# Patient Record
Sex: MALE | Race: WHITE | NOT HISPANIC OR LATINO | Employment: OTHER | URBAN - METROPOLITAN AREA
[De-identification: names, ages, dates, MRNs, and addresses within clinical notes are randomized per-mention and may not be internally consistent; named-entity substitution may affect disease eponyms.]

---

## 2017-01-04 ENCOUNTER — GENERIC CONVERSION - ENCOUNTER (OUTPATIENT)
Dept: OTHER | Facility: OTHER | Age: 52
End: 2017-01-04

## 2017-02-01 ENCOUNTER — HOSPITAL ENCOUNTER (OUTPATIENT)
Dept: SLEEP CENTER | Facility: CLINIC | Age: 52
Discharge: HOME | End: 2017-02-01
Payer: COMMERCIAL

## 2017-02-01 DIAGNOSIS — G47.33 OSA (OBSTRUCTIVE SLEEP APNEA): ICD-10-CM

## 2017-03-16 ENCOUNTER — APPOINTMENT (OUTPATIENT)
Dept: LAB | Facility: CLINIC | Age: 52
End: 2017-03-16
Payer: COMMERCIAL

## 2017-03-16 DIAGNOSIS — E78.5 HYPERLIPIDEMIA: ICD-10-CM

## 2017-03-16 DIAGNOSIS — E29.1 TESTICULAR HYPOFUNCTION: ICD-10-CM

## 2017-03-16 DIAGNOSIS — R73.01 IMPAIRED FASTING GLUCOSE: ICD-10-CM

## 2017-03-16 LAB
ALBUMIN SERPL BCP-MCNC: 4.1 G/DL (ref 3.5–5)
ALP SERPL-CCNC: 53 U/L (ref 46–116)
ALT SERPL W P-5'-P-CCNC: 62 U/L (ref 12–78)
ANION GAP SERPL CALCULATED.3IONS-SCNC: 9 MMOL/L (ref 4–13)
AST SERPL W P-5'-P-CCNC: 38 U/L (ref 5–45)
BASOPHILS # BLD AUTO: 0.03 THOUSANDS/ΜL (ref 0–0.1)
BASOPHILS NFR BLD AUTO: 0 % (ref 0–1)
BILIRUB SERPL-MCNC: 0.58 MG/DL (ref 0.2–1)
BUN SERPL-MCNC: 22 MG/DL (ref 5–25)
CALCIUM SERPL-MCNC: 8.6 MG/DL (ref 8.3–10.1)
CHLORIDE SERPL-SCNC: 98 MMOL/L (ref 100–108)
CHOLEST SERPL-MCNC: 127 MG/DL (ref 50–200)
CO2 SERPL-SCNC: 29 MMOL/L (ref 21–32)
CREAT SERPL-MCNC: 0.85 MG/DL (ref 0.6–1.3)
EOSINOPHIL # BLD AUTO: 0.18 THOUSAND/ΜL (ref 0–0.61)
EOSINOPHIL NFR BLD AUTO: 2 % (ref 0–6)
ERYTHROCYTE [DISTWIDTH] IN BLOOD BY AUTOMATED COUNT: 13.4 % (ref 11.6–15.1)
EST. AVERAGE GLUCOSE BLD GHB EST-MCNC: 140 MG/DL
GFR SERPL CREATININE-BSD FRML MDRD: >60 ML/MIN/1.73SQ M
GLUCOSE P FAST SERPL-MCNC: 117 MG/DL (ref 65–99)
HBA1C MFR BLD: 6.5 % (ref 4.2–6.3)
HCT VFR BLD AUTO: 45.7 % (ref 36.5–49.3)
HDLC SERPL-MCNC: 39 MG/DL (ref 40–60)
HGB BLD-MCNC: 15.8 G/DL (ref 12–17)
LDLC SERPL CALC-MCNC: 66 MG/DL (ref 0–100)
LYMPHOCYTES # BLD AUTO: 4 THOUSANDS/ΜL (ref 0.6–4.47)
LYMPHOCYTES NFR BLD AUTO: 43 % (ref 14–44)
MCH RBC QN AUTO: 32.2 PG (ref 26.8–34.3)
MCHC RBC AUTO-ENTMCNC: 34.6 G/DL (ref 31.4–37.4)
MCV RBC AUTO: 93 FL (ref 82–98)
MONOCYTES # BLD AUTO: 0.94 THOUSAND/ΜL (ref 0.17–1.22)
MONOCYTES NFR BLD AUTO: 10 % (ref 4–12)
NEUTROPHILS # BLD AUTO: 4.06 THOUSANDS/ΜL (ref 1.85–7.62)
NEUTS SEG NFR BLD AUTO: 45 % (ref 43–75)
NRBC BLD AUTO-RTO: 0 /100 WBCS
PLATELET # BLD AUTO: 197 THOUSANDS/UL (ref 149–390)
PMV BLD AUTO: 11.3 FL (ref 8.9–12.7)
POTASSIUM SERPL-SCNC: 3.9 MMOL/L (ref 3.5–5.3)
PROT SERPL-MCNC: 7.5 G/DL (ref 6.4–8.2)
PSA SERPL-MCNC: 0.6 NG/ML (ref 0–4)
RBC # BLD AUTO: 4.9 MILLION/UL (ref 3.88–5.62)
SODIUM SERPL-SCNC: 136 MMOL/L (ref 136–145)
TRIGL SERPL-MCNC: 112 MG/DL
WBC # BLD AUTO: 9.23 THOUSAND/UL (ref 4.31–10.16)

## 2017-03-16 PROCEDURE — 80061 LIPID PANEL: CPT

## 2017-03-16 PROCEDURE — 36415 COLL VENOUS BLD VENIPUNCTURE: CPT

## 2017-03-16 PROCEDURE — 84153 ASSAY OF PSA TOTAL: CPT

## 2017-03-16 PROCEDURE — 85025 COMPLETE CBC W/AUTO DIFF WBC: CPT

## 2017-03-16 PROCEDURE — 80053 COMPREHEN METABOLIC PANEL: CPT

## 2017-03-16 PROCEDURE — 84402 ASSAY OF FREE TESTOSTERONE: CPT

## 2017-03-16 PROCEDURE — 83036 HEMOGLOBIN GLYCOSYLATED A1C: CPT

## 2017-03-16 PROCEDURE — 84403 ASSAY OF TOTAL TESTOSTERONE: CPT

## 2017-03-17 LAB
TESTOST FREE SERPL-MCNC: 24.8 PG/ML (ref 7.2–24)
TESTOST SERPL-MCNC: 752 NG/DL (ref 348–1197)
TESTOSTERONE COMMENT: ABNORMAL

## 2017-03-20 ENCOUNTER — GENERIC CONVERSION - ENCOUNTER (OUTPATIENT)
Dept: OTHER | Facility: OTHER | Age: 52
End: 2017-03-20

## 2017-03-28 ENCOUNTER — ALLSCRIPTS OFFICE VISIT (OUTPATIENT)
Dept: OTHER | Facility: OTHER | Age: 52
End: 2017-03-28

## 2017-04-04 ENCOUNTER — ALLSCRIPTS OFFICE VISIT (OUTPATIENT)
Dept: OTHER | Facility: OTHER | Age: 52
End: 2017-04-04

## 2017-04-05 ENCOUNTER — GENERIC CONVERSION - ENCOUNTER (OUTPATIENT)
Dept: OTHER | Facility: OTHER | Age: 52
End: 2017-04-05

## 2017-04-10 ENCOUNTER — ALLSCRIPTS OFFICE VISIT (OUTPATIENT)
Dept: OTHER | Facility: OTHER | Age: 52
End: 2017-04-10

## 2017-04-11 ENCOUNTER — ALLSCRIPTS OFFICE VISIT (OUTPATIENT)
Dept: OTHER | Facility: OTHER | Age: 52
End: 2017-04-11

## 2017-04-18 ENCOUNTER — ALLSCRIPTS OFFICE VISIT (OUTPATIENT)
Dept: OTHER | Facility: OTHER | Age: 52
End: 2017-04-18

## 2017-04-25 ENCOUNTER — ALLSCRIPTS OFFICE VISIT (OUTPATIENT)
Dept: OTHER | Facility: OTHER | Age: 52
End: 2017-04-25

## 2017-06-27 ENCOUNTER — ALLSCRIPTS OFFICE VISIT (OUTPATIENT)
Dept: OTHER | Facility: OTHER | Age: 52
End: 2017-06-27

## 2017-07-03 DIAGNOSIS — E29.1 TESTICULAR HYPOFUNCTION: ICD-10-CM

## 2017-07-03 DIAGNOSIS — E11.9 TYPE 2 DIABETES MELLITUS WITHOUT COMPLICATIONS (HCC): ICD-10-CM

## 2017-07-03 DIAGNOSIS — E78.5 HYPERLIPIDEMIA: ICD-10-CM

## 2017-07-05 ENCOUNTER — GENERIC CONVERSION - ENCOUNTER (OUTPATIENT)
Dept: OTHER | Facility: OTHER | Age: 52
End: 2017-07-05

## 2017-07-19 ENCOUNTER — TRANSCRIBE ORDERS (OUTPATIENT)
Dept: LAB | Facility: CLINIC | Age: 52
End: 2017-07-19

## 2017-07-19 ENCOUNTER — GENERIC CONVERSION - ENCOUNTER (OUTPATIENT)
Dept: OTHER | Facility: OTHER | Age: 52
End: 2017-07-19

## 2017-07-19 ENCOUNTER — APPOINTMENT (OUTPATIENT)
Dept: LAB | Facility: CLINIC | Age: 52
End: 2017-07-19
Payer: COMMERCIAL

## 2017-07-19 DIAGNOSIS — E78.5 HYPERLIPIDEMIA: ICD-10-CM

## 2017-07-19 DIAGNOSIS — Z00.8 HEALTH EXAMINATION IN POPULATION SURVEY: ICD-10-CM

## 2017-07-19 DIAGNOSIS — E11.9 TYPE 2 DIABETES MELLITUS WITHOUT COMPLICATIONS (HCC): ICD-10-CM

## 2017-07-19 DIAGNOSIS — Z00.8 HEALTH EXAMINATION IN POPULATION SURVEY: Primary | ICD-10-CM

## 2017-07-19 DIAGNOSIS — E29.1 TESTICULAR HYPOFUNCTION: ICD-10-CM

## 2017-07-19 LAB
ALBUMIN SERPL BCP-MCNC: 3.8 G/DL (ref 3.5–5)
ALP SERPL-CCNC: 52 U/L (ref 46–116)
ALT SERPL W P-5'-P-CCNC: 43 U/L (ref 12–78)
ANION GAP SERPL CALCULATED.3IONS-SCNC: 5 MMOL/L (ref 4–13)
AST SERPL W P-5'-P-CCNC: 31 U/L (ref 5–45)
BILIRUB SERPL-MCNC: 0.65 MG/DL (ref 0.2–1)
BUN SERPL-MCNC: 16 MG/DL (ref 5–25)
CALCIUM SERPL-MCNC: 8.8 MG/DL (ref 8.3–10.1)
CHLORIDE SERPL-SCNC: 99 MMOL/L (ref 100–108)
CHOLEST SERPL-MCNC: 107 MG/DL (ref 50–200)
CO2 SERPL-SCNC: 31 MMOL/L (ref 21–32)
CREAT SERPL-MCNC: 0.81 MG/DL (ref 0.6–1.3)
CREAT UR-MCNC: 167 MG/DL
EST. AVERAGE GLUCOSE BLD GHB EST-MCNC: 143 MG/DL
GFR SERPL CREATININE-BSD FRML MDRD: >60 ML/MIN/1.73SQ M
GLUCOSE P FAST SERPL-MCNC: 118 MG/DL (ref 65–99)
HBA1C MFR BLD: 6.6 % (ref 4.2–6.3)
HDLC SERPL-MCNC: 32 MG/DL (ref 40–60)
LDLC SERPL CALC-MCNC: 57 MG/DL (ref 0–100)
MICROALBUMIN UR-MCNC: 6.3 MG/L (ref 0–20)
MICROALBUMIN/CREAT 24H UR: 4 MG/G CREATININE (ref 0–30)
POTASSIUM SERPL-SCNC: 4 MMOL/L (ref 3.5–5.3)
PROT SERPL-MCNC: 7 G/DL (ref 6.4–8.2)
SODIUM SERPL-SCNC: 135 MMOL/L (ref 136–145)
TRIGL SERPL-MCNC: 90 MG/DL

## 2017-07-19 PROCEDURE — 82043 UR ALBUMIN QUANTITATIVE: CPT

## 2017-07-19 PROCEDURE — 82570 ASSAY OF URINE CREATININE: CPT

## 2017-07-19 PROCEDURE — 84402 ASSAY OF FREE TESTOSTERONE: CPT

## 2017-07-19 PROCEDURE — 80053 COMPREHEN METABOLIC PANEL: CPT

## 2017-07-19 PROCEDURE — 84403 ASSAY OF TOTAL TESTOSTERONE: CPT

## 2017-07-19 PROCEDURE — 36415 COLL VENOUS BLD VENIPUNCTURE: CPT

## 2017-07-19 PROCEDURE — 83036 HEMOGLOBIN GLYCOSYLATED A1C: CPT

## 2017-07-19 PROCEDURE — 80061 LIPID PANEL: CPT

## 2017-07-20 ENCOUNTER — GENERIC CONVERSION - ENCOUNTER (OUTPATIENT)
Dept: OTHER | Facility: OTHER | Age: 52
End: 2017-07-20

## 2017-07-20 LAB
TESTOST FREE SERPL-MCNC: 25.2 PG/ML (ref 7.2–24)
TESTOST SERPL-MCNC: 962 NG/DL (ref 264–916)

## 2017-08-08 ENCOUNTER — GENERIC CONVERSION - ENCOUNTER (OUTPATIENT)
Dept: OTHER | Facility: OTHER | Age: 52
End: 2017-08-08

## 2017-09-05 DIAGNOSIS — E29.1 TESTICULAR HYPOFUNCTION: ICD-10-CM

## 2017-09-05 DIAGNOSIS — I10 ESSENTIAL (PRIMARY) HYPERTENSION: ICD-10-CM

## 2017-09-23 ENCOUNTER — TRANSCRIBE ORDERS (OUTPATIENT)
Dept: ADMINISTRATIVE | Age: 52
End: 2017-09-23

## 2017-09-23 ENCOUNTER — APPOINTMENT (OUTPATIENT)
Dept: LAB | Age: 52
End: 2017-09-23
Payer: COMMERCIAL

## 2017-09-23 DIAGNOSIS — E29.1 TESTICULAR HYPOFUNCTION: ICD-10-CM

## 2017-09-23 PROCEDURE — 84402 ASSAY OF FREE TESTOSTERONE: CPT

## 2017-09-23 PROCEDURE — 84403 ASSAY OF TOTAL TESTOSTERONE: CPT

## 2017-09-23 PROCEDURE — 36415 COLL VENOUS BLD VENIPUNCTURE: CPT

## 2017-09-25 ENCOUNTER — GENERIC CONVERSION - ENCOUNTER (OUTPATIENT)
Dept: OTHER | Facility: OTHER | Age: 52
End: 2017-09-25

## 2017-09-25 LAB
TESTOST FREE SERPL-MCNC: 8.4 PG/ML (ref 7.2–24)
TESTOST SERPL-MCNC: 487 NG/DL (ref 264–916)

## 2017-09-26 ENCOUNTER — GENERIC CONVERSION - ENCOUNTER (OUTPATIENT)
Dept: OTHER | Facility: OTHER | Age: 52
End: 2017-09-26

## 2017-10-04 ENCOUNTER — ALLSCRIPTS OFFICE VISIT (OUTPATIENT)
Dept: OTHER | Facility: OTHER | Age: 52
End: 2017-10-04

## 2017-10-17 ENCOUNTER — APPOINTMENT (OUTPATIENT)
Dept: LAB | Facility: CLINIC | Age: 52
End: 2017-10-17
Payer: COMMERCIAL

## 2017-10-17 DIAGNOSIS — I10 ESSENTIAL (PRIMARY) HYPERTENSION: ICD-10-CM

## 2017-10-17 LAB
ANION GAP SERPL CALCULATED.3IONS-SCNC: 8 MMOL/L (ref 4–13)
BUN SERPL-MCNC: 13 MG/DL (ref 5–25)
CALCIUM SERPL-MCNC: 8.6 MG/DL (ref 8.3–10.1)
CHLORIDE SERPL-SCNC: 99 MMOL/L (ref 100–108)
CO2 SERPL-SCNC: 30 MMOL/L (ref 21–32)
CREAT SERPL-MCNC: 0.93 MG/DL (ref 0.6–1.3)
GFR SERPL CREATININE-BSD FRML MDRD: 95 ML/MIN/1.73SQ M
GLUCOSE SERPL-MCNC: 124 MG/DL (ref 65–140)
POTASSIUM SERPL-SCNC: 3.9 MMOL/L (ref 3.5–5.3)
SODIUM SERPL-SCNC: 137 MMOL/L (ref 136–145)

## 2017-10-17 PROCEDURE — 80048 BASIC METABOLIC PNL TOTAL CA: CPT

## 2017-10-17 PROCEDURE — 36415 COLL VENOUS BLD VENIPUNCTURE: CPT

## 2017-10-18 ENCOUNTER — GENERIC CONVERSION - ENCOUNTER (OUTPATIENT)
Dept: OTHER | Facility: OTHER | Age: 52
End: 2017-10-18

## 2017-10-24 ENCOUNTER — ALLSCRIPTS OFFICE VISIT (OUTPATIENT)
Dept: OTHER | Facility: OTHER | Age: 52
End: 2017-10-24

## 2017-10-25 NOTE — PROGRESS NOTES
Assessment  1  Parotid gland enlargement (527 1) (K11 1)    Plan  Parotid gland enlargement    · Amoxicillin-Pot Clavulanate 875-125 MG Oral Tablet; TAKE 1 TABLET TWICE  DAILY AFTER MEALS UNTIL FINISHED    Discussion/Summary    Course of antibiotics for suspected parotitis  I advised CT scan parotids for persistent symptoms  Call if any changes  Chief Complaint  1  Sore Throat    History of Present Illness  HPI: Patient presents with a 2 day history of mild swelling parotid area R > L  no pain with chewing or swallowing  no nasal congestion or post nasal drainage  Current medications reviewed  History of ankylosing spondylitis      Review of Systems    Constitutional: no fever,-- no recent weight gain,-- no chills-- and-- no recent weight loss  ENT: as noted in HPI,-- no earache,-- no sore throat,-- no nasal discharge-- and-- no hoarseness  Respiratory: no cough  Gastrointestinal: no nausea,-- no vomiting-- and-- no diarrhea  Integumentary: no rashes  Neurological: no headache  Active Problems  1  Cervical radiculopathy (723 4) (M54 12)   2  Chronic pain syndrome (338 4) (G89 4)   3  Encounter for long-term (current) use of medications (V58 69) (Z79 899)   4  Essential hypertension (401 9) (I10)   5  Hyperlipidemia (272 4) (E78 5)   6  Lumbar postlaminectomy syndrome (722 83) (M96 1)   7  Testicular hypogonadism (257 2) (E29 1)   8  Tobacco use (305 1) (Z72 0)   9  Type 2 diabetes mellitus (250 00) (E11 9)    Past Medical History  1  History of Pilonidal cyst (685 1) (L05 91)    Family History  Father    1  Family history of Coronary Artery Disease (V17 49)   2  Family history of Heart Disease (V17 49)  Sister    3  Family history of Hypertension (V17 49)   4  Family history of Pure Hypercholesterolemia  Brother    5  Family history of Diabetes Mellitus (V18 0)   6  Family history of Diabetes Mellitus (V18 0)   7  Family history of Hypertension (V17 49)   8   Family history of Pure Hypercholesterolemia  Family History    9  Family history of Hypertension (V17 49)   10  Family history of No Significant Family History    Social History   · Denied: History of Alcohol Use (History)   · Current every day smoker (305 1) (F17 200)   · Denied: History of Drug Use   · Denied: History of Never A Smoker   · Tobacco use (305 1) (Z72 0)    Surgical History  1  History of Electr Analysis Of Progr Impl Pump W/ Reprogram & Refill, Requiring Physian    Current Meds   1  Atorvastatin Calcium 20 MG Oral Tablet; TAKE 1 TABLET AT BEDTIME; Therapy: 96CUF7807 to (Evaluate:44Ncx1222)  Requested for: 06DQR7371; Last   Rx:05Nlw4757 Ordered   2  BD Luer-Jordon Syringe 21G X 1 3 ML Miscellaneous; Use 1 per week; Therapy: 31LNT0707 to (Evaluate:12Ryt8903)  Requested for: 40CPX0984; Last   Rx:03Zoh6746 Ordered   3  Dilaudid SOLN Recorded   4  Gabapentin 300 MG Oral Capsule; take 3 daily Recorded   5  Lisinopril 20 MG Oral Tablet; Take 1 tablet daily; Therapy: 10PEJ9064 to (Last Rx:04Oct2017)  Requested for: 58HIO0700 Ordered   6  Methadone HCl - 10 MG Oral Tablet; Therapy: 25UOZ6074 to (Last PC:23VRI4895)  Requested for: 92PPU9157 Ordered   7  Omeprazole 40 MG Oral Capsule Delayed Release; Therapy: 21Jan2012 to (Last GB:14AVE7156)  Requested for: 21Jan2012 Ordered   8  OneTouch Lancets Miscellaneous; TEST 3X PER WEEK; Therapy: 52UYS4579 to (Evaluate:06Lcr6206)  Requested for: 28Mar2017; Last   Rx:28Mar2017 Ordered   9  OneTouch Verio In [employer]; TEST 3X PER WEEK; Therapy: 14LRX5133 to (Evaluate:84Zwd9589)  Requested for: 28Mar2017; Last   Rx:28Mar2017 Ordered   10  OneTouch Verio w/Device Kit; USE AS DIRECTED; Therapy: 60EWS8533 to (Evaluate:27Jun2017); Last Rx:29Mar2017 Ordered   11  OxyCONTIN 40 MG TB12;    Therapy: 75PAB9194 to (Last Rx:12Jan2012)  Requested for: 12Jan2012 Ordered   12  OxyCONTIN 80 MG TB12;    Therapy: 67HKV1974 to (Last Rx:12Jan2012)  Requested for: 12Jan2012 Ordered   13  Testosterone Cypionate 200 MG/ML Intramuscular Solution; Inject 80mg (0 4mL) weekly    as directed by physician; Therapy: 18TGN4691 to (Evaluate:21Jan2018) Recorded    Allergies  1  No Known Drug Allergies    Vitals   Recorded: 17GKY1885 11:32AM   Temperature 98 5 F, Tympanic   Heart Rate 78   Respiration 16   Systolic 314, LUE, Sitting   Diastolic 80, LUE, Sitting   Height 5 ft 9 in   Weight 220 lb    BMI Calculated 32 49   BSA Calculated 2 15     Physical Exam    Constitutional   General appearance: No acute distress, well appearing and well nourished  Eyes   Conjunctiva and lids: No erythema, swelling or discharge  -- sclera anicteric  Ears, Nose, Mouth, and Throat mild swelling parotid areas R > L  no tenderness  no redness or warmth  no masses  Otoscopic examination: Tympanic membranes translucent with normal light reflex  Canals patent without erythema  Oropharynx: Normal with no erythema, edema, exudate or lesions  Neck   Neck: Supple, symmetric, trachea midline, no masses  Thyroid: Normal, no thyromegaly  Pulmonary   Auscultation of lungs: Clear to auscultation  Cardiovascular   Auscultation of heart: Normal rate and rhythm, normal S1 and S2, no murmurs  Carotid pulses: 2+ bilaterally  Lymphatic   Palpation of lymph nodes in neck: No lymphadenopathy  no anterior cervical node enlargement,-- no posterior cervical node enlargement,-- no submandibular node enlargement-- and-- no supraclavicular node enlargement  Skin   Skin and subcutaneous tissue: Normal without rashes or lesions  Results/Data  (1) BASIC METABOLIC PROFILE 10WBT0474 11:44AM Donna Brizuelaiden Order Number: [id number]     Test Name Result Flag Reference   GLUCOSE,RANDM 124 mg/dL     If the patient is fasting, the ADA then defines impaired fasting glucose as > 100 mg/dL and diabetes as > or equal to 123 mg/dL    Specimen collection should occur prior to Sulfasalazine administration due to the potential for falsely depressed results  Specimen collection should occur prior to Sulfapyridine administration due to the potential for falsely elevated results  SODIUM 137 mmol/L  136-145   POTASSIUM 3 9 mmol/L  3 5-5 3   CHLORIDE 99 mmol/L L 100-108   CARBON DIOXIDE 30 mmol/L  21-32   ANION GAP (CALC) 8 mmol/L  4-13   BLOOD UREA NITROGEN 13 mg/dL  5-25   CREATININE 0 93 mg/dL  0 60-1 30   Standardized to IDMS reference method   CALCIUM 8 6 mg/dL  8 3-10 1   eGFR 95 ml/min/1 73sq [facility] Disease Education Program recommendations are as follows:  GFR calculation is accurate only with a steady state creatinine  Chronic Kidney disease less than 60 ml/min/1 73 sq  meters  Kidney failure less than 15 ml/min/1 73 sq  meters  (1) TESTOSTERONE, FREE (DIRECT) AND TOTAL 10Qnh6035 11:06AM Brown Keel Order Number: [id number]     Test Name Result Flag Reference   FREE TESTOSTERONE, DIRECT 8 4 pg/mL  7 2 - 24 0   TESTOSTERONE (TOTAL) 487 ng/dL  36 - 65   Adult male reference interval is based on a population of  healthy nonobese males (BMI <30) between 23and 44years old  [address] [place] [address], [address] [place] [address] [phone]  PMID: [id number]  Performed at:  [address]  [id number]  : Kailey Guerrero MD, Phone:  [phone]     (1) HEMOGLOBIN A1C 51MYT3023 08:12AM Brown Keel Order Number: [id number]     Test Name Result Flag Reference   HEMOGLOBIN A1C 6 6 % H 4 2-6 3   EST  AVG   GLUCOSE 143 mg/dl       (1) MICROALBUMIN CREATININE RATIO, RANDOM URINE 46Djj9983 08:12AM Brown Keel Order Number: [id number]     Test Name Result Flag Reference   MICROALBUMIN/ CREAT R 4 mg/g creatinine  0-30   MICROALBUMIN,URINE 6 3 mg/L  0 0-20 0   CREATININE URINE 167 0 mg/dL       (1) COMPREHENSIVE METABOLIC PANEL 26ZJE9150 77:17RQ Brown Keel Order Number: [id number]     Test Name Result Flag Reference   SODIUM 135 mmol/L L 136-145   POTASSIUM 4 0 mmol/L  3 5-5 3   CHLORIDE 99 mmol/L L 100-108   CARBON DIOXIDE 31 mmol/L  21-32   ANION GAP (CALC) 5 mmol/L  4-13   BLOOD UREA NITROGEN 16 mg/dL  5-25   CREATININE 0 81 mg/dL  0 60-1 30   Standardized to IDMS reference method   CALCIUM 8 8 mg/dL  8 3-10 1   BILI, TOTAL 0 65 mg/dL  0 20-1 00   ALK PHOSPHATAS 52 U/L     ALT (SGPT) 43 U/L  12-78   AST(SGOT) 31 U/L  5-45   ALBUMIN 3 8 g/dL  3 5-5 0   TOTAL PROTEIN 7 0 g/dL  6 4-8 2   eGFR Non-African American      >60 0 ml/min/1 73sq [facility] Disease Education Program recommendations are as follows:  GFR calculation is accurate only with a steady state creatinine  Chronic Kidney disease less than 60 ml/min/1 73 sq  meters  Kidney failure less than 15 ml/min/1 73 sq  meters  GLUCOSE FASTING 118 mg/dL H 65-99     (1) LIPID PANEL, FASTING 39RLJ3427 08:12AM Baljinder Orf     Test Name Result Flag Reference   CHOLESTEROL 107 mg/dL     HDL,DIRECT 32 mg/dL L 40-60   Specimen collection should occur prior to Metamizole administration due to the potential for falsely depressed results  LDL CHOLESTEROL CALCULATED 57 mg/dL  0-100   This is a fasting blood test  Water,black tea or black  coffee only after 9:00pm the night before test  Drink 2 glasses of water the morning of test         Triglyceride:         Normal              <150 mg/dl       Borderline High    150-199 mg/dl       High               200-499 mg/dl       Very High          >499 mg/dl  Cholesterol:         Desirable        <200 mg/dl      Borderline High  200-239 mg/dl      High             >239 mg/dl  HDL Cholesterol:        High    >59 mg/dL      Low     <41 mg/dL  LDL CALCULATED:    This screening LDL is a calculated result  It does not have the accuracy of the Direct Measured LDL in the monitoring of patients with hyperlipidemia and/or statin therapy  Direct Measure LDL (DNV441) must be ordered separately in these patients  TRIGLYCERIDES 90 mg/dL  <=150   Specimen collection should occur prior to N-Acetylcysteine or Metamizole administration due to the potential for falsely depressed results       Future Appointments    Date/Time Provider Specialty Site   04/05/2018 10:00 AM Jabier Go [facility] Endocrinology [address] [facility] ENDOCRINOLOGY     Signatures   Electronically signed by : ESTHER Stewart ; Oct 24 2017  1:55PM EST                       (Author)

## 2017-10-27 NOTE — PROGRESS NOTES
Assessment  1  Type 2 diabetes mellitus (250 00) (E11 9)   2  Testicular hypogonadism (257 2) (E29 1)   3  Hyperlipidemia (272 4) (E78 5)   4  Essential hypertension (401 9) (I10)    Plan  Essential hypertension    · (1) BASIC METABOLIC PROFILE; Status:Active; Requested GOV:27IXF4638;    Perform:[facility]; MGD:78FTM9923; Ordered;For:Essential hypertension; Ordered By:Xenia Barrett;  Essential hypertension, Type 2 diabetes mellitus    · Lisinopril 20 MG Oral Tablet; Take 1 tablet daily   Rx By: Lester Hilario; Dispense: 0 Days ; #:90 Tablet; Refill: 3;For: Essential hypertension, Type 2 diabetes mellitus; YARY = N; Verified Transmission to [address] [place] [address]; Last Updated By: System, [employer]; 10/4/2017 10:31:54 AM  Type 2 diabetes mellitus    · (1) HEMOGLOBIN A1C; Status:Active; Requested for:44Ifx8530;    Perform:[facility]; CCK:57LJD8614; Ordered; For:Type 2 diabetes mellitus; Ordered By:Xenia Barrett;   · Follow-up visit in 6 months Evaluation and Treatment  Follow-up  Status: Hold For -  Scheduling  Requested for: 42MSC1024   Ordered; For: Type 2 diabetes mellitus; Ordered By: Lester Hilario Performed:  Due: 60OEL2767   · Follow-Up With Advanced Practitioner Evaluation and Treatment  Follow-up  Status: Hold  For - Scheduling  Requested for: 62EWL6511   Ordered; For: Type 2 diabetes mellitus; Ordered By: Lester Hilario Performed:  Due: 47FNN1708    Discussion/Summary  Discussion Summary:   1  Hypogonadism-continue testosterone replacement  Type 2 diabetes-this is well controlled with lifestyle modification  Repeat hemoglobin A1c late November  If this is increasing, consider starting metformin  Hypertension-the blood pressure remains elevated  Increase lisinopril to 20 mg  Check BMP in 10 days  Hyperlipidemia-continue statin  Counseling Documentation With Imm: The patient was counseled regarding diagnostic results,-- instructions for management,-- impressions     Medication SE Review and Pt Understands Tx: The treatment plan was reviewed with the patient/guardian  The patient/guardian understands and agrees with the treatment plan      Chief Complaint  Chief Complaint Free Text Note Form: Follow up      History of Present Illness  Diabetes: The patient is being seen for routine follow-up of Diabetes Mellitus 2  The HbA1c was 6 6% performed on 7-19-17  The patient is not currently taking any medication for this problem  By report, there is good compliance with treatment,-- good tolerance of treatment-- and-- good symptom control  Current pertinent lifestyle factors include no obesity,-- no past or present history of a sedentary lifestyle-- and-- no inactivity  Symptoms reported by the patient include no polydipsia,-- no polyuria,-- no blurred vision,-- no polyphagia-- and-- no nocturia  Hypogonadism, Primary: The patient is being seen for follow-up of primary hypogonadism  The etiology is medication side effects  Current treatment includes intramuscular testosterone  The patient is currently asymptomatic  Hyperlipidemia (Follow-Up): The patient states his hyperlipidemia has been stable since the last visit  Comorbid Illnesses: diabetes mellitus-- and-- hypertension  He has no significant interval events  Symptoms: The patient is currently asymptomatic  Associated symptoms include no focal neurologic deficits  Hypertension (Follow-Up): The patient presents for follow-up of essential hypertension  The patient states he has been stable with his blood pressure control since the last visit  He has no comorbid illnesses  He has no significant interval events  Symptoms: The patient is currently asymptomatic  Review of Systems  Endo Adult ROS Male Established v2 - [facility]:   Constitutional/General: recent weight gain,-- no recent weight loss,-- poor energy/fatigue,-- no increased energy level,-- no insomnia/sleep problems,-- no fever-- and-- feeling weak     Heart: high blood pressure-- and-- palpitations, but-- no chest pain/tightness-- and-- no rapid/racing heart rate  Genitourinary - Urinary no frequent urination,-- no excess urination-- and-- no urinating during the night  Eyes: gritty/scratchy eyes, but-- no blurred vision,-- no double vision,-- no bulging eyes-- and-- no excessive tearing  Mouth / Throat: no hoarseness-- and-- no difficulty swallowing  Neck: no lumps,-- no swollen glands,-- no neck pain,-- neck stiffness-- and-- no enlarged thyroid  Respiratory: no wheezing,-- no asthma-- and-- no persistent cough  Musculoskeletal: muscle aches/pain,-- joint aches/pain-- and-- muscle weakness  Skin & Hair: no dry skin,-- no acne,-- the hair texture was not oily,-- no hair loss-- and-- no excessive hair growth  Gastrointestinal: no constipation,-- no diarrhea,-- no waking at night to drink-- and-- no stomach ache  Neurological: no blackouts,-- no weakness-- and-- no tremors  Genital: no testicular pain-- and-- no testicular lumps/bumps/mass  Endocrine: feeling hot frequently,-- no feeling cold frequently,-- no shifts between feeling hot and cold,-- no cold hands or feet,-- no excessive sweating,-- no thyroid problems,-- blood sugar problems,-- no excessive thirst,-- no excessive hunger,-- no change in shoe size,-- no nausea or vomiting-- and-- no shaky hands  ROS Reviewed:   ROS reviewed  Active Problems  1  Cervical radiculopathy (723 4) (M54 12)   2  Chronic pain syndrome (338 4) (G89 4)   3  Encounter for long-term (current) use of medications (V58 69) (Z79 899)   4  Encounter for pre-operative examination (V72 84) (Z01 818)   5  Essential hypertension (401 9) (I10)   6  Follow-up examination following surgery (V67 00) (Z09)   7  Hyperlipidemia (272 4) (E78 5)   8  Lumbar postlaminectomy syndrome (722 83) (M96 1)   9  Need for prophylactic vaccination and inoculation against influenza (V04 81) (Z23)   10  Surgery, elective (V50 9) (Z41 9)   11  Testicular hypogonadism (257 2) (E29 1)   12  Tobacco use (305 1) (Z72 0)   13  Type 2 diabetes mellitus (250 00) (E11 9)    Past Medical History  1  History of Pilonidal cyst (685 1) (L05 91)  Active Problems And Past Medical History Reviewed: The active problems and past medical history were reviewed and updated today  Surgical History  1  History of Electr Analysis Of Progr Impl Pump W/ Reprogram & Refill, Requiring Physian  Surgical History Reviewed: The surgical history was reviewed and updated today  Family History  Father    1  Family history of Coronary Artery Disease (V17 49)   2  Family history of Heart Disease (V17 49)  Sister    3  Family history of Hypertension (V17 49)   4  Family history of Pure Hypercholesterolemia  Brother    5  Family history of Diabetes Mellitus (V18 0)   6  Family history of Diabetes Mellitus (V18 0)   7  Family history of Hypertension (V17 49)   8  Family history of Pure Hypercholesterolemia  Family History    9  Family history of Hypertension (V17 49)   10  Family history of No Significant Family History  Family History Reviewed: The family history was reviewed and updated today  Social History   · Denied: History of Alcohol Use (History)   · Current every day smoker (305 1) (F17 200)   · Denied: History of Drug Use   · Denied: History of Never A Smoker   · Tobacco use (305 1) (Z72 0)  Social History Reviewed: The social history was reviewed and updated today  Current Meds   1  Atorvastatin Calcium 20 MG Oral Tablet; TAKE 1 TABLET AT BEDTIME; Therapy: 25ZGU5729 to (Evaluate:12Fzt8228)  Requested for: 84YCQ3216; Last   Rx:17Crs6209 Ordered   2  BD Luer-Jordon Syringe 21G X 1 3 ML Miscellaneous; Use 1 per week; Therapy: 59EUY3389 to (Evaluate:92Xql0648)  Requested for: 45MGX3833; Last   Rx:00Ntd8448 Ordered   3  Dilaudid SOLN Recorded   4  Gabapentin 300 MG Oral Capsule; take 3 daily Recorded   5  Lisinopril 10 MG Oral Tablet;  Take 1 tablet daily; Therapy: 52RAH7232 to (Evaluate:46Qlm1326)  Requested for: 54UHH6208; Last   BY:38ZJY9306 Ordered   6  Methadone HCl - 10 MG Oral Tablet; Therapy: 66GLB4240 to (Last VM:29DPZ7443)  Requested for: 30WVW2156 Ordered   7  Omeprazole 40 MG Oral Capsule Delayed Release; Therapy: 21Jan2012 to (Last EK:52EVG1707)  Requested for: 21Jan2012 Ordered   8  OneTouch Lancets Miscellaneous; TEST 3X PER WEEK; Therapy: 11DLH7046 to (Evaluate:20Wyv1355)  Requested for: 28Mar2017; Last   Rx:28Mar2017 Ordered   9  OneTouch Verio In [employer]; TEST 3X PER WEEK; Therapy: 08SDG0384 to (Evaluate:75Its6423)  Requested for: 28Mar2017; Last   Rx:28Mar2017 Ordered   10  OneTouch Verio w/Device Kit; USE AS DIRECTED; Therapy: 64MDF7680 to (Evaluate:27Jun2017); Last Rx:29Mar2017 Ordered   11  OxyCONTIN 40 MG TB12;    Therapy: 89MHK4540 to (Last Rx:12Jan2012)  Requested for: 12Jan2012 Ordered   12  OxyCONTIN 80 MG TB12;    Therapy: 94YXL2850 to (Last Rx:12Jan2012)  Requested for: 12Jan2012 Ordered   13  Testosterone Cypionate 200 MG/ML Intramuscular Solution; Inject 80mg (0 4mL) weekly    as directed by physician; Therapy: 70KUE2959 to (Evaluate:21Jan2018) Recorded  Medication List Reviewed: The medication list was reviewed and updated today  Allergies  1  No Known Drug Allergies    Vitals  Vital Signs    Recorded: 09ZEH9181 10:14AM   Heart Rate 92   Systolic 563   Diastolic 96   Height 5 ft 9 in   Weight 219 lb    BMI Calculated 32 34   BSA Calculated 2 14     Physical Exam    Constitutional   General appearance: No acute distress, well appearing and well nourished  Eyes   Conjunctiva and lids: No swelling, erythema, or discharge  Pupils: Equal, round and reactive to light  The sclera are anicteric  Extraocular movements are intact  Ears, Nose, Mouth, and Throat   External inspection of ears, nose and lips: Normal     Oropharynx: Normal with no erythema, edema, exudate or lesions      Exam of Head: The head is atraumatic and normocephalic  Neck: The neck is supple  The thyroid is normal in size with no palpable nodules  Pulmonary   Auscultation of lungs: Clear to auscultation bilaterally with normal chest expansion  Cardiovascular   Auscultation of heart: Normal rate and rhythm with no murmurs, gallops or rubs  Abdomen   Abdomen: Abdomen is soft, non-tender with normal bowel sounds  Lymphatic   Palpation of lymph nodes: No supraclavicular or suboccipital lymphadenopathy  Musculoskeletal   Inspection/palpation of joints, bones, and muscles: Muscle bulk and tone is normal     Skin   Skin and subcutaneous tissue: Normal skin temperature and color  Neurologic   Cranial nerves: Cranial nerves 2-12 intact  Reflexes: 2+ and symmetric  Motor Strength: Strength is 5/5 bilaterally  Psychiatric   Orientation to person, place and time: Normal     Mood and affect: Affect and attention span are normal        Results/Data  (1) TESTOSTERONE, FREE (DIRECT) AND TOTAL 32Vwa5072 11:06AM Grecia Ramires Order Number: [id number]     Test Name Result Flag Reference   FREE TESTOSTERONE, DIRECT 8 4 pg/mL  7 2 - 24 0   TESTOSTERONE (TOTAL) 487 ng/dL  264 - 65   Adult male reference interval is based on a population of  healthy nonobese males (BMI <30) between 23and 44years old  [address] [place] [address], [address] [phone]  PMID: [id number]  Performed at:  [address]  [id number]  : Pedro Rodrigez MD, Phone:  [phone]     (1) HEMOGLOBIN A1C 53QKO0922 08:12AM Grecia Ramires Order Number: [id number]     Test Name Result Flag Reference   HEMOGLOBIN A1C 6 6 % H 4 2-6 3   EST  AVG   GLUCOSE 143 mg/dl       (1) MICROALBUMIN CREATININE RATIO, RANDOM URINE 87Cxd9297 08:12AM Grecia Ramires Order Number: [id number]     Test Name Result Flag Reference   MICROALBUMIN/ CREAT R 4 mg/g creatinine  0-30   MICROALBUMIN,URINE 6 3 mg/L  0 0-20 0   CREATININE URINE 167 0 mg/dL       Future Appointments    Date/Time Provider Specialty Site   12/14/2017 10:10 AM ESTHER Cortez   Endocrinology [facility] ENDOCRINOLOGY     Signatures   Electronically signed by : ESTHER Pozo ; Oct  4 2017 10:33AM EST                       (Author)

## 2017-11-27 DIAGNOSIS — E11.9 TYPE 2 DIABETES MELLITUS WITHOUT COMPLICATIONS (HCC): ICD-10-CM

## 2018-01-09 NOTE — RESULT NOTES
Message   A1C is 6 5-- Which is a new Diagnosis of Type 2 Diabetes  Will place orders for Diabetes Education and Medical Nutrition therapy  He should follow up in 3 months instead of december  For now would suggest diet, exercise, and weight loss  Verified Results  (1) COMPREHENSIVE METABOLIC PANEL 89XFG4271 04:73QQ Lorelei Figueroadeidre Order Number: [id number]     Test Name Result Flag Reference   SODIUM 136 mmol/L  136-145   POTASSIUM 3 9 mmol/L  3 5-5 3   CHLORIDE 98 mmol/L L 100-108   CARBON DIOXIDE 29 mmol/L  21-32   ANION GAP (CALC) 9 mmol/L  4-13   BLOOD UREA NITROGEN 22 mg/dL  5-25   CREATININE 0 85 mg/dL  0 60-1 30   Standardized to IDMS reference method   CALCIUM 8 6 mg/dL  8 3-10 1   BILI, TOTAL 0 58 mg/dL  0 20-1 00   ALK PHOSPHATAS 53 U/L     ALT (SGPT) 62 U/L  12-78   AST(SGOT) 38 U/L  5-45   ALBUMIN 4 1 g/dL  3 5-5 0   TOTAL PROTEIN 7 5 g/dL  6 4-8 2   eGFR Non-African American      >60 0 ml/min/1 73sq m   - Patient Instructions: This is a fasting blood test  Water,black tea or black  coffee only after 9:00pm the night before test Drink 2 glasses of water the morning of test   National Kidney Disease Education Program recommendations are as follows:  GFR calculation is accurate only with a steady state creatinine  Chronic Kidney disease less than 60 ml/min/1 73 sq  meters  Kidney failure less than 15 ml/min/1 73 sq  meters  GLUCOSE FASTING 117 mg/dL H 65-99     (1) LIPID PANEL, FASTING 19SIK7647 07:28AM Lorelei Carmen Order Number: [id number]     Test Name Result Flag Reference   CHOLESTEROL 127 mg/dL     HDL,DIRECT 39 mg/dL L 40-60   Specimen collection should occur prior to Metamizole administration due to the potential for falsely depressed results  LDL CHOLESTEROL CALCULATED 66 mg/dL  0-100   - Patient Instructions:  This is a fasting blood test  Water,black tea or black  coffee only after 9:00pm the night before test   Drink 2 glasses of water the morning of test     - Patient Instructions: This is a fasting blood test  Water,black tea or black  coffee only after 9:00pm the night before test Drink 2 glasses of water the morning of test   Triglyceride:         Normal              <150 mg/dl       Borderline High    150-199 mg/dl       High               200-499 mg/dl       Very High          >499 mg/dl  Cholesterol:         Desirable        <200 mg/dl      Borderline High  200-239 mg/dl      High             >239 mg/dl  HDL Cholesterol:        High    >59 mg/dL      Low     <41 mg/dL  LDL CALCULATED:    This screening LDL is a calculated result  It does not have the accuracy of the Direct Measured LDL in the monitoring of patients with hyperlipidemia and/or statin therapy  Direct Measure LDL (QSJ061) must be ordered separately in these patients  TRIGLYCERIDES 112 mg/dL  <=150   Specimen collection should occur prior to N-Acetylcysteine or Metamizole administration due to the potential for falsely depressed results  (1) HEMOGLOBIN A1C 77TCG4370 07:28AM [facility] Order Number: [id number]     Test Name Result Flag Reference   HEMOGLOBIN A1C 6 5 % H 4 2-6 3   EST  AVG  GLUCOSE 140 mg/dl       (1) TESTOSTERONE, FREE (DIRECT) AND TOTAL 59DHR7589 07:28AM [facility] Order Number: [id number]     Test Name Result Flag Reference   FREE TESTOSTERONE, DIRECT 24 8 pg/mL H 7 2 - 24 0   COMMENT Comment     Adult male reference interval is based on a population of lean males  up to 36years old     TESTOSTERONE (TOTAL) 752 ng/dL  348 - 1197   Performed at:  [address] [place] [address]  [id number]  : Riki Vences MD, Phone:  [phone]     (1) PSA, DIAGNOSTIC (FOLLOW-UP) 33WWY9122 07:28AM [facility] Order Number: [id number]     Test Name Result Flag Reference   PSA 0 6 ng/mL  0 0-4 0   American Urological Association Guidelines define biochemical recurrence of prostate cancer as a detectable or rising PSA value post-radical prostatectomy that is greater than or equal to 0 2 ng/mL with a second confirmatory level of greater than or equal to 0 2 ng/mL  (1) CBC/PLT/DIFF 65ABH7820 07:28AM Andrés Lemus Order Number: [id number]     Test Name Result Flag Reference   WBC COUNT 9 23 Thousand/uL  4 31-10 16   RBC COUNT 4 90 Million/uL  3 88-5 62   HEMOGLOBIN 15 8 g/dL  12 0-17 0   HEMATOCRIT 45 7 %  36 5-49 3   MCV 93 fL  82-98   MCH 32 2 pg  26 8-34 3   MCHC 34 6 g/dL  31 4-37 4   RDW 13 4 %  11 6-15 1   MPV 11 3 fL  8 9-12 7   PLATELET COUNT 828 Thousands/uL  149-390   nRBC AUTOMATED 0 /100 WBCs     NEUTROPHILS RELATIVE PERCENT 45 %  43-75   LYMPHOCYTES RELATIVE PERCENT 43 %  14-44   MONOCYTES RELATIVE PERCENT 10 %  4-12   EOSINOPHILS RELATIVE PERCENT 2 %  0-6   BASOPHILS RELATIVE PERCENT 0 %  0-1   NEUTROPHILS ABSOLUTE COUNT 4 06 Thousands/? ??L  1 85-7 62   LYMPHOCYTES ABSOLUTE COUNT 4 00 Thousands/? ??L  0 60-4 47   MONOCYTES ABSOLUTE COUNT 0 94 Thousand/? ??L  0 17-1 22   EOSINOPHILS ABSOLUTE COUNT 0 18 Thousand/? ??L  0 00-0 61   BASOPHILS ABSOLUTE COUNT 0 03 Thousands/? ??L  0 00-0 10   - Patient Instructions: This bloodwork is non-fasting  Please drink two glasses of water morning of bloodwork  - Patient Instructions: This bloodwork is non-fasting  Please drink two glasses of water morning of bloodwork  Plan  Type 2 diabetes mellitus    · Follow-up visit in 3 months Evaluation and Treatment  Follow-up  Status: Hold For -  Scheduling  Requested for: 96GKM9620   · *1 - [facility] SELF MANAGEMENT TRAINING OUTPATIENT Physician Referral   New DX Type 2 Diabetes  Please provide meter sample and I will send RX for strips to pharmacy for 3x  per week testing  Thanks!   Status: Hold For - Scheduling  Requested for: 20Mar2017  Instruction : Yes  requires instruction : Yes  Needs requiring Individual DSMT? : No  Self-Management Education/Trainng : Living Well with Diabetes Education      Program  Care Summary provided  : Yes   · *1 - [facility] DIABETES Physician Referral  Consult  Only: the expectation is that the referring provider will communicate back to the patient  on treatment options  Evaluation and Treatment: the expectation is that the referred to  provider will communicate back to the patient on treatment options    Status: Need  Information - Financial Authorization  Requested for: [id number]  Care Summary provided  : Yes    Signatures   Electronically signed by : Courtney Collins [facility]; Mar 20 2017  9:48AM EST                       (Author)

## 2018-01-10 NOTE — RESULT NOTES
Discussion/Summary   A1C 6 6 Diabetes under good control rest of labs OK     Verified Results  (1) HEMOGLOBIN A1C 87VBI7071 08:12AM Eugenia Salgado Order Number: [id number]     Test Name Result Flag Reference   HEMOGLOBIN A1C 6 6 % H 4 2-6 3   EST  AVG  GLUCOSE 143 mg/dl       (1) MICROALBUMIN CREATININE RATIO, RANDOM URINE 23Hhr9997 08:12AM Eugenia Salgado Order Number: [id number]     Test Name Result Flag Reference   MICROALBUMIN/ CREAT R 4 mg/g creatinine  0-30   MICROALBUMIN,URINE 6 3 mg/L  0 0-20 0   CREATININE URINE 167 0 mg/dL       (1) COMPREHENSIVE METABOLIC PANEL 70KRD1635 79:21LD Eugenia Salgado Order Number: [id number]     Test Name Result Flag Reference   SODIUM 135 mmol/L L 136-145   POTASSIUM 4 0 mmol/L  3 5-5 3   CHLORIDE 99 mmol/L L 100-108   CARBON DIOXIDE 31 mmol/L  21-32   ANION GAP (CALC) 5 mmol/L  4-13   BLOOD UREA NITROGEN 16 mg/dL  5-25   CREATININE 0 81 mg/dL  0 60-1 30   Standardized to IDMS reference method   CALCIUM 8 8 mg/dL  8 3-10 1   BILI, TOTAL 0 65 mg/dL  0 20-1 00   ALK PHOSPHATAS 52 U/L     ALT (SGPT) 43 U/L  12-78   AST(SGOT) 31 U/L  5-45   ALBUMIN 3 8 g/dL  3 5-5 0   TOTAL PROTEIN 7 0 g/dL  6 4-8 2   eGFR Non-African American      >60 0 ml/min/1 73sq [facility] Disease Education Program recommendations are as follows:  GFR calculation is accurate only with a steady state creatinine  Chronic Kidney disease less than 60 ml/min/1 73 sq  meters  Kidney failure less than 15 ml/min/1 73 sq  meters     GLUCOSE FASTING 118 mg/dL H 65-99

## 2018-01-12 VITALS
BODY MASS INDEX: 32.58 KG/M2 | HEART RATE: 78 BPM | TEMPERATURE: 98.5 F | SYSTOLIC BLOOD PRESSURE: 112 MMHG | WEIGHT: 220 LBS | RESPIRATION RATE: 16 BRPM | HEIGHT: 69 IN | DIASTOLIC BLOOD PRESSURE: 80 MMHG

## 2018-01-12 NOTE — RESULT NOTES
Discussion/Summary   Testosterone levels are high-- reduce testosterone from 0 5ml to 0 4ml weekly and repeat testosterone levels in 6 weeks and make sure to do about 4 days after injection day     Verified Results  (1) TESTOSTERONE, FREE (DIRECT) AND TOTAL 24Vrk7590 08:12AM Adaline Fuse Order Number: [id number]     Test Name Result Flag Reference   FREE TESTOSTERONE, DIRECT 25 2 pg/mL H 7 2 - 24 0   TESTOSTERONE (TOTAL) 962 ng/dL H 264 - 916   **Please note reference interval change**  Adult male reference interval is based on a population of  healthy nonobese males (BMI <30) between 23and 44years old  [address] [place] [address], [address] [place] [address] Road [id number]  PMID: [id number]      Performed at:  [address]  [id number]  : Andreina Royal MD, Phone:  [phone]

## 2018-01-12 NOTE — PROGRESS NOTES
Plan    1  DSMT/MNT Time Record; Status:Complete;   Done: 06XJS5962 01:16PM    Discussion/Summary    PATIENT EDUCATION RECORD   Indication for Services: type 2 Diabetes Mellitus  He is ready to learn  He has no barriers to learning  Living Well with Diabetes Class 1 Education Content: What is diabetes, Types of diabetes, How is diabetes diagnosed, Management skills, Role of exercise, Glucose monitoring, Target range goals        Chief Complaint  Attendied diabetes class   Current Meds   1  Atorvastatin Calcium 20 MG Oral Tablet; TAKE 1 TABLET AT BEDTIME; Therapy: 39NLP6349 to (Evaluate:23Ymr7683)  Requested for: 22VKR5801; Last   Rx:80Edo2212 Ordered   2  BD Luer-Jordon Syringe 21G X 1" 3 ML Miscellaneous; Use 1 per week; Therapy: 86JUU3170 to (Evaluate:98Dpd0259)  Requested for: 93NIF7831; Last   Rx:40Crr9583 Ordered   3  Dilaudid SOLN Recorded   4  Methadone HCl - 10 MG Oral Tablet; Therapy: 70ZUD2539 to (Last FR:20FEN1966)  Requested for: 78VRO9652 Ordered   5  Omeprazole 40 MG Oral Capsule Delayed Release; Therapy: 21Jan2012 to (Last QV:94CNQ4822)  Requested for: 21Jan2012 Ordered   6  OneTouch Lancets Miscellaneous; TEST 3X PER WEEK; Therapy: 83FPB0385 to (Evaluate:34Ytm8853)  Requested for: 28Mar2017; Last   Rx:28Mar2017 Ordered   7  OneTouch Verio In [employer]; TEST 3X PER WEEK; Therapy: 26IYA0047 to (Evaluate:76Dgq4994)  Requested for: 28Mar2017; Last   Rx:28Mar2017 Ordered   8  OneTouch Verio w/Device Kit; USE AS DIRECTED; Therapy: 76MWA8841 to (Evaluate:27Jun2017); Last Rx:29Mar2017 Ordered   9  OxyCONTIN 40 MG TB12 (OxyCODONE HCl); Therapy: 96YSG4004 to (Last Rx:12Jan2012)  Requested for: 12Jan2012 Ordered   10  OxyCONTIN 80 MG TB12 (OxyCODONE HCl); Therapy: 19KHN2309 to (Last Rx:12Jan2012)  Requested for: 12Jan2012 Ordered   11  Testosterone Cypionate 200 MG/ML Intramuscular Solution; USE 0 5 ML IM ONCE PER    WEEK; Therapy: 40JFE1428 to (Evaluate:06Jun2017);  Last Rx:98Irc4520 Ordered    Results/Data  DSMT/MNT Time Record 37LAG8800 01:16PM Rima Big     Test Name Result Flag Reference   Date of Service 4/4/17     Start - Stop Time 9:30 am - 11:30 am     Total MInutes 120     Group Or Individual Instruction Grp - 1       End of Encounter Meds    1  Atorvastatin Calcium 20 MG Oral Tablet; TAKE 1 TABLET AT BEDTIME; Therapy: 44VCE9327 to (Evaluate:99Jop9983)  Requested for: 29EXX7118; Last   Rx:58Ajj9608 Ordered    2  OneTouch Lancets Miscellaneous; TEST 3X PER WEEK; Therapy: 91URS3993 to (Evaluate:46Cbq0770)  Requested for: 28Mar2017; Last   Rx:28Mar2017 Ordered   3  OneTouch Verio In [employer]; TEST 3X PER WEEK; Therapy: 80JOG6480 to (Evaluate:85Pzq3378)  Requested for: 28Mar2017; Last   Rx:28Mar2017 Ordered    4  BD Luer-Jordon Syringe 21G X 1" 3 ML Miscellaneous; Use 1 per week; Therapy: 66ERL9509 to (Evaluate:23Pcq9493)  Requested for: 67HGY0514; Last   Rx:11Bau3248 Ordered   5  Testosterone Cypionate 200 MG/ML Intramuscular Solution; USE 0 5 ML IM ONCE PER   WEEK; Therapy: 21TDD1352 to (Evaluate:06Jun2017); Last Rx:65Zcm4044 Ordered    6  OneTouch Verio w/Device Kit; USE AS DIRECTED; Therapy: 72AUN5674 to (Evaluate:27Jun2017); Last Rx:29Mar2017 Ordered    7  Dilaudid SOLN Recorded   8  Methadone HCl - 10 MG Oral Tablet; Therapy: 12JTR1552 to (Last IM:56MLF5175)  Requested for: 75CBQ5291 Ordered   9  Omeprazole 40 MG Oral Capsule Delayed Release; Therapy: 21Jan2012 to (Last EI:86MOU7636)  Requested for: 21Jan2012 Ordered   10  OxyCONTIN 40 MG TB12 (OxyCODONE HCl); Therapy: 56XRD6065 to (Last Rx:12Jan2012)  Requested for: 12Jan2012 Ordered   11  OxyCONTIN 80 MG TB12 (OxyCODONE HCl); Therapy: 91EKW5187 to (Last Rx:12Jan2012)  Requested for: 12Jan2012 Ordered    Future Appointments    Date/Time Provider Specialty Site   12/14/2017 10:10 AM ESTHER Canas   Endocrinology [facility] ENDOCRINOLOGY   04/11/2017 09:30 AM Annalise ePrdomo RD Diabetes Educator [address] [place] [address]   04/25/2017 09:30 AM Kar Hernandez RD Diabetes Educator [address] DIABETES EDUCATION   04/18/2017 09:30 AM TERELL Dudley Diabetes Educator [address] DIABETES EDUCATION   04/10/2017 10:15 AM Luke Vences RD, LDN Diabetes Educator [address] ENDOCRINOLOGY   06/27/2017 10:00 AM Celine Talavera, [facility] Endocrinology ST [address] [place] [address] ENDOCRINOLOGY     Signatures   Electronically signed by : TERELL Barnard;  Apr 4 2017  1:20PM EST                       (Author)    Electronically signed by : ESTHER Arnold ; Apr 4 2017  3:36PM EST

## 2018-01-12 NOTE — PROGRESS NOTES
Plan    1  DSMT/MNT Time Record; Status:Complete;   Done: 24Npb0211 12:19PM    Discussion/Summary    PATIENT EDUCATION RECORD   Indication for Services: type 2 Diabetes Mellitus  He is ready to learn  He has no barriers to learning  Living Well with Diabetes Class 2 Education Content: Macronutrients, Carbohydrate sources, What one serving of carbohydrate equals, Effects of diet on blood glucose levels, effects of carbohydrates on blood glucose levels, Basics of meal planning: balance, portions, and meal times, Measurements, Reading food labels to determine carbohydrates       Active Problems    1  Cervical radiculopathy (723 4) (M54 12)   2  Chronic pain syndrome (338 4) (G89 4)   3  Encounter for long-term (current) use of medications (V58 69) (Z79 899)   4  Encounter for pre-operative examination (V72 84) (Z01 818)   5  Essential hypertension (401 9) (I10)   6  Follow-up examination following surgery (V67 00) (Z09)   7  Hyperlipidemia (272 4) (E78 5)   8  Impaired fasting glucose (790 21) (R73 01)   9  Lumbar postlaminectomy syndrome (722 83) (M96 1)   10  Need for prophylactic vaccination and inoculation against influenza (V04 81) (Z23)   11  Surgery, elective (V50 9) (Z41 9)   12  Testicular hypogonadism (257 2) (E29 1)   13  Tobacco use (305 1) (Z72 0)   14  Type 2 diabetes mellitus (250 00) (E11 9)    Past Medical History    1  History of Pilonidal cyst (685 1) (L05 91)    Surgical History    1  History of Electr Analysis Of Progr Impl Pump W/ Reprogram & Refill, Requiring Physian    Family History  Father    1  Family history of Coronary Artery Disease (V17 49)   2  Family history of Heart Disease (V17 49)  Sister    3  Family history of Hypertension (V17 49)   4  Family history of Pure Hypercholesterolemia  Brother    5  Family history of Diabetes Mellitus (V18 0)   6  Family history of Diabetes Mellitus (V18 0)   7  Family history of Hypertension (V17 49)   8   Family history of Pure Hypercholesterolemia  Family History    9  Family history of Hypertension (V17 49)   10  Family history of No Significant Family History    Social History    · Denied: History of Alcohol Use (History)   · Current every day smoker (305 1) (F17 200)   · Denied: History of Drug Use   · Denied: History of Never A Smoker   · Tobacco use (305 1) (Z72 0)    Current Meds   1  Atorvastatin Calcium 20 MG Oral Tablet; TAKE 1 TABLET AT BEDTIME; Therapy: 18HNB5324 to (Evaluate:31Xqg9369)  Requested for: 54ICO3767; Last   Rx:18Joz4393 Ordered   2  BD Luer-Jordon Syringe 21G X 1" 3 ML Miscellaneous; Use 1 per week; Therapy: 11GCV8223 to (Evaluate:37Mkk3425)  Requested for: 54NGN9513; Last   Rx:42Ujk5495 Ordered   3  Dilaudid SOLN (HYDROmorphone HCl) Recorded   4  Methadone HCl - 10 MG Oral Tablet; Therapy: 24XFY9052 to (Last GB:32KDL6106)  Requested for: 69XPG5267 Ordered   5  Omeprazole 40 MG Oral Capsule Delayed Release; Therapy: 21Jan2012 to (Last GA:83RUQ5644)  Requested for: 21Jan2012 Ordered   6  OneTouch Lancets Miscellaneous; TEST 3X PER WEEK; Therapy: 51DPD8922 to (Evaluate:44Lgo8480)  Requested for: 28Mar2017; Last   Rx:28Mar2017 Ordered   7  OneTouch Verio In [employer]; TEST 3X PER WEEK; Therapy: 98IIN4903 to (Evaluate:79Meq7756)  Requested for: 28Mar2017; Last   Rx:28Mar2017 Ordered   8  OneTouch Verio w/Device Kit; USE AS DIRECTED; Therapy: 54XXV8592 to (Evaluate:27Jun2017); Last Rx:29Mar2017 Ordered   9  OxyCONTIN 40 MG TB12 (OxyCODONE HCl); Therapy: 57HFO1733 to (Last Rx:12Jan2012)  Requested for: 12Jan2012 Ordered   10  OxyCONTIN 80 MG TB12 (OxyCODONE HCl); Therapy: 05SPH3620 to (Last Rx:12Jan2012)  Requested for: 12Jan2012 Ordered   11  Testosterone Cypionate 200 MG/ML Intramuscular Solution; USE 0 5 ML IM ONCE PER    WEEK; Therapy: 79MUQ7341 to (Evaluate:06Jun2017); Last Rx:45Jhy2677 Ordered    Allergies    1  No Known Drug Allergies    End of Encounter Meds    1   Atorvastatin Calcium 20 MG Oral Tablet; TAKE 1 TABLET AT BEDTIME; Therapy: 67QJC2045 to (Evaluate:53Noo8365)  Requested for: 34HEJ6453; Last   Rx:08Fgr8242 Ordered    2  OneTouch Lancets Miscellaneous; TEST 3X PER WEEK; Therapy: 00MIZ1201 to (Evaluate:15Hyy5476)  Requested for: 28Mar2017; Last   Rx:28Mar2017 Ordered   3  OneTouch Verio In [employer]; TEST 3X PER WEEK; Therapy: 38OCF6154 to (Evaluate:73Zxh0023)  Requested for: 28Mar2017; Last   Rx:28Mar2017 Ordered    4  BD Luer-Jordon Syringe 21G X 1" 3 ML Miscellaneous; Use 1 per week; Therapy: 54SFM8707 to (Evaluate:26Zmq2449)  Requested for: 43UWS8238; Last   Rx:33Txv1462 Ordered   5  Testosterone Cypionate 200 MG/ML Intramuscular Solution; USE 0 5 ML IM ONCE PER   WEEK; Therapy: 42ROQ9988 to (Evaluate:06Jun2017); Last Rx:49Csg0245 Ordered    6  OneTouch Verio w/Device Kit; USE AS DIRECTED; Therapy: 82JGK8100 to (Evaluate:27Jun2017); Last Rx:29Mar2017 Ordered    7  Dilaudid SOLN (HYDROmorphone HCl) Recorded   8  Methadone HCl - 10 MG Oral Tablet; Therapy: 58OSM2000 to (Last VY:82QKM2384)  Requested for: 90EFK1647 Ordered   9  Omeprazole 40 MG Oral Capsule Delayed Release; Therapy: 21Jan2012 to (Last NT:68IYZ3931)  Requested for: 21Jan2012 Ordered   10  OxyCONTIN 40 MG TB12 (OxyCODONE HCl); Therapy: 43TRA3621 to (Last Rx:12Jan2012)  Requested for: 12Jan2012 Ordered   11  OxyCONTIN 80 MG TB12 (OxyCODONE HCl); Therapy: 51AWP9420 to (Last Rx:12Jan2012)  Requested for: 12Jan2012 Ordered    Future Appointments    Date/Time Provider Specialty Site   12/14/2017 10:10 AM ESTHER Maxwell   Endocrinology [facility] ENDOCRINOLOGY   04/25/2017 09:30 AM Ozzy Claudio RD Diabetes Educator [facility] DIABETES EDUCATION   04/18/2017 09:30 AM TERELL Garcia Diabetes Educator [facility] DIABETES EDUCATION   06/27/2017 10:00 AM Lisa Lewis [facility] Endocrinology [facility] ENDOCRINOLOGY     Signatures   Electronically signed by : Mirela Acevedo RD; Apr 11 2017 12:20PM EST (Author)    Electronically signed by : ESTHER Mackenzie ; Apr 11 2017  1:57PM EST

## 2018-01-13 VITALS
HEART RATE: 82 BPM | SYSTOLIC BLOOD PRESSURE: 170 MMHG | HEIGHT: 69 IN | DIASTOLIC BLOOD PRESSURE: 100 MMHG | BODY MASS INDEX: 32.88 KG/M2 | WEIGHT: 222.02 LBS

## 2018-01-13 NOTE — PROGRESS NOTES
Plan    1  DSMT/MNT Time Record; Status:Complete;   Done: 31MXM9367 02:57PM    Discussion/Summary    PATIENT EDUCATION RECORD   Indication for Services: type 2 Diabetes Mellitus  He is ready to learn  He has no barriers to learning  Living Well with Diabetes Class 4 Education Content: Types of cholesterol, Dietary sources of cholesterol, Types of fat, Types of fiber, Reading food labels- in depth, Healthy choices when dining out        Active Problems    1  Cervical radiculopathy (723 4) (M54 12)   2  Chronic pain syndrome (338 4) (G89 4)   3  Encounter for long-term (current) use of medications (V58 69) (Z79 899)   4  Encounter for pre-operative examination (V72 84) (Z01 818)   5  Essential hypertension (401 9) (I10)   6  Follow-up examination following surgery (V67 00) (Z09)   7  Hyperlipidemia (272 4) (E78 5)   8  Impaired fasting glucose (790 21) (R73 01)   9  Lumbar postlaminectomy syndrome (722 83) (M96 1)   10  Need for prophylactic vaccination and inoculation against influenza (V04 81) (Z23)   11  Surgery, elective (V50 9) (Z41 9)   12  Testicular hypogonadism (257 2) (E29 1)   13  Tobacco use (305 1) (Z72 0)   14  Type 2 diabetes mellitus (250 00) (E11 9)    Past Medical History    1  History of Pilonidal cyst (685 1) (L05 91)    Surgical History    1  History of Electr Analysis Of Progr Impl Pump W/ Reprogram & Refill, Requiring Physian    Family History  Father    1  Family history of Coronary Artery Disease (V17 49)   2  Family history of Heart Disease (V17 49)  Sister    3  Family history of Hypertension (V17 49)   4  Family history of Pure Hypercholesterolemia  Brother    5  Family history of Diabetes Mellitus (V18 0)   6  Family history of Diabetes Mellitus (V18 0)   7  Family history of Hypertension (V17 49)   8  Family history of Pure Hypercholesterolemia  Family History    9  Family history of Hypertension (V17 49)   10   Family history of No Significant Family History    Social History    · Denied: History of Alcohol Use (History)   · Current every day smoker (305 1) (F17 200)   · Denied: History of Drug Use   · Denied: History of Never A Smoker   · Tobacco use (305 1) (Z72 0)    Current Meds   1  Atorvastatin Calcium 20 MG Oral Tablet; TAKE 1 TABLET AT BEDTIME; Therapy: 91FZX5603 to (Evaluate:56Ohh6200)  Requested for: 45SUS9043; Last   Rx:05Lmv7805 Ordered   2  BD Luer-Jordon Syringe 21G X 1" 3 ML Miscellaneous; Use 1 per week; Therapy: 44JHC6398 to (Evaluate:66Con7500)  Requested for: 30XRZ2706; Last   Rx:35Bvb4943 Ordered   3  Dilaudid SOLN (HYDROmorphone HCl) Recorded   4  Methadone HCl - 10 MG Oral Tablet; Therapy: 49HQM7656 to (Last LA:69ZKV2621)  Requested for: 71FNN4609 Ordered   5  Omeprazole 40 MG Oral Capsule Delayed Release; Therapy: 21Jan2012 to (Last KR:93ASZ4465)  Requested for: 21Jan2012 Ordered   6  OneTouch Lancets Miscellaneous; TEST 3X PER WEEK; Therapy: 34SNP3974 to (Evaluate:55Bhu9780)  Requested for: 28Mar2017; Last   Rx:28Mar2017 Ordered   7  OneTouch Verio In [employer]; TEST 3X PER WEEK; Therapy: 63PAV2482 to (Evaluate:56Gpn4583)  Requested for: 28Mar2017; Last   Rx:28Mar2017 Ordered   8  OneTouch Verio w/Device Kit; USE AS DIRECTED; Therapy: 84LUM8322 to (Evaluate:27Jun2017); Last Rx:29Mar2017 Ordered   9  OxyCONTIN 40 MG TB12 (OxyCODONE HCl); Therapy: 05JMR6585 to (Last Rx:12Jan2012)  Requested for: 12Jan2012 Ordered   10  OxyCONTIN 80 MG TB12 (OxyCODONE HCl); Therapy: 01JDF6934 to (Last Rx:12Jan2012)  Requested for: 12Jan2012 Ordered   11  Testosterone Cypionate 200 MG/ML Intramuscular Solution; USE 0 5 ML IM ONCE PER    WEEK; Therapy: 86EEO6956 to (Evaluate:06Jun2017); Last Rx:23Ccz5904 Ordered    Allergies    1  No Known Drug Allergies    End of Encounter Meds    1  Atorvastatin Calcium 20 MG Oral Tablet; TAKE 1 TABLET AT BEDTIME; Therapy: 89VTK5087 to (Evaluate:13Gfv2014)  Requested for: 80KMK4453; Last   Rx:02Ayr5767 Ordered    2   OneTouch Lancets Miscellaneous; TEST 3X PER WEEK; Therapy: 66HST3436 to (Evaluate:21Hen8460)  Requested for: 28Mar2017; Last   Rx:28Mar2017 Ordered   3  OneTouch Verio In [employer]; TEST 3X PER WEEK; Therapy: 98WSL3460 to (Evaluate:42Yzu5513)  Requested for: 28Mar2017; Last   Rx:28Mar2017 Ordered    4  BD Luer-Jordon Syringe 21G X 1" 3 ML Miscellaneous; Use 1 per week; Therapy: 85RFT1826 to (Evaluate:17Hrt4261)  Requested for: 31SXP9509; Last   Rx:08Dec2016 Ordered   5  Testosterone Cypionate 200 MG/ML Intramuscular Solution; USE 0 5 ML IM ONCE PER   WEEK; Therapy: 16HSR6388 to (Evaluate:06Jun2017); Last Rx:08Dec2016 Ordered    6  OneTouch Verio w/Device Kit; USE AS DIRECTED; Therapy: 80LUF2570 to (Evaluate:27Jun2017); Last Rx:29Mar2017 Ordered    7  Dilaudid SOLN (HYDROmorphone HCl) Recorded   8  Methadone HCl - 10 MG Oral Tablet; Therapy: 03JOC4952 to (Last HO:90IVY9952)  Requested for: 35SQJ3279 Ordered   9  Omeprazole 40 MG Oral Capsule Delayed Release; Therapy: 21Jan2012 to (Last JX:77LDR6838)  Requested for: 21Jan2012 Ordered   10  OxyCONTIN 40 MG TB12 (OxyCODONE HCl); Therapy: 06BVM7342 to (Last Rx:12Jan2012)  Requested for: 12Jan2012 Ordered   11  OxyCONTIN 80 MG TB12 (OxyCODONE HCl); Therapy: 60LAZ4457 to (Last Rx:12Jan2012)  Requested for: 12Jan2012 Ordered    Future Appointments    Date/Time Provider Specialty Site   12/14/2017 10:10 AM ESTHER Roy   Endocrinology [facility] ENDOCRINOLOGY   06/27/2017 10:00 AM Emma Lagunas, [address] Endocrinology [facility] ENDOCRINOLOGY     Signatures   Electronically signed by : Emily Del Castillo RD; Apr 25 2017  2:58PM EST                       (Author)    Electronically signed by : ESTHER Pierson ; May  1 2017 10:10AM EST

## 2018-01-13 NOTE — PROGRESS NOTES
Discussion/Summary    PATIENT EDUCATION RECORD   Indication for Services: type 2 Diabetes Mellitus  He is ready to learn  He has no barriers to learning  Living Well with Diabetes Class 3 Education Content:Oral/Injectable medication, Prevention, Short-term complications, Long-term complications, Foot care, Sick day management (illness and stress), Travel       Chief Complaint  Attending diabetes class 3      Current Meds   1  Atorvastatin Calcium 20 MG Oral Tablet; TAKE 1 TABLET AT BEDTIME; Therapy: 25UCH5665 to (Evaluate:37Zbb5908)  Requested for: 54OUX6253; Last   Rx:50Eyk0414 Ordered   2  BD Luer-Jordon Syringe 21G X 1" 3 ML Miscellaneous; Use 1 per week; Therapy: 22RED0811 to (Evaluate:21Yut3068)  Requested for: 35ORT3335; Last   Rx:13Fxb7293 Ordered   3  Dilaudid SOLN (HYDROmorphone HCl) Recorded   4  Methadone HCl - 10 MG Oral Tablet; Therapy: 31JVI7423 to (Last GO:28GPO5815)  Requested for: 66ERA4237 Ordered   5  Omeprazole 40 MG Oral Capsule Delayed Release; Therapy: 21Jan2012 to (Last OC:23RSF7679)  Requested for: 21Jan2012 Ordered   6  OneTouch Lancets Miscellaneous; TEST 3X PER WEEK; Therapy: 01TIT0710 to (Evaluate:17Stt6706)  Requested for: 28Mar2017; Last   Rx:28Mar2017 Ordered   7  OneTouch Verio In [employer]; TEST 3X PER WEEK; Therapy: 59GLZ1827 to (Evaluate:36Yuq1100)  Requested for: 28Mar2017; Last   Rx:28Mar2017 Ordered   8  OneTouch Verio w/Device Kit; USE AS DIRECTED; Therapy: 31GTD4720 to (Evaluate:27Jun2017); Last Rx:29Mar2017 Ordered   9  OxyCONTIN 40 MG TB12 (OxyCODONE HCl); Therapy: 46HUH8369 to (Last Rx:12Jan2012)  Requested for: 12Jan2012 Ordered   10  OxyCONTIN 80 MG TB12 (OxyCODONE HCl); Therapy: 02PAC7967 to (Last Rx:12Jan2012)  Requested for: 12Jan2012 Ordered   11  Testosterone Cypionate 200 MG/ML Intramuscular Solution; USE 0 5 ML IM ONCE PER    WEEK; Therapy: 53MQT0071 to (Evaluate:06Jun2017); Last Rx:93Mce4648 Ordered    Allergies    1   No Known Drug Allergies    End of Encounter Meds    1  Atorvastatin Calcium 20 MG Oral Tablet; TAKE 1 TABLET AT BEDTIME; Therapy: 73NJP3351 to (Evaluate:06Ked9227)  Requested for: 20KXA3264; Last   Rx:26Psc4041 Ordered    2  OneTouch Lancets Miscellaneous; TEST 3X PER WEEK; Therapy: 42OSC2554 to (Evaluate:06Bhp2678)  Requested for: 28Mar2017; Last   Rx:28Mar2017 Ordered   3  OneTouch Verio In [employer]; TEST 3X PER WEEK; Therapy: 60JUU1226 to (Evaluate:84Nsf9101)  Requested for: 28Mar2017; Last   Rx:28Mar2017 Ordered    4  BD Luer-Jordon Syringe 21G X 1" 3 ML Miscellaneous; Use 1 per week; Therapy: 22WJM6540 to (Evaluate:83Iro5438)  Requested for: 27EFT2046; Last   Rx:37Qme8077 Ordered   5  Testosterone Cypionate 200 MG/ML Intramuscular Solution; USE 0 5 ML IM ONCE PER   WEEK; Therapy: 22GZQ1374 to (Evaluate:06Jun2017); Last Rx:93Rhv0246 Ordered    6  OneTouch Verio w/Device Kit; USE AS DIRECTED; Therapy: 50LZV9072 to (Evaluate:27Jun2017); Last Rx:29Mar2017 Ordered    7  Dilaudid SOLN (HYDROmorphone HCl) Recorded   8  Methadone HCl - 10 MG Oral Tablet; Therapy: 80CYW4914 to (Last CF:70KFV9529)  Requested for: 56XFK4435 Ordered   9  Omeprazole 40 MG Oral Capsule Delayed Release; Therapy: 21Jan2012 to (Last HH:66EES2639)  Requested for: 21Jan2012 Ordered   10  OxyCONTIN 40 MG TB12 (OxyCODONE HCl); Therapy: 89DTU2200 to (Last Rx:12Jan2012)  Requested for: 12Jan2012 Ordered   11  OxyCONTIN 80 MG TB12 (OxyCODONE HCl); Therapy: 98LEE4797 to (Last Rx:12Jan2012)  Requested for: 12Jan2012 Ordered    Future Appointments    Date/Time Provider Specialty Site   12/14/2017 10:10 AM ESTHER Canas  Endocrinology [facility]   04/25/2017 09:30 AM Annalise Perdomo, [address] Diabetes Educator [facility] DIABETES EDUCATION   06/27/2017 10:00 AM Beth Estrada, [facility] Endocrinology [facility] ENDOCRINOLOGY     Signatures   Electronically signed by : TERELL Pascual;  Apr 18 2017  1:45PM EST (Author)    Electronically signed by : TERELL Herron;  Apr 18 2017  1:45PM EST                       (Author)    Electronically signed by : ESTHER Fuchs ; Apr 19 2017  8:10AM EST

## 2018-01-14 VITALS
HEART RATE: 92 BPM | DIASTOLIC BLOOD PRESSURE: 96 MMHG | SYSTOLIC BLOOD PRESSURE: 148 MMHG | WEIGHT: 219 LBS | HEIGHT: 69 IN | BODY MASS INDEX: 32.44 KG/M2

## 2018-01-15 NOTE — RESULT NOTES
Discussion/Summary   Essentially normal lab  Verified Results  (1) BASIC METABOLIC PROFILE 42WIH5443 11:44AM Jose Dunn Order Number: [id number]     Test Name Result Flag Reference   GLUCOSE,RANDM 124 mg/dL     If the patient is fasting, the ADA then defines impaired fasting glucose as > 100 mg/dL and diabetes as > or equal to 123 mg/dL  Specimen collection should occur prior to Sulfasalazine administration due to the potential for falsely depressed results  Specimen collection should occur prior to Sulfapyridine administration due to the potential for falsely elevated results  SODIUM 137 mmol/L  136-145   POTASSIUM 3 9 mmol/L  3 5-5 3   CHLORIDE 99 mmol/L L 100-108   CARBON DIOXIDE 30 mmol/L  21-32   ANION GAP (CALC) 8 mmol/L  4-13   BLOOD UREA NITROGEN 13 mg/dL  5-25   CREATININE 0 93 mg/dL  0 60-1 30   Standardized to IDMS reference method   CALCIUM 8 6 mg/dL  8 3-10 1   eGFR 95 ml/min/1 73sq [facility] Disease Education Program recommendations are as follows:  GFR calculation is accurate only with a steady state creatinine  Chronic Kidney disease less than 60 ml/min/1 73 sq  meters  Kidney failure less than 15 ml/min/1 73 sq  meters

## 2018-01-15 NOTE — PROGRESS NOTES
Plan    1  DSMT/MNT Time Record; Status:Complete;   Done: 16VMU8575 12:00AM    Discussion/Summary    PATIENT EDUCATION RECORD   Indication for Services: type 2 Diabetes Mellitus  He is ready to learn  He has no barriers to learning  Diabetes Disease Process:   He understands the pathophysiology of diabetes: Method: Instruction  Response: Verbalizes Understanding   Discussed patient's type of diabetes: Method: Instruction  Response: Verbalizes Understanding   Discussed diagnosis criteria: Method: Instruction  Response: Verbalizes Understanding   Discussed treatment goals: Method: Instruction  Response: Verbalizes Understanding   Discussed benefits of control: Method: Instruction  Response: Verbalizes Understanding   Discussed treatment options: Method: Instruction  Response: Verbalizes Understanding   Healthy Eating:   Discussed general nutrition topics: Method: Instruction  Response: Verbalizes Understanding   Being Active:   Stated the benefits of exercise: Method: Instruction  Response: Verbalizes Understanding   Discussed "exercise guidelines": Method: Instruction  Response: Verbalizes Understanding  Recommend he discuss a structured exercise program with his PCP/Cardiologist    His blood glucose targets are: Pre-meal target  , Post-meal target < 140 and HS target 100-140  Monitoring:   Discussed target blood glucose ranges: Method: Instruction and Handout  Response: Verbalizes Understanding  Discussed target hemoglobin A1c: Method: Instruction  Response: Verbalizes Understanding  Discussed Finger stick testing: Method: Instruction and Demonstration  Response: Verbalizes Understanding and Returns Demonstration  Discussed proper stick techniques: Method: Instruction and Demonstration  Response: Verbalizes Understanding and Returns Demonstration  Discussed testing times: Method: Instruction  Response: Verbalizes Understanding  Discussed safe lancet disposal: Method: Instruction   Response: Verbalizes Understanding  Discussed meter : Method: Instruction  Response: Verbalizes Understanding  Discussed options for record keeping: Method: Instruction  Response: Verbalizes Understanding  Discussed reporting of readings to M D : Method: Instruction  Response: Verbalizes Understanding  He was instructed how to use the meter  He is currently using a One Touch Verio meter  He was given Fast 15 List   Problem Solving: He is able to state the symptoms, prevention, and treatment of hypoglycemia, but He is not on medications that cause hypoglycemia   Hypoglycemia: Stated definition and causes: Method: Instruction  Response: Verbalizes Understanding   Described signs and symptoms: Method: Instruction and Handout  Response: Verbalizes Understanding   Discussed prevention: Method: Instruction  Response: Verbalizes Instruction   Discussed treatment: Method: Instruction  Response: Verbalizes Instruction   Hyperglycemia: Stated definition and causes: Method: Instruction  Response: Verbalizes Understanding   Described signs and symptoms: Method: Instruction and Handout  Response: Verbalizes Instruction He was given the following educational materials: Know Your Blood Glucose Numbers, The 15/15 Rule for Treating Low Blood Sugar and Hyperglycemia/Hypoglycemia   Chief Complaint  Patient with T2DM seen for class assessment per MD request       History of Present Illness  Patient states, "I would like to avoid taking medication for this  What are the chances I won't need medication? I stopped drinking "gallons" of iced tea weekly about 2 years ago"  Patient's wife feels that he consumes large quantities of food at a time (i e  will consume 4 yogurts at a time) and that may be part of the problem  Patient was given a One Touch Verio meter and shown how to use it  BG at the visit was 111  He questioned if all of his pain is affecting his blood sugar as he knows it is causing his BP to go up   Patient is planning on taking Living Well with Diabetes classes in April at the [facility] is scheduled for MNT on 4/5/2017  Educator will remain available for further questions/concerns  Results/Data  DSMT/MNT Time Record [id number] 12:00AM Kerry Wooten     Test Name Result Flag Reference   Date of Service 3/28/2017     Start - Stop Time 10:45-11:45AM     Total MInutes 60 minutes     Group Or Individual Instruction DSMT-I       Future Appointments    Date/Time Provider Specialty Site   12/14/2017 10:10 AM ESTHER Buckner   Endocrinology [facility] ENDOCRINOLOGY   04/05/2017 10:00 AM Kerry Wooten RD, LDN Diabetes Educator [address] ENDOCRINOLOGY   06/27/2017 10:00 AM Jay Camacho [facility] Endocrinology [address] [place] [address] ENDOCRINOLOGY     Signatures   Electronically signed by : Brandon Infante; Mar 29 2017  9:46AM EST                       (Author)    Electronically signed by : ESTHER Corea ; Mar 29 2017  9:55AM EST

## 2018-01-15 NOTE — PROGRESS NOTES
Plan    1  DSMT/MNT Time Record; Status:Complete;   Done: 04LXT3668 11:42AM    Discussion/Summary    PATIENT EDUCATION RECORD   Indication for Services: type 2 Diabetes Mellitus  He is ready to learn  He has no barriers to learning  Healthy Eating:   Discussed general nutrition topics: Method: Instruction  Response: Needs Review   Discussed importance of meal timing/consistency: Method: Instruction  Response: Verbalizes Understanding   Discussed nutrient types ( Cho/Fat/Protein): Method: Instruction and Handout  Response: Needs Review   Discussed portion sizes: Method: Instruction and Handout  Response: Needs Review   Discussed food label reading: Method: Instruction  Response: Needs Review  Provided food diary and instructions on use: Method: Instruction and HandoutResponse: Verbalizes Understanding   Provided meal planning: Method: Instruction  Response: Needs Review His current weight is 221 6  His keal needs are ~1700 calories  His CHO's per meal are 45-60 grams  He/She was provided a meal plan for: fixed carbohydrates and weight loss  Discussed weight management/weight loss: Method: Instruction  Response: Verbalizes Understanding His weight goal is LOse 10% of present body weight  Discussed basic carbohydrate counting: Method: Instruction and Handout  Response: Needs Review   Being Active:   Recommend he discuss a structured exercise program with his PCP/Cardiologist  He was given the following educational materials: portion book, Personal Meal Plan 1700 calories calories and Calorie and Carbohydrate Tracking Books/Websites/Phone Apps   Chief Complaint  Patient with T2DM seen for MNT per MD request       History of Present Illness  Patient reports a weight gain of ~70 pounds since 2004  He states, "I weighed 150 pounds back in 2004  My pain started getting bad and they put me on steroids  Then my appetite increased   At the same time I was slowing down at work because of the pain from the Ankylosing Spondylitis"  Patient reports keeping his weight between 190 - 205 pounds ~2 years ago, but "the pain is dominating his life and it's getting harder to move"  Patient reports making dietary changes such as eliminating the intake of 1/2 gallon of icey tea a day and 72-96 ounces of sugar sweetened coffee daily, but denies experiencing any weight loss  He has been following a meal plan given to him by a  at a gym  The meal plan tends to be higher in protein with adequate CHO  Problems identified in food recall include inconsistent carbohydrate intake at meals, poorly timed meals (may have a snack 1 hour after consuming a meal), low intake of plant based foods, whole grains and low fat dairy and general lack of balance  Provided patient with a 1700 calorie meal plan to assist with consistency, balance and portion control  Encouraged him to consume his meals every 4-5 hours and keep snacks to 2 hours after a meal  Advised patient to keep his carbohydrate intake to ~45 grams per meal and 15 grams per between meal snack to assist with glycemic control  Suggested he limit lean protein intake to 8 ounces a day and limit added fat to 5 servings daily to assist with lipid management and calorie control  Patient would benefit from increasing his intake of plant based foods  Patient admits to having difficulty learning  Recommended he bring his wife with him to follow-up MNT  Patient's wife will be attending Living Well class 2 (carbohydrates) on 4/11/2017  Patient agreed to keep daily food logs  RD will remain available for further dietary questions/concerns  This is his initial assessment    Present at session: patient    Medical Diagnosis/Reason for Referral:T2DM  He has no special learning needs  His caloric needs are ~1700 calories  Recent weight change: 15 pound weight gain in the past 2 years; 70 pound weight gain in the past 10 years  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  Exercise routine:  Patient is unable to exercise secondary to Ankylosing Spondylitis  He eats breakfast at  8:00-9:00AM AM 5-6 hard cooked egg whites, 2 packets of flavored oatmeal or plain oatmeal, 1/2-3/4 cup berries, water OR once a week a the diner: egg white spinach omelet and a side of fruit; before changes was having 1/2 box of Honey Bunches of Oats cereal, 2 cup of whole milk, 3-4 24oz cups of coffee made with 1/4 cup sugar and 1/4 cup creamer; icey tea all day   He snacks at 10:00AM AM Protein shake made with 2 scoops of protein powder, lite coconut milk, 1/2 medium banana, 3/4 cup berries, 1 heaping tblsp of natural peanut butter; patient was not having a snack in the past   He eats lunch at  12:00PM PM 6-8oz turkey burger, 3/4 cup brown rice, green beans, water OR an apple or a rice cake; patient was SKIPPING lunch in the past   He snacks at 2:30PM PM 2 rice cakes with 1 Tblsp PB and an apple; patient was consuming 2 Activia yogurts in the past   He eats dinner at  5-5:30PM PM 6-8 ounce chicken breast, 3/4 -1 cup rice, 1/2 -3/4 cup non-starchy veggies or baby Lima beans OR 1-1 5 cups wheat pasta with meat sauce; prior to making changes patient reports consuming more red meat and larger portions   He snacks at 6:30-7:00PM PM 1 low sugar Greek yogurt; was having 2 Activia yogurts     NUTRITION DIAGNOSES   Food And Nutrition Related Knowledge Defici   Food and nutrition related knowledge deficit related to  lack of prior exposure to accurate nutrition related information  As evidenced by  no prior knowledge of need for food and nutrition related recommendations  Medical Nutrition Therapy Intervention: Plate Method, Carbohydrate counting, Meal planning, Individualized meal plan, Strategies to increase the intake of plant based foods, Strategies to monitor portion control, Label reading, Meal timing, Behavior modification strategies and Weight/BMI Goals  His comprehension was fair      His motivation was good   His compliance was fair   Goals:  1  45 grams CHO per meal and 15 grams per between meal snack  2  8 ounces lean protein and 5 added fats daily  3  Meals 4-5 hours apart; snacks 2 hours after meals  Vitals  Signs   Recorded: 10Apr2017 11:43AM   Weight: 221 lb 6 oz  BMI Calculated: 32 69  BSA Calculated: 2 16    Results/Data  DSMT/MNT Time Record 10Apr2017 11:42AM Liban Barrera     Test Name Result Flag Reference   Date of Service 4/10/2017     Start - Stop Time 10:15-11:15AM     Total MInutes 60 minutes     Group Or Individual Instruction MNT-I       Future Appointments    Date/Time Provider Specialty Site   12/14/2017 10:10 AM ESTHER Valle   Endocrinology [facility] ENDOCRINOLOGY   04/11/2017 09:30 AM Jose M Hugo, [address] [place] [address] Diabetes Educator [address] DIABETES EDUCATION   04/25/2017 09:30 AM Jose M Hugo RD Diabetes Educator [address] DIABETES EDUCATION   04/18/2017 09:30 AM TERELL Booth Diabetes Educator [address] DIABETES EDUCATION   06/27/2017 10:00 AM Britton Jimenez, [facility] Endocrinology [address] ENDOCRINOLOGY     Signatures   Electronically signed by : Dahlia Norfolk, Bethany Leventhal; Apr 10 2017  2:20PM EST                       (Author)    Electronically signed by : ESTHER Brewer ; Apr 11 2017  1:57PM EST

## 2018-01-17 NOTE — RESULT NOTES
Discussion/Summary   normal, appt 10/4     Verified Results  (1) TESTOSTERONE, FREE (DIRECT) AND TOTAL 23Isv3676 11:06AM [facility] Order Number: [id number]     Test Name Result Flag Reference   FREE TESTOSTERONE, DIRECT 8 4 pg/mL  7 2 - 24 0   TESTOSTERONE (TOTAL) 487 ng/dL  36 - 65   Adult male reference interval is based on a population of  healthy nonobese males (BMI <30) between 23and 44years old  [address] [place] [address], [address] [place], [place] [address],3;[id number]  PMID: [id number]      Performed at:  [address]  [id number]  : Brian Lyn MD, Phone:  [phone]

## 2018-01-22 VITALS — BODY MASS INDEX: 32.69 KG/M2 | WEIGHT: 221.38 LBS

## 2018-02-08 ENCOUNTER — OFFICE VISIT (OUTPATIENT)
Dept: FAMILY MEDICINE CLINIC | Facility: CLINIC | Age: 53
End: 2018-02-08
Payer: COMMERCIAL

## 2018-02-08 VITALS
DIASTOLIC BLOOD PRESSURE: 64 MMHG | WEIGHT: 228 LBS | SYSTOLIC BLOOD PRESSURE: 116 MMHG | RESPIRATION RATE: 18 BRPM | BODY MASS INDEX: 32.64 KG/M2 | TEMPERATURE: 96.9 F | HEART RATE: 78 BPM | HEIGHT: 70 IN

## 2018-02-08 DIAGNOSIS — H69.81 EUSTACHIAN TUBE DYSFUNCTION, RIGHT: Primary | ICD-10-CM

## 2018-02-08 PROCEDURE — 99213 OFFICE O/P EST LOW 20 MIN: CPT | Performed by: FAMILY MEDICINE

## 2018-02-08 RX ORDER — OMEPRAZOLE 40 MG/1
1 CAPSULE, DELAYED RELEASE ORAL DAILY
COMMUNITY
Start: 2012-01-21 | End: 2021-05-13 | Stop reason: SDUPTHER

## 2018-02-08 RX ORDER — LISINOPRIL 20 MG/1
1 TABLET ORAL DAILY
COMMUNITY
Start: 2017-06-27 | End: 2018-11-07 | Stop reason: SDUPTHER

## 2018-02-08 RX ORDER — MULTIVIT-MIN/IRON/FOLIC ACID/K 18-600-40
1 CAPSULE ORAL DAILY
COMMUNITY

## 2018-02-08 RX ORDER — OXYCODONE HCL 40 MG/1
1 TABLET, FILM COATED, EXTENDED RELEASE ORAL EVERY 12 HOURS
COMMUNITY
Start: 2012-01-12 | End: 2022-05-11 | Stop reason: ALTCHOICE

## 2018-02-08 RX ORDER — TESTOSTERONE ENANTHATE 200 MG/ML
0.4 INJECTION, SOLUTION INTRAMUSCULAR WEEKLY
COMMUNITY
Start: 2014-12-14 | End: 2018-06-06 | Stop reason: SDUPTHER

## 2018-02-08 RX ORDER — HYDROMORPHONE HYDROCHLORIDE 4 MG/ML
1 INJECTION, SOLUTION INTRAMUSCULAR; INTRAVENOUS; SUBCUTANEOUS DAILY
COMMUNITY

## 2018-02-08 RX ORDER — METHADONE HYDROCHLORIDE 10 MG/1
2 TABLET ORAL 2 TIMES DAILY
COMMUNITY
Start: 2012-01-23 | End: 2021-08-30 | Stop reason: ALTCHOICE

## 2018-02-08 RX ORDER — OXYCODONE HYDROCHLORIDE 80 MG/1
1 TABLET, FILM COATED, EXTENDED RELEASE ORAL EVERY 12 HOURS
COMMUNITY
Start: 2012-01-12

## 2018-02-08 RX ORDER — GABAPENTIN 300 MG/1
600 CAPSULE ORAL DAILY
COMMUNITY

## 2018-02-08 RX ORDER — ATORVASTATIN CALCIUM 20 MG/1
1 TABLET, FILM COATED ORAL
COMMUNITY
Start: 2015-11-10 | End: 2018-12-06 | Stop reason: SDUPTHER

## 2018-02-08 RX ORDER — FLUTICASONE PROPIONATE 50 MCG
2 SPRAY, SUSPENSION (ML) NASAL DAILY
Qty: 16 G | Refills: 1 | Status: SHIPPED | OUTPATIENT
Start: 2018-02-08 | End: 2019-11-21 | Stop reason: ALTCHOICE

## 2018-02-08 NOTE — PROGRESS NOTES
Assessment/Plan:         Diagnoses and all orders for this visit:    Eustachian tube dysfunction, right  -     fluticasone (FLONASE) 50 mcg/act nasal spray; 2 sprays into each nostril daily    Other orders  -     atorvastatin (LIPITOR) 20 mg tablet; Take 1 tablet by mouth  -     HYDROmorphone (DILAUDID) 4 mg/mL injection; Inject 1 Device as directed daily  -     gabapentin (NEURONTIN) 300 mg capsule; Take 1 capsule by mouth daily  -     lisinopril (ZESTRIL) 20 mg tablet; Take 1 tablet by mouth daily  -     methadone (DOLOPHINE) 10 mg tablet; Take 2 tablets by mouth 4 (four) times a day as needed,  -     omeprazole (PriLOSEC) 40 MG capsule; Take 1 capsule by mouth daily  -     oxyCODONE (OxyCONTIN) 40 mg 12 hr tablet; Take 1 tablet by mouth every 12 (twelve) hours  -     oxyCODONE (OXYCONTIN) 80 mg 12 hr tablet; Take 1 tablet by mouth every 12 (twelve) hours  -     Testosterone Enanthate 200 MG/ML SOLN; Inject 0 4 mL into the shoulder, thigh, or buttocks once a week  -     Cholecalciferol (VITAMIN D) 2000 units CAPS; Take 1 capsule by mouth daily      trial of steroid nasal spray with OTC Claritin or Zyrtec  Consider ENT referral for persistent symptoms advised to call if any changes  Vitals:    02/08/18 1202   BP: 116/64   Pulse: 78   Resp: 18   Temp: (!) 96 9 °F (36 1 °C)            Patient ID: Chris Moreno  is a 47 y o  male  1 week history of right ear feeling blocked  Decreased hearing  No pain  No ear drainage  + chronic nasal congestion which he attributes to "broken nose" no history of allergic rhinitis  Current medications reviewed  The following portions of the patient's history were reviewed and updated as appropriate: current medications, past family history, past medical history, past social history, past surgical history and problem list     Review of Systems   Constitutional: Negative for chills and fever     HENT: Negative for postnasal drip, rhinorrhea, sinus pain, sinus pressure and sore throat  Respiratory: Negative for cough  Gastrointestinal: Negative for nausea and vomiting  Skin: Negative for rash  Neurological: Negative for dizziness and headaches  Physical Exam   Constitutional: No distress  HENT:   Right Ear: External ear and ear canal normal  Tympanic membrane is retracted  No middle ear effusion  Left Ear: Tympanic membrane, external ear and ear canal normal    Nose: No septal deviation  Right sinus exhibits no frontal sinus tenderness  Left sinus exhibits no maxillary sinus tenderness and no frontal sinus tenderness  Mouth/Throat: Oropharynx is clear and moist    Decreased hearing on right compared to left with rubbing fingers  Eyes: Conjunctivae are normal  No scleral icterus  Neck: No tracheal deviation present  No thyromegaly present  Lymphadenopathy:     He has no cervical adenopathy  Nursing note and vitals reviewed

## 2018-04-04 RX ORDER — LANCETS 33 GAUGE
1 EACH MISCELLANEOUS 3 TIMES DAILY
COMMUNITY
Start: 2017-03-28 | End: 2021-06-28 | Stop reason: CLARIF

## 2018-04-05 ENCOUNTER — OFFICE VISIT (OUTPATIENT)
Dept: ENDOCRINOLOGY | Facility: CLINIC | Age: 53
End: 2018-04-05
Payer: COMMERCIAL

## 2018-04-05 VITALS
DIASTOLIC BLOOD PRESSURE: 82 MMHG | HEART RATE: 84 BPM | BODY MASS INDEX: 32.88 KG/M2 | HEIGHT: 69 IN | SYSTOLIC BLOOD PRESSURE: 126 MMHG | WEIGHT: 222 LBS

## 2018-04-05 DIAGNOSIS — I10 ESSENTIAL HYPERTENSION: ICD-10-CM

## 2018-04-05 DIAGNOSIS — E29.1 TESTICULAR HYPOGONADISM: ICD-10-CM

## 2018-04-05 DIAGNOSIS — M81.0 OSTEOPOROSIS, UNSPECIFIED OSTEOPOROSIS TYPE, UNSPECIFIED PATHOLOGICAL FRACTURE PRESENCE: ICD-10-CM

## 2018-04-05 DIAGNOSIS — E78.2 MIXED HYPERLIPIDEMIA: ICD-10-CM

## 2018-04-05 DIAGNOSIS — E11.9 TYPE 2 DIABETES MELLITUS WITHOUT COMPLICATION, WITHOUT LONG-TERM CURRENT USE OF INSULIN (HCC): Primary | ICD-10-CM

## 2018-04-05 PROBLEM — K11.1 PAROTID GLAND ENLARGEMENT: Status: ACTIVE | Noted: 2017-10-24

## 2018-04-05 LAB
ALBUMIN SERPL BCP-MCNC: 3.9 G/DL (ref 3.5–5)
ALP SERPL-CCNC: 49 U/L (ref 46–116)
ALT SERPL W P-5'-P-CCNC: 59 U/L (ref 12–78)
ANION GAP SERPL CALCULATED.3IONS-SCNC: 9 MMOL/L (ref 4–13)
AST SERPL W P-5'-P-CCNC: 26 U/L (ref 5–45)
BASOPHILS # BLD AUTO: 0.03 THOUSANDS/ΜL (ref 0–0.1)
BASOPHILS NFR BLD AUTO: 1 % (ref 0–1)
BILIRUB SERPL-MCNC: 0.4 MG/DL (ref 0.2–1)
BUN SERPL-MCNC: 10 MG/DL (ref 5–25)
CALCIUM SERPL-MCNC: 8.9 MG/DL (ref 8.3–10.1)
CHLORIDE SERPL-SCNC: 101 MMOL/L (ref 100–108)
CO2 SERPL-SCNC: 27 MMOL/L (ref 21–32)
CREAT SERPL-MCNC: 0.79 MG/DL (ref 0.6–1.3)
EOSINOPHIL # BLD AUTO: 0.13 THOUSAND/ΜL (ref 0–0.61)
EOSINOPHIL NFR BLD AUTO: 2 % (ref 0–6)
ERYTHROCYTE [DISTWIDTH] IN BLOOD BY AUTOMATED COUNT: 13.1 % (ref 11.6–15.1)
EST. AVERAGE GLUCOSE BLD GHB EST-MCNC: 148 MG/DL
GFR SERPL CREATININE-BSD FRML MDRD: 104 ML/MIN/1.73SQ M
GLUCOSE SERPL-MCNC: 169 MG/DL (ref 65–140)
HBA1C MFR BLD: 6.8 % (ref 4.2–6.3)
HCT VFR BLD AUTO: 41.9 % (ref 36.5–49.3)
HGB BLD-MCNC: 14.7 G/DL (ref 12–17)
LYMPHOCYTES # BLD AUTO: 2.39 THOUSANDS/ΜL (ref 0.6–4.47)
LYMPHOCYTES NFR BLD AUTO: 37 % (ref 14–44)
MCH RBC QN AUTO: 32.5 PG (ref 26.8–34.3)
MCHC RBC AUTO-ENTMCNC: 35.1 G/DL (ref 31.4–37.4)
MCV RBC AUTO: 93 FL (ref 82–98)
MONOCYTES # BLD AUTO: 0.64 THOUSAND/ΜL (ref 0.17–1.22)
MONOCYTES NFR BLD AUTO: 10 % (ref 4–12)
NEUTROPHILS # BLD AUTO: 3.28 THOUSANDS/ΜL (ref 1.85–7.62)
NEUTS SEG NFR BLD AUTO: 50 % (ref 43–75)
NRBC BLD AUTO-RTO: 0 /100 WBCS
PLATELET # BLD AUTO: 182 THOUSANDS/UL (ref 149–390)
PMV BLD AUTO: 10.9 FL (ref 8.9–12.7)
POTASSIUM SERPL-SCNC: 4.1 MMOL/L (ref 3.5–5.3)
PROT SERPL-MCNC: 7.3 G/DL (ref 6.4–8.2)
PSA SERPL-MCNC: 0.7 NG/ML (ref 0–4)
RBC # BLD AUTO: 4.52 MILLION/UL (ref 3.88–5.62)
SODIUM SERPL-SCNC: 137 MMOL/L (ref 136–145)
WBC # BLD AUTO: 6.49 THOUSAND/UL (ref 4.31–10.16)

## 2018-04-05 PROCEDURE — G0103 PSA SCREENING: HCPCS | Performed by: PHYSICIAN ASSISTANT

## 2018-04-05 PROCEDURE — 83036 HEMOGLOBIN GLYCOSYLATED A1C: CPT | Performed by: PHYSICIAN ASSISTANT

## 2018-04-05 PROCEDURE — 85025 COMPLETE CBC W/AUTO DIFF WBC: CPT | Performed by: PHYSICIAN ASSISTANT

## 2018-04-05 PROCEDURE — 99214 OFFICE O/P EST MOD 30 MIN: CPT | Performed by: PHYSICIAN ASSISTANT

## 2018-04-05 PROCEDURE — 84402 ASSAY OF FREE TESTOSTERONE: CPT | Performed by: PHYSICIAN ASSISTANT

## 2018-04-05 PROCEDURE — 80053 COMPREHEN METABOLIC PANEL: CPT | Performed by: PHYSICIAN ASSISTANT

## 2018-04-05 PROCEDURE — 36415 COLL VENOUS BLD VENIPUNCTURE: CPT | Performed by: PHYSICIAN ASSISTANT

## 2018-04-05 PROCEDURE — 84403 ASSAY OF TOTAL TESTOSTERONE: CPT | Performed by: PHYSICIAN ASSISTANT

## 2018-04-05 NOTE — LETTER
April 5, 2018     Remi Chapin MD  [address] [place] [id number]    Patient: Connie Dowling YOB: 1965   Date of Visit: 4/5/2018       Dear Dr Peter Primer:    Thank you for referring Lillian Moore to me for evaluation  Below are my notes for this consultation  If you have questions, please do not hesitate to call me  I look forward to following your patient along with you  Sincerely,        Eduardo Whitehead PA-C        CC: No Recipients  Eduardo Whitehead PA-C  4/5/2018  3:03 PM  Sign at close encounter      Established Patient Progress Note      Chief Complaint   Patient presents with    Hypogonadism    Diabetes Type 2          History of Present Illness:   Connie Dowling  is a 47 y o  male with a history of type 2 diabetes  since 2017   Reports complications of none  Denies recent illness or hospitalizations  Denies recent severe hypoglycemic or severe hyperglycemic episodes  Denies any issues with his current regimen  home glucose monitoring: are performed sporadically and are usually 120s, highest 140  Current regimen: No medications  Has attended MNT/DSMT  Last Eye Exam: Overdue for eye exam  Last Foot Exam: Following with podiatry on regular basis  Has hypertension: Taking lisinopril  Has hyperlipidemia: Taking atorvastatin    Has Hypogonadism: Treated withi Testosterone injection on Saturday   Osteoporosis-- previously treated by rheum dr Brandon Mortensen was on reclast in the past  Taking calcium and vitamin D  No fracture  Hasn't seen them in a few years  Last bone density scan over two years     Had prior steroid use from ankylosis spondylitis    Patient Active Problem List   Diagnosis    Cervical radiculopathy    Chronic pain disorder    Essential hypertension    Hyperlipidemia    Lumbar postlaminectomy syndrome    Mixed hyperlipidemia    Osteoporosis    Other chronic nonalcoholic liver disease    Parotid gland enlargement    Testicular hypogonadism    Type 2 diabetes mellitus ([facility] [address] )      History reviewed  No pertinent past medical history  History reviewed  No pertinent surgical history     Family History   Problem Relation Age of Onset    Diabetes unspecified Sister     Diabetes unspecified Brother      Social History   Substance Use Topics    Smoking status: Former Smoker    Smokeless tobacco: Never Used    Alcohol use No     No Known Allergies      Current Outpatient Prescriptions:     atorvastatin (LIPITOR) 20 mg tablet, Take 1 tablet by mouth, Disp: , Rfl:     Cholecalciferol (VITAMIN D) 2000 units CAPS, Take 1 capsule by mouth daily, Disp: , Rfl:     fluticasone (FLONASE) 50 mcg/act nasal spray, 2 sprays into each nostril daily, Disp: 16 g, Rfl: 1    gabapentin (NEURONTIN) 300 mg capsule, Take 1 capsule by mouth daily, Disp: , Rfl:     glucose blood (ONETOUCH VERIO) test strip, 1 applicator by In Vitro route 3 (three) times a day, Disp: , Rfl:     HYDROmorphone (DILAUDID) 4 mg/mL injection, Inject 1 Device as directed daily, Disp: , Rfl:     lisinopril (ZESTRIL) 20 mg tablet, Take 1 tablet by mouth daily, Disp: , Rfl:     methadone (DOLOPHINE) 10 mg tablet, Take 2 tablets by mouth 4 (four) times a day as needed,, Disp: , Rfl:     omeprazole (PriLOSEC) 40 MG capsule, Take 1 capsule by mouth daily, Disp: , Rfl:     ONETOUCH DELICA LANCETS 90A MISC, 1 applicator by Does not apply route 3 (three) times a day, Disp: , Rfl:     oxyCODONE (OxyCONTIN) 40 mg 12 hr tablet, Take 1 tablet by mouth every 12 (twelve) hours, Disp: , Rfl:     oxyCODONE (OXYCONTIN) 80 mg 12 hr tablet, Take 1 tablet by mouth every 12 (twelve) hours, Disp: , Rfl:     SYRINGE-NEEDLE, DISP, 3 ML (B-D 3CC LUER-HAFSA SYR 21GX1") 21G X 1" 3 ML MISC, by Does not apply route once a week, Disp: , Rfl:     Testosterone Enanthate 200 MG/ML SOLN, Inject 0 4 mL into the shoulder, thigh, or buttocks once a week, Disp: , Rfl:     Review of Systems   Constitutional: Negative for activity change, appetite change and fatigue  HENT: Negative for sore throat, trouble swallowing and voice change  Eyes: Negative for visual disturbance  Respiratory: Negative for choking, chest tightness and shortness of breath  Cardiovascular: Negative for chest pain, palpitations and leg swelling  Gastrointestinal: Negative for abdominal pain, constipation and diarrhea  Endocrine: Negative for cold intolerance, heat intolerance, polydipsia, polyphagia and polyuria  Genitourinary: Negative for frequency  Musculoskeletal: Positive for arthralgias, back pain, gait problem and myalgias  Skin: Negative for rash  Neurological: Negative for dizziness and syncope  Hematological: Negative for adenopathy  Psychiatric/Behavioral: Negative for sleep disturbance  All other systems reviewed and are negative  Physical Exam:  Body mass index is 32 78 kg/m²  /82   Pulse 84   Ht 5' 9" (1 753 m)   Wt 101 kg (222 lb)   BMI 32 78 kg/m²     Wt Readings from Last 3 Encounters:   04/05/18 101 kg (222 lb)   02/08/18 103 kg (228 lb)   10/24/17 99 8 kg (220 lb)       Physical Exam   Constitutional: He is oriented to person, place, and time  He appears well-developed and well-nourished  No distress (using cane, uncomfortable walking around)  HENT:   Head: Normocephalic and atraumatic  Mouth/Throat: Oropharynx is clear and moist    Eyes: Conjunctivae and EOM are normal  Pupils are equal, round, and reactive to light  Neck: Normal range of motion  Neck supple  No thyromegaly present  Cardiovascular: Normal rate, regular rhythm and normal heart sounds  No murmur heard  Pulmonary/Chest: Effort normal and breath sounds normal  No respiratory distress  He has no wheezes  He has no rales  Abdominal: Soft  Bowel sounds are normal  He exhibits no distension  There is no tenderness  Musculoskeletal: Normal range of motion  He exhibits no edema     Lymphadenopathy:     He has no cervical adenopathy  Neurological: He is alert and oriented to person, place, and time  Skin: Skin is warm and dry  Psychiatric: He has a normal mood and affect  Vitals reviewed  Diabetic Foot Exam    Labs:     Lab Results   Component Value Date    HGBA1C 6 6 (H) 07/19/2017     No results found for: GLU  Lab Results   Component Value Date    HGBA1C 6 6 (H) 07/19/2017       Lab Results   Component Value Date    CREATININE 0 93 10/17/2017    CREATININE 0 81 07/19/2017    CREATININE 0 85 03/16/2017    BUN 13 10/17/2017     10/17/2017    K 3 9 10/17/2017    CL 99 (L) 10/17/2017    CO2 30 10/17/2017     eGFR   Date Value Ref Range Status   10/17/2017 95 ml/min/1 73sq m Final       Lab Results   Component Value Date    CHOL 107 07/19/2017    HDL 32 (L) 07/19/2017    TRIG 90 07/19/2017       Lab Results   Component Value Date    ALT 43 07/19/2017    AST 31 07/19/2017    ALKPHOS 52 07/19/2017    BILITOT 0 65 07/19/2017       No results found for: [id number]  No results found for: FREET4, TSI    Impression & Plan:    Problem List Items Addressed This Visit     Essential hypertension     Continue lisinopril, BP at goal           Mixed hyperlipidemia     Continue Statin  Osteoporosis     Has not had eval/treatment in many years  No longer following with rheumatology  Will order DEXA scan  Continue calcium and Vitamin D  Relevant Orders    DXA bone density spine hip and pelvis    Testicular hypogonadism     Continue testosterone  Will check testosterone levels along with CBC and PSA  Relevant Orders    CBC and differential Clinic Collect    Testosterone, free, total Clinic Collect    PSA Clinic Collect    Comprehensive metabolic panel Clinic Collect    Type 2 diabetes mellitus ([facility] [address] ) - Primary     Stable on current regimen  Check A1C            Relevant Orders    HEMOGLOBIN A1C W/ EAG ESTIMATION Clinic Collect    Comprehensive metabolic panel Clinic Collect          Orders Placed This Encounter   Procedures    DXA bone density spine hip and pelvis     Standing Status:   Future     Standing Expiration Date:   4/5/2022     Scheduling Instructions:      Please do not wear jewelry on the day of the exam  Please wear comfortable clothing with no metal buttons, zippers, snaps or an underwire bra  Do not take any calcium supplements 24 hours prior to your test  Please bring your physician order, insurance cards, a form of photo ID and a list of your       medications with you  Arrive 5-10 minutes prior to your appointment time in order to register  Order Specific Question:   Reason for Exam:     Answer:   osteoporosis    HEMOGLOBIN A1C W/ EAG ESTIMATION Clinic Collect    CBC and differential Clinic Collect    Testosterone, free, total Clinic Collect    PSA Clinic Collect    Comprehensive metabolic panel Clinic Collect     This is a patient instruction: Patient fasting for 8 hours or longer recommended  There are no Patient Instructions on file for this visit  Discussed with the patient and all questioned fully answered  He will call me if any problems arise  Follow-up appointment in 6 months       Counseled patient on diagnostic results, prognosis, risk and benefit of treatment options, instruction for management, importance of treatment compliance, Risk  factor reduction and impressions      Courtney Collins PA-C

## 2018-04-05 NOTE — ASSESSMENT & PLAN NOTE
Has not had eval/treatment in many years  No longer following with rheumatology  Will order DEXA scan    Continue calcium and Vitamin D

## 2018-04-05 NOTE — PROGRESS NOTES
Established Patient Progress Note      Chief Complaint   Patient presents with    Hypogonadism    Diabetes Type 2          History of Present Illness:   Mame Mcallister  is a 47 y o  male with a history of type 2 diabetes  since 2017   Reports complications of none  Denies recent illness or hospitalizations  Denies recent severe hypoglycemic or severe hyperglycemic episodes  Denies any issues with his current regimen  home glucose monitoring: are performed sporadically and are usually 120s, highest 140  Current regimen: No medications  Has attended MNT/DSMT  Last Eye Exam: Overdue for eye exam  Last Foot Exam: Following with podiatry on regular basis  Has hypertension: Taking lisinopril  Has hyperlipidemia: Taking atorvastatin    Has Hypogonadism: Treated withi Testosterone injection on Saturday   Osteoporosis-- previously treated by rheum dr Celaya More was on reclast in the past  Taking calcium and vitamin D  No fracture  Hasn't seen them in a few years  Last bone density scan over two years  Had prior steroid use from ankylosis spondylitis    Patient Active Problem List   Diagnosis    Cervical radiculopathy    Chronic pain disorder    Essential hypertension    Hyperlipidemia    Lumbar postlaminectomy syndrome    Mixed hyperlipidemia    Osteoporosis    Other chronic nonalcoholic liver disease    Parotid gland enlargement    Testicular hypogonadism    Type 2 diabetes mellitus ([address] )      History reviewed  No pertinent past medical history  History reviewed  No pertinent surgical history     Family History   Problem Relation Age of Onset    Diabetes unspecified Sister     Diabetes unspecified Brother      Social History   Substance Use Topics    Smoking status: Former Smoker    Smokeless tobacco: Never Used    Alcohol use No     No Known Allergies      Current Outpatient Prescriptions:     atorvastatin (LIPITOR) 20 mg tablet, Take 1 tablet by mouth, Disp: , Rfl:    Cholecalciferol (VITAMIN D) 2000 units CAPS, Take 1 capsule by mouth daily, Disp: , Rfl:     fluticasone (FLONASE) 50 mcg/act nasal spray, 2 sprays into each nostril daily, Disp: 16 g, Rfl: 1    gabapentin (NEURONTIN) 300 mg capsule, Take 1 capsule by mouth daily, Disp: , Rfl:     glucose blood (ONETOUCH VERIO) test strip, 1 applicator by In Vitro route 3 (three) times a day, Disp: , Rfl:     HYDROmorphone (DILAUDID) 4 mg/mL injection, Inject 1 Device as directed daily, Disp: , Rfl:     lisinopril (ZESTRIL) 20 mg tablet, Take 1 tablet by mouth daily, Disp: , Rfl:     methadone (DOLOPHINE) 10 mg tablet, Take 2 tablets by mouth 4 (four) times a day as needed,, Disp: , Rfl:     omeprazole (PriLOSEC) 40 MG capsule, Take 1 capsule by mouth daily, Disp: , Rfl:     ONETOUCH DELICA LANCETS 93T MISC, 1 applicator by Does not apply route 3 (three) times a day, Disp: , Rfl:     oxyCODONE (OxyCONTIN) 40 mg 12 hr tablet, Take 1 tablet by mouth every 12 (twelve) hours, Disp: , Rfl:     oxyCODONE (OXYCONTIN) 80 mg 12 hr tablet, Take 1 tablet by mouth every 12 (twelve) hours, Disp: , Rfl:     SYRINGE-NEEDLE, DISP, 3 ML (B-D 3CC LUER-HAFSA SYR 21GX1") 21G X 1" 3 ML MISC, by Does not apply route once a week, Disp: , Rfl:     Testosterone Enanthate 200 MG/ML SOLN, Inject 0 4 mL into the shoulder, thigh, or buttocks once a week, Disp: , Rfl:     Review of Systems   Constitutional: Negative for activity change, appetite change and fatigue  HENT: Negative for sore throat, trouble swallowing and voice change  Eyes: Negative for visual disturbance  Respiratory: Negative for choking, chest tightness and shortness of breath  Cardiovascular: Negative for chest pain, palpitations and leg swelling  Gastrointestinal: Negative for abdominal pain, constipation and diarrhea  Endocrine: Negative for cold intolerance, heat intolerance, polydipsia, polyphagia and polyuria  Genitourinary: Negative for frequency  Musculoskeletal: Positive for arthralgias, back pain, gait problem and myalgias  Skin: Negative for rash  Neurological: Negative for dizziness and syncope  Hematological: Negative for adenopathy  Psychiatric/Behavioral: Negative for sleep disturbance  All other systems reviewed and are negative  Physical Exam:  Body mass index is 32 78 kg/m²  /82   Pulse 84   Ht 5' 9" (1 753 m)   Wt 101 kg (222 lb)   BMI 32 78 kg/m²    Wt Readings from Last 3 Encounters:   04/05/18 101 kg (222 lb)   02/08/18 103 kg (228 lb)   10/24/17 99 8 kg (220 lb)       Physical Exam   Constitutional: He is oriented to person, place, and time  He appears well-developed and well-nourished  No distress (using cane, uncomfortable walking around)  HENT:   Head: Normocephalic and atraumatic  Mouth/Throat: Oropharynx is clear and moist    Eyes: Conjunctivae and EOM are normal  Pupils are equal, round, and reactive to light  Neck: Normal range of motion  Neck supple  No thyromegaly present  Cardiovascular: Normal rate, regular rhythm and normal heart sounds  No murmur heard  Pulmonary/Chest: Effort normal and breath sounds normal  No respiratory distress  He has no wheezes  He has no rales  Abdominal: Soft  Bowel sounds are normal  He exhibits no distension  There is no tenderness  Musculoskeletal: Normal range of motion  He exhibits no edema  Lymphadenopathy:     He has no cervical adenopathy  Neurological: He is alert and oriented to person, place, and time  Skin: Skin is warm and dry  Psychiatric: He has a normal mood and affect  Vitals reviewed      Diabetic Foot Exam    Labs:     Lab Results   Component Value Date    HGBA1C 6 6 (H) 07/19/2017     No results found for: GLU  Lab Results   Component Value Date    HGBA1C 6 6 (H) 07/19/2017       Lab Results   Component Value Date    CREATININE 0 93 10/17/2017    CREATININE 0 81 07/19/2017    CREATININE 0 85 03/16/2017    BUN 13 10/17/2017     10/17/2017    K 3 9 10/17/2017    CL 99 (L) 10/17/2017    CO2 30 10/17/2017     eGFR   Date Value Ref Range Status   10/17/2017 95 ml/min/1 73sq m Final       Lab Results   Component Value Date    CHOL 107 07/19/2017    HDL 32 (L) 07/19/2017    TRIG 90 07/19/2017       Lab Results   Component Value Date    ALT 43 07/19/2017    AST 31 07/19/2017    ALKPHOS 52 07/19/2017    BILITOT 0 65 07/19/2017       No results found for: [id number]  No results found for: FREET4, TSI    Impression & Plan:    Problem List Items Addressed This Visit     Essential hypertension     Continue lisinopril, BP at goal           Mixed hyperlipidemia     Continue Statin  Osteoporosis     Has not had eval/treatment in many years  No longer following with rheumatology  Will order DEXA scan  Continue calcium and Vitamin D  Relevant Orders    DXA bone density spine hip and pelvis    Testicular hypogonadism     Continue testosterone  Will check testosterone levels along with CBC and PSA  Relevant Orders    CBC and differential Clinic Collect    Testosterone, free, total Clinic Collect    PSA Clinic Collect    Comprehensive metabolic panel Clinic Collect    Type 2 diabetes mellitus ([facility] [address] ) - Primary     Stable on current regimen  Check A1C  Relevant Orders    HEMOGLOBIN A1C W/ EAG ESTIMATION Clinic Collect    Comprehensive metabolic panel Clinic Collect          Orders Placed This Encounter   Procedures    DXA bone density spine hip and pelvis     Standing Status:   Future     Standing Expiration Date:   4/5/2022     Scheduling Instructions:      Please do not wear jewelry on the day of the exam  Please wear comfortable clothing with no metal buttons, zippers, snaps or an underwire bra  Do not take any calcium supplements 24 hours prior to your test  Please bring your physician order, insurance cards, a form of photo ID and a list of your       medications with you   Arrive 5-10 minutes prior to your appointment time in order to register  Order Specific Question:   Reason for Exam:     Answer:   osteoporosis    HEMOGLOBIN A1C W/ EAG ESTIMATION Clinic Collect    CBC and differential Clinic Collect    Testosterone, free, total Clinic Collect    PSA Clinic Collect    Comprehensive metabolic panel Clinic Collect     This is a patient instruction: Patient fasting for 8 hours or longer recommended  There are no Patient Instructions on file for this visit  Discussed with the patient and all questioned fully answered  He will call me if any problems arise  Follow-up appointment in 6 months       Counseled patient on diagnostic results, prognosis, risk and benefit of treatment options, instruction for management, importance of treatment compliance, Risk  factor reduction and impressions      Rashid Toney PA-C

## 2018-04-06 LAB
TESTOST FREE SERPL-MCNC: 15.2 PG/ML (ref 7.2–24)
TESTOST SERPL-MCNC: 656 NG/DL (ref 264–916)

## 2018-04-16 ENCOUNTER — TRANSCRIBE ORDERS (OUTPATIENT)
Dept: LAB | Facility: CLINIC | Age: 53
End: 2018-04-16

## 2018-04-16 ENCOUNTER — OFFICE VISIT (OUTPATIENT)
Dept: LAB | Facility: CLINIC | Age: 53
End: 2018-04-16
Payer: COMMERCIAL

## 2018-04-16 DIAGNOSIS — Z79.891 ENCOUNTER FOR LONG-TERM METHADONE USE: ICD-10-CM

## 2018-04-16 DIAGNOSIS — Z79.891 ENCOUNTER FOR LONG-TERM METHADONE USE: Primary | ICD-10-CM

## 2018-04-16 LAB
ATRIAL RATE: 83 BPM
P AXIS: 32 DEGREES
PR INTERVAL: 148 MS
QRS AXIS: 37 DEGREES
QRSD INTERVAL: 86 MS
QT INTERVAL: 386 MS
QTC INTERVAL: 453 MS
T WAVE AXIS: 29 DEGREES
VENTRICULAR RATE: 83 BPM

## 2018-04-16 PROCEDURE — 93005 ELECTROCARDIOGRAM TRACING: CPT

## 2018-04-16 PROCEDURE — 93010 ELECTROCARDIOGRAM REPORT: CPT | Performed by: INTERNAL MEDICINE

## 2018-04-17 ENCOUNTER — TELEPHONE (OUTPATIENT)
Dept: ENDOCRINOLOGY | Facility: CLINIC | Age: 53
End: 2018-04-17

## 2018-04-17 DIAGNOSIS — E11.65 UNCONTROLLED TYPE 2 DIABETES MELLITUS WITH COMPLICATION, UNSPECIFIED WHETHER LONG TERM INSULIN USE: Primary | ICD-10-CM

## 2018-04-17 DIAGNOSIS — E11.8 UNCONTROLLED TYPE 2 DIABETES MELLITUS WITH COMPLICATION, UNSPECIFIED WHETHER LONG TERM INSULIN USE: Primary | ICD-10-CM

## 2018-04-17 RX ORDER — METFORMIN HYDROCHLORIDE 500 MG/1
TABLET, EXTENDED RELEASE ORAL
Qty: 180 TABLET | Refills: 1 | Status: SHIPPED | OUTPATIENT
Start: 2018-04-17 | End: 2018-10-15 | Stop reason: SDUPTHER

## 2018-04-17 NOTE — TELEPHONE ENCOUNTER
----- Message from Yue Sanchez PA-C sent at 4/16/2018  2:45 PM EDT -----  A1C higher at 6 8 and Fasting glucose high at 169  Start Metformin ER 500mg daily with supper and after 1 week increase to 2 tabs (1000mg)  Take with food  Most common side effects may include upset stomach or diarrhea that can get better with time if severe/bothersome let us know  Rest of labs Michael Kraus

## 2018-05-04 ENCOUNTER — HOSPITAL ENCOUNTER (OUTPATIENT)
Dept: RADIOLOGY | Facility: MEDICAL CENTER | Age: 53
Discharge: HOME | End: 2018-05-04
Payer: COMMERCIAL

## 2018-05-04 DIAGNOSIS — M81.0 OSTEOPOROSIS, UNSPECIFIED OSTEOPOROSIS TYPE, UNSPECIFIED PATHOLOGICAL FRACTURE PRESENCE: ICD-10-CM

## 2018-05-04 PROCEDURE — 77080 DXA BONE DENSITY AXIAL: CPT

## 2018-05-08 ENCOUNTER — TELEPHONE (OUTPATIENT)
Dept: ENDOCRINOLOGY | Facility: CLINIC | Age: 53
End: 2018-05-08

## 2018-06-06 DIAGNOSIS — E29.1 TESTICULAR HYPOGONADISM: Primary | ICD-10-CM

## 2018-06-06 RX ORDER — TESTOSTERONE ENANTHATE 200 MG/ML
0.4 INJECTION, SOLUTION INTRAMUSCULAR WEEKLY
Qty: 5 ML | Refills: 0 | Status: SHIPPED | OUTPATIENT
Start: 2018-06-06 | End: 2018-09-04 | Stop reason: SDUPTHER

## 2018-06-22 ENCOUNTER — APPOINTMENT (OUTPATIENT)
Dept: LAB | Facility: CLINIC | Age: 53
End: 2018-06-22

## 2018-06-22 ENCOUNTER — TRANSCRIBE ORDERS (OUTPATIENT)
Dept: LAB | Facility: CLINIC | Age: 53
End: 2018-06-22

## 2018-06-22 DIAGNOSIS — Z00.8 HEALTH EXAMINATION IN POPULATION SURVEY: Primary | ICD-10-CM

## 2018-06-22 LAB
CHOLEST SERPL-MCNC: 130 MG/DL (ref 50–200)
EST. AVERAGE GLUCOSE BLD GHB EST-MCNC: 143 MG/DL
HBA1C MFR BLD: 6.6 % (ref 4.2–6.3)
HDLC SERPL-MCNC: 36 MG/DL (ref 40–60)
LDLC SERPL CALC-MCNC: 62 MG/DL (ref 0–100)
NONHDLC SERPL-MCNC: 94 MG/DL
TRIGL SERPL-MCNC: 159 MG/DL

## 2018-06-22 PROCEDURE — 36415 COLL VENOUS BLD VENIPUNCTURE: CPT

## 2018-06-22 PROCEDURE — 83036 HEMOGLOBIN GLYCOSYLATED A1C: CPT

## 2018-06-22 PROCEDURE — 80061 LIPID PANEL: CPT

## 2018-08-30 ENCOUNTER — OFFICE VISIT (OUTPATIENT)
Dept: LAB | Facility: CLINIC | Age: 53
End: 2018-08-30
Payer: COMMERCIAL

## 2018-08-30 ENCOUNTER — TRANSCRIBE ORDERS (OUTPATIENT)
Dept: LAB | Facility: CLINIC | Age: 53
End: 2018-08-30

## 2018-08-30 DIAGNOSIS — Z79.891 LONG TERM CURRENT USE OF METHADONE FOR PAIN CONTROL: Primary | ICD-10-CM

## 2018-08-30 DIAGNOSIS — Z79.891 LONG TERM CURRENT USE OF METHADONE FOR PAIN CONTROL: ICD-10-CM

## 2018-08-30 LAB
ATRIAL RATE: 83 BPM
P AXIS: 38 DEGREES
PR INTERVAL: 148 MS
QRS AXIS: 32 DEGREES
QRSD INTERVAL: 86 MS
QT INTERVAL: 378 MS
QTC INTERVAL: 444 MS
T WAVE AXIS: 26 DEGREES
VENTRICULAR RATE: 83 BPM

## 2018-08-30 PROCEDURE — 93005 ELECTROCARDIOGRAM TRACING: CPT

## 2018-08-30 PROCEDURE — 93010 ELECTROCARDIOGRAM REPORT: CPT | Performed by: INTERNAL MEDICINE

## 2018-09-04 DIAGNOSIS — E29.1 TESTICULAR HYPOGONADISM: ICD-10-CM

## 2018-09-04 RX ORDER — TESTOSTERONE ENANTHATE 200 MG/ML
0.4 INJECTION, SOLUTION INTRAMUSCULAR WEEKLY
Qty: 4.8 ML | Refills: 0 | Status: SHIPPED | OUTPATIENT
Start: 2018-09-04 | End: 2018-11-27 | Stop reason: SDUPTHER

## 2018-09-05 ENCOUNTER — TELEPHONE (OUTPATIENT)
Dept: ENDOCRINOLOGY | Facility: CLINIC | Age: 53
End: 2018-09-05

## 2018-10-03 ENCOUNTER — CLINICAL SUPPORT (OUTPATIENT)
Dept: FAMILY MEDICINE CLINIC | Facility: CLINIC | Age: 53
End: 2018-10-03
Payer: COMMERCIAL

## 2018-10-03 DIAGNOSIS — Z23 NEED FOR IMMUNIZATION AGAINST INFLUENZA: Primary | ICD-10-CM

## 2018-10-03 PROCEDURE — 90682 RIV4 VACC RECOMBINANT DNA IM: CPT

## 2018-10-03 PROCEDURE — 90471 IMMUNIZATION ADMIN: CPT

## 2018-10-15 ENCOUNTER — OFFICE VISIT (OUTPATIENT)
Dept: ENDOCRINOLOGY | Facility: CLINIC | Age: 53
End: 2018-10-15
Payer: COMMERCIAL

## 2018-10-15 VITALS
DIASTOLIC BLOOD PRESSURE: 70 MMHG | SYSTOLIC BLOOD PRESSURE: 138 MMHG | HEIGHT: 69 IN | BODY MASS INDEX: 32.44 KG/M2 | WEIGHT: 219 LBS | HEART RATE: 83 BPM

## 2018-10-15 DIAGNOSIS — E11.9 TYPE 2 DIABETES MELLITUS WITHOUT COMPLICATION, WITHOUT LONG-TERM CURRENT USE OF INSULIN (HCC): ICD-10-CM

## 2018-10-15 DIAGNOSIS — E78.2 MIXED HYPERLIPIDEMIA: ICD-10-CM

## 2018-10-15 DIAGNOSIS — I10 ESSENTIAL HYPERTENSION: ICD-10-CM

## 2018-10-15 DIAGNOSIS — M81.0 AGE-RELATED OSTEOPOROSIS WITHOUT CURRENT PATHOLOGICAL FRACTURE: ICD-10-CM

## 2018-10-15 DIAGNOSIS — E29.1 TESTICULAR HYPOGONADISM: ICD-10-CM

## 2018-10-15 DIAGNOSIS — E11.9 TYPE 2 DIABETES MELLITUS WITHOUT COMPLICATION, WITHOUT LONG-TERM CURRENT USE OF INSULIN (HCC): Primary | ICD-10-CM

## 2018-10-15 LAB
ALBUMIN SERPL BCP-MCNC: 4.2 G/DL (ref 3.5–5)
ALP SERPL-CCNC: 52 U/L (ref 46–116)
ALT SERPL W P-5'-P-CCNC: 62 U/L (ref 12–78)
ANION GAP SERPL CALCULATED.3IONS-SCNC: 7 MMOL/L (ref 4–13)
AST SERPL W P-5'-P-CCNC: 28 U/L (ref 5–45)
BILIRUB SERPL-MCNC: 0.28 MG/DL (ref 0.2–1)
BUN SERPL-MCNC: 12 MG/DL (ref 5–25)
CALCIUM SERPL-MCNC: 8.9 MG/DL (ref 8.3–10.1)
CHLORIDE SERPL-SCNC: 100 MMOL/L (ref 100–108)
CHOLEST SERPL-MCNC: 142 MG/DL (ref 50–200)
CO2 SERPL-SCNC: 29 MMOL/L (ref 21–32)
CREAT SERPL-MCNC: 0.83 MG/DL (ref 0.6–1.3)
CREAT UR-MCNC: 99.2 MG/DL
EST. AVERAGE GLUCOSE BLD GHB EST-MCNC: 148 MG/DL
GFR SERPL CREATININE-BSD FRML MDRD: 101 ML/MIN/1.73SQ M
GLUCOSE P FAST SERPL-MCNC: 92 MG/DL (ref 65–99)
HBA1C MFR BLD: 6.8 % (ref 4.2–6.3)
HCT VFR BLD AUTO: 42.7 % (ref 36.5–49.3)
HDLC SERPL-MCNC: 36 MG/DL (ref 40–60)
HGB BLD-MCNC: 14.5 G/DL (ref 12–17)
LDLC SERPL CALC-MCNC: 67 MG/DL (ref 0–100)
MICROALBUMIN UR-MCNC: 157 MG/L (ref 0–20)
MICROALBUMIN/CREAT 24H UR: 158 MG/G CREATININE (ref 0–30)
NONHDLC SERPL-MCNC: 106 MG/DL
POTASSIUM SERPL-SCNC: 4 MMOL/L (ref 3.5–5.3)
PROT SERPL-MCNC: 7.3 G/DL (ref 6.4–8.2)
SODIUM SERPL-SCNC: 136 MMOL/L (ref 136–145)
T4 FREE SERPL-MCNC: 0.81 NG/DL (ref 0.76–1.46)
TRIGL SERPL-MCNC: 197 MG/DL
TSH SERPL DL<=0.05 MIU/L-ACNC: 0.75 UIU/ML (ref 0.36–3.74)

## 2018-10-15 PROCEDURE — 82570 ASSAY OF URINE CREATININE: CPT | Performed by: INTERNAL MEDICINE

## 2018-10-15 PROCEDURE — 83036 HEMOGLOBIN GLYCOSYLATED A1C: CPT | Performed by: INTERNAL MEDICINE

## 2018-10-15 PROCEDURE — 84439 ASSAY OF FREE THYROXINE: CPT | Performed by: INTERNAL MEDICINE

## 2018-10-15 PROCEDURE — 80053 COMPREHEN METABOLIC PANEL: CPT | Performed by: INTERNAL MEDICINE

## 2018-10-15 PROCEDURE — 85014 HEMATOCRIT: CPT | Performed by: INTERNAL MEDICINE

## 2018-10-15 PROCEDURE — 80061 LIPID PANEL: CPT | Performed by: INTERNAL MEDICINE

## 2018-10-15 PROCEDURE — 99214 OFFICE O/P EST MOD 30 MIN: CPT | Performed by: INTERNAL MEDICINE

## 2018-10-15 PROCEDURE — 82043 UR ALBUMIN QUANTITATIVE: CPT | Performed by: INTERNAL MEDICINE

## 2018-10-15 PROCEDURE — 84402 ASSAY OF FREE TESTOSTERONE: CPT | Performed by: INTERNAL MEDICINE

## 2018-10-15 PROCEDURE — 85018 HEMOGLOBIN: CPT | Performed by: INTERNAL MEDICINE

## 2018-10-15 PROCEDURE — 3060F POS MICROALBUMINURIA REV: CPT | Performed by: FAMILY MEDICINE

## 2018-10-15 PROCEDURE — 84443 ASSAY THYROID STIM HORMONE: CPT | Performed by: INTERNAL MEDICINE

## 2018-10-15 PROCEDURE — 84403 ASSAY OF TOTAL TESTOSTERONE: CPT | Performed by: INTERNAL MEDICINE

## 2018-10-15 PROCEDURE — 36415 COLL VENOUS BLD VENIPUNCTURE: CPT | Performed by: INTERNAL MEDICINE

## 2018-10-15 RX ORDER — METFORMIN HYDROCHLORIDE 500 MG/1
TABLET, EXTENDED RELEASE ORAL
Qty: 180 TABLET | Refills: 3 | Status: SHIPPED | OUTPATIENT
Start: 2018-10-15 | End: 2019-10-14 | Stop reason: SDUPTHER

## 2018-10-15 RX ORDER — METFORMIN HYDROCHLORIDE 500 MG/1
TABLET, EXTENDED RELEASE ORAL
Qty: 180 TABLET | Refills: 3 | Status: SHIPPED | OUTPATIENT
Start: 2018-10-15 | End: 2018-10-15 | Stop reason: SDUPTHER

## 2018-10-15 NOTE — PROGRESS NOTES
Nancy Marsh  47 y o  male MRN: [id number]    Encounter: [id number]      Assessment/Plan     Assessment: This is a 47y o -year-old male with diabetes with hyperglycemia, hypertension, hyperlipidemia, hypogonadism and osteoporosis  Plan:  1  Type 2 diabetes with hyperglycemia-check routine laboratory testing  Continue metformin  We did discuss changes in his diet since he expressed that he has been eating more than usual     2  Hypogonadism-continue testosterone replacement  Check total and free testosterone, H&H     3  Hyperlipidemia-the LDL was at goal     4  Hypertension-blood pressure is at target  5  Osteoporosis-based on the most recent DEXA scan, his bone density is normal       CC: Diabetes    History of Present Illness     HPI:  69-year-old male with type 2 diabetes for about six months was currently on metformin  He denies any diarrhea  He states that he has not been checking his blood sugars three to the fact that he has a hard time obtaining blood from his finger  He denies any hypoglycemia  For the hypogonadism, he is on testosterone injection  He denies any difficulty urinating  For hyperlipidemia, he is on a statin  He denies any myalgia  For hypertension, he is on lisinopril  He denies any cough  For the osteoporosis, he continues on calcium and vitamin-D  Review of Systems   Constitutional: Negative for chills and fever  Respiratory: Negative for shortness of breath  Cardiovascular: Negative for chest pain  Gastrointestinal: Negative for constipation, diarrhea, nausea and vomiting  Endocrine: Negative for polydipsia and polyuria  All other systems reviewed and are negative        Historical Information   Past Medical History:   Diagnosis Date    Pilonidal cyst     last assessed 3/17/2014     Past Surgical History:   Procedure Laterality Date    OTHER SURGICAL HISTORY  03/13/2015    Electr analysis of progr impl pump w/reprogram refill, requiring physician    Replacement of right abdomen intrathecal  pain pump filled with Dilaudid with electronic analysis and programming   managed by Wen Ivey     Social History   History   Alcohol Use No     Comment: history     History   Drug Use    Types: Hydromorphone     Comment: per [employer] 'denied hx of drug use'     History   Smoking Status    Former Smoker   Smokeless Tobacco    Never Used     Comment: per [employer] 'current every day smoker/denied  hx of never a smoker/tobacco use'     Preop Family History:   Family History   Problem Relation Age of Onset    Diabetes unspecified Sister     Hypertension Sister     Hyperlipidemia Sister         pure hypercholesterolemia    Diabetes unspecified Brother         DM    Hypertension Brother     Hyperlipidemia Brother         pure hypercholesterolemia    Coronary artery disease Father     Heart disease Father     Diabetes Brother         DM    Hypertension Family        Meds/Allergies   Current Outpatient Prescriptions   Medication Sig Dispense Refill    atorvastatin (LIPITOR) 20 mg tablet Take 1 tablet by mouth      Cholecalciferol (VITAMIN D) 2000 units CAPS Take 1 capsule by mouth daily      fluticasone (FLONASE) 50 mcg/act nasal spray 2 sprays into each nostril daily 16 g 1    gabapentin (NEURONTIN) 300 mg capsule Take 1 capsule by mouth daily Taking two 300 mg at night       glucose blood (ONETOUCH VERIO) test strip 1 applicator by In Vitro route 3 (three) times a day      HYDROmorphone (DILAUDID) 4 mg/mL injection Inject 1 Device as directed daily      lisinopril (ZESTRIL) 20 mg tablet Take 1 tablet by mouth daily      metFORMIN (GLUCOPHAGE-XR) 500 mg 24 hr tablet Take 1 tablet daily with dinner then after 1 week increase to 2 tabs (1000 mg) daily with food as directed 180 tablet 1    methadone (DOLOPHINE) 10 mg tablet Take 2 tablets by mouth 4 (four) times a day as needed,      omeprazole (PriLOSEC) 40 MG capsule Take 1 capsule by mouth daily      ONETOUCH DELICA LANCETS 73P MISC 1 applicator by Does not apply route 3 (three) times a day      oxyCODONE (OxyCONTIN) 40 mg 12 hr tablet Take 1 tablet by mouth every 12 (twelve) hours      oxyCODONE (OXYCONTIN) 80 mg 12 hr tablet Take 1 tablet by mouth every 12 (twelve) hours      SYRINGE-NEEDLE, DISP, 3 ML (B-D 3CC LUER-HAFSA SYR 21GX1") 21G X 1" 3 ML MISC by Does not apply route once a week      Testosterone Enanthate 200 MG/ML SOLN Inject 0 4 mL (80 mg total) into a muscle once a week for 90 days 4 8 mL 0     No current facility-administered medications for this visit  No Known Allergies    Objective   Vitals: Blood pressure 138/70, pulse 83, height 5' 9" (1 753 m), weight 99 3 kg (219 lb)  Physical Exam   Constitutional: He is oriented to person, place, and time  He appears well-developed and well-nourished  HENT:   Head: Normocephalic and atraumatic  Mouth/Throat: Oropharynx is clear and moist and mucous membranes are normal    Eyes: Conjunctivae, EOM and lids are normal    Neck: Neck supple  No thyromegaly present  Cardiovascular: Normal rate, regular rhythm and normal heart sounds  Pulses are no weak pulses  Pulses:       Dorsalis pedis pulses are 1+ on the right side, and 1+ on the left side  Pulmonary/Chest: Effort normal and breath sounds normal    Abdominal: Soft  Bowel sounds are normal    Musculoskeletal: Normal range of motion  Feet:   Right Foot:   Skin Integrity: Negative for ulcer, skin breakdown, erythema, warmth, callus or dry skin  Left Foot:   Skin Integrity: Negative for ulcer, skin breakdown, erythema, warmth, callus or dry skin  Lymphadenopathy:        Head (right side): No occipital adenopathy present  Head (left side): No occipital adenopathy present  Right: No supraclavicular adenopathy present  Left: No supraclavicular adenopathy present  Neurological: He is alert and oriented to person, place, and time     Skin: Skin is warm and intact  Psychiatric: He has a normal mood and affect  His behavior is normal    Vitals reviewed  Patient's shoes and socks removed  Right Foot/Ankle   Right Foot Inspection  Skin Exam: skin normal and skin intact no dry skin, no warmth, no callus, no erythema, no maceration, no abnormal color, no pre-ulcer, no ulcer and no callus                            Sensory   Vibration: diminished    Monofilament testing: intact  Vascular    The right DP pulse is 1+  Left Foot/Ankle  Left Foot Inspection  Skin Exam: skin intactno dry skin, no warmth, no erythema, no maceration, normal color, no pre-ulcer, no ulcer and no callus                                         Sensory   Vibration: diminished    Monofilament: intact  Vascular    The left DP pulse is 1+  Assign Risk Category:  No deformity present; No loss of protective sensation; No weak pulses       Risk: 0    The history was obtained from the review of the chart, patient  Lab Results:   Lab Results   Component Value Date/Time    Hemoglobin A1C 6 6 (H) 06/22/2018 10:46 AM    Hemoglobin A1C 6 8 (H) 04/05/2018 10:48 AM    WBC 6 49 04/05/2018 10:48 AM    Hemoglobin 14 7 04/05/2018 10:48 AM    Hematocrit 41 9 04/05/2018 10:48 AM    MCV 93 04/05/2018 10:48 AM    Platelets 188 69/28/8705 10:48 AM    BUN 10 04/05/2018 10:48 AM    BUN 13 10/17/2017 11:44 AM    Sodium 137 04/05/2018 10:48 AM    Sodium 137 10/17/2017 11:44 AM    Potassium 4 1 04/05/2018 10:48 AM    Potassium 3 9 10/17/2017 11:44 AM    Chloride 101 04/05/2018 10:48 AM    Chloride 99 (L) 10/17/2017 11:44 AM    CO2 27 04/05/2018 10:48 AM    CO2 30 10/17/2017 11:44 AM    Creatinine 0 79 04/05/2018 10:48 AM    Creatinine 0 93 10/17/2017 11:44 AM    AST 26 04/05/2018 10:48 AM    ALT 59 04/05/2018 10:48 AM    Albumin 3 9 04/05/2018 10:48 AM    HDL, Direct 36 (L) 06/22/2018 10:46 AM    Triglycerides 159 (H) 06/22/2018 10:46 AM       Imaging Studies: I have personally reviewed pertinent reports  Portions of the record may have been created with voice recognition software  Occasional wrong word or "sound a like" substitutions may have occurred due to the inherent limitations of voice recognition software  Read the chart carefully and recognize, using context, where substitutions have occurred

## 2018-10-15 NOTE — PATIENT INSTRUCTIONS
10% - bad control"> 10% - bad control,Hemoglobin A1c (HbA1c) greater than 10% indicating poor diabetic control,Haemoglobin A1c greater than 10% indicating poor diabetic control">   Diabetes Mellitus Type 2 in Adults, Ambulatory Care   GENERAL INFORMATION:   Diabetes mellitus type 2  is a disease that affects how your body uses glucose (sugar)  Insulin helps move sugar out of the blood so it can be used for energy  Normally, when the blood sugar level increases, the pancreas makes more insulin  Type 2 diabetes develops because either the body cannot make enough insulin, or it cannot use the insulin correctly  After many years, your pancreas may stop making insulin  Common symptoms include the following:   · More hunger or thirst than usual     · Frequent urination     · Weight loss without trying     · Blurred vision  Seek immediate care for the following symptoms:   · Severe abdominal pain, or pain that spreads to your back  You may also be vomiting  · Trouble staying awake or focusing    · Shaking or sweating    · Blurred or double vision    · Breath has a fruity, sweet smell    · Breathing is deep and labored, or rapid and shallow    · Heartbeat is fast and weak  Treatment for diabetes mellitus type 2  includes keeping your blood sugar at a normal level  You must eat the right foods, and exercise regularly  You may also need medicine if you cannot control your blood sugar level with nutrition and exercise  Manage diabetes mellitus type 2:   · Check your blood sugar level  You will be taught how to check a small drop of blood in a glucose monitor  Ask your healthcare provider when and how often to check during the day  Ask your healthcare provider what your blood sugar levels should be when you check them  · Keep track of carbohydrates (sugar and starchy foods)  Your blood sugar level can get too high if you eat too many carbohydrates   Your dietitian will help you plan meals and snacks that have the right amount of carbohydrates  · Eat low-fat foods  Some examples are skinless chicken and low-fat milk  · Eat less sodium (salt)  Some examples of high-sodium foods to limit are soy sauce, potato chips, and soup  Do not add salt to food you cook  Limit your use of table salt  · Eat high-fiber foods  Foods that are a good source of fiber include vegetables, whole grain bread, and beans  · Limit alcohol  Alcohol affects your blood sugar level and can make it harder to manage your diabetes  Women should limit alcohol to 1 drink a day  Men should limit alcohol to 2 drinks a day  A drink of alcohol is 12 ounces of beer, 5 ounces of wine, or 1½ ounces of liquor  · Get regular exercise  Exercise can help keep your blood sugar level steady, decrease your risk of heart disease, and help you lose weight  Exercise for at least 30 minutes, 5 days a week  Include muscle strengthening activities 2 days each week  Work with your healthcare provider to create an exercise plan  · Check your feet each day  for injuries or open sores  Ask your healthcare provider for activities you can do if you have an open sore  · Quit smoking  If you smoke, it is never too late to quit  Smoking can worsen the problems that may occur with diabetes  Ask your healthcare provider for information about how to stop smoking if you are having trouble quitting  · Ask about your weight:  Ask healthcare providers if you need to lose weight, and how much to lose  Ask them to help you with a weight loss program  Even a 10 to 15 pound weight loss can help you manage your blood sugar level  · Carry medical alert identification  Wear medical alert jewelry or carry a card that says you have diabetes  Ask your healthcare provider where to get these items  · Ask about vaccines  Diabetes puts you at risk of serious illness if you get the flu, pneumonia, or hepatitis   Ask your healthcare provider if you should get a flu, pneumonia, or hepatitis B vaccine, and when to get the vaccine  Follow up with your healthcare provider as directed:  Write down your questions so you remember to ask them during your visits  CARE AGREEMENT:   You have the right to help plan your care  Learn about your health condition and how it may be treated  Discuss treatment options with your caregivers to decide what care you want to receive  You always have the right to refuse treatment  The above information is an  only  It is not intended as medical advice for individual conditions or treatments  Talk to your doctor, nurse or pharmacist before following any medical regimen to see if it is safe and effective for you  © 2014 [address] is for End User's use only and may not be sold, redistributed or otherwise used for commercial purposes  All illustrations and images included in CareNotes® are the copyrighted property of A D A M , Inc  or Logan Yousif

## 2018-10-17 ENCOUNTER — TELEPHONE (OUTPATIENT)
Dept: ENDOCRINOLOGY | Facility: CLINIC | Age: 53
End: 2018-10-17

## 2018-10-17 LAB
TESTOST FREE SERPL-MCNC: 15 PG/ML (ref 7.2–24)
TESTOST SERPL-MCNC: 828 NG/DL (ref 264–916)

## 2018-10-17 NOTE — TELEPHONE ENCOUNTER
Patient informed of the following lab results:    Zully Ford MD  [address] [place] [address]             Please call the patient regarding his abnormal result        The hemoglobin A1c is less than 7%   Continue metformin   Normal thyroid testing and testosterone levels   There is some protein in the urine   Continue to monitor

## 2018-10-17 NOTE — PROGRESS NOTES
Please call the patient regarding his abnormal result  The hemoglobin A1c is less than 7%  Continue metformin  Normal thyroid testing and testosterone levels  There is some protein in the urine  Continue to monitor

## 2018-10-19 DIAGNOSIS — E11.9 TYPE 2 DIABETES MELLITUS WITHOUT COMPLICATION, WITHOUT LONG-TERM CURRENT USE OF INSULIN (HCC): Primary | ICD-10-CM

## 2018-11-07 DIAGNOSIS — I10 ESSENTIAL HYPERTENSION: Primary | ICD-10-CM

## 2018-11-07 PROCEDURE — 4010F ACE/ARB THERAPY RXD/TAKEN: CPT | Performed by: FAMILY MEDICINE

## 2018-11-07 RX ORDER — LISINOPRIL 20 MG/1
TABLET ORAL
Qty: 90 TABLET | Refills: 0 | Status: SHIPPED | OUTPATIENT
Start: 2018-11-07 | End: 2019-02-06 | Stop reason: SDUPTHER

## 2018-11-27 DIAGNOSIS — E29.1 TESTICULAR HYPOGONADISM: ICD-10-CM

## 2018-11-27 RX ORDER — TESTOSTERONE ENANTHATE 200 MG/ML
0.4 INJECTION, SOLUTION INTRAMUSCULAR WEEKLY
Qty: 4.8 ML | Refills: 0 | Status: SHIPPED | OUTPATIENT
Start: 2018-11-27 | End: 2018-11-29

## 2018-11-29 ENCOUNTER — TELEPHONE (OUTPATIENT)
Dept: ENDOCRINOLOGY | Facility: CLINIC | Age: 53
End: 2018-11-29

## 2018-11-29 DIAGNOSIS — E29.1 TESTICULAR HYPOGONADISM: Primary | ICD-10-CM

## 2018-11-29 RX ORDER — TESTOSTERONE CYPIONATE 200 MG/ML
80 INJECTION INTRAMUSCULAR WEEKLY
Qty: 4.8 ML | Refills: 1 | Status: SHIPPED | OUTPATIENT
Start: 2018-11-29 | End: 2019-08-22 | Stop reason: DRUGHIGH

## 2018-11-29 RX ORDER — TESTOSTERONE CYPIONATE 200 MG/ML
80 INJECTION INTRAMUSCULAR WEEKLY
Qty: 4.8 ML | Refills: 13 | OUTPATIENT
Start: 2018-11-29 | End: 2022-05-06

## 2018-12-06 DIAGNOSIS — E78.5 HYPERLIPIDEMIA, UNSPECIFIED HYPERLIPIDEMIA TYPE: Primary | ICD-10-CM

## 2018-12-06 RX ORDER — ATORVASTATIN CALCIUM 20 MG/1
TABLET, FILM COATED ORAL
Qty: 90 TABLET | Refills: 0 | Status: SHIPPED | OUTPATIENT
Start: 2018-12-06 | End: 2019-03-11 | Stop reason: SDUPTHER

## 2018-12-13 ENCOUNTER — OFFICE VISIT (OUTPATIENT)
Dept: FAMILY MEDICINE CLINIC | Facility: CLINIC | Age: 53
End: 2018-12-13
Payer: COMMERCIAL

## 2018-12-13 VITALS
SYSTOLIC BLOOD PRESSURE: 124 MMHG | WEIGHT: 219.6 LBS | HEIGHT: 69 IN | BODY MASS INDEX: 32.53 KG/M2 | DIASTOLIC BLOOD PRESSURE: 80 MMHG | TEMPERATURE: 98.6 F

## 2018-12-13 DIAGNOSIS — J02.9 SORE THROAT: ICD-10-CM

## 2018-12-13 DIAGNOSIS — J02.0 STREP PHARYNGITIS: Primary | ICD-10-CM

## 2018-12-13 LAB — S PYO AG THROAT QL: POSITIVE

## 2018-12-13 PROCEDURE — 87880 STREP A ASSAY W/OPTIC: CPT | Performed by: FAMILY MEDICINE

## 2018-12-13 PROCEDURE — 99213 OFFICE O/P EST LOW 20 MIN: CPT | Performed by: FAMILY MEDICINE

## 2018-12-13 RX ORDER — AMOXICILLIN 500 MG/1
500 CAPSULE ORAL 3 TIMES DAILY
Qty: 21 CAPSULE | Refills: 0 | Status: SHIPPED | OUTPATIENT
Start: 2018-12-13 | End: 2018-12-20

## 2018-12-13 NOTE — PROGRESS NOTES
Assessment/Plan:         Diagnoses and all orders for this visit:    Strep pharyngitis  -     amoxicillin (AMOXIL) 500 mg capsule; Take 1 capsule (500 mg total) by mouth 3 (three) times a day for 7 days    Sore throat  -     POCT rapid strepA        Rapid strep +  Start antibiotics  Continue with symptom treatment  Push fluids  Recheck prn  Patient ID: Sosa Jarrett  is a 47 y o  male  2 day history of sore throat  Pain with swallowing  + chills  Mild non productive cough  He has been using prn DayQuil  The following portions of the patient's history were reviewed and updated as appropriate: allergies, current medications, past family history, past medical history, past social history, past surgical history and problem list     Review of Systems   Constitutional: Positive for appetite change (decreased appetite)  HENT: Positive for congestion  Negative for ear pain, postnasal drip, rhinorrhea and sinus pain  See HPI   Respiratory: Negative for shortness of breath and wheezing  Cardiovascular: Negative for chest pain and palpitations  Gastrointestinal: Negative for diarrhea, nausea and vomiting  Musculoskeletal: Negative for myalgias  Skin: Negative for rash  Neurological: Positive for headaches  Hematological: Negative for adenopathy  Objective:      /80 (BP Location: Left arm, Patient Position: Sitting)   Temp 98 6 °F (37 °C)   Ht 5' 9" (1 753 m)   Wt 99 6 kg (219 lb 9 6 oz)   BMI 32 43 kg/m²          Physical Exam   Constitutional: He appears well-developed and well-nourished  No distress  HENT:   Right Ear: Tympanic membrane normal    Left Ear: Tympanic membrane normal    Nose: Right sinus exhibits no maxillary sinus tenderness and no frontal sinus tenderness  Left sinus exhibits no maxillary sinus tenderness and no frontal sinus tenderness  Mouth/Throat: Posterior oropharyngeal erythema present   No oropharyngeal exudate or posterior oropharyngeal edema    Eyes: Conjunctivae are normal    Neck: No thyroid mass and no thyromegaly present  Cardiovascular: Normal rate, regular rhythm and normal heart sounds  Exam reveals no gallop  No murmur heard  Pulmonary/Chest: Breath sounds normal  He has no wheezes  He has no rales  Lymphadenopathy:     He has no cervical adenopathy  Skin: No rash noted  Nursing note and vitals reviewed

## 2019-02-06 DIAGNOSIS — I10 ESSENTIAL HYPERTENSION: ICD-10-CM

## 2019-02-07 RX ORDER — LISINOPRIL 20 MG/1
TABLET ORAL
Qty: 90 TABLET | Refills: 0 | Status: SHIPPED | OUTPATIENT
Start: 2019-02-07 | End: 2019-05-06 | Stop reason: SDUPTHER

## 2019-03-11 DIAGNOSIS — E78.5 HYPERLIPIDEMIA, UNSPECIFIED HYPERLIPIDEMIA TYPE: ICD-10-CM

## 2019-03-12 RX ORDER — ATORVASTATIN CALCIUM 20 MG/1
TABLET, FILM COATED ORAL
Qty: 90 TABLET | Refills: 0 | Status: SHIPPED | OUTPATIENT
Start: 2019-03-12 | End: 2019-06-04 | Stop reason: SDUPTHER

## 2019-05-06 DIAGNOSIS — I10 ESSENTIAL HYPERTENSION: ICD-10-CM

## 2019-05-06 RX ORDER — LISINOPRIL 20 MG/1
20 TABLET ORAL DAILY
Qty: 90 TABLET | Refills: 1 | Status: SHIPPED | OUTPATIENT
Start: 2019-05-06 | End: 2019-11-04 | Stop reason: SDUPTHER

## 2019-06-04 DIAGNOSIS — E78.5 HYPERLIPIDEMIA, UNSPECIFIED HYPERLIPIDEMIA TYPE: ICD-10-CM

## 2019-06-04 RX ORDER — ATORVASTATIN CALCIUM 20 MG/1
TABLET, FILM COATED ORAL
Qty: 90 TABLET | Refills: 0 | Status: SHIPPED | OUTPATIENT
Start: 2019-06-04 | End: 2019-09-05 | Stop reason: SDUPTHER

## 2019-06-26 ENCOUNTER — OFFICE VISIT (OUTPATIENT)
Dept: ENDOCRINOLOGY | Facility: CLINIC | Age: 54
End: 2019-06-26
Payer: COMMERCIAL

## 2019-06-26 VITALS
HEART RATE: 92 BPM | HEIGHT: 69 IN | BODY MASS INDEX: 32.29 KG/M2 | SYSTOLIC BLOOD PRESSURE: 128 MMHG | WEIGHT: 218 LBS | DIASTOLIC BLOOD PRESSURE: 88 MMHG

## 2019-06-26 DIAGNOSIS — E78.2 MIXED HYPERLIPIDEMIA: ICD-10-CM

## 2019-06-26 DIAGNOSIS — M81.0 AGE-RELATED OSTEOPOROSIS WITHOUT CURRENT PATHOLOGICAL FRACTURE: Primary | ICD-10-CM

## 2019-06-26 DIAGNOSIS — E11.9 TYPE 2 DIABETES MELLITUS WITHOUT COMPLICATION, WITHOUT LONG-TERM CURRENT USE OF INSULIN (HCC): ICD-10-CM

## 2019-06-26 DIAGNOSIS — E29.1 TESTICULAR HYPOGONADISM: ICD-10-CM

## 2019-06-26 DIAGNOSIS — I10 ESSENTIAL HYPERTENSION: ICD-10-CM

## 2019-06-26 PROCEDURE — 99214 OFFICE O/P EST MOD 30 MIN: CPT | Performed by: PHYSICIAN ASSISTANT

## 2019-07-10 DIAGNOSIS — Z12.11 SCREENING FOR COLON CANCER: Primary | ICD-10-CM

## 2019-08-14 ENCOUNTER — APPOINTMENT (OUTPATIENT)
Dept: LAB | Facility: CLINIC | Age: 54
End: 2019-08-14
Payer: COMMERCIAL

## 2019-08-14 DIAGNOSIS — E11.9 TYPE 2 DIABETES MELLITUS WITHOUT COMPLICATION, WITHOUT LONG-TERM CURRENT USE OF INSULIN (HCC): ICD-10-CM

## 2019-08-14 DIAGNOSIS — E29.1 TESTICULAR HYPOGONADISM: ICD-10-CM

## 2019-08-14 DIAGNOSIS — E78.2 MIXED HYPERLIPIDEMIA: ICD-10-CM

## 2019-08-14 LAB
ALBUMIN SERPL BCP-MCNC: 4.1 G/DL (ref 3.5–5)
ALP SERPL-CCNC: 51 U/L (ref 46–116)
ALT SERPL W P-5'-P-CCNC: 47 U/L (ref 12–78)
ANION GAP SERPL CALCULATED.3IONS-SCNC: 6 MMOL/L (ref 4–13)
AST SERPL W P-5'-P-CCNC: 23 U/L (ref 5–45)
BASOPHILS # BLD AUTO: 0.03 THOUSANDS/ΜL (ref 0–0.1)
BASOPHILS NFR BLD AUTO: 1 % (ref 0–1)
BILIRUB SERPL-MCNC: 0.52 MG/DL (ref 0.2–1)
BUN SERPL-MCNC: 13 MG/DL (ref 5–25)
CALCIUM SERPL-MCNC: 9.5 MG/DL (ref 8.3–10.1)
CHLORIDE SERPL-SCNC: 99 MMOL/L (ref 100–108)
CO2 SERPL-SCNC: 32 MMOL/L (ref 21–32)
CREAT SERPL-MCNC: 0.82 MG/DL (ref 0.6–1.3)
CREAT UR-MCNC: 115 MG/DL
EOSINOPHIL # BLD AUTO: 0.13 THOUSAND/ΜL (ref 0–0.61)
EOSINOPHIL NFR BLD AUTO: 2 % (ref 0–6)
ERYTHROCYTE [DISTWIDTH] IN BLOOD BY AUTOMATED COUNT: 12.7 % (ref 11.6–15.1)
EST. AVERAGE GLUCOSE BLD GHB EST-MCNC: 157 MG/DL
GFR SERPL CREATININE-BSD FRML MDRD: 102 ML/MIN/1.73SQ M
GLUCOSE P FAST SERPL-MCNC: 128 MG/DL (ref 65–99)
HBA1C MFR BLD: 7.1 % (ref 4.2–6.3)
HCT VFR BLD AUTO: 39.2 % (ref 36.5–49.3)
HGB BLD-MCNC: 13.1 G/DL (ref 12–17)
IMM GRANULOCYTES # BLD AUTO: 0.02 THOUSAND/UL (ref 0–0.2)
IMM GRANULOCYTES NFR BLD AUTO: 0 % (ref 0–2)
LYMPHOCYTES # BLD AUTO: 2.92 THOUSANDS/ΜL (ref 0.6–4.47)
LYMPHOCYTES NFR BLD AUTO: 44 % (ref 14–44)
MCH RBC QN AUTO: 31.6 PG (ref 26.8–34.3)
MCHC RBC AUTO-ENTMCNC: 33.4 G/DL (ref 31.4–37.4)
MCV RBC AUTO: 95 FL (ref 82–98)
MICROALBUMIN UR-MCNC: 84.8 MG/L (ref 0–20)
MICROALBUMIN/CREAT 24H UR: 74 MG/G CREATININE (ref 0–30)
MONOCYTES # BLD AUTO: 0.54 THOUSAND/ΜL (ref 0.17–1.22)
MONOCYTES NFR BLD AUTO: 8 % (ref 4–12)
NEUTROPHILS # BLD AUTO: 3 THOUSANDS/ΜL (ref 1.85–7.62)
NEUTS SEG NFR BLD AUTO: 45 % (ref 43–75)
NRBC BLD AUTO-RTO: 0 /100 WBCS
PLATELET # BLD AUTO: 217 THOUSANDS/UL (ref 149–390)
PMV BLD AUTO: 10.6 FL (ref 8.9–12.7)
POTASSIUM SERPL-SCNC: 4.3 MMOL/L (ref 3.5–5.3)
PROT SERPL-MCNC: 7.4 G/DL (ref 6.4–8.2)
PSA SERPL-MCNC: 0.8 NG/ML (ref 0–4)
RBC # BLD AUTO: 4.15 MILLION/UL (ref 3.88–5.62)
SODIUM SERPL-SCNC: 137 MMOL/L (ref 136–145)
WBC # BLD AUTO: 6.64 THOUSAND/UL (ref 4.31–10.16)

## 2019-08-14 PROCEDURE — 36415 COLL VENOUS BLD VENIPUNCTURE: CPT

## 2019-08-14 PROCEDURE — 82570 ASSAY OF URINE CREATININE: CPT

## 2019-08-14 PROCEDURE — 80053 COMPREHEN METABOLIC PANEL: CPT

## 2019-08-14 PROCEDURE — 83036 HEMOGLOBIN GLYCOSYLATED A1C: CPT

## 2019-08-14 PROCEDURE — 84402 ASSAY OF FREE TESTOSTERONE: CPT

## 2019-08-14 PROCEDURE — 85025 COMPLETE CBC W/AUTO DIFF WBC: CPT

## 2019-08-14 PROCEDURE — 84403 ASSAY OF TOTAL TESTOSTERONE: CPT

## 2019-08-14 PROCEDURE — 82043 UR ALBUMIN QUANTITATIVE: CPT

## 2019-08-14 PROCEDURE — 84153 ASSAY OF PSA TOTAL: CPT

## 2019-08-15 LAB
TESTOST FREE SERPL-MCNC: 19.8 PG/ML (ref 7.2–24)
TESTOST SERPL-MCNC: 976 NG/DL (ref 264–916)

## 2019-08-20 ENCOUNTER — TELEPHONE (OUTPATIENT)
Dept: ENDOCRINOLOGY | Facility: CLINIC | Age: 54
End: 2019-08-20

## 2019-08-20 DIAGNOSIS — E29.1 TESTICULAR HYPOGONADISM: Primary | ICD-10-CM

## 2019-08-20 NOTE — TELEPHONE ENCOUNTER
----- Message from Alex Roberts PA-C sent at 8/19/2019  4:59 PM EDT -----  Testosterone levels high  Currently taking 80mg weekly (0 4ml) reduce to 60mg weekly (0 3ml) and repeat lab testing in 2 months midway between injections  A1C higher at 7 1-- please focus on dietary improvements, check BG at least once per day, send log in two weeks for review  Small amount of protein in urine, continue lisinopril for BP/kidney protection

## 2019-08-20 NOTE — TELEPHONE ENCOUNTER
I did explain in a message the note that Mina has in his chart  I will place the order for free and total testosterone    Pt to call if he has any questions

## 2019-08-22 DIAGNOSIS — E29.1 TESTICULAR HYPOGONADISM: Primary | ICD-10-CM

## 2019-08-22 RX ORDER — TESTOSTERONE CYPIONATE 200 MG/ML
VIAL (ML) INTRAMUSCULAR
Start: 2019-08-22 | End: 2019-10-29 | Stop reason: SDUPTHER

## 2019-09-05 DIAGNOSIS — E78.5 HYPERLIPIDEMIA, UNSPECIFIED HYPERLIPIDEMIA TYPE: ICD-10-CM

## 2019-09-05 RX ORDER — ATORVASTATIN CALCIUM 20 MG/1
TABLET, FILM COATED ORAL
Qty: 90 TABLET | Refills: 0 | Status: SHIPPED | OUTPATIENT
Start: 2019-09-05 | End: 2019-12-03 | Stop reason: SDUPTHER

## 2019-09-23 ENCOUNTER — IMMUNIZATIONS (OUTPATIENT)
Dept: FAMILY MEDICINE CLINIC | Facility: CLINIC | Age: 54
End: 2019-09-23
Payer: COMMERCIAL

## 2019-09-23 DIAGNOSIS — Z23 NEED FOR 23-POLYVALENT PNEUMOCOCCAL POLYSACCHARIDE VACCINE: ICD-10-CM

## 2019-09-23 DIAGNOSIS — Z23 NEED FOR TDAP VACCINATION: ICD-10-CM

## 2019-09-23 DIAGNOSIS — Z23 NEED FOR IMMUNIZATION AGAINST INFLUENZA: Primary | ICD-10-CM

## 2019-09-23 PROCEDURE — 90715 TDAP VACCINE 7 YRS/> IM: CPT

## 2019-09-23 PROCEDURE — 90682 RIV4 VACC RECOMBINANT DNA IM: CPT

## 2019-09-23 PROCEDURE — 90732 PPSV23 VACC 2 YRS+ SUBQ/IM: CPT

## 2019-09-23 PROCEDURE — 90471 IMMUNIZATION ADMIN: CPT

## 2019-09-23 PROCEDURE — 90472 IMMUNIZATION ADMIN EACH ADD: CPT

## 2019-10-14 DIAGNOSIS — E11.9 TYPE 2 DIABETES MELLITUS WITHOUT COMPLICATION, WITHOUT LONG-TERM CURRENT USE OF INSULIN (HCC): ICD-10-CM

## 2019-10-14 RX ORDER — METFORMIN HYDROCHLORIDE 500 MG/1
TABLET, EXTENDED RELEASE ORAL
Qty: 180 TABLET | Refills: 0 | Status: SHIPPED | OUTPATIENT
Start: 2019-10-14 | End: 2020-01-06 | Stop reason: SDUPTHER

## 2019-10-29 DIAGNOSIS — E29.1 TESTICULAR HYPOGONADISM: ICD-10-CM

## 2019-10-29 RX ORDER — TESTOSTERONE CYPIONATE 200 MG/ML
VIAL (ML) INTRAMUSCULAR
Qty: 5 ML | Refills: 1 | Status: SHIPPED | OUTPATIENT
Start: 2019-10-29 | End: 2020-03-17 | Stop reason: SDUPTHER

## 2019-10-30 ENCOUNTER — APPOINTMENT (OUTPATIENT)
Dept: LAB | Facility: CLINIC | Age: 54
End: 2019-10-30
Payer: COMMERCIAL

## 2019-10-30 DIAGNOSIS — E29.1 TESTICULAR HYPOGONADISM: ICD-10-CM

## 2019-10-30 PROCEDURE — 36415 COLL VENOUS BLD VENIPUNCTURE: CPT

## 2019-10-30 PROCEDURE — 84403 ASSAY OF TOTAL TESTOSTERONE: CPT

## 2019-10-30 PROCEDURE — 84402 ASSAY OF FREE TESTOSTERONE: CPT

## 2019-10-31 LAB
TESTOST FREE SERPL-MCNC: 19.8 PG/ML (ref 7.2–24)
TESTOST SERPL-MCNC: 759 NG/DL (ref 264–916)

## 2019-11-04 DIAGNOSIS — I10 ESSENTIAL HYPERTENSION: ICD-10-CM

## 2019-11-04 RX ORDER — LISINOPRIL 20 MG/1
20 TABLET ORAL DAILY
Qty: 90 TABLET | Refills: 1 | Status: SHIPPED | OUTPATIENT
Start: 2019-11-04 | End: 2020-04-30 | Stop reason: SDUPTHER

## 2019-11-05 ENCOUNTER — TELEPHONE (OUTPATIENT)
Dept: ENDOCRINOLOGY | Facility: CLINIC | Age: 54
End: 2019-11-05

## 2019-11-05 NOTE — TELEPHONE ENCOUNTER
[facility] prior auth started for Testosterone Cypionate 200 mg/mL Solution - Inject 60 mg weekly, [phone]  Prior auth completed via CoverMyMeds

## 2019-11-06 NOTE — TELEPHONE ENCOUNTER
[facility] prior auth approved, PA#: [id number], Eff 11/6/19 - 11/6/20    Called and notified [address] of prior auth approval  Also called and notified patient of prior auth approval

## 2019-11-21 ENCOUNTER — OFFICE VISIT (OUTPATIENT)
Dept: FAMILY MEDICINE CLINIC | Facility: CLINIC | Age: 54
End: 2019-11-21
Payer: COMMERCIAL

## 2019-11-21 VITALS
HEIGHT: 69 IN | TEMPERATURE: 98.5 F | HEART RATE: 86 BPM | BODY MASS INDEX: 31.4 KG/M2 | SYSTOLIC BLOOD PRESSURE: 124 MMHG | RESPIRATION RATE: 16 BRPM | DIASTOLIC BLOOD PRESSURE: 86 MMHG | WEIGHT: 212 LBS

## 2019-11-21 DIAGNOSIS — IMO0002 UNCONTROLLED TYPE 2 DIABETES MELLITUS WITH COMPLICATION: ICD-10-CM

## 2019-11-21 DIAGNOSIS — M75.41 IMPINGEMENT SYNDROME OF RIGHT SHOULDER: Primary | ICD-10-CM

## 2019-11-21 DIAGNOSIS — M19.011 ARTHRITIS OF RIGHT ACROMIOCLAVICULAR JOINT: ICD-10-CM

## 2019-11-21 DIAGNOSIS — M62.08 DIASTASIS RECTI: ICD-10-CM

## 2019-11-21 PROCEDURE — 99213 OFFICE O/P EST LOW 20 MIN: CPT | Performed by: FAMILY MEDICINE

## 2019-11-21 PROCEDURE — 1036F TOBACCO NON-USER: CPT | Performed by: FAMILY MEDICINE

## 2019-11-21 PROCEDURE — 20610 DRAIN/INJ JOINT/BURSA W/O US: CPT | Performed by: FAMILY MEDICINE

## 2019-11-21 RX ORDER — METHYLPREDNISOLONE ACETATE 80 MG/ML
80 INJECTION, SUSPENSION INTRA-ARTICULAR; INTRALESIONAL; INTRAMUSCULAR; SOFT TISSUE ONCE
Status: COMPLETED | OUTPATIENT
Start: 2019-11-21 | End: 2019-11-21

## 2019-11-21 RX ADMIN — METHYLPREDNISOLONE ACETATE 80 MG: 80 INJECTION, SUSPENSION INTRA-ARTICULAR; INTRALESIONAL; INTRAMUSCULAR; SOFT TISSUE at 15:34

## 2019-11-21 NOTE — PROGRESS NOTES
Assessment/Plan:     Diagnoses and all orders for this visit:    Impingement syndrome of right shoulder  -     methylPREDNISolone acetate (DEPO-MEDROL) injection 80 mg  -     Large joint arthrocentesis: R subacromial bursa    Arthritis of right acromioclavicular joint    Diastasis recti    Uncontrolled type 2 diabetes mellitus with complication (HCC)        No treatment needed for diastasis recti  No changes in current medications  As a separate procedure today I performed a steroid injection right shoulder  See procedure note  Consider PT for persistent symptoms    BMI Counseling: Body mass index is 31 31 kg/m²  The BMI is above normal  Nutrition recommendations include reducing portion sizes, decreasing overall calorie intake, consuming healthier snacks, moderation in carbohydrate intake, reducing intake of saturated fat and trans fat and reducing intake of cholesterol  Exercise recommendations include exercising 3-5 times per week  Patient ID: Marlen Sullivan  is a 49 y o  male  Recurrent right shoulder pain  Symptoms started one week ago  No swelling  No bruising  No specific trauma or injury  Right handed  Prior left shoulder surgery  Chronic pain syndrome on high dose oral opoids along with intrathecal pain pump  Current medications reviewed  Type 2 DM on Metformin  mg BID  08/2019 A1c 7 1       The following portions of the patient's history were reviewed and updated as appropriate: allergies, current medications, past family history, past medical history, past social history, past surgical history and problem list     Review of Systems   Constitutional: Positive for unexpected weight change (7 lb weight loss from 12/2018 )  Negative for chills and fever  Gastrointestinal: Positive for abdominal pain  Negative for blood in stool, constipation, diarrhea, nausea and vomiting         " bulge" mid abdomen area when sits up  Genitourinary: Negative for difficulty urinating     Musculoskeletal: Positive for arthralgias  Negative for joint swelling  See HPI   Neurological: Negative for dizziness and headaches  Objective:      /86   Pulse 86   Temp 98 5 °F (36 9 °C)   Resp 16   Ht 5' 9" (1 753 m)   Wt 96 2 kg (212 lb)   BMI 31 31 kg/m²          Physical Exam   Constitutional: No distress  Eyes: No scleral icterus  Neck: No thyroid mass and no thyromegaly present  Abdominal: Soft  Bowel sounds are normal  He exhibits no mass  There is no hepatosplenomegaly  There is no tenderness  There is no rebound and no guarding  No hernia  Musculoskeletal: He exhibits tenderness  He exhibits no edema  Inspection right shoulder normal  No effusion  No warmth or redness  No tenderness  Full ROM with abduction, restricted ROM with internal and external rotation  + tenderness and crepitus over right AC joint  Negative cross arm test  Negative Speed test  Negative Yergason sign  +  impingement sign  + empty can sign  Negative sulcus sign  Negative [place]   Lymphadenopathy:     He has no cervical adenopathy  Right: No supraclavicular adenopathy present  Left: No supraclavicular adenopathy present  Neurological: He has normal strength  Skin: No rash noted  Large joint arthrocentesis: R subacromial bursa  Date/Time: 11/21/2019 4:00 PM  Consent given by: patient  Site marked: site marked  Supporting Documentation  Indications: pain (Right shoulder impingement syndrome)   Procedure Details  Location: shoulder - R subacromial bursa  Preparation: Betadine    Needle size: 22 G  Ultrasound guidance: no  Approach: posterolateral    Patient tolerance: patient tolerated the procedure well with no immediate complications  Dressing:  Sterile dressing applied    Depo-Medrol 80 mg/mL and Lidocaine 1% 4 mL

## 2019-12-03 DIAGNOSIS — E78.5 HYPERLIPIDEMIA, UNSPECIFIED HYPERLIPIDEMIA TYPE: ICD-10-CM

## 2019-12-03 RX ORDER — ATORVASTATIN CALCIUM 20 MG/1
20 TABLET, FILM COATED ORAL
Qty: 90 TABLET | Refills: 3 | Status: SHIPPED | OUTPATIENT
Start: 2019-12-03 | End: 2020-12-01 | Stop reason: SDUPTHER

## 2019-12-12 ENCOUNTER — OFFICE VISIT (OUTPATIENT)
Dept: FAMILY MEDICINE CLINIC | Facility: CLINIC | Age: 54
End: 2019-12-12
Payer: COMMERCIAL

## 2019-12-12 VITALS
TEMPERATURE: 98.3 F | HEART RATE: 88 BPM | RESPIRATION RATE: 16 BRPM | BODY MASS INDEX: 31.84 KG/M2 | DIASTOLIC BLOOD PRESSURE: 64 MMHG | HEIGHT: 69 IN | WEIGHT: 215 LBS | SYSTOLIC BLOOD PRESSURE: 112 MMHG

## 2019-12-12 DIAGNOSIS — G89.29 CHRONIC RIGHT SHOULDER PAIN: Primary | ICD-10-CM

## 2019-12-12 DIAGNOSIS — M45.5 ANKYLOSING SPONDYLITIS OF THORACOLUMBAR REGION (HCC): ICD-10-CM

## 2019-12-12 DIAGNOSIS — IMO0002 UNCONTROLLED TYPE 2 DIABETES MELLITUS WITH COMPLICATION: ICD-10-CM

## 2019-12-12 DIAGNOSIS — M75.41 IMPINGEMENT SYNDROME OF RIGHT SHOULDER: ICD-10-CM

## 2019-12-12 DIAGNOSIS — M25.511 CHRONIC RIGHT SHOULDER PAIN: Primary | ICD-10-CM

## 2019-12-12 PROCEDURE — 3008F BODY MASS INDEX DOCD: CPT | Performed by: FAMILY MEDICINE

## 2019-12-12 PROCEDURE — 99213 OFFICE O/P EST LOW 20 MIN: CPT | Performed by: FAMILY MEDICINE

## 2019-12-12 PROCEDURE — 1036F TOBACCO NON-USER: CPT | Performed by: FAMILY MEDICINE

## 2019-12-12 RX ORDER — PREGABALIN 75 MG/1
75 CAPSULE ORAL AS NEEDED
Refills: 0 | COMMUNITY
Start: 2019-11-26 | End: 2020-07-09

## 2019-12-12 NOTE — PROGRESS NOTES
Assessment/Plan:       Diagnoses and all orders for this visit:    Chronic right shoulder pain  -     XR shoulder 2+ vw right; Future    Impingement syndrome of right shoulder    Ankylosing spondylitis of thoracolumbar region [facility])    Uncontrolled type 2 diabetes mellitus with complication ([address] )    Other orders  -     pregabalin (LYRICA) 75 mg capsule; Take 75 mg by mouth daily          Trial of Voltaren gel to right shoulder  PRN ES Tylenol  X rays right shoulder  Consider MRI right shoulder for persistent symptoms  Patient offered referral for PT he declined  Patient ID: King Lopez  is a 49 y o  male  Patient was seen last month with recurrent right shoulder pain  No improvement with steroid injection for right shoulder impingement syndrome  No swelling  No specific trauma or injury  Right handed  Pain worse with certain movements and HS  Prior left shoulder surgery  History of ankylosing spondylitis  Chronic pain syndrome-lumbar post laminectomy syndrome on high dose oral opoids along with intrathecal pain pump  Intolerance to oral NSAIDs  Current medications reviewed  Type 2 DM on Metformin  mg BID  08/2019 A1c 7 1      The following portions of the patient's history were reviewed and updated as appropriate: allergies, current medications, past family history, past medical history, past social history, past surgical history and problem list     Review of Systems   Constitutional: Negative for appetite change, chills, fever and unexpected weight change  Musculoskeletal: Positive for arthralgias and back pain  Negative for joint swelling  See HPI    Skin: Negative for rash  Neurological: Negative for dizziness and headaches  Hematological: Negative for adenopathy  Objective:      /64   Pulse 88   Temp 98 3 °F (36 8 °C)   Resp 16   Ht 5' 9" (1 753 m)   Wt 97 5 kg (215 lb)   BMI 31 75 kg/m²          Physical Exam   Constitutional: No distress     Eyes: No scleral icterus  Musculoskeletal:   Inspection right shoulder normal  No effusion  No warmth or redness  No tenderness  Pain with external rotation  Negative cross arm test  Negative Speed test  Negative Yergason sign  +  impingement sign  Negative empty can sign  Negative sulcus sign  Negative [place]   Lymphadenopathy:     He has no cervical adenopathy  Right: No supraclavicular adenopathy present  Left: No supraclavicular adenopathy present  Neurological: He has normal strength  Skin: No rash noted           Hemoglobin A1C (%)   Date Value   08/14/2019 7 1 (H)   10/15/2018 6 8 (H)   06/22/2018 6 6 (H)   11/10/2015 6 1 (H)   06/20/2015 6 1 (H)   05/14/2015 5 9 (H)

## 2019-12-13 ENCOUNTER — APPOINTMENT (OUTPATIENT)
Dept: RADIOLOGY | Facility: MEDICAL CENTER | Age: 54
End: 2019-12-13
Payer: COMMERCIAL

## 2019-12-13 DIAGNOSIS — G89.29 CHRONIC RIGHT SHOULDER PAIN: ICD-10-CM

## 2019-12-13 DIAGNOSIS — M25.511 CHRONIC RIGHT SHOULDER PAIN: ICD-10-CM

## 2019-12-13 PROCEDURE — 73030 X-RAY EXAM OF SHOULDER: CPT

## 2019-12-20 ENCOUNTER — TELEPHONE (OUTPATIENT)
Dept: FAMILY MEDICINE CLINIC | Facility: CLINIC | Age: 54
End: 2019-12-20

## 2019-12-20 DIAGNOSIS — M25.511 CHRONIC RIGHT SHOULDER PAIN: Primary | ICD-10-CM

## 2019-12-20 DIAGNOSIS — G89.29 CHRONIC RIGHT SHOULDER PAIN: Primary | ICD-10-CM

## 2019-12-20 DIAGNOSIS — M45.5 ANKYLOSING SPONDYLITIS OF THORACOLUMBAR REGION (HCC): ICD-10-CM

## 2019-12-20 NOTE — TELEPHONE ENCOUNTER
Call re right shoulder  X rays normal  Could consider MRI shoulder for persistent symptoms  Procedure: Xr Shoulder 2+ Vw Right    Result Date: 12/19/2019  Narrative: RIGHT SHOULDER INDICATION:   M25 511: Pain in right shoulder G89 29: Other chronic pain  COMPARISON:  None VIEWS:  XR SHOULDER 2+ VW RIGHT FINDINGS: There is no acute fracture or dislocation  Mild osteoarthritis acromioclavicular joint  No lytic or blastic lesions are seen  Soft tissues are unremarkable  Impression: No acute osseous abnormality   Workstation performed: [id number]

## 2020-01-02 ENCOUNTER — TELEPHONE (OUTPATIENT)
Dept: FAMILY MEDICINE CLINIC | Facility: CLINIC | Age: 55
End: 2020-01-02

## 2020-01-02 DIAGNOSIS — F41.9 ANXIETY: Primary | ICD-10-CM

## 2020-01-02 RX ORDER — HYDROXYZINE 50 MG/1
50 TABLET, FILM COATED ORAL DAILY PRN
Qty: 10 TABLET | Refills: 0 | Status: SHIPPED | OUTPATIENT
Start: 2020-01-02 | End: 2020-02-24

## 2020-01-02 NOTE — TELEPHONE ENCOUNTER
Yes he can have additional ones to use as needed for other future procedures, so I gave him Rx for #10

## 2020-01-02 NOTE — TELEPHONE ENCOUNTER
Pharmacy called wanted to make sure it was ok for patient to take the Hydroxyzine with Methadone  (qt prolongation) please advise  Pharmacy would like a return call    [phone]

## 2020-01-02 NOTE — TELEPHONE ENCOUNTER
Patient's wife called stating patient has MRI  scheduled for Saturday  She stated get nervous and is asking if something can be called in for him to take prior to MRI to calm him down  Please advise   [phone]

## 2020-01-04 ENCOUNTER — HOSPITAL ENCOUNTER (OUTPATIENT)
Dept: MRI IMAGING | Facility: HOSPITAL | Age: 55
Discharge: HOME | End: 2020-01-04
Attending: FAMILY MEDICINE
Payer: COMMERCIAL

## 2020-01-04 DIAGNOSIS — M25.511 CHRONIC RIGHT SHOULDER PAIN: ICD-10-CM

## 2020-01-04 DIAGNOSIS — M45.5 ANKYLOSING SPONDYLITIS OF THORACOLUMBAR REGION (HCC): ICD-10-CM

## 2020-01-04 DIAGNOSIS — G89.29 CHRONIC RIGHT SHOULDER PAIN: ICD-10-CM

## 2020-01-04 PROCEDURE — 73221 MRI JOINT UPR EXTREM W/O DYE: CPT

## 2020-01-06 ENCOUNTER — OFFICE VISIT (OUTPATIENT)
Dept: ENDOCRINOLOGY | Facility: CLINIC | Age: 55
End: 2020-01-06
Payer: COMMERCIAL

## 2020-01-06 ENCOUNTER — TELEPHONE (OUTPATIENT)
Dept: FAMILY MEDICINE CLINIC | Facility: CLINIC | Age: 55
End: 2020-01-06

## 2020-01-06 VITALS
HEART RATE: 80 BPM | SYSTOLIC BLOOD PRESSURE: 110 MMHG | WEIGHT: 220 LBS | DIASTOLIC BLOOD PRESSURE: 80 MMHG | BODY MASS INDEX: 32.49 KG/M2

## 2020-01-06 DIAGNOSIS — E11.9 TYPE 2 DIABETES MELLITUS WITHOUT COMPLICATION, WITHOUT LONG-TERM CURRENT USE OF INSULIN (HCC): ICD-10-CM

## 2020-01-06 DIAGNOSIS — M81.0 AGE-RELATED OSTEOPOROSIS WITHOUT CURRENT PATHOLOGICAL FRACTURE: ICD-10-CM

## 2020-01-06 DIAGNOSIS — E29.1 TESTICULAR HYPOGONADISM: Primary | ICD-10-CM

## 2020-01-06 PROCEDURE — 99214 OFFICE O/P EST MOD 30 MIN: CPT | Performed by: INTERNAL MEDICINE

## 2020-01-06 RX ORDER — METFORMIN HYDROCHLORIDE 500 MG/1
TABLET, EXTENDED RELEASE ORAL
Qty: 180 TABLET | Refills: 3 | Status: SHIPPED | OUTPATIENT
Start: 2020-01-06 | End: 2020-07-09 | Stop reason: SDUPTHER

## 2020-01-06 NOTE — PROGRESS NOTES
King Lopez  49 y o  male MRN: [id number]    Encounter: [id number]      Assessment/Plan     Assessment: This is a 49y o -year-old male with diabetes with hyperglycemia, testicular hypogonadism and osteoporosis  Plan:  1  Type 2 diabetes with hyperglycemia-check hemoglobin A1c  Based on results, I will make further changes to his regimen if necessary  For due to his pain, he is unable to exercise  2  Testicular hypogonadism-he is to let me know much testosterone he is taking  Check total and free testosterone, H&H     3  Osteoporosis-I have ordered a repeat DEXA scan to be done in May of this year  CC: Diabetes    History of Present Illness     HPI:  71-year-old male with type 2 diabetes that was diagnosed about six months ago  He is currently on metformin  Denies any symptoms of hypo or hyperglycemia  He does not have any complications of diabetes  For hypogonadism, he is on testosterone injections  He denies any injection site difficulties  For osteoporosis, he is currently not on any medications  He will be due for a DEXA scan later this year  Review of Systems   Constitutional: Negative for chills and fever  Respiratory: Negative for shortness of breath  Cardiovascular: Negative for chest pain  Gastrointestinal: Negative for constipation, diarrhea, nausea and vomiting  Endocrine: Negative for polydipsia and polyuria  Neurological: Negative for numbness  All other systems reviewed and are negative  Historical Information   Past Medical History:   Diagnosis Date    Chronic pain     Diabetes ([address] )     GERD (gastroesophageal reflux disease)     Hyperlipidemia     Low testosterone     Neuropathy     Pilonidal cyst     last assessed 3/17/2014     Past Surgical History:   Procedure Laterality Date    OTHER SURGICAL HISTORY  03/13/2015    Electr analysis of progr impl pump w/reprogram refill, requiring physician    Replacement of right abdomen intrathecal pain pump filled with Dilaudid with electronic analysis and programming   managed by Dorys Carlos     Social History   Social History     Substance and Sexual Activity   Alcohol Use No    Comment: history     Social History     Substance and Sexual Activity   Drug Use Yes    Types: Hydromorphone     Social History     Tobacco Use   Smoking Status Former Smoker   Smokeless Tobacco Never Used     Family History:   Family History   Problem Relation Age of Onset    Diabetes unspecified Sister     Hypertension Sister     Hyperlipidemia Sister         pure hypercholesterolemia    Diabetes unspecified Brother         DM    Hypertension Brother     Hyperlipidemia Brother         pure hypercholesterolemia    Coronary artery disease Father     Heart disease Father     Diabetes Brother         DM    Hypertension Family        Meds/Allergies   Current Outpatient Medications   Medication Sig Dispense Refill    atorvastatin (LIPITOR) 20 mg tablet Take 1 tablet (20 mg total) by mouth daily at bedtime 90 tablet 3    Cholecalciferol (VITAMIN D) 2000 units CAPS Take 1 capsule by mouth daily      gabapentin (NEURONTIN) 300 mg capsule Take 1 capsule by mouth daily Taking two 300 mg at night       glucose blood (ONETOUCH VERIO) test strip 1 each by Other route 3 (three) times a day 300 each 3    HYDROmorphone (DILAUDID) 4 mg/mL injection Inject 1 Device as directed daily      metFORMIN (GLUCOPHAGE-XR) 500 mg 24 hr tablet Take 2 tablets daily with dinner as directed 180 tablet 0    methadone (DOLOPHINE) 10 mg tablet Take 2 tablets by mouth 3 (three) times a day ,       omeprazole (PriLOSEC) 40 MG capsule Take 1 capsule by mouth daily      ONETOUCH DELICA LANCETS 74Q MISC 1 applicator by Does not apply route 3 (three) times a day      oxyCODONE (OxyCONTIN) 40 mg 12 hr tablet Take 1 tablet by mouth every 12 (twelve) hours      oxyCODONE (OXYCONTIN) 80 mg 12 hr tablet Take 1 tablet by mouth every 12 (twelve) hours  pregabalin (LYRICA) 75 mg capsule Take 75 mg by mouth daily  0    SYRINGE-NEEDLE, DISP, 3 ML (B-D 3CC LUER-HAFSA SYR 21GX1") 21G X 1" 3 ML MISC by Does not apply route once a week      Testosterone Cypionate 200 MG/ML SOLN Inject 60 mg (0 3 ml) weekly 5 mL 1    hydrOXYzine HCL (ATARAX) 50 mg tablet Take 1 tablet (50 mg total) by mouth daily as needed for anxiety 10 tablet 0    lisinopril (ZESTRIL) 20 mg tablet Take 1 tablet (20 mg total) by mouth daily (Patient not taking: Reported on 1/6/2020) 90 tablet 1     No current facility-administered medications for this visit  No Known Allergies    Objective   Vitals: Blood pressure 110/80, pulse 80, weight 99 8 kg (220 lb)  Physical Exam   Constitutional: He is oriented to person, place, and time  He appears well-developed and well-nourished  No distress  HENT:   Head: Normocephalic and atraumatic  Mouth/Throat: Oropharynx is clear and moist and mucous membranes are normal  No oropharyngeal exudate  Eyes: Conjunctivae, EOM and lids are normal  Right eye exhibits no discharge  Left eye exhibits no discharge  No scleral icterus  Neck: Neck supple  No thyromegaly present  Cardiovascular: Normal rate, regular rhythm and normal heart sounds  Exam reveals no gallop and no friction rub  Pulses are no weak pulses  No murmur heard  Pulses:       Dorsalis pedis pulses are 1+ on the right side, and 1+ on the left side  Pulmonary/Chest: Effort normal and breath sounds normal  No respiratory distress  He has no wheezes  Abdominal: Soft  Bowel sounds are normal  He exhibits no distension  There is no tenderness  Musculoskeletal: Normal range of motion  He exhibits no edema, tenderness or deformity  Feet:   Right Foot:   Skin Integrity: Negative for ulcer, skin breakdown, erythema, warmth, callus or dry skin  Left Foot:   Skin Integrity: Negative for ulcer, skin breakdown, erythema, warmth, callus or dry skin     Lymphadenopathy:        Head (right side): No occipital adenopathy present  Head (left side): No occipital adenopathy present  Right: No supraclavicular adenopathy present  Left: No supraclavicular adenopathy present  Neurological: He is alert and oriented to person, place, and time  No cranial nerve deficit  Coordination normal    Skin: Skin is warm and intact  No rash noted  He is not diaphoretic  No erythema  Psychiatric: He has a normal mood and affect  His behavior is normal    Vitals reviewed  Patient's shoes and socks removed  Right Foot/Ankle   Right Foot Inspection  Skin Exam: skin normal and skin intact no dry skin, no warmth, no callus, no erythema, no maceration, no abnormal color, no pre-ulcer, no ulcer and no callus                            Sensory   Vibration: intact    Monofilament testing: intact  Vascular    The right DP pulse is 1+  Left Foot/Ankle  Left Foot Inspection  Skin Exam: skin normal and skin intactno dry skin, no warmth, no erythema, no maceration, normal color, no pre-ulcer, no ulcer and no callus                                         Sensory   Vibration: intact    Monofilament: intact  Vascular    The left DP pulse is 1+  Assign Risk Category:  No deformity present; No loss of protective sensation; No weak pulses       Risk: 0      The history was obtained from the review of the chart, patient      Lab Results:   Lab Results   Component Value Date/Time    Hemoglobin A1C 7 1 (H) 08/14/2019 07:37 AM    WBC 6 64 08/14/2019 07:37 AM    Hemoglobin 13 1 08/14/2019 07:37 AM    Hematocrit 39 2 08/14/2019 07:37 AM    MCV 95 08/14/2019 07:37 AM    Platelets 192 09/77/0805 07:37 AM    BUN 13 08/14/2019 07:37 AM    Potassium 4 3 08/14/2019 07:37 AM    Chloride 99 (L) 08/14/2019 07:37 AM    CO2 32 08/14/2019 07:37 AM    Creatinine 0 82 08/14/2019 07:37 AM    AST 23 08/14/2019 07:37 AM    ALT 47 08/14/2019 07:37 AM    Albumin 4 1 08/14/2019 07:37 AM           Imaging Studies: I have personally reviewed pertinent reports  Portions of the record may have been created with voice recognition software  Occasional wrong word or "sound a like" substitutions may have occurred due to the inherent limitations of voice recognition software  Read the chart carefully and recognize, using context, where substitutions have occurred

## 2020-01-06 NOTE — TELEPHONE ENCOUNTER
Call re MRI right shoulder  MRI showed rotator cuff tear  Biceps tendonitis and mild OA  Recommendation ortho evaluation  Procedure: Xr Shoulder 2+ Vw Right    Result Date: 12/19/2019  Narrative: RIGHT SHOULDER INDICATION:   M25 511: Pain in right shoulder G89 29: Other chronic pain  COMPARISON:  None VIEWS:  XR SHOULDER 2+ VW RIGHT FINDINGS: There is no acute fracture or dislocation  Mild osteoarthritis acromioclavicular joint  No lytic or blastic lesions are seen  Soft tissues are unremarkable  Impression: No acute osseous abnormality  Workstation performed: [id number]     Procedure: Mri Shoulder Right Wo Contrast    Result Date: 1/6/2020  Narrative: MRI RIGHT SHOULDER INDICATION:   M25 511: Pain in right shoulder G89 29: Other chronic pain M45 5: Ankylosing spondylitis of thoracolumbar region  COMPARISON:  Right shoulder x-ray 12/13/19 TECHNIQUE:   The following MR sequences were obtained of the right shoulder: Localizer, axial GRE/PD fat sat, oblique coronal T2 fat sat, oblique sagittal T1/T2 fat sat  Imaging performed on 1 5T MRI Gadolinium was not used  FINDINGS: SUBCUTANEOUS TISSUES: Normal JOINT EFFUSION: None  ACROMION PROCESS: There is an os acromiale  No edema noted at the synchondrosis ROTATOR CUFF: There is a well-corticated ossific protuberance at the greater tuberosity anteriorly as seen on series 7/16, which contains the anterior leading edge supraspinatus insertion fibers; this ossific protuberance is seated slightly more distal than the expected anatomic location  There is moderate supraspinatus tendinopathy with high-grade bursal surface tearing, measuring about 5 mm in width, located just posterior to the protuberance described above, with retraction of tendon fibers measuring about 1 3 cm on series 5/13  Intrasubstance subscapularis tear noted on series 4/16 SUBACROMIAL/SUBDELTOID BURSA: Normal  LONG HEAD OF BICEPS TENDON: There is mild tendinosis without tear    Fluid is noted in the bicipital groove sheath GLENOID LABRUM: Intact  GLENOHUMERAL JOINT: Intact  ACROMIOCLAVICULAR JOINT:  There is mild osteoarthritis  Bone marrow edema noted in the distal clavicle BONES: Normal      Impression: 1  Moderate supraspinatus tendinopathy with high-grade bursal surface tear 2  Intrasubstance subscapularis tear 3  Mild long head biceps tendinopathy with tenosynovitis 4  Mild acromioclavicular osteoarthritis with bone marrow edema in the distal clavicle 5    Os acromiale noted without bone marrow edema Workstation performed: [id number]

## 2020-01-09 ENCOUNTER — OFFICE VISIT (OUTPATIENT)
Dept: OBGYN CLINIC | Facility: HOSPITAL | Age: 55
End: 2020-01-09
Payer: COMMERCIAL

## 2020-01-09 VITALS
SYSTOLIC BLOOD PRESSURE: 119 MMHG | HEIGHT: 69 IN | BODY MASS INDEX: 32.58 KG/M2 | WEIGHT: 220 LBS | DIASTOLIC BLOOD PRESSURE: 79 MMHG | HEART RATE: 84 BPM

## 2020-01-09 DIAGNOSIS — M25.511 CHRONIC RIGHT SHOULDER PAIN: ICD-10-CM

## 2020-01-09 DIAGNOSIS — G89.29 CHRONIC RIGHT SHOULDER PAIN: ICD-10-CM

## 2020-01-09 DIAGNOSIS — S46.011A TRAUMATIC INCOMPLETE TEAR OF RIGHT ROTATOR CUFF, INITIAL ENCOUNTER: Primary | ICD-10-CM

## 2020-01-09 PROCEDURE — 20610 DRAIN/INJ JOINT/BURSA W/O US: CPT | Performed by: ORTHOPAEDIC SURGERY

## 2020-01-09 PROCEDURE — 99243 OFF/OP CNSLTJ NEW/EST LOW 30: CPT | Performed by: ORTHOPAEDIC SURGERY

## 2020-01-09 RX ORDER — BUPIVACAINE HYDROCHLORIDE 2.5 MG/ML
2 INJECTION, SOLUTION INFILTRATION; PERINEURAL
Status: COMPLETED | OUTPATIENT
Start: 2020-01-09 | End: 2020-01-09

## 2020-01-09 RX ORDER — BETAMETHASONE SODIUM PHOSPHATE AND BETAMETHASONE ACETATE 3; 3 MG/ML; MG/ML
12 INJECTION, SUSPENSION INTRA-ARTICULAR; INTRALESIONAL; INTRAMUSCULAR; SOFT TISSUE
Status: COMPLETED | OUTPATIENT
Start: 2020-01-09 | End: 2020-01-09

## 2020-01-09 RX ORDER — NALOXONE HYDROCHLORIDE 4 MG/.1ML
SPRAY NASAL AS NEEDED
COMMUNITY
Start: 2020-01-08 | End: 2020-06-08

## 2020-01-09 RX ORDER — LIDOCAINE HYDROCHLORIDE 10 MG/ML
2 INJECTION, SOLUTION INFILTRATION; PERINEURAL
Status: COMPLETED | OUTPATIENT
Start: 2020-01-09 | End: 2020-01-09

## 2020-01-09 RX ADMIN — BUPIVACAINE HYDROCHLORIDE 2 ML: 2.5 INJECTION, SOLUTION INFILTRATION; PERINEURAL at 16:23

## 2020-01-09 RX ADMIN — LIDOCAINE HYDROCHLORIDE 2 ML: 10 INJECTION, SOLUTION INFILTRATION; PERINEURAL at 16:23

## 2020-01-09 RX ADMIN — BETAMETHASONE SODIUM PHOSPHATE AND BETAMETHASONE ACETATE 12 MG: 3; 3 INJECTION, SUSPENSION INTRA-ARTICULAR; INTRALESIONAL; INTRAMUSCULAR; SOFT TISSUE at 16:23

## 2020-01-09 NOTE — LETTER
January 10, 2020     Mauricio Wise MD  [address] [place] [address]    Patient: Sera Parks YOB: 1965   Date of Visit: 1/9/2020       Dear Dr Nicolasa Kumar:    Thank you for referring Roddy Trevino to me for evaluation  Below are my notes for this consultation  If you have questions, please do not hesitate to call me  I look forward to following your patient along with you  Sincerely,        Vena Blizzard, MD        CC: No Recipients  Bernardino Mora MD  1/9/2020  5:23 PM  Attested  49 y o male presenting for evaluation of right shoulder pain  He does have a history of chronic shoulder problems and injuries to his right shoulder in the past and he was recommended to undergo a rotator for cuff repair by surgeon years ago  Approximately 2 months ago he was lifting a backpack at of his passenger seat when he felt a tearing sensation in his right shoulder and immediate onset of pain  This is remained persistent  Today pain is located over the anterior lateral aspect of the shoulder is made worse with attempted overhead activities and when lying on the affected side  Pain is worse when he is trying to sleep at night  Pain is currently relieved by rest and oral medications  He has not tried a formal course of physical therapy  Review of Systems  Review of systems negative unless otherwise specified in HPI    Past Medical History  Past Medical History:   Diagnosis Date    Chronic pain     Diabetes ([address] )     GERD (gastroesophageal reflux disease)     Hyperlipidemia     Low testosterone     Neuropathy     Pilonidal cyst     last assessed 3/17/2014       Past Surgical History  Past Surgical History:   Procedure Laterality Date    OTHER SURGICAL HISTORY  03/13/2015    Electr analysis of progr impl pump w/reprogram refill, requiring physician    Replacement of right abdomen intrathecal  pain pump filled with Dilaudid with electronic analysis and programming   managed by Dago Vaughn       Current Medications  Current Outpatient Medications on File Prior to Visit   Medication Sig Dispense Refill    atorvastatin (LIPITOR) 20 mg tablet Take 1 tablet (20 mg total) by mouth daily at bedtime 90 tablet 3    Cholecalciferol (VITAMIN D) 2000 units CAPS Take 1 capsule by mouth daily      gabapentin (NEURONTIN) 300 mg capsule Take 1 capsule by mouth daily Taking two 300 mg at night       glucose blood (ONETOUCH VERIO) test strip 1 each by Other route 3 (three) times a day 300 each 3    HYDROmorphone (DILAUDID) 4 mg/mL injection Inject 1 Device as directed daily      hydrOXYzine HCL (ATARAX) 50 mg tablet Take 1 tablet (50 mg total) by mouth daily as needed for anxiety 10 tablet 0    lisinopril (ZESTRIL) 20 mg tablet Take 1 tablet (20 mg total) by mouth daily (Patient not taking: Reported on 1/6/2020) 90 tablet 1    metFORMIN (GLUCOPHAGE-XR) 500 mg 24 hr tablet Take 2 tablets daily with dinner as directed 180 tablet 3    methadone (DOLOPHINE) 10 mg tablet Take 2 tablets by mouth 3 (three) times a day ,       NARCAN 4 MG/0 1ML LIQD       omeprazole (PriLOSEC) 40 MG capsule Take 1 capsule by mouth daily      ONETOUCH DELICA LANCETS 54D MISC 1 applicator by Does not apply route 3 (three) times a day      oxyCODONE (OxyCONTIN) 40 mg 12 hr tablet Take 1 tablet by mouth every 12 (twelve) hours      oxyCODONE (OXYCONTIN) 80 mg 12 hr tablet Take 1 tablet by mouth every 12 (twelve) hours      pregabalin (LYRICA) 75 mg capsule Take 75 mg by mouth daily  0    SYRINGE-NEEDLE, DISP, 3 ML (B-D 3CC LUER-HAFSA SYR 21GX1") 21G X 1" 3 ML MISC by Does not apply route once a week      Testosterone Cypionate 200 MG/ML SOLN Inject 60 mg (0 3 ml) weekly 5 mL 1     No current facility-administered medications on file prior to visit          Recent Labs [facility])  0   Lab Value Date/Time    HCT 39 2 08/14/2019 0737    HCT 43 2 08/20/2015 0901    HGB 13 1 08/14/2019 0737    HGB 15 3 08/20/2015 0901    WBC 6 64 08/14/2019 0737    WBC 9 55 08/20/2015 0901    INR 1 00 03/04/2015 1150    GLUCOSE 94 11/30/2015 0937    HGBA1C 7 1 (H) 08/14/2019 0737    HGBA1C 6 1 (H) 11/10/2015 1735         Physical exam  · General: Awake, Alert, Oriented  · Eyes: Pupils equal, round and reactive to light  · Heart: regular rate and rhythm  · Lungs: No audible wheezing  · Abdomen: soft  Right upper extremity  · Skin intact  No atrophy of the periscapular muscles  Tenderness palpation over the anterior lateral aspect of the acromion  Forward flexion to 160° external rotation to 45° internal rotation to the right sacroiliac joint  Painful Sarah's negative belly press negative Hornblower's  Motor sensation intact distally        Imaging  Images were personally reviewed with the patient    X-rays right shoulder shows no fracture or degenerative changes    MRI of the right shoulder shows intrasubstance degeneration of the supraspinatus tendon and anterior leading edge bursal sided tear tear    Procedure  Large joint arthrocentesis: R subacromial bursa  Date/Time: 1/9/2020 4:23 PM  Site marked: site marked  Timeout: Immediately prior to procedure a time out was called to verify the correct patient, procedure, equipment, support staff and site/side marked as required   Supporting Documentation  Indications: pain   Procedure Details  Location: shoulder - R subacromial bursa  Preparation: Patient was prepped and draped in the usual sterile fashion  Needle size: 22 G  Approach: posterolateral  Medications administered: 2 mL bupivacaine 0 25 %; 2 mL lidocaine 1 %; 12 mg betamethasone acetate-betamethasone sodium phosphate 6 (3-3) mg/mL    Patient tolerance: patient tolerated the procedure well with no immediate complications  Dressing:  Sterile dressing applied            Assessment/Plan:   54 y o male with right subacromial bursitis and bursal sided rotator cuff tear    · Injection of corticosteroid medication into the right subacromial bursa was administered as outlined above  · Will begin a formal physical therapy regimen since this has not been initiated  · Patient follow-up with Dr Grecia Herbert in 6 weeks to see if he is responding well to physical therapy        Attestation signed by Franc Silva MD at 1/9/2020  5:23 PM:  Patient was seen and examined today  Case was reviewed with the resident physician  I agree the history, exam, assessment and plan as documented by the resident physician  [de-identified] year right-hand-dominant male fell today strain to his right shoulder when reaching and lifting a backpack office car seat  This occurred several weeks ago  He continues to have pain and dysfunction since  Examination confirms a right shoulder with almost full active forward flexion abduction internal external rotation  There is weakness of external rotation 10 testing in this recreates pain  Cursory exam of both upper extremity finds no muscle strength weakness C5-T1 in both upper extremities  I personally reviewed x-rays of the right shoulder my interpretation is as follows: No fracture, dislocation, joint space narrowing seen  I personally reviewed an MRI scan of the right shoulder my interpretation is as follows:  Strain pattern is seen within the distal supraspinatus  Assessment/plan:  49 year right-hand-dominant male who was pain and dysfunction stemming from right rotator cuff strain  Treatment options including risks, benefits, alternatives were discussed in detail  An injection of corticosteroid followed by supervised program of physical therapy is indicated  It is advised, except, administers outlined above    I recommended this patient be seen by Dr Anna Mota in follow-up after physical therapy has been tried to determine whether not a rotator cuff repair would be necessary

## 2020-01-09 NOTE — PROGRESS NOTES
49 y o male presenting for evaluation of right shoulder pain  He does have a history of chronic shoulder problems and injuries to his right shoulder in the past and he was recommended to undergo a rotator for cuff repair by surgeon years ago  Approximately 2 months ago he was lifting a backpack at of his passenger seat when he felt a tearing sensation in his right shoulder and immediate onset of pain  This is remained persistent  Today pain is located over the anterior lateral aspect of the shoulder is made worse with attempted overhead activities and when lying on the affected side  Pain is worse when he is trying to sleep at night  Pain is currently relieved by rest and oral medications  He has not tried a formal course of physical therapy  Review of Systems  Review of systems negative unless otherwise specified in HPI    Past Medical History  Past Medical History:   Diagnosis Date    Chronic pain     Diabetes ([address] )     GERD (gastroesophageal reflux disease)     Hyperlipidemia     Low testosterone     Neuropathy     Pilonidal cyst     last assessed 3/17/2014       Past Surgical History  Past Surgical History:   Procedure Laterality Date    OTHER SURGICAL HISTORY  03/13/2015    Electr analysis of progr impl pump w/reprogram refill, requiring physician    Replacement of right abdomen intrathecal  pain pump filled with Dilaudid with electronic analysis and programming   managed by Maira Rai       Current Medications  Current Outpatient Medications on File Prior to Visit   Medication Sig Dispense Refill    atorvastatin (LIPITOR) 20 mg tablet Take 1 tablet (20 mg total) by mouth daily at bedtime 90 tablet 3    Cholecalciferol (VITAMIN D) 2000 units CAPS Take 1 capsule by mouth daily      gabapentin (NEURONTIN) 300 mg capsule Take 1 capsule by mouth daily Taking two 300 mg at night       glucose blood (ONETOUCH VERIO) test strip 1 each by Other route 3 (three) times a day 300 each 3    HYDROmorphone (DILAUDID) 4 mg/mL injection Inject 1 Device as directed daily      hydrOXYzine HCL (ATARAX) 50 mg tablet Take 1 tablet (50 mg total) by mouth daily as needed for anxiety 10 tablet 0    lisinopril (ZESTRIL) 20 mg tablet Take 1 tablet (20 mg total) by mouth daily (Patient not taking: Reported on 1/6/2020) 90 tablet 1    metFORMIN (GLUCOPHAGE-XR) 500 mg 24 hr tablet Take 2 tablets daily with dinner as directed 180 tablet 3    methadone (DOLOPHINE) 10 mg tablet Take 2 tablets by mouth 3 (three) times a day ,       NARCAN 4 MG/0 1ML LIQD       omeprazole (PriLOSEC) 40 MG capsule Take 1 capsule by mouth daily      ONETOUCH DELICA LANCETS 42Q MISC 1 applicator by Does not apply route 3 (three) times a day      oxyCODONE (OxyCONTIN) 40 mg 12 hr tablet Take 1 tablet by mouth every 12 (twelve) hours      oxyCODONE (OXYCONTIN) 80 mg 12 hr tablet Take 1 tablet by mouth every 12 (twelve) hours      pregabalin (LYRICA) 75 mg capsule Take 75 mg by mouth daily  0    SYRINGE-NEEDLE, DISP, 3 ML (B-D 3CC LUER-HAFSA SYR 21GX1") 21G X 1" 3 ML MISC by Does not apply route once a week      Testosterone Cypionate 200 MG/ML SOLN Inject 60 mg (0 3 ml) weekly 5 mL 1     No current facility-administered medications on file prior to visit  Recent Labs [facility])  0   Lab Value Date/Time    HCT 39 2 08/14/2019 0737    HCT 43 2 08/20/2015 0901    HGB 13 1 08/14/2019 0737    HGB 15 3 08/20/2015 0901    WBC 6 64 08/14/2019 0737    WBC 9 55 08/20/2015 0901    INR 1 00 03/04/2015 1150    GLUCOSE 94 11/30/2015 0937    HGBA1C 7 1 (H) 08/14/2019 0737    HGBA1C 6 1 (H) 11/10/2015 1735         Physical exam  · General: Awake, Alert, Oriented  · Eyes: Pupils equal, round and reactive to light  · Heart: regular rate and rhythm  · Lungs: No audible wheezing  · Abdomen: soft  Right upper extremity  · Skin intact  No atrophy of the periscapular muscles    Tenderness palpation over the anterior lateral aspect of the acromion  Forward flexion to 160° external rotation to 45° internal rotation to the right sacroiliac joint  Painful Sarah's negative belly press negative Hornblower's  Motor sensation intact distally        Imaging  Images were personally reviewed with the patient    X-rays right shoulder shows no fracture or degenerative changes    MRI of the right shoulder shows intrasubstance degeneration of the supraspinatus tendon and anterior leading edge bursal sided tear tear    Procedure  Large joint arthrocentesis: R subacromial bursa  Date/Time: 1/9/2020 4:23 PM  Site marked: site marked  Timeout: Immediately prior to procedure a time out was called to verify the correct patient, procedure, equipment, support staff and site/side marked as required   Supporting Documentation  Indications: pain   Procedure Details  Location: shoulder - R subacromial bursa  Preparation: Patient was prepped and draped in the usual sterile fashion  Needle size: 22 G  Approach: posterolateral  Medications administered: 2 mL bupivacaine 0 25 %; 2 mL lidocaine 1 %; 12 mg betamethasone acetate-betamethasone sodium phosphate 6 (3-3) mg/mL    Patient tolerance: patient tolerated the procedure well with no immediate complications  Dressing:  Sterile dressing applied            Assessment/Plan:   49 y o male with right subacromial bursitis and bursal sided rotator cuff tear    · Injection of corticosteroid medication into the right subacromial bursa was administered as outlined above  · Will begin a formal physical therapy regimen since this has not been initiated  · Patient follow-up with Dr Delroy Arenas in 6 weeks to see if he is responding well to physical therapy

## 2020-01-15 ENCOUNTER — EVALUATION (OUTPATIENT)
Dept: PHYSICAL THERAPY | Facility: CLINIC | Age: 55
End: 2020-01-15
Payer: COMMERCIAL

## 2020-01-15 DIAGNOSIS — S46.011A TRAUMATIC INCOMPLETE TEAR OF RIGHT ROTATOR CUFF, INITIAL ENCOUNTER: ICD-10-CM

## 2020-01-15 PROCEDURE — 97110 THERAPEUTIC EXERCISES: CPT | Performed by: PHYSICAL THERAPIST

## 2020-01-15 PROCEDURE — 97162 PT EVAL MOD COMPLEX 30 MIN: CPT | Performed by: PHYSICAL THERAPIST

## 2020-01-15 NOTE — PROGRESS NOTES
PT Evaluation     Today's date: 1/15/2020  Patient name: Marlena Perez  : 1965  MRN: [id number]  Referring provider: Jess Kim MD  Dx:   Encounter Diagnosis     ICD-10-CM    1  Traumatic incomplete tear of right rotator cuff, initial encounter S46 011A Ambulatory referral to Physical Therapy       Start Time: 1015  Stop Time: 1115  Total time in clinic (min): 60 minutes    Assessment  Assessment details: Marlena Perez  is a 49 y o  male who presents with signs and symptoms consistent of chronic shoulder tendopathy  Patient notes that he has had pain the shoulders for years but had a recent episode of more intense pain  Patient reports that this episode of pain started about 2 5 months ago when he went to  a heavy backpack and immediately had a sharp pain in the R shoulder  Patient noted it got slight better but has been persisting since then  Patient went to his doctor last week where he received an MRI showing moderate supraspinatus tendinopathy with high-grade bursal surface tear, intrasubstance subscapularis tear, mild long head biceps tendinopathy with tenosynovitis, mild acromioclavicular osteoarthritis with bone marrow  Patient notes that the pain is achiness and occasionally a sharp pain with certain movements  Patient notes that the pain mostly on the top near the [facility] joint but then globally throughout the whole shoulder in the area of the deltoid  Aggravating factors include overhead lifting, lifting heavy objects, putting deodorant on, putting on jacket, and sleeping  Easing factors include, injections, medications, rest in sitting with it propped up  Patient presents with intermittent R shoulder pain specifically with abduction and internal rotation, slight decreases in strength secondary to pain, decreased ROM into internal rotation, and decreased joint mobility due to posterior and anterior capsule tightness   Positive mcconnell-hellen, painful arc in the range of the [facility] joint, and positive infraspinatus testing conforming impingement and tendonopathy issues  Cervical ROM and testing were negative ruling put cervical involvement  Due to these impairments, patient has difficulty performing a/iadls, recreational activities and work-related activities  Patient would benefit from skilled physical therapy to address the impairments, improve their level of function, and to improve their overall quality of life  Impairments: abnormal or restricted ROM, abnormal movement, impaired physical strength, lacks appropriate home exercise program, pain with function and poor posture     Symptom irritability: moderateUnderstanding of Dx/Px/POC: good   Prognosis: good    Goals  Short Term Goals: to be achieved by 4 weeks  1) Patient to be independent with basic HEP  2) Decrease pain to 4/10 at its worst   3) Increase shoulder ROM by 5-10 degrees in all deficit planes in order to participate in daily activities  4) Increase shoulder strength by 1/2 MMT grade in all deficient planes  Long Term Goals: to be achieved by discharge  1) FOTO equal to or greater than 65 indicating improvements with overall function  2) Patient to be independent with comprehensive HEP  3) Patient will demonstrate over head reaching with little to no pain in order to participate in daily activities  4) Patient will be able to tolerate holding or lifting heavy objects at chest height for prolong periods of time in order to spend time with grandson and perform daily tasks  5) Patient to report no sleep interruption secondary to pain  6) Patient will have little to no pain with UE dressing and pants management in order to improve quality of life    7)  Decrease pain to 2-3/10 at its worst       Plan  Plan details: 2x week for 4-6 weeks   Patient would benefit from: PT eval and skilled physical therapy  Planned therapy interventions: manual therapy, neuromuscular re-education, patient education, therapeutic activities, therapeutic exercise and home exercise program  Treatment plan discussed with: patient        Subjective Evaluation    History of Present Illness  Mechanism of injury: History of Current Injury: Patient notes that he has been having R shoulder pain that started 2 5 months ago  Patient notes that he has had pain the shoulders for years but had an increase in pain when he went to  a heavy backpack and immediately have a sharp pain  Patient didn't go to see a doctor until last week since it wasn't getting better  Patient notes having radiating pain into the elbow and down to the hand, and decrease  strength  Intermittent pain more so than constant  Pain location/Descriptors: Patient notes that the pain is achiness and occasionally a sharp pain  Patient notes that the pain mostly on the top near the [facility], [place] joint but then globally throughout the whole shoulder  Aggravating factors: Overhead lifting,   Easing factors: Injections, medications, rest in sitting   24 HR pattern: Patient notes having sleep disturbances with achiness  Imaging: see below  Special Questions: Denies numbness or tingling  Denies Ds and Ns  Patient goals:  Patient reports that he would like to be able to decrease pain to a tolerable level to do the things that he wants to do     Hobbies/Interest: Taking care and spending time with his grandson   Occupation: [employer]; construction work    Pain  Current pain ratin  At best pain ratin  At worst pain ratin  Location: R   Quality: dull ache and sharp  Relieving factors: rest and change in position  Aggravating factors: overhead activity and lifting      Diagnostic Tests  MRI studies: abnormal (atient received MRI results that showed Moderate supraspinatus tendinopathy with high-grade bursal surface tear, Intrasubstance subscapularis tear, Mild long head biceps tendinopathy with tenosynovitis, Mild acromioclavicular osteoarthritis with bone marro)  Patient Goals  Patient goals for therapy: decreased pain  Patient goal: Patient reports that he would like to be able to decrease pain to a tolerable level to do the things that he wants to do  Objective     Palpation   Left   No palpable tenderness to the anterior deltoid and biceps  Right   No palpable tenderness to the anterior deltoid and biceps  Additional Palpation Details  No TTP at the [facility], [place] joint or any other area of the shoulder  Active Range of Motion   Cervical/Thoracic Spine       Cervical    Flexion:  WFL  Extension:  WFL  Left lateral flexion:  WFL  Right lateral flexion:  WFL  Left rotation:  WFL  Right rotation:  WFL  Left Shoulder   Flexion: WFL and with pain  Abduction: WFL  Adduction: WFL  External rotation 0°: 42 degrees   Internal rotation 90°: WFL    Right Shoulder   Flexion: WFL  Extension: 60 (tightness) degrees   Abduction: 140 degrees with pain  External rotation 0°: 32 degrees   Internal rotation 90°: 33 degrees with pain    Additional Active Range of Motion Details  Completed in sitting secondary to not tolerating supine long  Passive Range of Motion   Left Shoulder   Flexion: WFL  Extension: WFL  Abduction: WFL  External rotation 0°: WFL    Right Shoulder   Flexion: WFL  Extension: WFL  Abduction: WFL  External rotation 0°: WFL    Scapular Mobility   Left Shoulder   Scapular mobility: good    Right Shoulder   Scapular mobility: good    Additional Scapular Mobility Details  Tightness in all planes with scapular mobility         Strength/Myotome Testing     Left Shoulder     Planes of Motion   Flexion: 4+   Abduction: 4+   External rotation at 0°: 4+   Internal rotation at 0°: 4+     Isolated Muscles   Serratus anterior: 4     Right Shoulder     Planes of Motion   Flexion: 4   Abduction: 4   External rotation at 0°: 4   Internal rotation at 0°: 4     Isolated Muscles   Serratus anterior: 4     Left Elbow   Flexion: 5  Forearm supination: 5  Forearm pronation: 5    Right Elbow Flexion: 5  Forearm supination: 5  Forearm pronation: 5    Additional Strength Details  Can't tolerate prone: unable to assess scapular stabilizers   Strength:   Elbow Flexed R: 43 lbs  Elbow Extended R: 43 lbs  Elbow Flexed L: 48 lbs  Elbow Extended R: 42 lbs    No pain throughout elbow resisted testing  Tests   Cervical   Negative vertical compression  Left   Negative Spurling's Test A  Right   Negative Spurling's Test A  Left Shoulder   Negative Hawkin's and painful arc  Right Shoulder   Positive Hawkin's and painful arc  Lumbar   Negative vertical compression  Precautions:  Ankylosing spondilitis, Chronic Pain, diabetes, GERD       Manual              Inf and posterior Mobs             Scapular mobs                                                         Exercise Diary              Sidelying IR and ER with weights HEP            Shoulder adduction stretch HEP            Internal rotation strap stretch HEP            Pec stretch              Ys, Ts, Is             Resisted IR and ER             Wall ball rolls CW and CCW             Forward chair stretch             Wall slides with lift              No moneys              Posterior behind back resisted abduction                                                                                                                     Arm bike                  Modalities

## 2020-01-16 NOTE — PROGRESS NOTES
Daily Note     Today's date: 2020  Patient name: Ortega Chu  : 1965  MRN: [id number]  Referring provider: Uma English MD  Dx:   Encounter Diagnosis     ICD-10-CM    1  Traumatic incomplete tear of right rotator cuff, initial encounter S46 011A        Start Time: 745  Stop Time: 0830  Total time in clinic (min): 45 minutes    Subjective: Patient notes that he has some shoulder pain this date as well as some shoulder  Objective: See treatment diary below  Assessment: Patient tolerated treatment well  Patient responded well to the introduction of exercise program this date  Patient demonstrated good technique with cueing about 25% of the time for proper recruitment of muscular  Patient had some complaints of pain and soreness in the R shoulder and elbow throughout exercise  Patient fatigued post session  Patient would benefit from continued PT  Plan: Continue per plan of care  Precautions:  Ankylosing spondilitis, Chronic Pain, diabetes, GERD       Manual              Inf and posterior Mobs             Scapular mobs  10'                                                       Exercise Diary              Sidelying IR and ER with weights HEP            Shoulder adduction stretch HEP            Internal rotation strap stretch HEP            Pec stretch              Ys, Ts, Is             Resisted IR and ER  [place]             Wall ball rolls CW and CCW 10x ea direction             Forward chair stretch 3x20"            Wall slides with lift  2x10             No moneys  3x10  GTB            Posterior behind back resisted abduction             [place] Sweeps              [place] Rotation pull downs  2x10  20#            Resisted Rows at [place] 2x10   20#                                                                             Arm bike  3' FWD and 3' BWD                 Modalities

## 2020-01-17 ENCOUNTER — OFFICE VISIT (OUTPATIENT)
Dept: PHYSICAL THERAPY | Facility: CLINIC | Age: 55
End: 2020-01-17
Payer: COMMERCIAL

## 2020-01-17 DIAGNOSIS — S46.011A TRAUMATIC INCOMPLETE TEAR OF RIGHT ROTATOR CUFF, INITIAL ENCOUNTER: Primary | ICD-10-CM

## 2020-01-17 PROCEDURE — 97112 NEUROMUSCULAR REEDUCATION: CPT | Performed by: PHYSICAL THERAPIST

## 2020-01-17 PROCEDURE — 97110 THERAPEUTIC EXERCISES: CPT | Performed by: PHYSICAL THERAPIST

## 2020-01-17 PROCEDURE — 97140 MANUAL THERAPY 1/> REGIONS: CPT | Performed by: PHYSICAL THERAPIST

## 2020-01-21 NOTE — PROGRESS NOTES
Daily Note     Today's date: 2020  Patient name: Rachele Min  : 1965  MRN: [id number]  Referring provider: Bacilio Cho MD  Dx:   Encounter Diagnosis     ICD-10-CM    1  Traumatic incomplete tear of right rotator cuff, initial encounter S46 011A        Start Time: 1000  Stop Time: 1045  Total time in clinic (min): 45 minutes    Subjective: Patient notes that he has some shoulder pain this date and not feeling too well with having a cold earlier in the week  Objective: See treatment diary below  Assessment: Patient tolerated treatment well  Patient demonstrated good technique with cueing about 25% of the time for proper recruitment of muscular  Patient had some complaints of pain and soreness in the R shoulder throughout exercise specifically with overhead movement  Will continue to focus on scapular strength  Patient fatigued post session  Patient would benefit from continued PT  Plan: Continue per plan of care  Precautions:  Ankylosing spondilitis, Chronic Pain, diabetes, GERD,  pump      Manual             Inf and posterior Mobs             Scapular mobs  10' 10'                                                      Exercise Diary             Sidelying IR and ER with weights HEP            Shoulder adduction stretch HEP            Internal rotation strap stretch HEP            Pec stretch   3x20"            Ys, Ts, Is             Resisted IR and ER  [place]             Wall ball rolls CW and CCW 10x ea direction             Forward chair stretch 3x20" 3x20"           Wall slides with lift  2x10  2x10 YTB           No moneys  3x10  GTB 3x10  GTB           Posterior behind back resisted abduction             [place] Sweeps   3x10 20#           [place] Rotation pull downs  2x10  20# 2x10  20#           Resisted Rows at [place] 2x10   20# 2x10   20#           [place] wall slides with rotation  5x  RTB Arm bike  3' FWD and 3' BWD  Pullies 5 min                Modalities

## 2020-01-22 ENCOUNTER — APPOINTMENT (OUTPATIENT)
Dept: PHYSICAL THERAPY | Facility: CLINIC | Age: 55
End: 2020-01-22
Payer: COMMERCIAL

## 2020-01-24 ENCOUNTER — TRANSCRIBE ORDERS (OUTPATIENT)
Dept: LAB | Facility: CLINIC | Age: 55
End: 2020-01-24

## 2020-01-24 ENCOUNTER — OFFICE VISIT (OUTPATIENT)
Dept: PHYSICAL THERAPY | Facility: CLINIC | Age: 55
End: 2020-01-24
Payer: COMMERCIAL

## 2020-01-24 ENCOUNTER — APPOINTMENT (OUTPATIENT)
Dept: LAB | Facility: CLINIC | Age: 55
End: 2020-01-24
Payer: COMMERCIAL

## 2020-01-24 DIAGNOSIS — S46.011A TRAUMATIC INCOMPLETE TEAR OF RIGHT ROTATOR CUFF, INITIAL ENCOUNTER: Primary | ICD-10-CM

## 2020-01-24 DIAGNOSIS — Z51.81 ENCOUNTER FOR THERAPEUTIC DRUG MONITORING: Primary | ICD-10-CM

## 2020-01-24 DIAGNOSIS — Z00.00 ROUTINE GENERAL MEDICAL EXAMINATION AT A HEALTH CARE FACILITY: ICD-10-CM

## 2020-01-24 PROCEDURE — 97110 THERAPEUTIC EXERCISES: CPT | Performed by: PHYSICAL THERAPIST

## 2020-01-24 PROCEDURE — 97140 MANUAL THERAPY 1/> REGIONS: CPT | Performed by: PHYSICAL THERAPIST

## 2020-01-24 PROCEDURE — 97112 NEUROMUSCULAR REEDUCATION: CPT | Performed by: PHYSICAL THERAPIST

## 2020-01-24 PROCEDURE — 80307 DRUG TEST PRSMV CHEM ANLYZR: CPT

## 2020-01-28 NOTE — PROGRESS NOTES
Daily Note     Today's date: 2020  Patient name: Blanca Soliz  : 1965  MRN: [id number]  Referring provider: Gonzales Milton MD  Dx:   Encounter Diagnosis     ICD-10-CM    1  Traumatic incomplete tear of right rotator cuff, initial encounter S46 011A        Start Time: 1000  Stop Time: 1045  Total time in clinic (min): 45 minutes    Subjective: Patient notes that he has some shoulder pain this date  Objective: See treatment diary below  Assessment: Patient tolerated treatment well  Patient responded well to introduction of new exercises  Patient demonstrated good technique with cueing about 25% of the time for correction of set-up  Patient had continued complaints of pain and soreness in the R shoulder throughout exercise specifically with overhead movement and activation of the RTC  Patient fatigued post session  Patient would benefit from continued PT  Plan: Continue per plan of care  Precautions:  Ankylosing spondilitis, Chronic Pain, diabetes, GERD,  Diluted pump (abdomen)      Manual            Inf and posterior Mobs   5'          Scapular mobs  10' 10' 5'                                                     Exercise Diary            Sidelying IR and ER with weights HEP            Shoulder adduction stretch HEP            Internal rotation strap stretch HEP            Pec stretch   3x20"            Ys, Ts, Is             Resisted IR and ER  [place]   2x10  GTB          Wall ball rolls CW and CCW 10x ea direction             Forward chair stretch 3x20" 3x20" 3x20"          Wall slides with lift  2x10  2x10 YTB 2x10 YTB          No moneys  3x10  GTB 3x10  GTB 3x10  GTB          Posterior behind back resisted abduction             [place] Sweeps   3x10 20# 3x10 20#          [place] Rotation pull downs  2x10  20# 2x10  20#           Resisted Rows at [place] 2x10   20# 2x10   20# 2x10   20#          [place] wall slides with rotation  5x  RTB 5x  RTB Resisted Extensions   2x10  BTB          Serratus punches with TB   2x10  GTB          Horizontal ABD scap focus   10x GTB                       Arm bike  3' FWD and 3' BWD  Pullies 5 min  3' FWD and 3' BWD

## 2020-01-29 ENCOUNTER — TELEPHONE (OUTPATIENT)
Dept: ENDOCRINOLOGY | Facility: CLINIC | Age: 55
End: 2020-01-29

## 2020-01-29 ENCOUNTER — OFFICE VISIT (OUTPATIENT)
Dept: PHYSICAL THERAPY | Facility: CLINIC | Age: 55
End: 2020-01-29
Payer: COMMERCIAL

## 2020-01-29 ENCOUNTER — LAB (OUTPATIENT)
Dept: LAB | Facility: CLINIC | Age: 55
End: 2020-01-29
Payer: COMMERCIAL

## 2020-01-29 DIAGNOSIS — E11.9 TYPE 2 DIABETES MELLITUS WITHOUT COMPLICATION, WITHOUT LONG-TERM CURRENT USE OF INSULIN (HCC): ICD-10-CM

## 2020-01-29 DIAGNOSIS — E29.1 TESTICULAR HYPOGONADISM: ICD-10-CM

## 2020-01-29 DIAGNOSIS — S46.011A TRAUMATIC INCOMPLETE TEAR OF RIGHT ROTATOR CUFF, INITIAL ENCOUNTER: Primary | ICD-10-CM

## 2020-01-29 LAB
ALBUMIN SERPL BCP-MCNC: 3.8 G/DL (ref 3.5–5)
ALP SERPL-CCNC: 49 U/L (ref 46–116)
ALT SERPL W P-5'-P-CCNC: 56 U/L (ref 12–78)
ANION GAP SERPL CALCULATED.3IONS-SCNC: 4 MMOL/L (ref 4–13)
AST SERPL W P-5'-P-CCNC: 21 U/L (ref 5–45)
BILIRUB SERPL-MCNC: 0.41 MG/DL (ref 0.2–1)
BUN SERPL-MCNC: 12 MG/DL (ref 5–25)
CALCIUM SERPL-MCNC: 9 MG/DL (ref 8.3–10.1)
CHLORIDE SERPL-SCNC: 100 MMOL/L (ref 100–108)
CO2 SERPL-SCNC: 33 MMOL/L (ref 21–32)
CREAT SERPL-MCNC: 0.74 MG/DL (ref 0.6–1.3)
EST. AVERAGE GLUCOSE BLD GHB EST-MCNC: 174 MG/DL
GFR SERPL CREATININE-BSD FRML MDRD: 105 ML/MIN/1.73SQ M
GLUCOSE P FAST SERPL-MCNC: 152 MG/DL (ref 65–99)
HBA1C MFR BLD: 7.7 % (ref 4.2–6.3)
HCT VFR BLD AUTO: 40.8 % (ref 36.5–49.3)
HGB BLD-MCNC: 13.7 G/DL (ref 12–17)
POTASSIUM SERPL-SCNC: 3.8 MMOL/L (ref 3.5–5.3)
PROT SERPL-MCNC: 7.4 G/DL (ref 6.4–8.2)
SODIUM SERPL-SCNC: 137 MMOL/L (ref 136–145)

## 2020-01-29 PROCEDURE — 36415 COLL VENOUS BLD VENIPUNCTURE: CPT

## 2020-01-29 PROCEDURE — 85014 HEMATOCRIT: CPT

## 2020-01-29 PROCEDURE — 97112 NEUROMUSCULAR REEDUCATION: CPT | Performed by: PHYSICAL THERAPIST

## 2020-01-29 PROCEDURE — 80053 COMPREHEN METABOLIC PANEL: CPT

## 2020-01-29 PROCEDURE — 83036 HEMOGLOBIN GLYCOSYLATED A1C: CPT

## 2020-01-29 PROCEDURE — 84403 ASSAY OF TOTAL TESTOSTERONE: CPT

## 2020-01-29 PROCEDURE — 85018 HEMOGLOBIN: CPT

## 2020-01-29 PROCEDURE — 84402 ASSAY OF FREE TESTOSTERONE: CPT

## 2020-01-29 PROCEDURE — 97140 MANUAL THERAPY 1/> REGIONS: CPT | Performed by: PHYSICAL THERAPIST

## 2020-01-29 PROCEDURE — 97110 THERAPEUTIC EXERCISES: CPT | Performed by: PHYSICAL THERAPIST

## 2020-01-29 NOTE — TELEPHONE ENCOUNTER
----- Message from Nirmal Saul MD sent at 1/29/2020  2:23 PM EST -----  A1c has increased  Please send blood sugar log      Sent to my chart

## 2020-01-30 LAB
TESTOST FREE SERPL-MCNC: 21.7 PG/ML (ref 7.2–24)
TESTOST SERPL-MCNC: 796 NG/DL (ref 264–916)

## 2020-01-31 ENCOUNTER — TELEPHONE (OUTPATIENT)
Dept: ENDOCRINOLOGY | Facility: CLINIC | Age: 55
End: 2020-01-31

## 2020-01-31 ENCOUNTER — APPOINTMENT (OUTPATIENT)
Dept: PHYSICAL THERAPY | Facility: CLINIC | Age: 55
End: 2020-01-31
Payer: COMMERCIAL

## 2020-01-31 NOTE — PROGRESS NOTES
Daily Note     Today's date: 2020  Patient name: Eladio Bales  : 1965  MRN: [id number]  Referring provider: Afsaneh Horton MD  Dx:   No diagnosis found  Subjective: Patient notes that he has some shoulder pain this date  Objective: See treatment diary below  Assessment: Patient tolerated treatment well  Patient responded well to introduction of new exercises  Patient demonstrated good technique with cueing about 25% of the time for correction of set-up  Patient had continued complaints of pain and soreness in the R shoulder throughout exercise specifically with overhead movement and activation of the RTC  Patient fatigued post session  Patient would benefit from continued PT  Plan: Continue per plan of care  Precautions:  Ankylosing spondilitis, Chronic Pain, diabetes, GERD,  Diluted pump (abdomen)      Manual            Inf and posterior Mobs   5'          Scapular mobs  10' 10' 5'                                                     Exercise Diary            Sidelying IR and ER with weights HEP            Shoulder adduction stretch HEP            Internal rotation strap stretch HEP            Pec stretch   3x20"            Ys, Ts, Is             Resisted IR and ER  [place]   2x10  GTB          Wall ball rolls CW and CCW 10x ea direction             Forward chair stretch 3x20" 3x20" 3x20"          Wall slides with lift  2x10  2x10 YTB 2x10 YTB          No moneys  3x10  GTB 3x10  GTB 3x10  GTB          Posterior behind back resisted abduction             [place] Sweeps   3x10 20# 3x10 20#          [place] Rotation pull downs  2x10  20# 2x10  20#           Resisted Rows at [place] 2x10   20# 2x10   20# 2x10   20#          [place] wall slides with rotation  5x  RTB 5x  RTB          Resisted Extensions   2x10  BTB          Serratus punches with TB   2x10  GTB          Horizontal ABD scap focus   10x GTB                       Arm bike  3' FWD and 3' BWD  Pullies 5 min  3' FWD and 3' BWD

## 2020-02-03 LAB
AMPHETAMINES UR QL SCN: NEGATIVE NG/ML
BARBITURATES UR QL SCN: NEGATIVE NG/ML
BENZODIAZ UR QL: NEGATIVE NG/ML
BZE UR QL: NEGATIVE NG/ML
CANNABINOIDS UR QL SCN: NEGATIVE NG/ML
METHADONE UR QL SCN: POSITIVE
OPIATES UR QL: NEGATIVE
PCP UR QL: NEGATIVE NG/ML
PROPOXYPH UR QL SCN: NEGATIVE NG/ML

## 2020-02-05 ENCOUNTER — APPOINTMENT (OUTPATIENT)
Dept: PHYSICAL THERAPY | Facility: CLINIC | Age: 55
End: 2020-02-05
Payer: COMMERCIAL

## 2020-02-06 NOTE — PROGRESS NOTES
Daily Note     Today's date: 2020  Patient name: Costella Ganser  : 1965  MRN: [id number]  Referring provider: Marge Ortiz MD  Dx:   Encounter Diagnosis     ICD-10-CM    1  Traumatic incomplete tear of right rotator cuff, initial encounter S46 011A        Start Time: 1000  Stop Time: 1045  Total time in clinic (min): 45 minutes    Subjective: Patient notes that his shoulder is a little sore today secondary to dog sit the past couple days and having to push them away at times  Objective: See treatment diary below  Assessment: Patient tolerated treatment well  Patient had continued complaints of pain and soreness in the R shoulder throughout exercise specifically with overhead movement and activation of the RTC  Patient doing well with scapular stabilizing exercises will minimal cueing  Patient fatigued post session  Patient would benefit from continued PT  Plan: Continue per plan of care  Precautions:  Ankylosing spondilitis, Chronic Pain, diabetes, GERD,  Diluted pump (abdomen)      Manual   2/7         Inf and posterior Mobs   5' 8'         Scapular mobs  10' 10' 5'                                                     Exercise Diary   2/7         Sidelying IR and ER with weights HEP            Shoulder adduction stretch HEP            Internal rotation strap stretch HEP            Pec stretch   3x20"            Ys, Ts, Is             Resisted IR and ER     2x10  GTB 2x10  GTB         Wall ball rolls CW and CCW 10x ea direction             Forward chair stretch 3x20" 3x20" 3x20" 3x20"         Wall slides with lift  2x10  2x10 YTB 2x10 YTB          No moneys  3x10  GTB 3x10  GTB 3x10  GTB 3x10  GTB         Posterior behind back resisted abduction             [place] Sweeps   3x10 20# 3x10 20# 3x10 20#         [place] Rotation pull downs  2x10  20# 2x10  20#  2x10  15#         Resisted Rows at [place] 2x10   20# 2x10   20# 2x10   20# 2x10   20# [place] wall slides with rotation  5x  RTB 5x  RTB          Resisted Extensions   2x10  BTB 2x10  BTB         Serratus punches with TB   2x10  GTB          Horizontal ABD scap focus   10x GTB 10x GTB                      Arm bike  3' FWD and 3' BWD  Pullies 5 min  3' FWD and 3' BWD  3' FWD and 3' BWD

## 2020-02-07 ENCOUNTER — OFFICE VISIT (OUTPATIENT)
Dept: PHYSICAL THERAPY | Facility: CLINIC | Age: 55
End: 2020-02-07
Payer: COMMERCIAL

## 2020-02-07 DIAGNOSIS — S46.011A TRAUMATIC INCOMPLETE TEAR OF RIGHT ROTATOR CUFF, INITIAL ENCOUNTER: Primary | ICD-10-CM

## 2020-02-07 PROCEDURE — 97140 MANUAL THERAPY 1/> REGIONS: CPT | Performed by: PHYSICAL THERAPIST

## 2020-02-07 PROCEDURE — 97112 NEUROMUSCULAR REEDUCATION: CPT | Performed by: PHYSICAL THERAPIST

## 2020-02-07 PROCEDURE — 97110 THERAPEUTIC EXERCISES: CPT | Performed by: PHYSICAL THERAPIST

## 2020-02-11 NOTE — PROGRESS NOTES
Daily Note     Today's date: 2020  Patient name: Sera Parks  : 1965  MRN: [id number]  Referring provider: Joselin Mark MD  Dx:   Encounter Diagnosis     ICD-10-CM    1  Traumatic incomplete tear of right rotator cuff, initial encounter S46 011A        Start Time: 945  Stop Time: 1030  Total time in clinic (min): 45 minutes    Subjective: Patient notes that his shoulder is a little sore today  Objective: See treatment diary below  Assessment: Patient tolerated treatment well  Patient had some complaints of pain throughout treatment this date  Focused on scapular strengthening this date  Patient required minimal verbal cueing for proper technique  Patient doing well with scapular stabilizing exercises will minimal cueing  Patient fatigued post session  Patient would benefit from continued PT  Plan: Continue per plan of care  Precautions:  Ankylosing spondilitis, Chronic Pain, diabetes, GERD,  Diluted pump (abdomen)      Manual          Inf and posterior Mobs   5' 8' 8'        Scapular mobs  10' 10' 5'  5'                                                   Exercise Diary          Sidelying IR and ER with weights HEP            Shoulder adduction stretch HEP            Internal rotation strap stretch HEP            Pec stretch   3x20"            Ys, Ts, Is             Resisted IR and ER     2x10  GTB 2x10  GTB 2x10  GTB        Wall ball rolls CW and CCW 10x ea direction             Forward chair stretch 3x20" 3x20" 3x20" 3x20" 3x20"        Wall slides with lift  2x10  2x10 YTB 2x10 YTB          No moneys  3x10  GTB 3x10  GTB 3x10  GTB 2x10  GTB 3x10  GTB        Posterior behind back resisted abduction             [place]ill Sweeps   3x10 20# 3x10 20# 3x10 20# 3x10 20#        [place] Rotation pull downs  2x10  20# 2x10  20#  2x10  15# 2x10  15#        Resisted Rows at [place] 2x10   20# 2x10   20# 2x10   20# 3x10   20# 3x10   20# [place] wall slides with rotation  5x  RTB 5x  RTB          Resisted Extensions   2x10  BTB 2x10  BTB 2x10  BTB        Serratus punches with TB   2x10  GTB          Horizontal ABD scap focus   10x GTB 10x GTB 10x GTB                     Arm bike  3' FWD and 3' BWD  Pullies 5 min  3' FWD and 3' BWD  3' FWD and 3' BWD  3' FWD and 3' BWD

## 2020-02-12 ENCOUNTER — OFFICE VISIT (OUTPATIENT)
Dept: PHYSICAL THERAPY | Facility: CLINIC | Age: 55
End: 2020-02-12
Payer: COMMERCIAL

## 2020-02-12 DIAGNOSIS — S46.011A TRAUMATIC INCOMPLETE TEAR OF RIGHT ROTATOR CUFF, INITIAL ENCOUNTER: Primary | ICD-10-CM

## 2020-02-12 PROCEDURE — 97112 NEUROMUSCULAR REEDUCATION: CPT | Performed by: PHYSICAL THERAPIST

## 2020-02-12 PROCEDURE — 97140 MANUAL THERAPY 1/> REGIONS: CPT | Performed by: PHYSICAL THERAPIST

## 2020-02-12 PROCEDURE — 97110 THERAPEUTIC EXERCISES: CPT | Performed by: PHYSICAL THERAPIST

## 2020-02-13 NOTE — PROGRESS NOTES
Daily Note     Today's date: 2020  Patient name: Esther Carlos  : 1965  MRN: [id number]  Referring provider: Carmina Parker MD  Dx:   Encounter Diagnosis     ICD-10-CM    1  Traumatic incomplete tear of right rotator cuff, initial encounter S46 011A        Start Time: 1000  Stop Time: 1045  Total time in clinic (min): 45 minutes    Subjective: Patient notes that his shoulder is a little sore today  Objective: See treatment diary below  Assessment: Patient tolerated treatment well  Patient responded well to progressions with exercises this date with minimal pain  Patient doing well with scapular stabilizing exercises will minimal cueing  Patient fatigued post session  Patient would benefit from continued PT  Plan: Continue per plan of care  Precautions:  Ankylosing spondilitis, Chronic Pain, diabetes, GERD,  Diluted pump (abdomen)      Manual         Inf and posterior Mobs   5' 8' 8'        Scapular mobs  10' 10' 5'  5'                                                   Exercise Diary         Sidelying IR and ER with weights HEP            Shoulder adduction stretch HEP            Internal rotation strap stretch HEP            Pec stretch   3x20"            Ys, Ts, Is             Resisted IR and ER     2x10  GTB 2x10  GTB 2x10  GTB 2x10  GTB       Wall ball rolls CW and CCW 10x ea direction             Forward chair stretch 3x20" 3x20" 3x20" 3x20" 3x20"        Wall slides with lift  2x10  2x10 YTB 2x10 YTB          No moneys  3x10  GTB 3x10  GTB 3x10  GTB 2x10  GTB 3x10  GTB 3x10  BTB       Posterior behind back resisted abduction             [place] Sweeps   3x10 20# 3x10 20# 3x10 20# 3x10 20# 3x10 20#       [place] Rotation pull downs  2x10  20# 2x10  20#  2x10  15# 2x10  15# 2x10  15#       Resisted Rows at [place] 2x10   20# 2x10   20# 2x10   20# 3x10   20# 3x10   20# 3x10   20#       [place] wall slides with rotation 5x  RTB 5x  RTB          Resisted Extensions   2x10  BTB 2x10  BTB 2x10  BTB 3x10  BTB       Serratus punches with TB   2x10  GTB          Horizontal ABD scap focus   10x GTB 10x GTB 10x GTB 10x GTB       Bent over Ys, Ts, Is      10x ea       Arm bike  3' FWD and 3' BWD  Pullies 5 min  3' FWD and 3' BWD  3' FWD and 3' BWD  3' FWD and 3' BWD  Pullies  5 min

## 2020-02-14 ENCOUNTER — OFFICE VISIT (OUTPATIENT)
Dept: PHYSICAL THERAPY | Facility: CLINIC | Age: 55
End: 2020-02-14
Payer: COMMERCIAL

## 2020-02-14 DIAGNOSIS — S46.011A TRAUMATIC INCOMPLETE TEAR OF RIGHT ROTATOR CUFF, INITIAL ENCOUNTER: Primary | ICD-10-CM

## 2020-02-14 PROCEDURE — 97110 THERAPEUTIC EXERCISES: CPT | Performed by: PHYSICAL THERAPIST

## 2020-02-14 PROCEDURE — 97140 MANUAL THERAPY 1/> REGIONS: CPT | Performed by: PHYSICAL THERAPIST

## 2020-02-14 PROCEDURE — 97112 NEUROMUSCULAR REEDUCATION: CPT | Performed by: PHYSICAL THERAPIST

## 2020-02-18 NOTE — PROGRESS NOTES
Daily Note     Today's date: 2020  Patient name: Alissa Bolanos  : 1965  MRN: [id number]  Referring provider: Alexei Mark MD  Dx:   Encounter Diagnosis     ICD-10-CM    1  Traumatic incomplete tear of right rotator cuff, initial encounter S46 011A        Start Time: 1030  Stop Time: 1115  Total time in clinic (min): 45 minutes    Subjective: Patient notes that his shoulder is a little sore today  Patient did not get a good night sleep last night and is tired  Objective: See treatment diary below  Assessment: Patient tolerated treatment well  Patient responded well to progressions this date  Patient continues to have mild-moderate with active movements such as external rotation and abduction  Patient doing well with scapular stabilizing exercises will minimal cueing  Patient fatigued post session  Patient would benefit from continued PT  Plan: Continue per plan of care  Re-Evaluation next session  Precautions:  Ankylosing spondilitis, Chronic Pain, diabetes, GERD,  Diluted pump (abdomen)      Manual         Inf and posterior Mobs   5' 8' 8' 8' 5'      Scapular mobs  10' 10' 5'  5' 8' 5'                                                 Exercise Diary        Sidelying IR and ER with weights HEP            Shoulder adduction stretch HEP            Internal rotation strap stretch HEP            Pec stretch   3x20"            Ys, Ts, Is             Resisted IR and ER     2x10  GTB 2x10  GTB 2x10  GTB 2x10  GTB 3x10  BTB      Wall ball rolls CW and CCW 10x ea direction             Forward chair stretch 3x20" 3x20" 3x20" 3x20" 3x20"        Wall slides with lift  2x10  2x10 YTB 2x10 YTB          No moneys  3x10  GTB 3x10  GTB 3x10  GTB 2x10  GTB 3x10  GTB 3x10  BTB 3x10  BTB      Posterior behind back resisted abduction             [place]ill Sweeps   3x10 20# 3x10 20# 3x10 20# 3x10 20# 3x10 20# 3x10 25#      [place] Rotation pull downs  2x10  20# 2x10  20#  2x10  15# 2x10  15# 2x10  15#       Resisted Rows at [place] 2x10   20# 2x10   20# 2x10   20# 3x10   20# 3x10   20# 3x10   20# 3x10   25#      [place] wall slides with rotation  5x  RTB 5x  RTB    5x  RTB      Resisted Extensions   2x10  BTB 2x10  BTB 2x10  BTB 3x10  BTB 3x10  BTB      Serratus punches with TB   2x10  GTB          Horizontal ABD scap focus   10x GTB 10x GTB 10x GTB 10x GTB 10x GTB      Bent over Ys, Ts, Is      10x ea       Arm bike  3' FWD and 3' BWD  Pullies 5 min  3' FWD and 3' BWD  3' FWD and 3' BWD  3' FWD and 3' BWD  Pullies  5 min  3' FWD and 3' BWD

## 2020-02-19 ENCOUNTER — OFFICE VISIT (OUTPATIENT)
Dept: PHYSICAL THERAPY | Facility: CLINIC | Age: 55
End: 2020-02-19
Payer: COMMERCIAL

## 2020-02-19 DIAGNOSIS — S46.011A TRAUMATIC INCOMPLETE TEAR OF RIGHT ROTATOR CUFF, INITIAL ENCOUNTER: Primary | ICD-10-CM

## 2020-02-19 PROCEDURE — 97140 MANUAL THERAPY 1/> REGIONS: CPT | Performed by: PHYSICAL THERAPIST

## 2020-02-19 PROCEDURE — 97112 NEUROMUSCULAR REEDUCATION: CPT | Performed by: PHYSICAL THERAPIST

## 2020-02-19 PROCEDURE — 97110 THERAPEUTIC EXERCISES: CPT | Performed by: PHYSICAL THERAPIST

## 2020-02-20 NOTE — PROGRESS NOTES
PT RE-EVALUATION    Today's date: 2020  Patient name: Wilber Christianson  : 1965  MRN: [id number]  Referring provider: Shubham Tariq MD  Dx:   Encounter Diagnosis     ICD-10-CM    1  Traumatic incomplete tear of right rotator cuff, initial encounter S46 011A        Start Time: 945  Stop Time: 1030  Total time in clinic (min): 45 minutes    Assessment  Assessment details: Re-Eval 2020: Patient notes that he feels he is 40% improved since the start of therapy  Patient has completed 9 visits thus far in this episode of care for chronic shoulder tendinoplasty  Patient notes that when he is trying to rest he has the worst pain  Patient notes that he is able to get t-shirts on a little better since we started therapy  Patient still unable to put on sweatshirts  Patient reports that the pain fluctuates so much throughout the day as well as between days  Patient notes that he might not have to much pain and can do a little more one day and then have a lot of pain the next  Upon evaluation, patient had some good improvements with strength and ROM throughout the R shoulder  Patient continues to have pain with movements into rotation and overhead which is limiting him the most as well as tightness throughout the joint  Patient has met some of his short term goals and is progressing slow but appropriately toward long term goals  Patient scored a 61 on FOTO indicating improvements with overall function  Patient would benefit from continued skilled physical therapy to address the impairments, improve their level of function, and to improve their overall quality of life  Wilber Christianson is a 49 y o  male who presents with signs and symptoms consistent of chronic shoulder tendopathy  Patient notes that he has had pain the shoulders for years but had a recent episode of more intense pain   Patient reports that this episode of pain started about 2 5 months ago when he went to  a heavy backpack and immediately had a sharp pain in the R shoulder  Patient noted it got slight better but has been persisting since then  Patient went to his doctor last week where he received an MRI showing moderate supraspinatus tendinopathy with high-grade bursal surface tear, intrasubstance subscapularis tear, mild long head biceps tendinopathy with tenosynovitis, mild acromioclavicular osteoarthritis with bone marrow  Patient notes that the pain is achiness and occasionally a sharp pain with certain movements  Patient notes that the pain mostly on the top near the [facility] joint but then globally throughout the whole shoulder in the area of the deltoid  Aggravating factors include overhead lifting, lifting heavy objects, putting deodorant on, putting on jacket, and sleeping  Easing factors include, injections, medications, rest in sitting with it propped up  Patient presents with intermittent R shoulder pain specifically with abduction and internal rotation, slight decreases in strength secondary to pain, decreased ROM into internal rotation, and decreased joint mobility due to posterior and anterior capsule tightness  Positive mcconnell-hellen, painful arc in the range of the [facility] joint, and positive infraspinatus testing conforming impingement and tendonopathy issues  Cervical ROM and testing were negative ruling put cervical involvement  Due to these impairments, patient has difficulty performing a/iadls, recreational activities and work-related activities  Patient would benefit from skilled physical therapy to address the impairments, improve their level of function, and to improve their overall quality of life      Impairments: abnormal or restricted ROM, abnormal movement, impaired physical strength, lacks appropriate home exercise program, pain with function and poor posture     Symptom irritability: moderateUnderstanding of Dx/Px/POC: good   Prognosis: good    Goals  Short Term Goals: to be achieved by 4 weeks  1) Patient to be independent with basic HEP  MET  2) Decrease pain to 4/10 at its worst  PARTIALLY MET   3) Increase shoulder ROM by 5-10 degrees in all deficit planes in order to participate in daily activities  PARTIALLY MET   4) Increase shoulder strength by 1/2 MMT grade in all deficient planes  PARTIALLY MET     Long Term Goals: to be achieved by discharge  1) FOTO equal to or greater than 65 indicating improvements with overall function  PARTIALLY MET   2) Patient to be independent with comprehensive HEP  PARTIALLY MET   3) Patient will demonstrate over head reaching with little to no pain in order to participate in daily activities  PARTIALLY MET   4) Patient will be able to tolerate holding or lifting heavy objects at chest height for prolong periods of time in order to spend time with grandson and perform daily tasks  PARTIALLY MET   5) Patient to report no sleep interruption secondary to pain  PARTIALLY MET   6) Patient will have little to no pain with UE dressing and pants management in order to improve quality of life  PARTIALLY MET   7)  Decrease pain to 2-3/10 at its worst  PARTIALLY MET       Plan  Plan details: UPDATED POC: 2x week for 4-6 weeks   Patient would benefit from: PT eval and skilled physical therapy  Planned therapy interventions: manual therapy, neuromuscular re-education, patient education, therapeutic activities, therapeutic exercise and home exercise program  Treatment plan discussed with: patient        Subjective Evaluation    History of Present Illness  Mechanism of injury: Patient notes that he feels he is 40% improved since the start of therapy  Patient notes that when he is trying to rest he has the worst pain  Patient notes that he is able to get t-shirts on a little better since we started therapy  Patient still unable to put on sweatshirts  Patient reports that the pain fluctuates so much throughout the day as well as between days   Patient notes that he might not have to much pain and can do a little more one day and then have a lot of pain the next  History of Current Injury: Patient notes that he has been having R shoulder pain that started 2 5 months ago  Patient notes that he has had pain the shoulders for years but had an increase in pain when he went to  a heavy backpack and immediately have a sharp pain  Patient didn't go to see a doctor until last week since it wasn't getting better  Patient notes having radiating pain into the elbow and down to the hand, and decrease  strength  Intermittent pain more so than constant  Pain location/Descriptors: Patient notes that the pain is achiness and occasionally a sharp pain  Patient notes that the pain mostly on the top near the [facility] joint but then globally throughout the whole shoulder  Aggravating factors: Overhead lifting,   Easing factors: Injections, medications, rest in sitting   24 HR pattern: Patient notes having sleep disturbances with achiness  Imaging: see below  Special Questions: Denies numbness or tingling  Denies Ds and Ns  Patient goals:  Patient reports that he would like to be able to decrease pain to a tolerable level to do the things that he wants to do     Hobbies/Interest: Taking care and spending time with his grandson   Occupation:  [employer]; construction work    Pain  Current pain ratin  At best pain ratin  At worst pain ratin  Location: R shoulder   Quality: dull ache and sharp  Relieving factors: rest and change in position  Aggravating factors: overhead activity and lifting      Diagnostic Tests  MRI studies: abnormal (atient received MRI results that showed Moderate supraspinatus tendinopathy with high-grade bursal surface tear, Intrasubstance subscapularis tear, Mild long head biceps tendinopathy with tenosynovitis, Mild acromioclavicular osteoarthritis with bone marro)  Patient Goals  Patient goals for therapy: decreased pain  Patient goal: Patient reports that he would like to be able to decrease pain to a tolerable level to do the things that he wants to do  Objective     Palpation   Left   No palpable tenderness to the anterior deltoid and biceps  Right   No palpable tenderness to the anterior deltoid and biceps  Additional Palpation Details  No TTP at the [facility] joint or any other area of the shoulder  Active Range of Motion   Cervical/Thoracic Spine       Cervical    Flexion:  WFL  Extension:  WFL  Left lateral flexion:  WFL  Right lateral flexion:  WFL  Left rotation:  WFL  Right rotation:  WFL  Left Shoulder   Flexion: WFL and with pain  Abduction: WFL  Adduction: WFL  External rotation 0°: 42 degrees   Internal rotation 90°: WFL    Right Shoulder   Flexion: WFL  Extension: 60 (tightness) degrees   Abduction: 170 degrees with pain  External rotation 0°: 39 degrees   External rotation 90°: 80 (supine) degrees  Internal rotation 90°: 70 (supine) degrees     Passive Range of Motion   Left Shoulder   Flexion: WFL  Extension: WFL  Abduction: WFL  External rotation 0°: WFL    Right Shoulder   Flexion: WFL  Extension: WFL  Abduction: WFL  External rotation 0°: WFL    Scapular Mobility   Left Shoulder   Scapular mobility: good    Right Shoulder   Scapular mobility: good    Additional Scapular Mobility Details  Tightness in all planes with scapular mobility         Strength/Myotome Testing     Left Shoulder     Planes of Motion   Flexion: 4+   Abduction: 4+   External rotation at 0°: 4+   Internal rotation at 0°: 4+     Isolated Muscles   Serratus anterior: 4     Right Shoulder     Planes of Motion   Flexion: 4+   Abduction: 4+   External rotation at 0°: 4   External rotation at 90°: 4   Internal rotation at 0°: 4   Internal rotation at 90°: 4     Isolated Muscles   Serratus anterior: 4     Left Elbow   Flexion: 5  Forearm supination: 5  Forearm pronation: 5    Right Elbow   Flexion: 5  Forearm supination: 5  Forearm pronation: 5    Additional Strength Details  Can't tolerate prone: unable to assess scapular stabilizers   Strength:   Elbow Flexed R: 43 lbs  Elbow Extended R: 32 lbs  Elbow Flexed L: 43 lbs  Elbow Extended R: 43 lbs    No pain throughout elbow resisted testing  Tests   Cervical   Negative vertical compression  Left   Negative Spurling's Test A  Right   Negative Spurling's Test A  Left Shoulder   Negative Hawkin's and painful arc  Right Shoulder   Positive Hawkin's and painful arc  Lumbar   Negative vertical compression  Flowsheet Rows      Most Recent Value   PT/OT G-Codes   Current Score  60   Projected Score  65             Precautions:  Ankylosing spondilitis, Chronic Pain, diabetes, GERD      Manual  1/17 1/24 1/29 2/7 2/12 2/14 2/19          Inf and posterior Mobs     5' 8' 8' 8' 5'         Scapular mobs  10' 10' 5'   5' 8' 5'                                                                                       Exercise Diary  1/17 1/24 1/29 2/7 2/12 2/14 2/19 2/21       Sidelying IR and ER with weights HEP                     Shoulder adduction stretch HEP                     Internal rotation strap stretch HEP                     Pec stretch    3x20"                    Ys, Ts, Is                       Resisted IR and ER        2x10  GTB 2x10  GTB 2x10  GTB 2x10  GTB 3x10  BTB  3x10  BTB       Wall ball rolls CW and CCW 10x ea direction                      Forward chair stretch 3x20" 3x20" 3x20" 3x20" 3x20"             Wall slides with lift  2x10  2x10 YTB 2x10 YTB                 No moneys  3x10  GTB 3x10  GTB 3x10  GTB 2x10  GTB 3x10  GTB 3x10  BTB 3x10  BTB         Posterior behind back resisted abduction                       [place] Sweeps    3x10 20# 3x10 20# 3x10 20# 3x10 20# 3x10 20# 3x10 25#  3x10 25#       [place] Rotation pull downs  2x10  20# 2x10  20#   2x10  15# 2x10  15# 2x10  15#           Resisted Rows at [place] 2x10   20# 2x10   20# 2x10   20# 3x10   20# 3x10   20# 3x10   20# 3x10   25#  3x10 25#       [place] wall slides with rotation   5x  RTB 5x  RTB       5x  RTB         Resisted Extensions     2x10  BTB 2x10  BTB 2x10  BTB 3x10  BTB 3x10  BTB  3x10  Blk TB       Serratus punches with TB     2x10  GTB                 Horizontal ABD scap focus     10x GTB 10x GTB 10x GTB 10x GTB 10x GTB         Bent over Ys, Ts, Is           10x ea           Arm bike  3' FWD and 3' BWD  Pullies 5 min  3' FWD and 3' BWD  3' FWD and 3' BWD  3' FWD and 3' BWD  Pullies  5 min  3' FWD and 3' BWD

## 2020-02-21 ENCOUNTER — EVALUATION (OUTPATIENT)
Dept: PHYSICAL THERAPY | Facility: CLINIC | Age: 55
End: 2020-02-21
Payer: COMMERCIAL

## 2020-02-21 DIAGNOSIS — S46.011A TRAUMATIC INCOMPLETE TEAR OF RIGHT ROTATOR CUFF, INITIAL ENCOUNTER: Primary | ICD-10-CM

## 2020-02-21 PROCEDURE — 97112 NEUROMUSCULAR REEDUCATION: CPT | Performed by: PHYSICAL THERAPIST

## 2020-02-21 PROCEDURE — 97110 THERAPEUTIC EXERCISES: CPT | Performed by: PHYSICAL THERAPIST

## 2020-02-24 ENCOUNTER — OFFICE VISIT (OUTPATIENT)
Dept: OBGYN CLINIC | Facility: HOSPITAL | Age: 55
End: 2020-02-24
Payer: COMMERCIAL

## 2020-02-24 VITALS
HEIGHT: 69 IN | HEART RATE: 82 BPM | SYSTOLIC BLOOD PRESSURE: 128 MMHG | WEIGHT: 217 LBS | DIASTOLIC BLOOD PRESSURE: 82 MMHG | BODY MASS INDEX: 32.14 KG/M2

## 2020-02-24 DIAGNOSIS — M75.101 TEAR OF RIGHT SUPRASPINATUS TENDON: Primary | ICD-10-CM

## 2020-02-24 DIAGNOSIS — S46.011A TRAUMATIC INCOMPLETE TEAR OF RIGHT ROTATOR CUFF, INITIAL ENCOUNTER: ICD-10-CM

## 2020-02-24 PROCEDURE — 3079F DIAST BP 80-89 MM HG: CPT | Performed by: ORTHOPAEDIC SURGERY

## 2020-02-24 PROCEDURE — 3008F BODY MASS INDEX DOCD: CPT | Performed by: ORTHOPAEDIC SURGERY

## 2020-02-24 PROCEDURE — 99214 OFFICE O/P EST MOD 30 MIN: CPT | Performed by: ORTHOPAEDIC SURGERY

## 2020-02-24 PROCEDURE — 3074F SYST BP LT 130 MM HG: CPT | Performed by: ORTHOPAEDIC SURGERY

## 2020-02-24 PROCEDURE — 1036F TOBACCO NON-USER: CPT | Performed by: ORTHOPAEDIC SURGERY

## 2020-02-24 PROCEDURE — 3051F HG A1C>EQUAL 7.0%<8.0%: CPT | Performed by: ORTHOPAEDIC SURGERY

## 2020-02-24 RX ORDER — HYDROXYZINE 50 MG/1
50 TABLET, FILM COATED ORAL AS NEEDED
COMMUNITY
End: 2020-07-09

## 2020-02-24 NOTE — PROGRESS NOTES
I personally examined the patient and reviewed the history provided  I agree with the note and the assessment and plan by Dr Lizett Oliveira MD      Briefly the patient is a 49 y o  male with a chief complaint of right shoulder pain who presents to the office for evaluation and treatment  Please refer to the documented HPI in the body of the note for details  The patient had an acute injury which precipitated these symptoms and has failed to improve with appropriate nonoperative care    Physical Exam: Blood pressure 128/82, pulse 82, height 5' 9" (1 753 m), weight 98 4 kg (217 lb)  Right shoulder full range of motion with pain and weakness with abduction testing    Radiology: I have personally reviewed the following images and my read follows  MRI scan right shoulder shows a high-grade bursal sided supraspinatus tendon tear    Assessment:    Right high-grade bursal sided supraspinatus tendon tear    Plan:    Given the patient's lack of improvement with nonoperative care he is indicated for arthroscopic rotator cuff repair of his right shoulder  He does wish to proceed forward with scheduling  A thorough discussion was performed with the patient regarding the risks and benefit of operative and nonoperative treatment of their rotator cuff tear  Risks discussed include but were not limited to infection, neurovascular injury, recurrent tear, nonhealing of the repair, need for further surgery, need for biceps tenodesis or tenotomy, stiffness, need for prolonged rehabilitation, as well as the risk of anesthesia  After this discussion all questions were answered and informed consent was obtained in the office for arthroscopic rotator cuff repair of the right shoulder                Assessment  Diagnoses and all orders for this visit:    Tear of right supraspinatus tendon        Discussion and Plan:  WBAT RUE  Continue pain regimen as directed   Given patients persistent symptoms despite conservative treatment discussion of arthroscopic rotator cuff repair including the risks and benefits oc the procedure were discussed in detail, at this time patient wishes to proceed with surgical scheduling  Will schedule patient for surgery today and will see patient back post op       Subjective:   Patient ID: Pasquale Tapia  is a 49 y o  male      complaining of right shoulder pain   The pain began 3 month(s) ago and is associated with an acute injury  Patient has had chronic pain to the right shoulder but remembers trying to lift a backpack out of the back seat of his car and experiencing a tearing sensation to the right shoulder  The patient describes the pain as aching in quality,  occurring with increasing frequency in timing, and localizes the pain to the  right deltoid  The pain is worse with overhead work and relieved by rest   The pain is not associated with numbness and tingling  The pain is not associated with constitutional symptoms  The patient is awoken at night by the pain  The patient has had physical therapy as well as narcotic medication for treatment  The following portions of the patient's history were reviewed and updated as appropriate: allergies, current medications, past family history, past medical history, past social history, past surgical history and problem list     Review of Systems   Constitutional: Negative for chills  HENT: Negative for hearing loss  Eyes: Negative for discharge  Respiratory: Negative for cough  Cardiovascular: Negative for chest pain  Skin: Negative for pallor  Neurological: Negative for numbness  Psychiatric/Behavioral: Negative for agitation         Objective:  /82   Pulse 82   Ht 5' 9" (1 753 m)   Wt 98 4 kg (217 lb)   BMI 32 05 kg/m²       Right Shoulder Exam     Range of Motion   Active abduction: normal   External rotation: normal   Forward flexion: normal   Internal rotation 0 degrees: Lumbar     Muscle Strength   Abduction: 4/5 External rotation: 4/5   Supraspinatus: 4/5   Subscapularis: 5/5   Biceps: 5/5     Tests   Fay test: positive  Impingement: positive  Drop arm: negative    Other   Erythema: absent  Sensation: normal  Pulse: present            Physical Exam   Constitutional: He is oriented to person, place, and time  He appears well-developed and well-nourished  HENT:   Head: Normocephalic and atraumatic  Eyes: Pupils are equal, round, and reactive to light  EOM are normal    Neck: Normal range of motion  Neck supple  Cardiovascular: Normal rate, regular rhythm, normal heart sounds and intact distal pulses  Pulmonary/Chest: Effort normal and breath sounds normal  No respiratory distress  Abdominal: Soft  He exhibits no distension  Neurological: He is alert and oriented to person, place, and time  Skin: Skin is warm and dry  Psychiatric: He has a normal mood and affect  His behavior is normal    Nursing note and vitals reviewed  I have personally reviewed pertinent films in PACS and my interpretation is as follows      XR of the right shoulder shows no significant degenerative changes to the glenohumeral joint    MRI of the right shoulder shows bursal sided tear of the right supraspinatus tendon

## 2020-02-24 NOTE — PATIENT INSTRUCTIONS
You are being scheduled for a shoulder arthroscopy to treat your symptoms  Below are some instructions and information on what to expect before and after your surgery  Pre-Surgical Preparation for Arthroscopic Shoulder Surgery: You will be contacted the evening prior to your surgery to confirm the scheduled time of the procedure and when to arrive at the hospital    Do not eat or drink anything after midnight the night before your surgery  Since you are having out-patient surgery, make sure that you have someone who can drive you home later in the day  Also, prepare that person for a long day, as the process of safely preparing for and recovering from the procedure is more time consuming than the actual procedure! As you will be in a sling after surgery, please wear or bring a loose fitting button-down shirt so that you can easily place this over the sling when you leave the surgical suite  This avoids having to place the operative arm in a sleeve  Most patients find that this is the easiest outfit to wear for the first week or so after surgery so you may want to plan accordingly  Most patients find that lying down in bed after shoulder surgery accentuates their discomfort  This is likely related to the effect of gravity on the swelling in the shoulder  As a result, most patients sleep better in a recliner or in bed with pillows propped up behind their back for the first few days or weeks after surgery  It is a good idea to plan for this ahead of time so there will be less hassle getting things set up the night after surgery  What to Expect After Arthroscopic Shoulder Surgery: It is normal to have swelling and discomfort in the shoulder for several days or a week after surgery  It is also normal to have a small amount of drainage from the surgical wounds (especially the first few days after surgery), as we put fluid into the shoulder to visualize the structures during surgery  It is NOT normal to have foul smelling, purulent drainage and if this is noted, please contact the office immediately or proceed to the emergency room for evaluation as this may indicate an infection  Applying ice bags to the shoulder may help with pain that is not controlled by the regional block  Ice should be applied 20-30 minutes at a time, every hour or two  Make sure to put a thin towel or T-shirt next to your skin to avoid direct contact of the ice with the skin  Icing is most helpful in the first 48 hours, although many people find that continuing past this time frame lessens their postoperative pain  Please note that your post-operative dressing is not conductive to ice, so if you need to, it is okay to remove that dressing even the night after surgery and place band-aids over the wounds in order for the ice to take effect  Pain Control    Most patients will receive a nerve block, the local anesthetic may keep your whole arm numb for up to 4 days  You will be given a prescription for narcotic pain medication when you are discharged from the hospital   With the newer nerve block that is being utilized, patients are rarely requiring the use of this narcotic pain medication  If you find you do not tolerate that type of pain medicine well, call our office and we will try another one  In addition to the narcotic pain medication, it is safe to use an anti-inflammatory (unless the patient has a medical condition that would not allow safe use of this mediation)  This includes the Advil, Motrin, Ibuprofen and Alleve category of medications  Simply follow the over the counter dosing on the package and use as indicated as another adjunct  Importantly since these medications are all very similar, use only one of them  Tylenol is a separate medication that can be utilized as well and can be taken at the same time as the other medication or given in a "staggered" manner    Just make sure that you follow the dosing on the over the counter bottle instructions  Also make sure that the pain medication prescribed by Dr Nancy Paez team does not contain acetaminophen (this is found in Percocet and Vicodin)  Typically we do not prescribe those types of pain medications but if for some reason that has been prescribed DO NOT add more Tylenol (acetaminophen) as you could end up taking too much of that medication  As mentioned above, most patients find that lying down accentuates their discomfort  You might sleep better in a recliner, or propped up in bed  Dr Shira Owens encourages patients to safely ambulate around the house as much as possible in the first few days after the procedure as this can help with blood circulation in the legs  While the incidence of blood clots is very rare following shoulder surgery, early ambulation is a great way to help decrease the already low rate  24 hours after the surgery you may remove the bandage and cover incisions with Band-Aids if needed  At that time you may shower, the wounds will have a surgical glue that will protect them from shower water but do not submerge your incisions directly (bathing or swimming) until at least 2 weeks post-operatively  It is safe to let the arm hang at the side and take a shower and put on a shirt without the sling on  Just make sure that you do not use the operative are to reach out and grab anything as that may damage the repair  When you are done showering and getting dressed please return the operative arm to the sling  Unless noted otherwise in your discharge paperwork, Dr Shira Owens uses absorbable sutures so they do not need to be removed  Dr Laura Hopper physician assistant (PA) will see you in the office a few days after the procedure to review the intra-operative findings and to initiate physical therapy if appropriate    A post-operative appointment should have been scheduled for you already, but if for some reason this did not happen, please call the office to make one  Physical therapy is important after nearly all shoulder surgeries and a detailed rehabilitation plan based on the specific intra-operative findings and procedures will be provided to your therapist at the first post-operative office visit  Most patients have post-operative therapy appointments scheduled pre-operatively, but if you do not, that will be handled at the first post-operative office visit  Unless expressly directed otherwise it is safe to remove the sling even the first day after the surgery and let the arm hang by the side  This allows patients to shower and even put a shirt on (bad arm in the sleeve first)  It is also safe to flex and extend their wrist, hand and fingers as much as possible when the block wears off  These simple motions can serve to pump fluid out of the forearm and decrease swelling in the arm

## 2020-02-24 NOTE — PRE-PROCEDURE INSTRUCTIONS
Pre-Surgery Instructions:   Medication Instructions    atorvastatin (LIPITOR) 20 mg tablet Instructed patient per Anesthesia Guidelines   Cholecalciferol (VITAMIN D) 2000 units CAPS Patient was instructed by Physician and understands   gabapentin (NEURONTIN) 300 mg capsule Instructed patient per Anesthesia Guidelines   HYDROmorphone (DILAUDID) 4 mg/mL injection Instructed patient per Anesthesia Guidelines   hydrOXYzine HCL (ATARAX) 50 mg tablet Instructed patient per Anesthesia Guidelines   lisinopril (ZESTRIL) 20 mg tablet Instructed patient per Anesthesia Guidelines   metFORMIN (GLUCOPHAGE-XR) 500 mg 24 hr tablet Patient was instructed by Physician and understands   methadone (DOLOPHINE) 10 mg tablet Patient was instructed by Physician and understands   NARCAN 4 MG/0 1ML LIQD Patient was instructed by Physician and understands   omeprazole (PriLOSEC) 40 MG capsule Instructed patient per Anesthesia Guidelines   oxyCODONE (OxyCONTIN) 40 mg 12 hr tablet Instructed patient per Anesthesia Guidelines   oxyCODONE (OXYCONTIN) 80 mg 12 hr tablet Instructed patient per Anesthesia Guidelines   pregabalin (LYRICA) 75 mg capsule Instructed patient per Anesthesia Guidelines   Testosterone Cypionate 200 MG/ML SOLN Instructed patient per Anesthesia Guidelines  Pre op and bathing instructions reviewed  Pt will get hibiclens  Pt will bring sling DOS

## 2020-02-25 ENCOUNTER — ANESTHESIA EVENT (OUTPATIENT)
Dept: PERIOP | Facility: AMBULARY SURGERY CENTER | Age: 55
End: 2020-02-25
Payer: COMMERCIAL

## 2020-02-26 ENCOUNTER — APPOINTMENT (OUTPATIENT)
Dept: PHYSICAL THERAPY | Facility: CLINIC | Age: 55
End: 2020-02-26
Payer: COMMERCIAL

## 2020-02-27 NOTE — ANESTHESIA PREPROCEDURE EVALUATION
Review of Systems/Medical History  Patient summary reviewed  Chart reviewed  No history of anesthetic complications     Cardiovascular  Hyperlipidemia, Hypertension ,    Pulmonary  Sleep apnea ,        GI/Hepatic    GERD ,        Negative  ROS        Endo/Other  Diabetes ,      GYN       Hematology  Negative hematology ROS      Musculoskeletal  Back pain , cervical pain,   Arthritis     Neurology  Negative neurology ROS   Fibromyalgia   Psychology   Depression ,   Chronic opioid dependence Chronic pain,            Physical Exam    Airway    Mallampati score: II  TM Distance: >3 FB  Neck ROM: full     Dental       Cardiovascular      Pulmonary      Other Findings        Anesthesia Plan  ASA Score- 3     Anesthesia Type- general with ASA Monitors  Additional Monitors:   Airway Plan: LMA  Comment: IS Block for post op pain per surgeon request        Plan Factors-    Induction- intravenous  Postoperative Plan-     Informed Consent- Anesthetic plan and risks discussed with patient  I personally reviewed this patient with the CRNA  Discussed and agreed on the Anesthesia Plan with the CRNA  Brian Peterson

## 2020-02-28 ENCOUNTER — ANESTHESIA (OUTPATIENT)
Dept: PERIOP | Facility: AMBULARY SURGERY CENTER | Age: 55
End: 2020-02-28
Payer: COMMERCIAL

## 2020-02-28 ENCOUNTER — APPOINTMENT (OUTPATIENT)
Dept: PHYSICAL THERAPY | Facility: CLINIC | Age: 55
End: 2020-02-28
Payer: COMMERCIAL

## 2020-02-28 ENCOUNTER — HOSPITAL ENCOUNTER (OUTPATIENT)
Facility: AMBULARY SURGERY CENTER | Age: 55
Setting detail: OUTPATIENT SURGERY
Discharge: HOME | End: 2020-02-28
Attending: ORTHOPAEDIC SURGERY | Admitting: ORTHOPAEDIC SURGERY
Payer: COMMERCIAL

## 2020-02-28 VITALS
WEIGHT: 217 LBS | OXYGEN SATURATION: 95 % | BODY MASS INDEX: 32.14 KG/M2 | HEART RATE: 80 BPM | RESPIRATION RATE: 18 BRPM | DIASTOLIC BLOOD PRESSURE: 70 MMHG | HEIGHT: 69 IN | TEMPERATURE: 97.9 F | SYSTOLIC BLOOD PRESSURE: 118 MMHG

## 2020-02-28 PROCEDURE — C9290 INJ, BUPIVACAINE LIPOSOME: HCPCS | Performed by: ANESTHESIOLOGY

## 2020-02-28 PROCEDURE — 99024 POSTOP FOLLOW-UP VISIT: CPT | Performed by: ORTHOPAEDIC SURGERY

## 2020-02-28 PROCEDURE — C1713 ANCHOR/SCREW BN/BN,TIS/BN: HCPCS | Performed by: ORTHOPAEDIC SURGERY

## 2020-02-28 PROCEDURE — 29827 SHO ARTHRS SRG RT8TR CUF RPR: CPT | Performed by: ORTHOPAEDIC SURGERY

## 2020-02-28 DEVICE — ANCHOR HEALIX ADV BR 3 SUTURE W/ORTHOCORD 4.5MM: Type: IMPLANTABLE DEVICE | Site: SHOULDER | Status: FUNCTIONAL

## 2020-02-28 RX ORDER — SODIUM CHLORIDE, SODIUM LACTATE, POTASSIUM CHLORIDE, CALCIUM CHLORIDE 600; 310; 30; 20 MG/100ML; MG/100ML; MG/100ML; MG/100ML
50 INJECTION, SOLUTION INTRAVENOUS CONTINUOUS
Status: DISCONTINUED | OUTPATIENT
Start: 2020-02-28 | End: 2020-02-28 | Stop reason: HOSPADM

## 2020-02-28 RX ORDER — FENTANYL CITRATE/PF 50 MCG/ML
50 SYRINGE (ML) INJECTION
Status: DISCONTINUED | OUTPATIENT
Start: 2020-02-28 | End: 2020-02-28 | Stop reason: HOSPADM

## 2020-02-28 RX ORDER — ONDANSETRON 2 MG/ML
4 INJECTION INTRAMUSCULAR; INTRAVENOUS ONCE AS NEEDED
Status: DISCONTINUED | OUTPATIENT
Start: 2020-02-28 | End: 2020-02-28 | Stop reason: HOSPADM

## 2020-02-28 RX ORDER — FENTANYL CITRATE 50 UG/ML
INJECTION, SOLUTION INTRAMUSCULAR; INTRAVENOUS AS NEEDED
Status: DISCONTINUED | OUTPATIENT
Start: 2020-02-28 | End: 2020-02-28 | Stop reason: SURG

## 2020-02-28 RX ORDER — BUPIVACAINE HYDROCHLORIDE 5 MG/ML
INJECTION, SOLUTION EPIDURAL; INTRACAUDAL AS NEEDED
Status: DISCONTINUED | OUTPATIENT
Start: 2020-02-28 | End: 2020-02-28 | Stop reason: SURG

## 2020-02-28 RX ORDER — METOCLOPRAMIDE HYDROCHLORIDE 5 MG/ML
10 INJECTION INTRAMUSCULAR; INTRAVENOUS ONCE AS NEEDED
Status: DISCONTINUED | OUTPATIENT
Start: 2020-02-28 | End: 2020-02-28 | Stop reason: HOSPADM

## 2020-02-28 RX ORDER — ONDANSETRON 2 MG/ML
4 INJECTION INTRAMUSCULAR; INTRAVENOUS EVERY 6 HOURS PRN
Status: DISCONTINUED | OUTPATIENT
Start: 2020-02-28 | End: 2020-02-28 | Stop reason: HOSPADM

## 2020-02-28 RX ORDER — CEFAZOLIN SODIUM 2 G/50ML
2000 SOLUTION INTRAVENOUS ONCE
Status: COMPLETED | OUTPATIENT
Start: 2020-02-28 | End: 2020-02-28

## 2020-02-28 RX ORDER — LIDOCAINE HYDROCHLORIDE 10 MG/ML
INJECTION, SOLUTION EPIDURAL; INFILTRATION; INTRACAUDAL; PERINEURAL AS NEEDED
Status: DISCONTINUED | OUTPATIENT
Start: 2020-02-28 | End: 2020-02-28 | Stop reason: SURG

## 2020-02-28 RX ORDER — HYDROMORPHONE HCL/PF 1 MG/ML
0.5 SYRINGE (ML) INJECTION
Status: DISCONTINUED | OUTPATIENT
Start: 2020-02-28 | End: 2020-02-28 | Stop reason: HOSPADM

## 2020-02-28 RX ORDER — ONDANSETRON 2 MG/ML
INJECTION INTRAMUSCULAR; INTRAVENOUS AS NEEDED
Status: DISCONTINUED | OUTPATIENT
Start: 2020-02-28 | End: 2020-02-28 | Stop reason: SURG

## 2020-02-28 RX ORDER — PROPOFOL 10 MG/ML
INJECTION, EMULSION INTRAVENOUS AS NEEDED
Status: DISCONTINUED | OUTPATIENT
Start: 2020-02-28 | End: 2020-02-28 | Stop reason: SURG

## 2020-02-28 RX ORDER — ACETAMINOPHEN 325 MG/1
650 TABLET ORAL EVERY 6 HOURS PRN
Status: DISCONTINUED | OUTPATIENT
Start: 2020-02-28 | End: 2020-02-28 | Stop reason: HOSPADM

## 2020-02-28 RX ORDER — MIDAZOLAM HYDROCHLORIDE 2 MG/2ML
INJECTION, SOLUTION INTRAMUSCULAR; INTRAVENOUS AS NEEDED
Status: DISCONTINUED | OUTPATIENT
Start: 2020-02-28 | End: 2020-02-28 | Stop reason: SURG

## 2020-02-28 RX ADMIN — FENTANYL CITRATE 50 MCG: 50 INJECTION, SOLUTION INTRAMUSCULAR; INTRAVENOUS at 09:56

## 2020-02-28 RX ADMIN — CEFAZOLIN SODIUM 2000 MG: 2 SOLUTION INTRAVENOUS at 10:01

## 2020-02-28 RX ADMIN — MIDAZOLAM HYDROCHLORIDE 2 MG: 1 INJECTION, SOLUTION INTRAMUSCULAR; INTRAVENOUS at 08:58

## 2020-02-28 RX ADMIN — BUPIVACAINE 20 ML: 13.3 INJECTION, SUSPENSION, LIPOSOMAL INFILTRATION at 09:00

## 2020-02-28 RX ADMIN — PROPOFOL 200 MG: 10 INJECTION, EMULSION INTRAVENOUS at 09:56

## 2020-02-28 RX ADMIN — ONDANSETRON 4 MG: 2 INJECTION INTRAMUSCULAR; INTRAVENOUS at 10:45

## 2020-02-28 RX ADMIN — LIDOCAINE HYDROCHLORIDE 50 MG: 10 INJECTION, SOLUTION EPIDURAL; INFILTRATION; INTRACAUDAL; PERINEURAL at 09:56

## 2020-02-28 RX ADMIN — BUPIVACAINE HYDROCHLORIDE 7.5 ML: 5 INJECTION, SOLUTION EPIDURAL; INTRACAUDAL at 09:00

## 2020-02-28 RX ADMIN — SODIUM CHLORIDE, SODIUM LACTATE, POTASSIUM CHLORIDE, AND CALCIUM CHLORIDE: .6; .31; .03; .02 INJECTION, SOLUTION INTRAVENOUS at 09:51

## 2020-02-28 NOTE — H&P
I identified and marked the patient in the pre-op holding area after confirming the surgical consent  No changes to medical health since the H&P was preformed  The patient's prescription history was queried in the [facility] to ensure compliance with applicable state laws

## 2020-02-28 NOTE — OP NOTE
OPERATIVE REPORT  PATIENT NAME: Gene Mukherjee Sr     :  1965  MRN: [id number]  Pt Location: [facility] OR ROOM 05    SURGERY DATE: 2020     SURGEON: Yanick Aranda MD     ASSISTANT: Pinky Rodriguez PA-C     NOTE: Pinky Rodriguez PA-C was present throughout the entire procedure and performed essential assistance with patient prepping, draping, positioning, suture management, wound closure, sterile dressing application and sling application, all under my direct supervision  RESIDENT ASSITANT: Leonardo Andrade MD PGY-2 Assisted in the procedure  Chick MD Harman, was present for the entire procedure and was scrubbed for and performed all the key and essential components of the procedure  PREOPERATIVE DIAGNOSIS:  Right Shoulder High Grade Bursal Sided Supraspinatus Tear    POSTOPERATIVE DIAGNOSIS: Same with Small Full Thickness Component    PROCEDURES: Surgical Arthroscopy Right Shoulder with Rotator Cuff Repair and Debridement (Subacromial Bursitis and CA Ligmament)    ANESTHESIA STAFF: Ismael Hendrix MD     ANESTHESIA TYPE: General LMA with ultrasound guided interscalene block (Exparel)  The interscalene block was provided by the anesthesia staff per my request for postoperative pain control and to decrease the use of postoperative narcotic medication for pain control  COMPLICATIONS: None    FINDINGS: High Grade Bursal Sided Supraspinatus Tear with Small Full Thickness Component    SPECIMEN(S):  None    ESTIMATED BLOOD LOSS: Minimal    INDICATION:  Briefly, the patient is a 49 y o   male with right shoulder pain  MRI scan confirmed a bursal sided partial thickness supraspinatus tear  The patient elected for arthroscopic treatment  Informed consent was obtained after a thorough discussion of the risks and benefits of the procedure, as well as alternatives to the procedure       OPERATIVE TECHNIQUE:  On the day of surgery, I identified the patients right shoulder and marked it with my initials  The patient was taken to the operating room where anesthesia was induced and 2 grams of IV Cefazolin were given  The patient was examined in the supine position and was found to have full range of motion of the right shoulder equivalent to the contralateral side with no instability (patient was noted to have lack of full elbow extension consistent with his history of ankylosing spondylitis)  The patient was then positioned in the [address] [place] [address] position  All bony prominences were padded  The shoulder was prepped and draped in normal sterile fashion  After a time-out for safety, a standard posterior arthroscopic portal was made  Glenohumeral evaluation revealed intact glenohumeral articular cartilage with no loose bodies  There was no tear of the subscapularis and on the articular side of the supraspinatus and infraspinatus there was no large tear but did appear to have a small pinhole type tear of the posterior supraspinatus  The long-head biceps and circumferential glenoid labrum without tear  After the intra-articular evaluation was completed, the scope was then placed in the subacromial space through a posterolateral portal where a thorough debridement of the subacromial bursitis (bursectomy) was performed  The high-grade bursal sided partial thickness supraspinatus tear was found and this did have a small pinhole component as suspected on the articular side, the tear measured a little less than 1 cm from anterior to posterior  The tendon quality itself was significantly frayed but robust   There was significant reciprocal fraying of the CA ligament associated with this partial-thickness tear  The tuberosity was prepared in routine fashion and a single row repair with one 4 5 mm triple loaded Mitek Healix Advanced anchor using 3 simple stitches through the tendon was performed achieving anatomic reduction of the rotator cuff tendon to the tuberosity    The central stitch did go through the small full-thickness component to close this off  The CA ligament did have fraying reciprocal to the bursal sided tear and the patient was noted to have an os acromiale so a formal subacromial decompression was felt to be inappropriate and may destabilize his os so instead a debridement of the CA ligament was performed to remove any frayed tissue but the CA ligament self was not released off the anterolateral edge of the acromion thereby maintaining stability of the os acromiale  The area was then irrigated  Scope was withdrawn  Wounds were closed with 4-0 Monocryl and Histoacryl  Sterile dressings and a sling with an abduction pillow was placed  The patient was awoken without complication and returned to the recovery room in good condition  We will see the patient back in the office next week to initiate therapy following accelerated rotator cuff repair rehabilitation protocol  At the end of procedure, the counts were correct       PATIENT DISPOSITION:  Stable to PACU      SIGNATURE: Anna Mota MD  DATE: February 28, 2020  TIME: 10:49 AM

## 2020-02-28 NOTE — DISCHARGE INSTRUCTIONS
You are being scheduled for a shoulder arthroscopy to treat your symptoms  Below are some instructions and information on what to expect before and after your surgery  Pre-Surgical Preparation for Arthroscopic Shoulder Surgery: You will be contacted the evening prior to your surgery to confirm the scheduled time of the procedure and when to arrive at the hospital    Do not eat or drink anything after midnight the night before your surgery  Since you are having out-patient surgery, make sure that you have someone who can drive you home later in the day  Also, prepare that person for a long day, as the process of safely preparing for and recovering from the procedure is more time consuming than the actual procedure! As you will be in a sling after surgery, please wear or bring a loose fitting button-down shirt so that you can easily place this over the sling when you leave the surgical suite  This avoids having to place the operative arm in a sleeve  Most patients find that this is the easiest outfit to wear for the first week or so after surgery so you may want to plan accordingly  Most patients find that lying down in bed after shoulder surgery accentuates their discomfort  This is likely related to the effect of gravity on the swelling in the shoulder  As a result, most patients sleep better in a recliner or in bed with pillows propped up behind their back for the first few days or weeks after surgery  It is a good idea to plan for this ahead of time so there will be less hassle getting things set up the night after surgery  What to Expect After Arthroscopic Shoulder Surgery: It is normal to have swelling and discomfort in the shoulder for several days or a week after surgery  It is also normal to have a small amount of drainage from the surgical wounds (especially the first few days after surgery), as we put fluid into the shoulder to visualize the structures during surgery  It is NOT normal to have foul smelling, purulent drainage and if this is noted, please contact the office immediately or proceed to the emergency room for evaluation as this may indicate an infection  Applying ice bags to the shoulder may help with pain that is not controlled by the regional block  Ice should be applied 20-30 minutes at a time, every hour or two  Make sure to put a thin towel or T-shirt next to your skin to avoid direct contact of the ice with the skin  Icing is most helpful in the first 48 hours, although many people find that continuing past this time frame lessens their postoperative pain  Please note that your post-operative dressing is not conductive to ice, so if you need to, it is okay to remove that dressing even the night after surgery and place band-aids over the wounds in order for the ice to take effect  Pain Control    Most patients will receive a nerve block, the local anesthetic may keep your whole arm numb for up to 4 days  You will be given a prescription for narcotic pain medication when you are discharged from the hospital   With the newer nerve block that is being utilized, patients are rarely requiring the use of this narcotic pain medication  If you find you do not tolerate that type of pain medicine well, call our office and we will try another one  In addition to the narcotic pain medication, it is safe to use an anti-inflammatory (unless the patient has a medical condition that would not allow safe use of this mediation)  This includes the Advil, Motrin, Ibuprofen and Alleve category of medications  Simply follow the over the counter dosing on the package and use as indicated as another adjunct  Importantly since these medications are all very similar, use only one of them  Tylenol is a separate medication that can be utilized as well and can be taken at the same time as the other medication or given in a "staggered" manner    Just make sure that you follow the dosing on the over the counter bottle instructions  Also make sure that the pain medication prescribed by Dr Kati Puente team does not contain acetaminophen (this is found in Percocet and Vicodin)  Typically we do not prescribe those types of pain medications but if for some reason that has been prescribed DO NOT add more Tylenol (acetaminophen) as you could end up taking too much of that medication  As mentioned above, most patients find that lying down accentuates their discomfort  You might sleep better in a recliner, or propped up in bed  Dr Yevgeniy Samuels encourages patients to safely ambulate around the house as much as possible in the first few days after the procedure as this can help with blood circulation in the legs  While the incidence of blood clots is very rare following shoulder surgery, early ambulation is a great way to help decrease the already low rate  24 hours after the surgery you may remove the bandage and cover incisions with Band-Aids if needed  At that time you may shower, the wounds will have a surgical glue that will protect them from shower water but do not submerge your incisions directly (bathing or swimming) until at least 2 weeks post-operatively  It is safe to let the arm hang at the side and take a shower and put on a shirt without the sling on  Just make sure that you do not use the operative are to reach out and grab anything as that may damage the repair  When you are done showering and getting dressed please return the operative arm to the sling  Unless noted otherwise in your discharge paperwork, Dr Yevgeniy Samuels uses absorbable sutures so they do not need to be removed  Dr Martínez Shore physician assistant (PA) will see you in the office a few days after the procedure to review the intra-operative findings and to initiate physical therapy if appropriate    A post-operative appointment should have been scheduled for you already, but if for some reason this did not happen, please call the office to make one  Physical therapy is important after nearly all shoulder surgeries and a detailed rehabilitation plan based on the specific intra-operative findings and procedures will be provided to your therapist at the first post-operative office visit  Most patients have post-operative therapy appointments scheduled pre-operatively, but if you do not, that will be handled at the first post-operative office visit  Unless expressly directed otherwise it is safe to remove the sling even the first day after the surgery and let the arm hang by the side  This allows patients to shower and even put a shirt on (bad arm in the sleeve first)  It is also safe to flex and extend their wrist, hand and fingers as much as possible when the block wears off  These simple motions can serve to pump fluid out of the forearm and decrease swelling in the arm

## 2020-02-28 NOTE — ANESTHESIA PROCEDURE NOTES
Peripheral Block    Patient location during procedure: holding area  Start time: 2/28/2020 9:00 AM  Reason for block: at surgeon's request and post-op pain management  Staffing  Anesthesiologist: Chelsi Peacock MD  Performed: anesthesiologist   Preanesthetic Checklist  Completed: patient identified, site marked, surgical consent, pre-op evaluation, timeout performed, IV checked, risks and benefits discussed and monitors and equipment checked  Peripheral Block  Patient position: supine  Prep: ChloraPrep  Patient monitoring: continuous pulse ox and frequent blood pressure checks  Block type: interscalene  Laterality: right  Injection technique: single-shot  Procedures: ultrasound guided, Ultrasound guidance required for the procedure to increase accuracy and safety of medication placement and decrease risk of complications    Ultrasound permanent image saved  Needle  Needle type: Stimuplex   Needle gauge: 22 G  Needle length: 5 cm  Needle localization: ultrasound guidance  Test dose: negative  Assessment  Injection assessment: incremental injection and local visualized surrounding nerve on ultrasound  Paresthesia pain: none  Heart rate change: no  Slow fractionated injection: yes  Post-procedure:  site cleaned  patient tolerated the procedure well with no immediate complications

## 2020-02-28 NOTE — ANESTHESIA POSTPROCEDURE EVALUATION
Post-Op Assessment Note    CV Status:  Stable    Pain management: adequate     Mental Status:  Alert and awake   Hydration Status:  Euvolemic   PONV Controlled:  Controlled   Airway Patency:  Patent   Post Op Vitals Reviewed: Yes      Staff: CRNA           /71 (02/28/20 1100)    Temp 98 2 °F (36 8 °C) (02/28/20 1100)    Pulse 77 (02/28/20 1100)   Resp 16 (02/28/20 1100)    SpO2 94 % (02/28/20 1100)

## 2020-03-02 ENCOUNTER — OFFICE VISIT (OUTPATIENT)
Dept: OBGYN CLINIC | Facility: HOSPITAL | Age: 55
End: 2020-03-02

## 2020-03-02 ENCOUNTER — EVALUATION (OUTPATIENT)
Dept: PHYSICAL THERAPY | Facility: CLINIC | Age: 55
End: 2020-03-02
Payer: COMMERCIAL

## 2020-03-02 VITALS
HEART RATE: 87 BPM | SYSTOLIC BLOOD PRESSURE: 106 MMHG | WEIGHT: 220 LBS | DIASTOLIC BLOOD PRESSURE: 77 MMHG | HEIGHT: 69 IN | BODY MASS INDEX: 32.58 KG/M2

## 2020-03-02 DIAGNOSIS — Z47.89 AFTERCARE FOLLOWING SURGERY OF THE MUSCULOSKELETAL SYSTEM: Primary | ICD-10-CM

## 2020-03-02 DIAGNOSIS — M75.101 TEAR OF RIGHT SUPRASPINATUS TENDON: Primary | ICD-10-CM

## 2020-03-02 PROCEDURE — 3074F SYST BP LT 130 MM HG: CPT | Performed by: PHYSICIAN ASSISTANT

## 2020-03-02 PROCEDURE — 3078F DIAST BP <80 MM HG: CPT | Performed by: PHYSICIAN ASSISTANT

## 2020-03-02 PROCEDURE — 97162 PT EVAL MOD COMPLEX 30 MIN: CPT | Performed by: PHYSICAL THERAPIST

## 2020-03-02 PROCEDURE — 99024 POSTOP FOLLOW-UP VISIT: CPT | Performed by: PHYSICIAN ASSISTANT

## 2020-03-02 PROCEDURE — 97110 THERAPEUTIC EXERCISES: CPT | Performed by: PHYSICAL THERAPIST

## 2020-03-02 PROCEDURE — 3008F BODY MASS INDEX DOCD: CPT | Performed by: PHYSICIAN ASSISTANT

## 2020-03-02 NOTE — PROGRESS NOTES
PT Evaluation     Today's date: 3/2/2020  Patient name: Edgard Tavarez  : 1965  MRN: [id number]  Referring provider: Annette Herbert MD  Dx:   Encounter Diagnosis     ICD-10-CM    1  Tear of right supraspinatus tendon M75 101        Start Time: 1215  Stop Time: 1300  Total time in clinic (min): 45 minutes    Assessment  Assessment details: Edgard Tavarez  is a 49 y o  male who presents status post right shoulder arthroscopy with rotator cuff repair and subacromial debridement  Patient was being seen for formal PT at this location leading up to this surgery for conservative treatment  Patient was not getting relief from 6 weeks of therapy and decided to continue with surgical repair  Patient is on accelerated protocol for RTC repair  Patient notes that he is having shoulder and elbow pain  Patient notes that the pain in the elbow is from the posterior aspect down into the forearm  Patient notes slight relief in the pain after the pillow was removed by doctor during this mornings follow-up appointment  Wife and patient wife's mother is helping with him  Patient presents with pain, decreased strength, decreased ROM and decreased joint mobility  Due to these impairments, Patient has difficulty performing a/iadls and recreational activities  Patient would benefit from skilled physical therapy to address the impairments, improve their level of function, and to improve their overall quality of life  Impairments: abnormal movement, impaired physical strength, lacks appropriate home exercise program, pain with function, scapular dyskinesis and weight-bearing intolerance    Symptom irritability: moderateUnderstanding of Dx/Px/POC: good   Prognosis: good    Goals  Short Term Goals: to be achieved by 4 weeks  1) Patient to be independent with basic HEP  2) Decrease pain to 6/10 at its worst   3) Increase shoulder ROM by 5-10 degrees in all planes      Long Term Goals: to be achieved by discharge  1) FOTO equal to or greater than 63 indicating improvements with overall function  2) Patient to be independent with comprehensive HEP  3) Patient will demonstrate maximal over head reaching  4) Increase UE strength to 4/5 MMT grade in all planes to improve a/iadls  5) Patient to report no sleep interruption secondary to pain  Plan  Plan details: 2x week for 4-6 weeks   Patient would benefit from: PT eval and skilled physical therapy  Planned therapy interventions: manual therapy, neuromuscular re-education, therapeutic activities, therapeutic exercise, patient education and home exercise program  Treatment plan discussed with: patient        Subjective Evaluation    History of Present Illness  Mechanism of injury: Patient is status post right shoulder arthroscopy with rotator cuff repair and subacromial debridement  Patient was being seen for formal PT at this location leading up to this surgery  Patient notes that he is having shoulder and elbow pain  Patient notes that the pain in the elbow is from the posterior aspect down into the forearm  Patient notes slight relief in the pain after the pillow was removed by doctor during this mornings follow-up appointment  Wife and patient wife's mother is helping with him  Patient had follow-up appointment with surgeon this date and was made aware of the protocol moving forward     Pain  Current pain ratin  At best pain ratin  At worst pain rating: 10  Location: R anterior shoulder   Quality: throbbing, sharp and pulling          Objective     Active Range of Motion   Cervical/Thoracic Spine       Cervical    Flexion:  WFL  Extension:  WFL  Left lateral flexion:  WFL  Right lateral flexion:  WFL  Left rotation:  WFL  Right rotation:  [facility]    Left Wrist   Wrist flexion: WFL  Wrist extension: WFL  Radial deviation: WFL    Right Wrist   Wrist flexion: 65 degrees   Wrist extension: 60 degrees   Radial deviation: 15 degrees   Ulnar deviation: 35 degrees     Passive Range of Motion   Left Shoulder   Internal rotation 0°: with pain    Right Shoulder   Flexion: 40 degrees with pain  Abduction: 45 degrees with pain  External rotation 0°: 10 degrees with pain  Internal rotation 0°: 40 degrees     Left Elbow   Flexion: WFL  Extension: WFL    Right Elbow   Flexion: 20 degrees with pain  Extension: 60 degrees with pain    Additional Passive Range of Motion Details  Patient symptoms very irritable  Unable to get much PROM based on patient's tolerance  Strength/Myotome Testing     Additional Strength Details  Not tested on IE secondary to protocol               Precautions: Dorothy accelerated RTC protocol, Ankylosing spondilitis, Chronic Pain, diabetes, GERD     Manual              PROM shoulder motion              Low gr mobs according to protocol schedule                                                        Exercise Diary              Elbow flex/ext HEP            Wrist flex HEP            Towel squeezes HEP            Scap retractions              Neck flex/ext             Chin tucks              Neck side bending             Shoulder shrugs              Scapular depression             Scapular protraction             Pendulums                                                                                                                                       Modalities

## 2020-03-02 NOTE — PROGRESS NOTES
Assessment:       1  Aftercare following surgery of the musculoskeletal system          Plan:        Patient is doing well postoperatively  He will continue with pain management services as he was under the care prior to surgery  Operative note, images, and rehab protocol were discussed  All questions were addressed to the patient's satisfaction  Patient has an appointment with PT  Follow-up will be in 6-8 weeks to assess patient's progress  Subjective:     Patient ID: Elana Alejandro  is a 49 y o  male  Chief Complaint:    HPI    Patient presents to the office for follow-up status post right shoulder arthroscopy with rotator cuff repair and subacromial debridement  His surgery was performed on 02/28/2020  He states he has regained sensation in his right upper extremity following anesthetic block  He is complaining of elbow pain, particularly on the medial aspect  Social History     Occupational History    Occupation: retired construction   Tobacco Use    Smoking status: Former Smoker     Start date: 2/24/2018    Smokeless tobacco: Never Used   Substance and Sexual Activity    Alcohol use: No     Comment: history    Drug use: Yes     Types: Hydromorphone    Sexual activity: Not on file      Review of Systems   Constitutional: Negative  Respiratory: Negative  Musculoskeletal: Positive for arthralgias, back pain, gait problem, joint swelling, myalgias, neck pain and neck stiffness  Skin: Positive for wound  Neurological: Positive for weakness  Negative for numbness  Psychiatric/Behavioral: Negative  Objective:     Ortho ExamPhysical Exam   Constitutional: He is oriented to person, place, and time  He appears well-nourished  HENT:   Head: Atraumatic  Cardiovascular: Intact distal pulses  Pulmonary/Chest: Effort normal    Musculoskeletal:   Arm in sling  Range of motion and strength not tested  Elbow pain relieved after abduction pillow removed  Neurological: He is alert and oriented to person, place, and time  No sensory deficit  Skin: Skin is warm and dry  Capillary refill takes less than 2 seconds  Surgical incisions dry and clean  Psychiatric: He has a normal mood and affect   His behavior is normal

## 2020-03-02 NOTE — PROGRESS NOTES
PT Discharge    Patient DC secondary to receiving RTC surgery on 2/28/2020  Patient to return for post-op treatment

## 2020-03-04 ENCOUNTER — OFFICE VISIT (OUTPATIENT)
Dept: PHYSICAL THERAPY | Facility: CLINIC | Age: 55
End: 2020-03-04
Payer: COMMERCIAL

## 2020-03-04 DIAGNOSIS — M75.101 TEAR OF RIGHT SUPRASPINATUS TENDON: Primary | ICD-10-CM

## 2020-03-04 PROCEDURE — 97140 MANUAL THERAPY 1/> REGIONS: CPT | Performed by: PHYSICAL THERAPIST

## 2020-03-04 PROCEDURE — 97112 NEUROMUSCULAR REEDUCATION: CPT | Performed by: PHYSICAL THERAPIST

## 2020-03-04 PROCEDURE — 97110 THERAPEUTIC EXERCISES: CPT | Performed by: PHYSICAL THERAPIST

## 2020-03-04 NOTE — PROGRESS NOTES
Daily Note     Today's date: 3/4/2020  Patient name: Wilbert Valdez  : 1965  MRN: [id number]  Referring provider: Halle Lazo MD  Dx:   Encounter Diagnosis     ICD-10-CM    1  Tear of right supraspinatus tendon M75 101        Start Time: 930  Stop Time:   Total time in clinic (min): 45 minutes    Subjective: Patient notes that he is having a lot of pain this date  Patient reports that he did not sleep well and continues to have the same aching pain that wakes him up the same as prior to the surgery  Objective: See treatment diary below      Assessment: Patient tolerated treatment well  Patient responded well to introduction of exercise program this  Patient most limited with manual PROM by high levels of pain in the shoulder  Patient would benefit from continued PT      Plan: Continue per plan of care        Precautions: Dorothy accelerated RTC protocol, Ankylosing spondilitis, Chronic Pain, diabetes, GERD     Manual  3/4            PROM shoulder motion  20'    ABD and Flex            Low gr mobs according to protocol schedule                                                        Exercise Diary   3/4           Elbow flex/ext HEP            Wrist flex HEP            Towel squeezes HEP            Scap retractions   10x 3" hold           Neck flex/ext             Chin tucks              Neck side bending             Shoulder shrugs   10x           Scapular depression             Scapular protraction             Pendulums              UT stretch  2x30"            Levator stretch  2x30"                                                                                                           Modalities

## 2020-03-06 ENCOUNTER — OFFICE VISIT (OUTPATIENT)
Dept: PHYSICAL THERAPY | Facility: CLINIC | Age: 55
End: 2020-03-06
Payer: COMMERCIAL

## 2020-03-06 DIAGNOSIS — S46.011A TRAUMATIC INCOMPLETE TEAR OF RIGHT ROTATOR CUFF, INITIAL ENCOUNTER: ICD-10-CM

## 2020-03-06 DIAGNOSIS — M75.101 TEAR OF RIGHT SUPRASPINATUS TENDON: Primary | ICD-10-CM

## 2020-03-06 PROCEDURE — 97110 THERAPEUTIC EXERCISES: CPT | Performed by: PHYSICAL THERAPIST

## 2020-03-06 PROCEDURE — 97140 MANUAL THERAPY 1/> REGIONS: CPT | Performed by: PHYSICAL THERAPIST

## 2020-03-06 NOTE — PROGRESS NOTES
Daily Note     Today's date: 3/6/2020  Patient name: Pedro Greco  : 1965  MRN: [id number]  Referring provider: Renita Matson MD  Dx:   Encounter Diagnosis     ICD-10-CM    1  Tear of right supraspinatus tendon M75 101    2  Traumatic incomplete tear of right rotator cuff, initial encounter [id number]        Start Time: 0945  Stop Time: 1030  Total time in clinic (min): 45 minutes    Subjective: Patient notes that he is having a lot of pain this date  Patient reports that he did not sleep well and continues to have the same aching pain that wakes him up the same as prior to the surgery  Objective: See treatment diary below      Assessment: Patient tolerated treatment well  Patient responded well to introduction of exercise program this  Patient responded better to PROM this date with decreased pain and more relax throughout range  PROM to approximately 85 degrees of abduction and 45-50 degrees of forward flexion in the scapular plane  Introduced pendulums this date with patient demonstrating good technique with occasional cues for using body instead of shoulder  Patient would benefit from continued PT      Plan: Continue per plan of care        Precautions: Dorothy accelerated RTC protocol, Ankylosing spondilitis, Chronic Pain, diabetes, GERD     Manual  3/4 3/6           PROM shoulder motion  20'    ABD and Flex 20'    ABD and Flex           Low gr mobs according to protocol schedule                                                        Exercise Diary   3/4 3/6          Elbow flex/ext HEP  2x10           Wrist flex HEP            Towel squeezes HEP            Scap retractions   10x 3" hold 10x 3" hold          Neck flex/ext             Chin tucks    10x 3"          Neck side bending             Shoulder shrugs   10x 20x          Scapular depression             Scapular protraction   2x10          Pendulums              UT stretch  2x30"            Levator stretch  2x30"            Pendulums 2x1 min                                                                                             Modalities

## 2020-03-11 ENCOUNTER — OFFICE VISIT (OUTPATIENT)
Dept: PHYSICAL THERAPY | Facility: CLINIC | Age: 55
End: 2020-03-11
Payer: COMMERCIAL

## 2020-03-11 DIAGNOSIS — S46.011A TRAUMATIC INCOMPLETE TEAR OF RIGHT ROTATOR CUFF, INITIAL ENCOUNTER: ICD-10-CM

## 2020-03-11 DIAGNOSIS — M75.101 TEAR OF RIGHT SUPRASPINATUS TENDON: Primary | ICD-10-CM

## 2020-03-11 PROCEDURE — 97112 NEUROMUSCULAR REEDUCATION: CPT | Performed by: PHYSICAL THERAPIST

## 2020-03-11 PROCEDURE — 97110 THERAPEUTIC EXERCISES: CPT | Performed by: PHYSICAL THERAPIST

## 2020-03-11 PROCEDURE — 97140 MANUAL THERAPY 1/> REGIONS: CPT | Performed by: PHYSICAL THERAPIST

## 2020-03-11 NOTE — PROGRESS NOTES
Daily Note     Today's date: 3/13/2020  Patient name: Beka Ann  : 1965  MRN: [id number]  Referring provider: Ford Irene MD  Dx:   Encounter Diagnosis     ICD-10-CM    1  Tear of right supraspinatus tendon M75 101    2  Traumatic incomplete tear of right rotator cuff, initial encounter [id number]        Start Time: 0915  Stop Time: 1000  Total time in clinic (min): 45 minutes    Subjective: Patient notes that he is having a very good day this date  Patient reports that the pain has slightly decreased and he was able to get more sleep than he has been  Objective: See treatment diary below      Assessment: Patient tolerated treatment well  Patient responded well to PROM this date despite having increased pain this morning  PROM to approximately 110 degrees of abduction and 90 degrees of forward flexion in the scapular plane  Patient also had some improvements with shoulder ER PROM with getting 15-20 degrees  Patient would benefit from continued PT      Plan: Continue per plan of care        Precautions: Dorothy accelerated RTC protocol, Ankylosing spondilitis, Chronic Pain, diabetes, GERD     Manual  3/4 3/6 3/11 3/13         PROM shoulder motion  20'    ABD and Flex 20'    ABD and Flex 20'    ABD and Flex  And ER/IR 20'    ABD and Flex  And ER/IR         Low gr mobs according to protocol schedule                                                        Exercise Diary   3/4 3/6 3/11 3/13        Elbow flex/ext HEP  2x10           Wrist flex HEP            Towel squeezes HEP            Scap retractions   10x 3" hold 10x 3" hold 3x10 3" hold 3x10 3" hold        Neck flex/ext             Chin tucks    10x 3"          Neck side bending             Shoulder shrugs   10x 20x 30x         Scapular depression             Scapular protraction   2x10          Pendulums              UT stretch  2x30"            Levator stretch  2x30"            Pendulums    2x1 min  2x1 min Modalities

## 2020-03-11 NOTE — PROGRESS NOTES
Levator stretch  2x30"            Pendulums    2x1 min  2x1 min                                                                                            Modalities

## 2020-03-13 ENCOUNTER — OFFICE VISIT (OUTPATIENT)
Dept: PHYSICAL THERAPY | Facility: CLINIC | Age: 55
End: 2020-03-13
Payer: COMMERCIAL

## 2020-03-13 DIAGNOSIS — M75.101 TEAR OF RIGHT SUPRASPINATUS TENDON: Primary | ICD-10-CM

## 2020-03-13 DIAGNOSIS — S46.011A TRAUMATIC INCOMPLETE TEAR OF RIGHT ROTATOR CUFF, INITIAL ENCOUNTER: ICD-10-CM

## 2020-03-13 PROCEDURE — 97140 MANUAL THERAPY 1/> REGIONS: CPT | Performed by: PHYSICAL THERAPIST

## 2020-03-13 PROCEDURE — 97110 THERAPEUTIC EXERCISES: CPT | Performed by: PHYSICAL THERAPIST

## 2020-03-16 NOTE — PROGRESS NOTES
Daily Note     Today's date: 3/18/2020  Patient name: Kamla Castellanos  : 1965  MRN: [id number]  Referring provider: Shanon Grey MD  Dx:   Encounter Diagnosis     ICD-10-CM    1  Tear of right supraspinatus tendon M75 101    2  Traumatic incomplete tear of right rotator cuff, initial encounter [id number]        Start Time: 0915  Stop Time: 1000  Total time in clinic (min): 45 minutes    Subjective: Patient notes that he is doing okay  Patient reports he is still sleeping a little bit better but occasional has sharp pains if he accidentally goes to move it  Objective: See treatment diary below      Assessment: Patient tolerated treatment well  Patient responded well to PROM this date despite having increased pain this morning  PROM to approximately 130 degrees of abduction and 122 degrees of forward flexion in the scapular plane  Patient also had some improvements with shoulder ER PROM with getting 35-40 degrees and full range of PROM of IR  Patient would benefit from continued PT       Plan: Continue per plan of care        Precautions: [facility] accelerated RTC protocol, Ankylosing spondilitis, Chronic Pain, diabetes, GERD   **when supine head of table slightly inclined for comfort    Manual  3/4 3/6 3/11 3/13 3/18        PROM shoulder motion  20'    ABD and Flex 20'    ABD and Flex 20'    ABD and Flex  And ER/IR 20'    ABD and Flex  And ER/IR 20'    ABD and Flex  And ER/IR        Low gr mobs according to protocol schedule                                                        Exercise Diary   3/4 3/6 3/11 3/13 3/18       Elbow flex/ext HEP  2x10           Wrist flex HEP            Towel squeezes HEP            Scap retractions   10x 3" hold 10x 3" hold 3x10 3" hold 3x10 3" hold 3x10 3" hold       Neck flex/ext             Chin tucks    10x 3"   15x 3"       Neck side bending             Shoulder shrugs   10x 20x 30x  30x       Scapular depression             Scapular protraction   2x10 Pendulums              UT stretch  2x30"            Levator stretch  2x30"            Pendulums    2x1 min  2x1 min   2x1 min                                                                                          Modalities

## 2020-03-17 DIAGNOSIS — E29.1 TESTICULAR HYPOGONADISM: ICD-10-CM

## 2020-03-17 RX ORDER — TESTOSTERONE CYPIONATE 200 MG/ML
VIAL (ML) INTRAMUSCULAR
Qty: 5 ML | Refills: 1 | Status: SHIPPED | OUTPATIENT
Start: 2020-03-17 | End: 2020-11-02 | Stop reason: SDUPTHER

## 2020-03-18 ENCOUNTER — OFFICE VISIT (OUTPATIENT)
Dept: PHYSICAL THERAPY | Facility: CLINIC | Age: 55
End: 2020-03-18
Payer: COMMERCIAL

## 2020-03-18 DIAGNOSIS — S46.011A TRAUMATIC INCOMPLETE TEAR OF RIGHT ROTATOR CUFF, INITIAL ENCOUNTER: ICD-10-CM

## 2020-03-18 DIAGNOSIS — M75.101 TEAR OF RIGHT SUPRASPINATUS TENDON: Primary | ICD-10-CM

## 2020-03-18 PROCEDURE — 97140 MANUAL THERAPY 1/> REGIONS: CPT | Performed by: PHYSICAL THERAPIST

## 2020-03-18 PROCEDURE — 97110 THERAPEUTIC EXERCISES: CPT | Performed by: PHYSICAL THERAPIST

## 2020-03-18 PROCEDURE — 97112 NEUROMUSCULAR REEDUCATION: CPT | Performed by: PHYSICAL THERAPIST

## 2020-03-20 ENCOUNTER — OFFICE VISIT (OUTPATIENT)
Dept: PHYSICAL THERAPY | Facility: CLINIC | Age: 55
End: 2020-03-20
Payer: COMMERCIAL

## 2020-03-20 DIAGNOSIS — M75.101 TEAR OF RIGHT SUPRASPINATUS TENDON: Primary | ICD-10-CM

## 2020-03-20 DIAGNOSIS — S46.011A TRAUMATIC INCOMPLETE TEAR OF RIGHT ROTATOR CUFF, INITIAL ENCOUNTER: ICD-10-CM

## 2020-03-20 PROCEDURE — 97112 NEUROMUSCULAR REEDUCATION: CPT | Performed by: PHYSICAL THERAPIST

## 2020-03-20 PROCEDURE — 97110 THERAPEUTIC EXERCISES: CPT | Performed by: PHYSICAL THERAPIST

## 2020-03-20 PROCEDURE — 97140 MANUAL THERAPY 1/> REGIONS: CPT | Performed by: PHYSICAL THERAPIST

## 2020-03-20 NOTE — PROGRESS NOTES
Daily Note     Today's date: 3/20/2020  Patient name: Costella Ganser  : 1965  MRN: [id number]  Referring provider: Marge Ortiz MD  Dx:   Encounter Diagnosis     ICD-10-CM    1  Tear of right supraspinatus tendon M75 101    2  Traumatic incomplete tear of right rotator cuff, initial encounter [id number]        Start Time: 0930  Stop Time: 1030  Total time in clinic (min): 60 minutes    Subjective: No significant changes as per patient  Objective: See treatment diary below      Assessment: Patient is now 3 weeks s/p rtc repair with Dr Delroy Arenas  Tolerated treatment fair  Discussed pain history at length today  Patient has maladaptive thoughts about pain and catastrophizes all movements  Due to sensitive pain state, this will significantly delay the accelerated protocol for his shoulder  Patient currently is very concerned about his pain management physician retiring and discontinued use of his dilaudid pain pump  Pt wants to change which makes him a candidate for extensive pain neuroscience education along with continued treatment of the right shoulder per protocol guidelines  The goal would be to have patient reconceptualize his pain experience and have him learn how the pain neuromatrix is involved in pain processing  Patient will discuss with his wife  Plan: Continue per plan of care        Precautions: Dorothy accelerated RTC protocol, Ankylosing spondilitis, Chronic Pain, diabetes, GERD   **when supine head of table slightly inclined for comfort    Manual  3/4 3/6 3/11 3/13 3/18 3/20       PROM shoulder motion  20'    ABD and Flex 20'    ABD and Flex 20'    ABD and Flex  And ER/IR 20'    ABD and Flex  And ER/IR 20'    ABD and Flex  And ER/IR 20'    ABD and Flex  And ER/IR       Low gr mobs according to protocol schedule                                                        Exercise Diary   3/4 3/6 3/11 3/13 3/18 3/20      Elbow flex/ext HEP  2x10           Wrist flex HEP            Towel squeezes HEP            Scap retractions   10x 3" hold 10x 3" hold 3x10 3" hold 3x10 3" hold 3x10 3" hold 3x10 3" holds      Neck flex/ext             Chin tucks    10x 3"   15x 3" 15x3"      Neck side bending             Shoulder shrugs   10x 20x 30x  30x 30x      Scapular depression             Scapular protraction   2x10          Pendulums              UT stretch  2x30"            Levator stretch  2x30"            Pendulums    2x1 min  2x1 min   2x1 min  4' total      Seated PB stretch       PB shoulder flexion +LS flexion 10x10"                                                                            Modalities                                                         1:1 with PT from [id number]

## 2020-03-23 ENCOUNTER — OFFICE VISIT (OUTPATIENT)
Dept: PHYSICAL THERAPY | Facility: CLINIC | Age: 55
End: 2020-03-23
Payer: COMMERCIAL

## 2020-03-23 DIAGNOSIS — M75.101 TEAR OF RIGHT SUPRASPINATUS TENDON: Primary | ICD-10-CM

## 2020-03-23 PROCEDURE — 97110 THERAPEUTIC EXERCISES: CPT | Performed by: PHYSICAL THERAPIST

## 2020-03-23 PROCEDURE — 97140 MANUAL THERAPY 1/> REGIONS: CPT | Performed by: PHYSICAL THERAPIST

## 2020-03-23 NOTE — PROGRESS NOTES
Daily Note     Today's date: 3/23/2020  Patient name: Chilo Narvaez  : 1965  MRN: [id number]  Referring provider: Odilon Camilo MD  Dx:   Encounter Diagnosis     ICD-10-CM    1  Tear of right supraspinatus tendon M75 101        Start Time: 845  Stop Time: 930  Total time in clinic (min): 45 minutes    Subjective: Patient notes that he did not take his pain medication prior to session  Patients is having increased chronic pain this date  Objective: See treatment diary below      Assessment: Patient is now 4 weeks s/p RTC repair of the RUE  Tolerated treatment fair  Patient had increased pain this date secondary to not taking his pain medication prior to therapy  Patient is the same ROM as last session in all planes and having difficulty with achieving full ROM at this time  Accelerated program is planned for full ROM by the end of this week however secondary to patients underlying heightened pain state, having difficulty with progressing to that range  Patient very guarded at his end range and grimaces in pain with most movements especially forward flexion and at times starts to bring his arm down out of motion despite verbal cueing to relax muscles  Up to this point patient was progressing slowly but we have only see slight improvements the last few sessions  Plan: Continue per plan of care        Precautions: Dorothy accelerated RTC protocol, Ankylosing spondilitis, Chronic Pain, diabetes, GERD   **when supine head of table slightly inclined for comfort    Manual  3/4 3/6 3/11 3/13 3/18 3/20 3/23      PROM shoulder motion  20'    ABD and Flex 20'    ABD and Flex 20'    ABD and Flex  And ER/IR 20'    ABD and Flex  And ER/IR 20'    ABD and Flex  And ER/IR 20'    ABD and Flex  And ER/IR 20'    ABD and Flex  And ER/IR      Low gr mobs according to protocol schedule                                                        Exercise Diary   3/4 3/6 3/11 3/13 3/18 3/20 3/23     Elbow flex/ext HEP 2x10           Wrist flex HEP            Towel squeezes HEP            Scap retractions   10x 3" hold 10x 3" hold 3x10 3" hold 3x10 3" hold 3x10 3" hold 3x10 3" holds      Neck flex/ext             Chin tucks    10x 3"   15x 3" 15x3"      Neck side bending             Shoulder shrugs   10x 20x 30x  30x 30x      Scapular depression             Scapular protraction   2x10          Pendulums              UT stretch  2x30"            Levator stretch  2x30"            Pendulums    2x1 min  2x1 min   2x1 min  4' total      Seated PB stretch       PB shoulder flexion +LS flexion 10x10"  PB shoulder flexion +LS flexion 10x10                                                                          Modalities

## 2020-03-23 NOTE — PROGRESS NOTES
Daily Note     Today's date: 3/25/2020  Patient name: Alexis Jorge  : 1965  MRN: [id number]  Referring provider: Paty Jordan MD  Dx:   Encounter Diagnosis     ICD-10-CM    1  Tear of right supraspinatus tendon M75 101    2  Traumatic incomplete tear of right rotator cuff, initial encounter [id number]        Start Time: 0915  Stop Time: 1000  Total time in clinic (min): 45 minutes    Subjective: Patient notes no new changes this date  Objective: See treatment diary below      Assessment: Patient is now 4 weeks s/p RTC repair of the RUE  Tolerated treatment fair  Patient did slightly better this session compared to last  Patient able to get 170 degrees of forward flexion in the scapular plane which is an improvement from last session  Patient is most limited in abduction (140 degrees) and external rotation (50 degrees)  Motion is most limited by patient pain and muscle guarding  Performed inferior mobilization and AP mobilization this date  Up to this point patient was progressing slowly but we have only see slight improvements the last few sessions  Will continue to focus on PROM and start incorporating more movements as per protocol  Plan: Continue per plan of care        Precautions: Dorothy accelerated RTC protocol, Ankylosing spondilitis, Chronic Pain, diabetes, GERD   **when supine head of table slightly inclined for comfort    Manual  3/4 3/6 3/11 3/13 3/18 3/20 3/23 3/25     PROM shoulder motion  20'    ABD and Flex 20'    ABD and Flex 20'    ABD and Flex  And ER/IR 20'    ABD and Flex  And ER/IR 20'    ABD and Flex  And ER/IR 20'    ABD and Flex  And ER/IR 20'    ABD and Flex  And ER/IR 20'    ABD and Flex  And ER/IR     Low gr mobs according to protocol schedule                                                        Exercise Diary   3/4 3/6 3/11 3/13 3/18 3/20 3/23 3/25    Elbow flex/ext HEP  2x10           Wrist flex HEP            Towel squeezes HEP            Scap retractions 10x 3" hold 10x 3" hold 3x10 3" hold 3x10 3" hold 3x10 3" hold 3x10 3" holds      Neck flex/ext             Chin tucks    10x 3"   15x 3" 15x3"      Neck side bending             Shoulder shrugs   10x 20x 30x  30x 30x      Scapular depression             Scapular protraction   2x10          Pendulums              UT stretch  2x30"            Levator stretch  2x30"            Pendulums    2x1 min  2x1 min   2x1 min  4' total      Seated PB stretch       PB shoulder flexion +LS flexion 10x10"  PB shoulder flexion +LS flexion 10x10 Table slides shoulder flexion +LS [id number] 3" hold                                                                          Modalities

## 2020-03-25 ENCOUNTER — OFFICE VISIT (OUTPATIENT)
Dept: PHYSICAL THERAPY | Facility: CLINIC | Age: 55
End: 2020-03-25
Payer: COMMERCIAL

## 2020-03-25 DIAGNOSIS — M75.101 TEAR OF RIGHT SUPRASPINATUS TENDON: Primary | ICD-10-CM

## 2020-03-25 DIAGNOSIS — S46.011A TRAUMATIC INCOMPLETE TEAR OF RIGHT ROTATOR CUFF, INITIAL ENCOUNTER: ICD-10-CM

## 2020-03-25 PROCEDURE — 97110 THERAPEUTIC EXERCISES: CPT | Performed by: PHYSICAL THERAPIST

## 2020-03-25 PROCEDURE — 97140 MANUAL THERAPY 1/> REGIONS: CPT | Performed by: PHYSICAL THERAPIST

## 2020-03-27 ENCOUNTER — APPOINTMENT (OUTPATIENT)
Dept: PHYSICAL THERAPY | Facility: CLINIC | Age: 55
End: 2020-03-27
Payer: COMMERCIAL

## 2020-04-01 ENCOUNTER — EVALUATION (OUTPATIENT)
Dept: PHYSICAL THERAPY | Facility: CLINIC | Age: 55
End: 2020-04-01
Payer: COMMERCIAL

## 2020-04-01 DIAGNOSIS — S46.011A TRAUMATIC INCOMPLETE TEAR OF RIGHT ROTATOR CUFF, INITIAL ENCOUNTER: ICD-10-CM

## 2020-04-01 DIAGNOSIS — M75.101 TEAR OF RIGHT SUPRASPINATUS TENDON: Primary | ICD-10-CM

## 2020-04-01 PROCEDURE — 97112 NEUROMUSCULAR REEDUCATION: CPT | Performed by: PHYSICAL THERAPIST

## 2020-04-01 PROCEDURE — 97110 THERAPEUTIC EXERCISES: CPT | Performed by: PHYSICAL THERAPIST

## 2020-04-01 PROCEDURE — 97140 MANUAL THERAPY 1/> REGIONS: CPT | Performed by: PHYSICAL THERAPIST

## 2020-04-03 ENCOUNTER — OFFICE VISIT (OUTPATIENT)
Dept: PHYSICAL THERAPY | Facility: CLINIC | Age: 55
End: 2020-04-03
Payer: COMMERCIAL

## 2020-04-03 DIAGNOSIS — M75.101 TEAR OF RIGHT SUPRASPINATUS TENDON: Primary | ICD-10-CM

## 2020-04-03 DIAGNOSIS — S46.011A TRAUMATIC INCOMPLETE TEAR OF RIGHT ROTATOR CUFF, INITIAL ENCOUNTER: ICD-10-CM

## 2020-04-03 PROCEDURE — 97110 THERAPEUTIC EXERCISES: CPT | Performed by: PHYSICAL THERAPIST

## 2020-04-03 PROCEDURE — 97112 NEUROMUSCULAR REEDUCATION: CPT | Performed by: PHYSICAL THERAPIST

## 2020-04-03 PROCEDURE — 97140 MANUAL THERAPY 1/> REGIONS: CPT | Performed by: PHYSICAL THERAPIST

## 2020-04-08 ENCOUNTER — OFFICE VISIT (OUTPATIENT)
Dept: PHYSICAL THERAPY | Facility: CLINIC | Age: 55
End: 2020-04-08
Payer: COMMERCIAL

## 2020-04-08 DIAGNOSIS — S46.011A TRAUMATIC INCOMPLETE TEAR OF RIGHT ROTATOR CUFF, INITIAL ENCOUNTER: ICD-10-CM

## 2020-04-08 DIAGNOSIS — M75.101 TEAR OF RIGHT SUPRASPINATUS TENDON: Primary | ICD-10-CM

## 2020-04-08 PROCEDURE — 97112 NEUROMUSCULAR REEDUCATION: CPT | Performed by: PHYSICAL THERAPIST

## 2020-04-08 PROCEDURE — 97110 THERAPEUTIC EXERCISES: CPT | Performed by: PHYSICAL THERAPIST

## 2020-04-08 PROCEDURE — 97140 MANUAL THERAPY 1/> REGIONS: CPT | Performed by: PHYSICAL THERAPIST

## 2020-04-10 ENCOUNTER — OFFICE VISIT (OUTPATIENT)
Dept: PHYSICAL THERAPY | Facility: CLINIC | Age: 55
End: 2020-04-10
Payer: COMMERCIAL

## 2020-04-10 DIAGNOSIS — M75.101 TEAR OF RIGHT SUPRASPINATUS TENDON: Primary | ICD-10-CM

## 2020-04-10 DIAGNOSIS — S46.011A TRAUMATIC INCOMPLETE TEAR OF RIGHT ROTATOR CUFF, INITIAL ENCOUNTER: ICD-10-CM

## 2020-04-10 PROCEDURE — 97140 MANUAL THERAPY 1/> REGIONS: CPT | Performed by: PHYSICAL THERAPIST

## 2020-04-10 PROCEDURE — 97110 THERAPEUTIC EXERCISES: CPT | Performed by: PHYSICAL THERAPIST

## 2020-04-10 PROCEDURE — 97112 NEUROMUSCULAR REEDUCATION: CPT | Performed by: PHYSICAL THERAPIST

## 2020-04-15 ENCOUNTER — OFFICE VISIT (OUTPATIENT)
Dept: PHYSICAL THERAPY | Facility: CLINIC | Age: 55
End: 2020-04-15
Payer: COMMERCIAL

## 2020-04-15 DIAGNOSIS — M75.101 TEAR OF RIGHT SUPRASPINATUS TENDON: Primary | ICD-10-CM

## 2020-04-15 DIAGNOSIS — S46.011A TRAUMATIC INCOMPLETE TEAR OF RIGHT ROTATOR CUFF, INITIAL ENCOUNTER: ICD-10-CM

## 2020-04-15 PROCEDURE — 97140 MANUAL THERAPY 1/> REGIONS: CPT | Performed by: PHYSICAL THERAPIST

## 2020-04-15 PROCEDURE — 97110 THERAPEUTIC EXERCISES: CPT | Performed by: PHYSICAL THERAPIST

## 2020-04-15 PROCEDURE — 97112 NEUROMUSCULAR REEDUCATION: CPT | Performed by: PHYSICAL THERAPIST

## 2020-04-17 ENCOUNTER — OFFICE VISIT (OUTPATIENT)
Dept: PHYSICAL THERAPY | Facility: CLINIC | Age: 55
End: 2020-04-17
Payer: COMMERCIAL

## 2020-04-17 DIAGNOSIS — S46.011A TRAUMATIC INCOMPLETE TEAR OF RIGHT ROTATOR CUFF, INITIAL ENCOUNTER: ICD-10-CM

## 2020-04-17 DIAGNOSIS — M75.101 TEAR OF RIGHT SUPRASPINATUS TENDON: Primary | ICD-10-CM

## 2020-04-17 PROCEDURE — 97110 THERAPEUTIC EXERCISES: CPT | Performed by: PHYSICAL THERAPIST

## 2020-04-17 PROCEDURE — 97140 MANUAL THERAPY 1/> REGIONS: CPT | Performed by: PHYSICAL THERAPIST

## 2020-04-17 PROCEDURE — 97112 NEUROMUSCULAR REEDUCATION: CPT | Performed by: PHYSICAL THERAPIST

## 2020-04-20 ENCOUNTER — OFFICE VISIT (OUTPATIENT)
Dept: PHYSICAL THERAPY | Facility: CLINIC | Age: 55
End: 2020-04-20
Payer: COMMERCIAL

## 2020-04-20 DIAGNOSIS — S46.011A TRAUMATIC INCOMPLETE TEAR OF RIGHT ROTATOR CUFF, INITIAL ENCOUNTER: Primary | ICD-10-CM

## 2020-04-20 DIAGNOSIS — M75.101 TEAR OF RIGHT SUPRASPINATUS TENDON: ICD-10-CM

## 2020-04-20 PROCEDURE — 97112 NEUROMUSCULAR REEDUCATION: CPT | Performed by: PHYSICAL THERAPIST

## 2020-04-20 PROCEDURE — 97110 THERAPEUTIC EXERCISES: CPT | Performed by: PHYSICAL THERAPIST

## 2020-04-20 PROCEDURE — 97140 MANUAL THERAPY 1/> REGIONS: CPT | Performed by: PHYSICAL THERAPIST

## 2020-04-23 ENCOUNTER — OFFICE VISIT (OUTPATIENT)
Dept: PHYSICAL THERAPY | Facility: CLINIC | Age: 55
End: 2020-04-23
Payer: COMMERCIAL

## 2020-04-23 DIAGNOSIS — M75.101 TEAR OF RIGHT SUPRASPINATUS TENDON: ICD-10-CM

## 2020-04-23 DIAGNOSIS — S46.011A TRAUMATIC INCOMPLETE TEAR OF RIGHT ROTATOR CUFF, INITIAL ENCOUNTER: Primary | ICD-10-CM

## 2020-04-23 PROCEDURE — 97140 MANUAL THERAPY 1/> REGIONS: CPT | Performed by: PHYSICAL THERAPIST

## 2020-04-23 PROCEDURE — 97110 THERAPEUTIC EXERCISES: CPT | Performed by: PHYSICAL THERAPIST

## 2020-04-23 PROCEDURE — 97112 NEUROMUSCULAR REEDUCATION: CPT | Performed by: PHYSICAL THERAPIST

## 2020-04-27 ENCOUNTER — OFFICE VISIT (OUTPATIENT)
Dept: PHYSICAL THERAPY | Facility: CLINIC | Age: 55
End: 2020-04-27
Payer: COMMERCIAL

## 2020-04-27 DIAGNOSIS — M75.101 TEAR OF RIGHT SUPRASPINATUS TENDON: ICD-10-CM

## 2020-04-27 DIAGNOSIS — S46.011A TRAUMATIC INCOMPLETE TEAR OF RIGHT ROTATOR CUFF, INITIAL ENCOUNTER: Primary | ICD-10-CM

## 2020-04-27 PROCEDURE — 97110 THERAPEUTIC EXERCISES: CPT | Performed by: PHYSICAL THERAPIST

## 2020-04-27 PROCEDURE — 97140 MANUAL THERAPY 1/> REGIONS: CPT | Performed by: PHYSICAL THERAPIST

## 2020-04-27 PROCEDURE — 97112 NEUROMUSCULAR REEDUCATION: CPT | Performed by: PHYSICAL THERAPIST

## 2020-04-30 ENCOUNTER — OFFICE VISIT (OUTPATIENT)
Dept: PHYSICAL THERAPY | Facility: CLINIC | Age: 55
End: 2020-04-30
Payer: COMMERCIAL

## 2020-04-30 DIAGNOSIS — I10 ESSENTIAL HYPERTENSION: ICD-10-CM

## 2020-04-30 DIAGNOSIS — S46.011A TRAUMATIC INCOMPLETE TEAR OF RIGHT ROTATOR CUFF, INITIAL ENCOUNTER: Primary | ICD-10-CM

## 2020-04-30 DIAGNOSIS — M75.101 TEAR OF RIGHT SUPRASPINATUS TENDON: ICD-10-CM

## 2020-04-30 PROCEDURE — 97110 THERAPEUTIC EXERCISES: CPT | Performed by: PHYSICAL THERAPIST

## 2020-04-30 PROCEDURE — 97112 NEUROMUSCULAR REEDUCATION: CPT | Performed by: PHYSICAL THERAPIST

## 2020-04-30 PROCEDURE — 97140 MANUAL THERAPY 1/> REGIONS: CPT | Performed by: PHYSICAL THERAPIST

## 2020-04-30 RX ORDER — LISINOPRIL 20 MG/1
20 TABLET ORAL DAILY
Qty: 90 TABLET | Refills: 1 | Status: SHIPPED | OUTPATIENT
Start: 2020-04-30 | End: 2020-10-28 | Stop reason: SDUPTHER

## 2020-05-04 ENCOUNTER — OFFICE VISIT (OUTPATIENT)
Dept: PHYSICAL THERAPY | Facility: CLINIC | Age: 55
End: 2020-05-04
Payer: COMMERCIAL

## 2020-05-04 DIAGNOSIS — M75.101 TEAR OF RIGHT SUPRASPINATUS TENDON: ICD-10-CM

## 2020-05-04 DIAGNOSIS — S46.011A TRAUMATIC INCOMPLETE TEAR OF RIGHT ROTATOR CUFF, INITIAL ENCOUNTER: Primary | ICD-10-CM

## 2020-05-04 PROCEDURE — 97110 THERAPEUTIC EXERCISES: CPT | Performed by: PHYSICAL THERAPIST

## 2020-05-04 PROCEDURE — 97140 MANUAL THERAPY 1/> REGIONS: CPT | Performed by: PHYSICAL THERAPIST

## 2020-05-04 PROCEDURE — 97112 NEUROMUSCULAR REEDUCATION: CPT | Performed by: PHYSICAL THERAPIST

## 2020-05-06 ENCOUNTER — TELEMEDICINE (OUTPATIENT)
Dept: OBGYN CLINIC | Facility: OTHER | Age: 55
End: 2020-05-06

## 2020-05-06 DIAGNOSIS — M75.101 TEAR OF RIGHT SUPRASPINATUS TENDON: Primary | ICD-10-CM

## 2020-05-06 PROBLEM — M19.011 ARTHRITIS OF RIGHT ACROMIOCLAVICULAR JOINT: Status: RESOLVED | Noted: 2019-11-21 | Resolved: 2020-05-06

## 2020-05-06 PROBLEM — S46.011A TRAUMATIC INCOMPLETE TEAR OF RIGHT ROTATOR CUFF: Status: RESOLVED | Noted: 2020-01-09 | Resolved: 2020-05-06

## 2020-05-06 PROCEDURE — 99024 POSTOP FOLLOW-UP VISIT: CPT | Performed by: ORTHOPAEDIC SURGERY

## 2020-05-07 ENCOUNTER — EVALUATION (OUTPATIENT)
Dept: PHYSICAL THERAPY | Facility: CLINIC | Age: 55
End: 2020-05-07
Payer: COMMERCIAL

## 2020-05-07 DIAGNOSIS — S46.011A TRAUMATIC INCOMPLETE TEAR OF RIGHT ROTATOR CUFF, INITIAL ENCOUNTER: Primary | ICD-10-CM

## 2020-05-07 DIAGNOSIS — M75.101 TEAR OF RIGHT SUPRASPINATUS TENDON: ICD-10-CM

## 2020-05-07 PROCEDURE — 97112 NEUROMUSCULAR REEDUCATION: CPT | Performed by: PHYSICAL THERAPIST

## 2020-05-07 PROCEDURE — 97110 THERAPEUTIC EXERCISES: CPT | Performed by: PHYSICAL THERAPIST

## 2020-05-11 ENCOUNTER — OFFICE VISIT (OUTPATIENT)
Dept: PHYSICAL THERAPY | Facility: CLINIC | Age: 55
End: 2020-05-11
Payer: COMMERCIAL

## 2020-05-11 DIAGNOSIS — M75.101 TEAR OF RIGHT SUPRASPINATUS TENDON: ICD-10-CM

## 2020-05-11 DIAGNOSIS — S46.011A TRAUMATIC INCOMPLETE TEAR OF RIGHT ROTATOR CUFF, INITIAL ENCOUNTER: Primary | ICD-10-CM

## 2020-05-11 PROCEDURE — 97110 THERAPEUTIC EXERCISES: CPT | Performed by: PHYSICAL THERAPIST

## 2020-05-11 PROCEDURE — 97112 NEUROMUSCULAR REEDUCATION: CPT | Performed by: PHYSICAL THERAPIST

## 2020-05-11 PROCEDURE — 97140 MANUAL THERAPY 1/> REGIONS: CPT | Performed by: PHYSICAL THERAPIST

## 2020-05-18 ENCOUNTER — OFFICE VISIT (OUTPATIENT)
Dept: PHYSICAL THERAPY | Facility: CLINIC | Age: 55
End: 2020-05-18
Payer: COMMERCIAL

## 2020-05-18 DIAGNOSIS — M75.101 TEAR OF RIGHT SUPRASPINATUS TENDON: ICD-10-CM

## 2020-05-18 DIAGNOSIS — S46.011A TRAUMATIC INCOMPLETE TEAR OF RIGHT ROTATOR CUFF, INITIAL ENCOUNTER: Primary | ICD-10-CM

## 2020-05-18 PROCEDURE — 97140 MANUAL THERAPY 1/> REGIONS: CPT | Performed by: PHYSICAL THERAPIST

## 2020-05-18 PROCEDURE — 97112 NEUROMUSCULAR REEDUCATION: CPT | Performed by: PHYSICAL THERAPIST

## 2020-05-18 PROCEDURE — 97110 THERAPEUTIC EXERCISES: CPT | Performed by: PHYSICAL THERAPIST

## 2020-05-21 ENCOUNTER — OFFICE VISIT (OUTPATIENT)
Dept: PHYSICAL THERAPY | Facility: CLINIC | Age: 55
End: 2020-05-21
Payer: COMMERCIAL

## 2020-05-21 DIAGNOSIS — M75.101 TEAR OF RIGHT SUPRASPINATUS TENDON: ICD-10-CM

## 2020-05-21 DIAGNOSIS — S46.011A TRAUMATIC INCOMPLETE TEAR OF RIGHT ROTATOR CUFF, INITIAL ENCOUNTER: Primary | ICD-10-CM

## 2020-05-21 PROCEDURE — 97112 NEUROMUSCULAR REEDUCATION: CPT | Performed by: PHYSICAL THERAPIST

## 2020-05-21 PROCEDURE — 97140 MANUAL THERAPY 1/> REGIONS: CPT | Performed by: PHYSICAL THERAPIST

## 2020-05-21 PROCEDURE — 97110 THERAPEUTIC EXERCISES: CPT | Performed by: PHYSICAL THERAPIST

## 2020-05-26 ENCOUNTER — OFFICE VISIT (OUTPATIENT)
Dept: PHYSICAL THERAPY | Facility: CLINIC | Age: 55
End: 2020-05-26
Payer: COMMERCIAL

## 2020-05-26 DIAGNOSIS — M75.101 TEAR OF RIGHT SUPRASPINATUS TENDON: ICD-10-CM

## 2020-05-26 DIAGNOSIS — S46.011A TRAUMATIC INCOMPLETE TEAR OF RIGHT ROTATOR CUFF, INITIAL ENCOUNTER: Primary | ICD-10-CM

## 2020-05-26 PROCEDURE — 97110 THERAPEUTIC EXERCISES: CPT | Performed by: PHYSICAL THERAPIST

## 2020-05-26 PROCEDURE — 97140 MANUAL THERAPY 1/> REGIONS: CPT | Performed by: PHYSICAL THERAPIST

## 2020-05-26 PROCEDURE — 97112 NEUROMUSCULAR REEDUCATION: CPT | Performed by: PHYSICAL THERAPIST

## 2020-05-28 ENCOUNTER — OFFICE VISIT (OUTPATIENT)
Dept: PHYSICAL THERAPY | Facility: CLINIC | Age: 55
End: 2020-05-28
Payer: COMMERCIAL

## 2020-05-28 DIAGNOSIS — S46.011A TRAUMATIC INCOMPLETE TEAR OF RIGHT ROTATOR CUFF, INITIAL ENCOUNTER: Primary | ICD-10-CM

## 2020-05-28 DIAGNOSIS — M75.101 TEAR OF RIGHT SUPRASPINATUS TENDON: ICD-10-CM

## 2020-05-28 PROCEDURE — 97140 MANUAL THERAPY 1/> REGIONS: CPT | Performed by: PHYSICAL THERAPIST

## 2020-05-28 PROCEDURE — 97110 THERAPEUTIC EXERCISES: CPT | Performed by: PHYSICAL THERAPIST

## 2020-05-28 PROCEDURE — 97112 NEUROMUSCULAR REEDUCATION: CPT | Performed by: PHYSICAL THERAPIST

## 2020-06-01 ENCOUNTER — OFFICE VISIT (OUTPATIENT)
Dept: PHYSICAL THERAPY | Facility: CLINIC | Age: 55
End: 2020-06-01
Payer: COMMERCIAL

## 2020-06-01 DIAGNOSIS — M75.101 TEAR OF RIGHT SUPRASPINATUS TENDON: ICD-10-CM

## 2020-06-01 DIAGNOSIS — S46.011A TRAUMATIC INCOMPLETE TEAR OF RIGHT ROTATOR CUFF, INITIAL ENCOUNTER: Primary | ICD-10-CM

## 2020-06-01 PROCEDURE — 97112 NEUROMUSCULAR REEDUCATION: CPT | Performed by: PHYSICAL THERAPIST

## 2020-06-01 PROCEDURE — 97110 THERAPEUTIC EXERCISES: CPT | Performed by: PHYSICAL THERAPIST

## 2020-06-04 ENCOUNTER — OFFICE VISIT (OUTPATIENT)
Dept: PHYSICAL THERAPY | Facility: CLINIC | Age: 55
End: 2020-06-04
Payer: COMMERCIAL

## 2020-06-04 DIAGNOSIS — S46.011A TRAUMATIC INCOMPLETE TEAR OF RIGHT ROTATOR CUFF, INITIAL ENCOUNTER: Primary | ICD-10-CM

## 2020-06-04 DIAGNOSIS — M75.101 TEAR OF RIGHT SUPRASPINATUS TENDON: ICD-10-CM

## 2020-06-04 PROCEDURE — 97530 THERAPEUTIC ACTIVITIES: CPT | Performed by: PHYSICAL THERAPIST

## 2020-06-04 PROCEDURE — 97112 NEUROMUSCULAR REEDUCATION: CPT | Performed by: PHYSICAL THERAPIST

## 2020-06-04 PROCEDURE — 97110 THERAPEUTIC EXERCISES: CPT | Performed by: PHYSICAL THERAPIST

## 2020-06-08 ENCOUNTER — OFFICE VISIT (OUTPATIENT)
Dept: PHYSICAL THERAPY | Facility: CLINIC | Age: 55
End: 2020-06-08
Payer: COMMERCIAL

## 2020-06-08 ENCOUNTER — OFFICE VISIT (OUTPATIENT)
Dept: OBGYN CLINIC | Facility: HOSPITAL | Age: 55
End: 2020-06-08
Payer: COMMERCIAL

## 2020-06-08 VITALS
BODY MASS INDEX: 32.29 KG/M2 | DIASTOLIC BLOOD PRESSURE: 69 MMHG | HEIGHT: 69 IN | WEIGHT: 218 LBS | HEART RATE: 80 BPM | SYSTOLIC BLOOD PRESSURE: 103 MMHG

## 2020-06-08 DIAGNOSIS — M75.101 TEAR OF RIGHT SUPRASPINATUS TENDON: ICD-10-CM

## 2020-06-08 DIAGNOSIS — M75.101 TEAR OF RIGHT SUPRASPINATUS TENDON: Primary | ICD-10-CM

## 2020-06-08 DIAGNOSIS — S46.011A TRAUMATIC INCOMPLETE TEAR OF RIGHT ROTATOR CUFF, INITIAL ENCOUNTER: Primary | ICD-10-CM

## 2020-06-08 DIAGNOSIS — Z47.89 AFTERCARE FOLLOWING SURGERY OF THE MUSCULOSKELETAL SYSTEM: ICD-10-CM

## 2020-06-08 PROCEDURE — 3008F BODY MASS INDEX DOCD: CPT | Performed by: ORTHOPAEDIC SURGERY

## 2020-06-08 PROCEDURE — 3051F HG A1C>EQUAL 7.0%<8.0%: CPT | Performed by: ORTHOPAEDIC SURGERY

## 2020-06-08 PROCEDURE — 99213 OFFICE O/P EST LOW 20 MIN: CPT | Performed by: ORTHOPAEDIC SURGERY

## 2020-06-08 PROCEDURE — 3078F DIAST BP <80 MM HG: CPT | Performed by: ORTHOPAEDIC SURGERY

## 2020-06-08 PROCEDURE — 97140 MANUAL THERAPY 1/> REGIONS: CPT | Performed by: PHYSICAL THERAPIST

## 2020-06-08 PROCEDURE — 97112 NEUROMUSCULAR REEDUCATION: CPT | Performed by: PHYSICAL THERAPIST

## 2020-06-08 PROCEDURE — 1036F TOBACCO NON-USER: CPT | Performed by: ORTHOPAEDIC SURGERY

## 2020-06-08 PROCEDURE — 97110 THERAPEUTIC EXERCISES: CPT | Performed by: PHYSICAL THERAPIST

## 2020-06-08 PROCEDURE — 3074F SYST BP LT 130 MM HG: CPT | Performed by: ORTHOPAEDIC SURGERY

## 2020-06-11 ENCOUNTER — EVALUATION (OUTPATIENT)
Dept: PHYSICAL THERAPY | Facility: CLINIC | Age: 55
End: 2020-06-11
Payer: COMMERCIAL

## 2020-06-11 DIAGNOSIS — S46.011A TRAUMATIC INCOMPLETE TEAR OF RIGHT ROTATOR CUFF, INITIAL ENCOUNTER: Primary | ICD-10-CM

## 2020-06-11 DIAGNOSIS — M75.101 TEAR OF RIGHT SUPRASPINATUS TENDON: ICD-10-CM

## 2020-06-11 PROCEDURE — 97110 THERAPEUTIC EXERCISES: CPT | Performed by: PHYSICAL THERAPIST

## 2020-06-11 PROCEDURE — 97112 NEUROMUSCULAR REEDUCATION: CPT | Performed by: PHYSICAL THERAPIST

## 2020-06-15 ENCOUNTER — OFFICE VISIT (OUTPATIENT)
Dept: PHYSICAL THERAPY | Facility: CLINIC | Age: 55
End: 2020-06-15
Payer: COMMERCIAL

## 2020-06-15 DIAGNOSIS — S46.011A TRAUMATIC INCOMPLETE TEAR OF RIGHT ROTATOR CUFF, INITIAL ENCOUNTER: Primary | ICD-10-CM

## 2020-06-15 DIAGNOSIS — M75.101 TEAR OF RIGHT SUPRASPINATUS TENDON: ICD-10-CM

## 2020-06-15 PROCEDURE — 97112 NEUROMUSCULAR REEDUCATION: CPT | Performed by: PHYSICAL THERAPIST

## 2020-06-15 PROCEDURE — 97110 THERAPEUTIC EXERCISES: CPT | Performed by: PHYSICAL THERAPIST

## 2020-06-18 ENCOUNTER — OFFICE VISIT (OUTPATIENT)
Dept: PHYSICAL THERAPY | Facility: CLINIC | Age: 55
End: 2020-06-18
Payer: COMMERCIAL

## 2020-06-18 DIAGNOSIS — M75.101 TEAR OF RIGHT SUPRASPINATUS TENDON: ICD-10-CM

## 2020-06-18 DIAGNOSIS — S46.011A TRAUMATIC INCOMPLETE TEAR OF RIGHT ROTATOR CUFF, INITIAL ENCOUNTER: Primary | ICD-10-CM

## 2020-06-18 PROCEDURE — 97110 THERAPEUTIC EXERCISES: CPT | Performed by: PHYSICAL THERAPIST

## 2020-06-22 ENCOUNTER — APPOINTMENT (OUTPATIENT)
Dept: PHYSICAL THERAPY | Facility: CLINIC | Age: 55
End: 2020-06-22
Payer: COMMERCIAL

## 2020-06-22 NOTE — PROGRESS NOTES
Daily Note     Today's date: 2020  Patient name: Cheng Ngo  : 1965  MRN: [id number]  Referring provider: Tammie Wilkes MD  Dx:   No diagnosis found  Subjective: Patient reports that he feels the "impingement" sypmtoms are getting better and does not have it has often  Objective: See treatment diary below        Assessment:  Patient tolerated treatment very well this date with very minimal to no pain throughout  Patient responded well to progressions and over all progressing strengthening with no issues  Patient able to complete shoulder elevation in all planes without any issues  Patient able to perform wall slides with lift off with no sharp pain which is an improvement from the last few sessions  Patient is showing great progress and is getting close to being done with therapy within the next two weeks to a comprehensive exercise program   Patient would benefit from continued therapy in order to maximize function  Plan: Continue per plan of care          Precautions: Dorothy accelerated RTC protocol, Ankylosing spondilitis, Chronic Pain, diabetes, GERD   **when supine head of table slightly inclined for comfort  Manual     PROM shoulder motion  5'     ABD and Flex  And ER/IR   20'     ABD and Flex  And ER/IR   10'     ABD and Flex  And ER/IR   20'     ABD and Flex  And ER/IR 5' ABD and Flex   5' ABD and Flex 10'     ABD and Flex  And ER   Low gr mobs according to protocol schedule 10'  Inf/AP  10'  Inf/AP  5'  Inf  Gr III  10'  Inf/AP  10'  Inf/AP   10'  Inf/AP  5'  Inf/AP  10'  Inf/AP  10'  Inf/AP   5'  Inf  Gr III   Subscapularis release       5'                  Scapular mobs                                                      Exercise Diary    6/11 6/15 6/18 5/21 5/26 5/28 6/1 6/4  6   Elbow flex/ext HEP       3x10 6# eccentric lower             Resisted Rows    3x10 BTB 3x10 [place] 25#  3x10 [place] 25#         3x10 [place] 15#  3x10 [place] 15#  3x10 [place] 20#   ER/IR resisted    3x10 BTB  3x10 Kirill 5# for ER and 10# IR 3x10 [place] 5# for ER and 10# IR  3x10 blk      2x10 Blk   3x10 10# Kirill   3x10 [place] 5# for ER and 10# IR   Resisted Extensions    3x10 BTB 3x10 [place] 25# 3x10 [place] 25#        3x10 [place] 15#  3x10 [place] 15#  3x10 [place] 20#   Sidelying ER resisted    2x10 4#   3x10 5#     3x10 5#       [place] Pulldowns                3x10 20# 3x10 20# 3x10 20#   Seated PB stretch  HEP                     Cane AAROM Flexion Abduction ER IR            ER 10x10"     all planes 10x        Shoulder AROM in all planes                      Pullies     Flex 3'  Abd 3'  Arm bike 3' and 3'  4 mins Flex 3'  Abd 3'  5 mins   5 mins Arm bike 3' and 3'  Arm bike 3' and 3'   Flex 3'  Abd 3'    Rotator Cuff Isometrics                        Lateral and front raises    10x 1# Flex and ABD 2x 10   ABD 4#     Flex in scapular plane 5#  2x10     Forward flexion 4# 2x10  2x 10   ABD 4#     Flex in scapular plane 5#  2x10     Forward flexion 4# 2x10 2x 10    Flex (3#) and ABD (3#)   (to 90 degrees) 2x 10    Flex (3#) and ABD (3#)   (to 90 degrees)   2x 10    ABD (3# to 90 degrees)     Flex in scapular plane and forward flexion 1# 10x  2x 10    ABD 3#     Flex in scapular plane 5#     Forward flexion 3# 10x  2x 10    ABD 3#     Flex in scapular plane 5#     Forward flexion 3# 10x    Shoulder press     2x10 3# B/L 2x10 4# B/L 2x10 1# B/L             No moneys      3x10 GTB 2x10 BTB 2x10 GTB             Wall slides with lift off       15x          10x no lift off 10x into 'Y' position   Horizontal ABD with scap squeeze        2x10 GTB             Pec stretch doorway            3x30"           IR stretch with strap       10x10"     10x10" 10x10"         ER at 90-90      3x10 GTB  3x10 BTB     2x10 GTB 2x10 GTB 2x10 GTB 2x10 GTB   Lifting from waist height to shoulder height                  Box with 10#  2x10      Forward reaching at shoulder height                 5#  3x10  5#  3x10    Clothes line simulation lifting                  10x      Trampoline ball toss     2x20 Rball                  Resisted lifting                        Lat pulldows      3x10  30#                                                                   Body blade        FWD/LAT 2x30" ea   FWD/LAT 2x30" ea

## 2020-06-24 ENCOUNTER — HOSPITAL ENCOUNTER (OUTPATIENT)
Dept: RADIOLOGY | Facility: MEDICAL CENTER | Age: 55
Discharge: HOME | End: 2020-06-24
Payer: COMMERCIAL

## 2020-06-24 DIAGNOSIS — M81.0 AGE-RELATED OSTEOPOROSIS WITHOUT CURRENT PATHOLOGICAL FRACTURE: ICD-10-CM

## 2020-06-24 PROCEDURE — 77080 DXA BONE DENSITY AXIAL: CPT

## 2020-06-25 ENCOUNTER — OFFICE VISIT (OUTPATIENT)
Dept: PHYSICAL THERAPY | Facility: CLINIC | Age: 55
End: 2020-06-25
Payer: COMMERCIAL

## 2020-06-25 DIAGNOSIS — S46.011A TRAUMATIC INCOMPLETE TEAR OF RIGHT ROTATOR CUFF, INITIAL ENCOUNTER: Primary | ICD-10-CM

## 2020-06-25 DIAGNOSIS — M75.101 TEAR OF RIGHT SUPRASPINATUS TENDON: ICD-10-CM

## 2020-06-25 PROCEDURE — 97110 THERAPEUTIC EXERCISES: CPT | Performed by: PHYSICAL THERAPIST

## 2020-06-25 PROCEDURE — 97530 THERAPEUTIC ACTIVITIES: CPT | Performed by: PHYSICAL THERAPIST

## 2020-06-29 ENCOUNTER — APPOINTMENT (OUTPATIENT)
Dept: PHYSICAL THERAPY | Facility: CLINIC | Age: 55
End: 2020-06-29
Payer: COMMERCIAL

## 2020-07-09 ENCOUNTER — TELEMEDICINE (OUTPATIENT)
Dept: ENDOCRINOLOGY | Facility: CLINIC | Age: 55
End: 2020-07-09
Payer: COMMERCIAL

## 2020-07-09 DIAGNOSIS — E29.1 TESTICULAR HYPOGONADISM: Primary | ICD-10-CM

## 2020-07-09 DIAGNOSIS — M81.0 AGE-RELATED OSTEOPOROSIS WITHOUT CURRENT PATHOLOGICAL FRACTURE: ICD-10-CM

## 2020-07-09 DIAGNOSIS — E78.2 MIXED HYPERLIPIDEMIA: ICD-10-CM

## 2020-07-09 DIAGNOSIS — IMO0002 UNCONTROLLED TYPE 2 DIABETES MELLITUS WITH COMPLICATION: ICD-10-CM

## 2020-07-09 DIAGNOSIS — Z12.5 SCREENING FOR PROSTATE CANCER: ICD-10-CM

## 2020-07-09 DIAGNOSIS — E11.9 TYPE 2 DIABETES MELLITUS WITHOUT COMPLICATION, WITHOUT LONG-TERM CURRENT USE OF INSULIN (HCC): ICD-10-CM

## 2020-07-09 PROCEDURE — 99214 OFFICE O/P EST MOD 30 MIN: CPT | Performed by: PHYSICIAN ASSISTANT

## 2020-07-09 PROCEDURE — 3074F SYST BP LT 130 MM HG: CPT | Performed by: PHYSICIAN ASSISTANT

## 2020-07-09 PROCEDURE — 3051F HG A1C>EQUAL 7.0%<8.0%: CPT | Performed by: PHYSICIAN ASSISTANT

## 2020-07-09 PROCEDURE — 3078F DIAST BP <80 MM HG: CPT | Performed by: PHYSICIAN ASSISTANT

## 2020-07-09 PROCEDURE — 1036F TOBACCO NON-USER: CPT | Performed by: PHYSICIAN ASSISTANT

## 2020-07-09 RX ORDER — METFORMIN HYDROCHLORIDE 500 MG/1
TABLET, EXTENDED RELEASE ORAL
Qty: 360 TABLET | Refills: 1 | Status: SHIPPED | OUTPATIENT
Start: 2020-07-09 | End: 2021-01-05 | Stop reason: SDUPTHER

## 2020-07-09 NOTE — PROGRESS NOTES
Virtual Regular Visit      Assessment/Plan:    Problem List Items Addressed This Visit        Endocrine    Testicular hypogonadism - Primary     Continue testosterone Injections-- 200mg/ml- 0 3ml (60mg) per week  Complete lab testing as ordered  Relevant Orders    Testosterone, free, total    CBC and differential    PSA, total screen Lab Collect    Uncontrolled type 2 diabetes mellitus with complication (HCC)     Last A1C high at 7 7  Increase Metformin ER to 1000mg twice per day with food  Check BG daily and send log for review  Complete lab testing as ordered and will make additional medication change if needed  Focus on dietary improvements- he declines referral to dietician at this time  Lab Results   Component Value Date    HGBA1C 7 7 (H) 01/29/2020            Relevant Medications    metFORMIN (GLUCOPHAGE-XR) 500 mg 24 hr tablet       Musculoskeletal and Integument    Osteoporosis     Recent DEXA Scan normal           Relevant Orders    Vitamin D 25 hydroxy       Other    Mixed hyperlipidemia     Continue atorvastatin         Relevant Orders    Comprehensive metabolic panel    Lipid Panel with Direct LDL reflex    T4, free    TSH, 3rd generation      Other Visit Diagnoses     Type 2 diabetes mellitus without complication, without long-term current use of insulin (HCC)        Relevant Medications    metFORMIN (GLUCOPHAGE-XR) 500 mg 24 hr tablet    Other Relevant Orders    Hemoglobin A1C    Microalbumin / creatinine urine ratio    Screening for prostate cancer        Relevant Orders    PSA, total screen Lab Collect               Reason for visit is   Chief Complaint   Patient presents with    Virtual Regular Visit        Encounter provider Piyush Phelps PA-C    Provider located at [address] [place] [id number]      Recent Visits  No visits were found meeting these conditions  Showing recent visits within past 7 days and meeting all other requirements     Today's Visits  Date Type Provider Dept   07/09/20 Telemedicine Bolivar Sheridan PA-C [facility] [address]   Showing today's visits and meeting all other requirements     Future Appointments  No visits were found meeting these conditions  Showing future appointments within next 150 days and meeting all other requirements        The patient was identified by name and date of birth  Yaron Melo was informed that this is a telemedicine visit and that the visit is being conducted through [address] and patient was informed that this is not a secure, HIPAA-complaint platform  He agrees to proceed     My office door was closed  No one else was in the room  He acknowledged consent and understanding of privacy and security of the video platform  The patient has agreed to participate and understands they can discontinue the visit at any time  Patient is aware this is a billable service  Subjective  uLis Baca Sr  is a 49 y o  male with history of Hypogonadism, osteoporosis, Type 2 Diabetes, HTN, and Hyperlipidemia  For the Diabetes, he is taking metformin 1000mg daily  He has a meter, but hasn't checked blood sugars at home  Eats 3x per day and tries to make healthy choices  Exercise limited due to chronic pain  He is overdue for eye exam    For HTN, taking lisinopril  For hyperlipidemia, taking atorvastatin  For the hypogonadism, he is getting weekly injections given by his wife     He does have history of osteoporosis but recent DEXA was normal      HPI     Past Medical History:   Diagnosis Date    Chronic pain     Depression     Diabetes (HCC)     Fibromyalgia, primary     GERD (gastroesophageal reflux disease)     Hyperlipidemia     Hypertension     Low testosterone     Neuropathy     Pilonidal cyst     last assessed 3/17/2014    Sleep apnea     no cpap       Past Surgical History:   Procedure Laterality Date    BACK SURGERY      discectomy    ELBOW SURGERY Right     OTHER SURGICAL HISTORY  03/13/2015    Electr analysis of progr impl pump w/reprogram refill, requiring physician  Replacement of right abdomen intrathecal  pain pump filled with Dilaudid with electronic analysis and programming   managed by [facility]      ID SHLDR ARTHROSCOP,SURG,W/ROTAT CUFF REPR Right 2/28/2020    Procedure: SHOULDER ARTHROSCOPIC ROTATOR CUFF REPAIR AND DEBRIDEMENT;  Surgeon: Patrick Ruiz MD;  Location: [facility];  Service: Orthopedics    ROTATOR CUFF REPAIR Left     WISDOM TOOTH EXTRACTION         Current Outpatient Medications   Medication Sig Dispense Refill    atorvastatin (LIPITOR) 20 mg tablet Take 1 tablet (20 mg total) by mouth daily at bedtime 90 tablet 3    Cholecalciferol (VITAMIN D) 2000 units CAPS Take 1 capsule by mouth daily      gabapentin (NEURONTIN) 300 mg capsule Take 1 capsule by mouth daily Taking two 300 mg at night       glucose blood (ONETOUCH VERIO) test strip 1 each by Other route 3 (three) times a day 300 each 3    HYDROmorphone (DILAUDID) 4 mg/mL injection Inject 1 Device as directed daily       lisinopril (ZESTRIL) 20 mg tablet Take 1 tablet (20 mg total) by mouth daily 90 tablet 1    metFORMIN (GLUCOPHAGE-XR) 500 mg 24 hr tablet Take 2 tablets daily with breakfast and supper 360 tablet 1    methadone (DOLOPHINE) 10 mg tablet Take 2 tablets by mouth 2 (two) times a day  Indications:  For breakthrough pain,       omeprazole (PriLOSEC) 40 MG capsule Take 1 capsule by mouth daily      ONETOUCH DELICA LANCETS 73N MISC 1 applicator by Does not apply route 3 (three) times a day      oxyCODONE (OxyCONTIN) 40 mg 12 hr tablet Take 1 tablet by mouth every 12 (twelve) hours      oxyCODONE (OXYCONTIN) 80 mg 12 hr tablet Take 1 tablet by mouth every 12 (twelve) hours      SYRINGE-NEEDLE, DISP, 3 ML (B-D 3CC LUER-HAFSA SYR 21GX1") 21G X 1" 3 ML MISC by Does not apply route once a week      Testosterone Cypionate 200 MG/ML SOLN Inject 60 mg (0 3 ml) weekly 5 mL 1     No current facility-administered medications for this visit  No Known Allergies    Review of Systems   Constitutional: Negative for activity change, appetite change and fatigue  HENT: Negative for sore throat, trouble swallowing and voice change  Eyes: Negative for visual disturbance  Respiratory: Negative for choking, chest tightness and shortness of breath  Cardiovascular: Negative for chest pain, palpitations and leg swelling  Gastrointestinal: Negative for abdominal pain, constipation and diarrhea  Endocrine: Negative for cold intolerance, heat intolerance, polydipsia, polyphagia and polyuria  Genitourinary: Negative for frequency  Musculoskeletal: Positive for back pain  Negative for arthralgias and myalgias  Skin: Negative for rash  Neurological: Negative for dizziness and syncope  Hematological: Negative for adenopathy  Psychiatric/Behavioral: Negative for sleep disturbance  All other systems reviewed and are negative  Video Exam    There were no vitals filed for this visit  Physical Exam     Physical Exam   Constitutional: He is oriented to person, place, and time  He appears well-developed and well-nourished    He does seem to be uncomfortable due to chronic pain moving throughout his house  HENT:   Head: Normocephalic and atraumatic  Neck: Normal range of motion  Pulmonary/Chest: Effort normal    Musculoskeletal: Normal range of motion  Neurological: He is alert and oriented to person, place, and time  Skin: He is not diaphoretic  Psychiatric: He has a normal mood and affect   His behavior is normal      Component      Latest Ref Rng & Units 8/14/2019 8/14/2019 8/14/2019           7:37 AM  7:37 AM  7:37 AM   WBC      4 31 - 10 16 Thousand/uL 6 64     Red Blood Cell Count      3 88 - 5 62 Million/uL 4 15 Hemoglobin      12 0 - 17 0 g/dL 13 1     HCT      36 5 - 49 3 % 39 2     MCV      82 - 98 fL 95     MCH      26 8 - 34 3 pg 31 6     MCHC      31 4 - 37 4 g/dL 33 4     RDW      11 6 - 15 1 % 12 7     MPV      8 9 - 12 7 fL 10 6     Platelet Count      313 - 390 Thousands/uL 217     nRBC      /100 WBCs 0     Neutrophils %      43 - 75 % 45     Immat GRANS %      0 - 2 % 0     Lymphocytes Relative      14 - 44 % 44     Monocytes Relative      4 - 12 % 8     Eosinophils      0 - 6 % 2     Basophils Relative      0 - 1 % 1     Absolute Neutrophils      1 85 - 7 62 Thousands/µL 3 00     Immature Grans Absolute      0 00 - 0 20 Thousand/uL 0 02     Lymphocytes Absolute      0 60 - 4 47 Thousands/µL 2 92     Absolute Monocytes      0 17 - 1 22 Thousand/µL 0 54     Absolute Eosinophils      0 00 - 0 61 Thousand/µL 0 13     Basophils Absolute      0 00 - 0 10 Thousands/µL 0 03     Sodium      136 - 145 mmol/L      Potassium      3 5 - 5 3 mmol/L      Chloride      100 - 108 mmol/L      CO2      21 - 32 mmol/L      Anion Gap      4 - 13 mmol/L      BUN      5 - 25 mg/dL      Creatinine      0 60 - 1 30 mg/dL      GLUCOSE FASTING      65 - 99 mg/dL      Calcium      8 3 - 10 1 mg/dL      AST      5 - 45 U/L      ALT      12 - 78 U/L      Alkaline Phosphatase      46 - 116 U/L      Total Protein      6 4 - 8 2 g/dL      Albumin      3 5 - 5 0 g/dL      TOTAL BILIRUBIN      0 20 - 1 00 mg/dL      eGFR      ml/min/1 73sq m      EXT Creatinine Urine      mg/dL   115 0   MICROALBUM ,U,RANDOM      0 0 - 20 0 mg/L   84 8 (H)   MICROALBUMIN/CREATININE RATIO      0 - 30 mg/g creatinine   74 (H)   TESTOSTERONE FREE      7 2 - 24 0 pg/mL      Testosterone, Total, LC/MS      264 - 916 ng/dL      Hemoglobin A1C      4 2 - 6 3 %      eAG, EST AVG Glucose      mg/dl      PSA, Total      0 0 - 4 0 ng/mL  0 8      Component      Latest Ref Rng & Units 8/14/2019 10/30/2019 1/29/2020           7:37 AM  7:55 AM  7:05 AM   WBC      4 31 - 10 16 Thousand/uL      Red Blood Cell Count      3 88 - 5 62 Million/uL      Hemoglobin      12 0 - 17 0 g/dL   13 7   HCT      36 5 - 49 3 %   40 8   MCV      82 - 98 fL      MCH      26 8 - 34 3 pg      MCHC      31 4 - 37 4 g/dL      RDW      11 6 - 15 1 %      MPV      8 9 - 12 7 fL      Platelet Count      930 - 390 Thousands/uL      nRBC      /100 WBCs      Neutrophils %      43 - 75 %      Immat GRANS %      0 - 2 %      Lymphocytes Relative      14 - 44 %      Monocytes Relative      4 - 12 %      Eosinophils      0 - 6 %      Basophils Relative      0 - 1 %      Absolute Neutrophils      1 85 - 7 62 Thousands/µL      Immature Grans Absolute      0 00 - 0 20 Thousand/uL      Lymphocytes Absolute      0 60 - 4 47 Thousands/µL      Absolute Monocytes      0 17 - 1 22 Thousand/µL      Absolute Eosinophils      0 00 - 0 61 Thousand/µL      Basophils Absolute      0 00 - 0 10 Thousands/µL      Sodium      136 - 145 mmol/L      Potassium      3 5 - 5 3 mmol/L      Chloride      100 - 108 mmol/L      CO2      21 - 32 mmol/L      Anion Gap      4 - 13 mmol/L      BUN      5 - 25 mg/dL      Creatinine      0 60 - 1 30 mg/dL      GLUCOSE FASTING      65 - 99 mg/dL      Calcium      8 3 - 10 1 mg/dL      AST      5 - 45 U/L      ALT      12 - 78 U/L      Alkaline Phosphatase      46 - 116 U/L      Total Protein      6 4 - 8 2 g/dL      Albumin      3 5 - 5 0 g/dL      TOTAL BILIRUBIN      0 20 - 1 00 mg/dL      eGFR      ml/min/1 73sq m      EXT Creatinine Urine      mg/dL      MICROALBUM ,U,RANDOM      0 0 - 20 0 mg/L      MICROALBUMIN/CREATININE RATIO      0 - 30 mg/g creatinine      TESTOSTERONE FREE      7 2 - 24 0 pg/mL 19 8 19 8    Testosterone, Total, LC/MS      264 - 916 ng/dL 976 (H) 759    Hemoglobin A1C      4 2 - 6 3 %      eAG, EST AVG Glucose      mg/dl      PSA, Total      0 0 - 4 0 ng/mL        Component      Latest Ref Rng & Units 1/29/2020 1/29/2020 1/29/2020           7:05 AM  7:05 AM  7:05 AM WBC      4 31 - 10 16 Thousand/uL      Red Blood Cell Count      3 88 - 5 62 Million/uL      Hemoglobin      12 0 - 17 0 g/dL      HCT      36 5 - 49 3 %      MCV      82 - 98 fL      MCH      26 8 - 34 3 pg      MCHC      31 4 - 37 4 g/dL      RDW      11 6 - 15 1 %      MPV      8 9 - 12 7 fL      Platelet Count      782 - 390 Thousands/uL      nRBC      /100 WBCs      Neutrophils %      43 - 75 %      Immat GRANS %      0 - 2 %      Lymphocytes Relative      14 - 44 %      Monocytes Relative      4 - 12 %      Eosinophils      0 - 6 %      Basophils Relative      0 - 1 %      Absolute Neutrophils      1 85 - 7 62 Thousands/µL      Immature Grans Absolute      0 00 - 0 20 Thousand/uL      Lymphocytes Absolute      0 60 - 4 47 Thousands/µL      Absolute Monocytes      0 17 - 1 22 Thousand/µL      Absolute Eosinophils      0 00 - 0 61 Thousand/µL      Basophils Absolute      0 00 - 0 10 Thousands/µL      Sodium      136 - 145 mmol/L 137     Potassium      3 5 - 5 3 mmol/L 3 8     Chloride      100 - 108 mmol/L 100     CO2      21 - 32 mmol/L 33 (H)     Anion Gap      4 - 13 mmol/L 4     BUN      5 - 25 mg/dL 12     Creatinine      0 60 - 1 30 mg/dL 0 74     GLUCOSE FASTING      65 - 99 mg/dL 152 (H)     Calcium      8 3 - 10 1 mg/dL 9 0     AST      5 - 45 U/L 21     ALT      12 - 78 U/L 56     Alkaline Phosphatase      46 - 116 U/L 49     Total Protein      6 4 - 8 2 g/dL 7 4     Albumin      3 5 - 5 0 g/dL 3 8     TOTAL BILIRUBIN      0 20 - 1 00 mg/dL 0 41     eGFR      ml/min/1 73sq m 105     EXT Creatinine Urine      mg/dL      MICROALBUM ,U,RANDOM      0 0 - 20 0 mg/L      MICROALBUMIN/CREATININE RATIO      0 - 30 mg/g creatinine      TESTOSTERONE FREE      7 2 - 24 0 pg/mL   21 7   Testosterone, Total, LC/MS      264 - 916 ng/dL   796   Hemoglobin A1C      4 2 - 6 3 %  7 7 (H)    eAG, EST AVG Glucose      mg/dl  174    PSA, Total      0 0 - 4 0 ng/mL        I spent 15 minutes directly with the patient during this visit      VIRTUAL VISIT [address] that he has consented to an online visit or consultation  He understands that the online visit is based solely on information provided by him, and that, in the absence of a face-to-face physical evaluation by the physician, the diagnosis he receives is both limited and provisional in terms of accuracy and completeness  This is not intended to replace a full medical face-to-face evaluation by the physician  Gavino Elizondo understands and accepts these terms

## 2020-07-09 NOTE — ASSESSMENT & PLAN NOTE
Last A1C high at 7 7  Increase Metformin ER to 1000mg twice per day with food  Check BG daily and send log for review  Complete lab testing as ordered and will make additional medication change if needed  Focus on dietary improvements- he declines referral to dietician at this time     Lab Results   Component Value Date    HGBA1C 7 7 (H) 01/29/2020

## 2020-08-11 ENCOUNTER — APPOINTMENT (OUTPATIENT)
Dept: LAB | Facility: CLINIC | Age: 55
End: 2020-08-11
Payer: COMMERCIAL

## 2020-08-11 DIAGNOSIS — Z12.5 SCREENING FOR PROSTATE CANCER: ICD-10-CM

## 2020-08-11 DIAGNOSIS — M81.0 AGE-RELATED OSTEOPOROSIS WITHOUT CURRENT PATHOLOGICAL FRACTURE: ICD-10-CM

## 2020-08-11 DIAGNOSIS — E11.9 TYPE 2 DIABETES MELLITUS WITHOUT COMPLICATION, WITHOUT LONG-TERM CURRENT USE OF INSULIN (HCC): ICD-10-CM

## 2020-08-11 DIAGNOSIS — E78.2 MIXED HYPERLIPIDEMIA: ICD-10-CM

## 2020-08-11 DIAGNOSIS — E29.1 TESTICULAR HYPOGONADISM: ICD-10-CM

## 2020-08-11 LAB
25(OH)D3 SERPL-MCNC: 49 NG/ML (ref 30–100)
ALBUMIN SERPL BCP-MCNC: 3.9 G/DL (ref 3.5–5)
ALP SERPL-CCNC: 56 U/L (ref 46–116)
ALT SERPL W P-5'-P-CCNC: 56 U/L (ref 12–78)
ANION GAP SERPL CALCULATED.3IONS-SCNC: 8 MMOL/L (ref 4–13)
AST SERPL W P-5'-P-CCNC: 29 U/L (ref 5–45)
BASOPHILS # BLD AUTO: 0.03 THOUSANDS/ΜL (ref 0–0.1)
BASOPHILS NFR BLD AUTO: 1 % (ref 0–1)
BILIRUB SERPL-MCNC: 0.49 MG/DL (ref 0.2–1)
BUN SERPL-MCNC: 19 MG/DL (ref 5–25)
CALCIUM SERPL-MCNC: 9.2 MG/DL (ref 8.3–10.1)
CHLORIDE SERPL-SCNC: 99 MMOL/L (ref 100–108)
CHOLEST SERPL-MCNC: 133 MG/DL (ref 50–200)
CO2 SERPL-SCNC: 30 MMOL/L (ref 21–32)
CREAT SERPL-MCNC: 0.94 MG/DL (ref 0.6–1.3)
CREAT UR-MCNC: 117 MG/DL
EOSINOPHIL # BLD AUTO: 0.16 THOUSAND/ΜL (ref 0–0.61)
EOSINOPHIL NFR BLD AUTO: 3 % (ref 0–6)
ERYTHROCYTE [DISTWIDTH] IN BLOOD BY AUTOMATED COUNT: 12.5 % (ref 11.6–15.1)
EST. AVERAGE GLUCOSE BLD GHB EST-MCNC: 151 MG/DL
GFR SERPL CREATININE-BSD FRML MDRD: 92 ML/MIN/1.73SQ M
GLUCOSE P FAST SERPL-MCNC: 119 MG/DL (ref 65–99)
HBA1C MFR BLD: 6.9 %
HCT VFR BLD AUTO: 40.9 % (ref 36.5–49.3)
HDLC SERPL-MCNC: 37 MG/DL
HGB BLD-MCNC: 13.8 G/DL (ref 12–17)
IMM GRANULOCYTES # BLD AUTO: 0.01 THOUSAND/UL (ref 0–0.2)
IMM GRANULOCYTES NFR BLD AUTO: 0 % (ref 0–2)
LDLC SERPL CALC-MCNC: 79 MG/DL (ref 0–100)
LYMPHOCYTES # BLD AUTO: 3.03 THOUSANDS/ΜL (ref 0.6–4.47)
LYMPHOCYTES NFR BLD AUTO: 47 % (ref 14–44)
MCH RBC QN AUTO: 32.4 PG (ref 26.8–34.3)
MCHC RBC AUTO-ENTMCNC: 33.7 G/DL (ref 31.4–37.4)
MCV RBC AUTO: 96 FL (ref 82–98)
MICROALBUMIN UR-MCNC: 39.4 MG/L (ref 0–20)
MICROALBUMIN/CREAT 24H UR: 34 MG/G CREATININE (ref 0–30)
MONOCYTES # BLD AUTO: 0.51 THOUSAND/ΜL (ref 0.17–1.22)
MONOCYTES NFR BLD AUTO: 8 % (ref 4–12)
NEUTROPHILS # BLD AUTO: 2.6 THOUSANDS/ΜL (ref 1.85–7.62)
NEUTS SEG NFR BLD AUTO: 41 % (ref 43–75)
NRBC BLD AUTO-RTO: 0 /100 WBCS
PLATELET # BLD AUTO: 200 THOUSANDS/UL (ref 149–390)
PMV BLD AUTO: 11.4 FL (ref 8.9–12.7)
POTASSIUM SERPL-SCNC: 4.2 MMOL/L (ref 3.5–5.3)
PROT SERPL-MCNC: 7.7 G/DL (ref 6.4–8.2)
PSA SERPL-MCNC: 0.7 NG/ML (ref 0–4)
RBC # BLD AUTO: 4.26 MILLION/UL (ref 3.88–5.62)
SODIUM SERPL-SCNC: 137 MMOL/L (ref 136–145)
T4 FREE SERPL-MCNC: 0.83 NG/DL (ref 0.76–1.46)
TRIGL SERPL-MCNC: 84 MG/DL
TSH SERPL DL<=0.05 MIU/L-ACNC: 0.99 UIU/ML (ref 0.36–3.74)
WBC # BLD AUTO: 6.34 THOUSAND/UL (ref 4.31–10.16)

## 2020-08-11 PROCEDURE — 84403 ASSAY OF TOTAL TESTOSTERONE: CPT

## 2020-08-11 PROCEDURE — 84402 ASSAY OF FREE TESTOSTERONE: CPT

## 2020-08-11 PROCEDURE — 84439 ASSAY OF FREE THYROXINE: CPT

## 2020-08-11 PROCEDURE — 82043 UR ALBUMIN QUANTITATIVE: CPT

## 2020-08-11 PROCEDURE — 82570 ASSAY OF URINE CREATININE: CPT

## 2020-08-11 PROCEDURE — 80053 COMPREHEN METABOLIC PANEL: CPT

## 2020-08-11 PROCEDURE — 85025 COMPLETE CBC W/AUTO DIFF WBC: CPT

## 2020-08-11 PROCEDURE — 80061 LIPID PANEL: CPT

## 2020-08-11 PROCEDURE — G0103 PSA SCREENING: HCPCS

## 2020-08-11 PROCEDURE — 84443 ASSAY THYROID STIM HORMONE: CPT

## 2020-08-11 PROCEDURE — 36415 COLL VENOUS BLD VENIPUNCTURE: CPT

## 2020-08-11 PROCEDURE — 83036 HEMOGLOBIN GLYCOSYLATED A1C: CPT

## 2020-08-11 PROCEDURE — 82306 VITAMIN D 25 HYDROXY: CPT

## 2020-08-12 LAB
TESTOST FREE SERPL-MCNC: 9.9 PG/ML (ref 7.2–24)
TESTOST SERPL-MCNC: 493 NG/DL (ref 264–916)

## 2020-08-13 ENCOUNTER — TELEPHONE (OUTPATIENT)
Dept: ENDOCRINOLOGY | Facility: CLINIC | Age: 55
End: 2020-08-13

## 2020-08-13 NOTE — TELEPHONE ENCOUNTER
----- Message from Ambreen Power PA-C sent at 8/13/2020 12:15 PM EDT -----  A1C (diabetes test) has improved to 6 9- near goal of less than 6 5  Rest of labs OK except small amount of protein in urine which has improved

## 2020-09-14 ENCOUNTER — OFFICE VISIT (OUTPATIENT)
Dept: OBGYN CLINIC | Facility: OTHER | Age: 55
End: 2020-09-14
Payer: COMMERCIAL

## 2020-09-14 ENCOUNTER — APPOINTMENT (OUTPATIENT)
Dept: RADIOLOGY | Facility: OTHER | Age: 55
End: 2020-09-14
Payer: COMMERCIAL

## 2020-09-14 VITALS
HEART RATE: 80 BPM | SYSTOLIC BLOOD PRESSURE: 118 MMHG | HEIGHT: 69 IN | BODY MASS INDEX: 31.7 KG/M2 | DIASTOLIC BLOOD PRESSURE: 74 MMHG | WEIGHT: 214 LBS

## 2020-09-14 DIAGNOSIS — M75.101 TEAR OF RIGHT SUPRASPINATUS TENDON: ICD-10-CM

## 2020-09-14 DIAGNOSIS — M19.011 OSTEOARTHRITIS OF RIGHT ACROMIOCLAVICULAR JOINT: ICD-10-CM

## 2020-09-14 DIAGNOSIS — M75.101 TEAR OF RIGHT SUPRASPINATUS TENDON: Primary | ICD-10-CM

## 2020-09-14 PROCEDURE — 73030 X-RAY EXAM OF SHOULDER: CPT

## 2020-09-14 PROCEDURE — 20605 DRAIN/INJ JOINT/BURSA W/O US: CPT | Performed by: ORTHOPAEDIC SURGERY

## 2020-09-14 PROCEDURE — 99214 OFFICE O/P EST MOD 30 MIN: CPT | Performed by: ORTHOPAEDIC SURGERY

## 2020-09-14 RX ORDER — BETAMETHASONE SODIUM PHOSPHATE AND BETAMETHASONE ACETATE 3; 3 MG/ML; MG/ML
6 INJECTION, SUSPENSION INTRA-ARTICULAR; INTRALESIONAL; INTRAMUSCULAR; SOFT TISSUE
Status: COMPLETED | OUTPATIENT
Start: 2020-09-14 | End: 2020-09-14

## 2020-09-14 RX ORDER — BUPIVACAINE HYDROCHLORIDE 2.5 MG/ML
2 INJECTION, SOLUTION INFILTRATION; PERINEURAL
Status: COMPLETED | OUTPATIENT
Start: 2020-09-14 | End: 2020-09-14

## 2020-09-14 RX ADMIN — BUPIVACAINE HYDROCHLORIDE 2 ML: 2.5 INJECTION, SOLUTION INFILTRATION; PERINEURAL at 10:38

## 2020-09-14 RX ADMIN — BETAMETHASONE SODIUM PHOSPHATE AND BETAMETHASONE ACETATE 6 MG: 3; 3 INJECTION, SUSPENSION INTRA-ARTICULAR; INTRALESIONAL; INTRAMUSCULAR; SOFT TISSUE at 10:38

## 2020-09-14 NOTE — PATIENT INSTRUCTIONS
Patient to consider seeing Dr Roshni Courtney, Dr Maddie Richey, or Dr MARLEN RODRÍGUEZ OF [facility] for his elbow pain  Follow up with Rheumatology  CORTICOSTEROID INJECTION  What is a corticosteroid? Injuries or disease such as arthritis, bursitis or tendonitis result in inflammation  In turn, this inflammation can cause swelling and pain  A local injection of a corticosteroid is provided to diminish inflammation  By doing so, it will also decrease pain and swelling which is making you uncomfortable  Is this the same thing as a Cortisone Injection? Cortisone® is a brand name of a corticosteroid used commonly in the past   Today I commonly use a more water-soluble corticosteroid named Celestone®  Will the injection hurt? As with any injection, you may feel pain at the time of the injection  Typically, I use a local anesthetic (Marilynne Revels) in addition to the corticosteroid to determine if the injection has been placed in the appropriate location  Hence it is important to monitor your symptoms 4-6 hours after the injection, as the area will be anesthetized (numb) while the local anesthetic is working  Once the local anesthetic wears off, the intensity of pain can be the same as it was prior to the injection, or even worse  This does not mean that the injection is not working  The corticosteroid may take 24-72 hours to begin having a positive effect  If you do experience an increase in pain, the use of an ice pack on the area for 20 minutes at a time should help  It is also helpful to take an oral anti-inflammatory such as Tylenol® or Motrin® if you are able to medically do so  For this reason it is best to avoid activities that put stress on the area the first 24 hours after the injection  How long will pain relief last?  This will vary according to the type and severity of the symptoms being treated and the severity of the condition  Symptom relief may last weeks to months    I typically couple injections with physical therapy so that the underlying problem causing the inflammation may be treated as the pain diminishes  If the combination is not successful, you may be a surgical candidate  I have read bad things about steroids  Will these things happen to me? Corticosteroids, when utilized properly, are safe and effective drugs  When used in a low dose, potential adverse reactions are very rare  Some patients may experience a sensation of flushing for several days  Very rarely, there can be a local reaction which may include increased discomfort for a period of time in the areas that has been injected  A steroid should not be used over and over again  Multiple injections in the same area can produce adverse effects such as tissue atrophy and degeneration of tendon or cartilage  A small percentage of patients (< 0 1%) may develop an infection in the joint after injection  This is a treatable problem, but if neglected, may result in permanent disability  Signs of infection include redness, swelling, discharge, fevers, increasing pain and drainage from the injection site  This represents an emergency and you should contact our office immediately or seek treatment in the ER if after hours  If I have diabetes, will this injection affect me? If you are diabetic, an injection of a corticosteroid can raise your blood sugar level, requiring more insulin for a brief period of time  This may necessitate careful blood sugar maintenance  If the elevated sugars are not able to be controlled, contact your diabetic doctor for guidance

## 2020-09-14 NOTE — PROGRESS NOTES
Assessment  Diagnoses and all orders for this visit:    Tear of right supraspinatus tendon  -right shoulder arthroscopic rotator cuff repair and debridement performed on 02/28/2020    Right AC joint OA        Discussion and Plan:    The patient has an examination consistent with Right AC joint OA  I have discussed with the patient the pathophysiology of this diagnosis and reviewed how the examination correlates with this diagnosis  Treatment options were discussed at length and after discussing these treatment options, the patient elected for [facility] joint injection today  The patient did have a rotator cuff repair by myself and his rotator cuff strength seems excellent is symptoms do not appear to be related to the rotator cuff at this time  A pointed out that his physical therapy functional outcome went from 4/100 to 92/100 following the procedure so I do feel this is indicative of a successful procedure and I feel that symptoms he is having at this time are separate from the rotator cuff and are directly related to the exacerbation of acromioclavicular joint arthritis  Patient was instructed to follow up with Rheumatology  He stated during today's visit I have pain everywhere even my scalp  He states it has been years since he saw his Rheumatologist   Patient was instructed to discuss his symptoms with his pain mgmt physician as well  Patient has had chronic right elbow issues and is status post what sounds like a capsular release done at [facility] years ago  He feels his symptoms are recurring and wishes to see an elbow expert for evaluation in the future  He was offered a referral to see Dr Tomas Johnson or [place] however he declined stating he will schedule when he is ready to do something        Subjective:   Patient ID: Brenda Freedman is a 48 y o  male      HPI  Patient presents today 7 mos status post right shoulder arthroscopic rotator cuff repair and debridement performed on 02/28/2020  Patient states he continues to have "impingement" symptoms in the [facility] joint with pain into the triceps and upper trap  Pain increases with shoulder range of motion  He also note pain if he just lets the shoulder hand  Patient does also have a long history of cervical and elbow related issues  The following portions of the patient's history were reviewed and updated as appropriate: allergies, current medications, past family history, past medical history, past social history, past surgical history and problem list     Review of Systems   Constitutional: Negative for chills and fever  HENT: Negative for drooling and hearing loss  Eyes: Negative for visual disturbance  Respiratory: Negative for cough and shortness of breath  Cardiovascular: Negative for chest pain  Gastrointestinal: Negative for abdominal pain  Skin: Negative for rash  Psychiatric/Behavioral: Negative for agitation  Objective:  /74   Pulse 80   Ht 5' 9" (1 753 m)   Wt 97 1 kg (214 lb)   BMI 31 60 kg/m²       Right Shoulder Exam     Tenderness   The patient is experiencing tenderness in the acromioclavicular joint  Range of Motion   External rotation: 70   Forward flexion: 170   Internal rotation 0 degrees: Lumbar     Muscle Strength   Abduction: 5/5   External rotation: 5/5     Tests   Fay test: positive  Impingement: positive    Other   Erythema: absent  Sensation: normal  Pulse: present        Right Elbow flexion contracture of 10 degrees      Physical Exam  Vitals signs reviewed  Constitutional:       Appearance: He is well-developed  HENT:      Head: Normocephalic  Eyes:      Pupils: Pupils are equal, round, and reactive to light  Pulmonary:      Effort: Pulmonary effort is normal    Skin:     General: Skin is warm and dry       Medium joint arthrocentesis: R acromioclavicular  Date/Time: 9/14/2020 10:38 AM  Consent given by: patient  Site marked: site marked  Timeout: Immediately prior to procedure a time out was called to verify the correct patient, procedure, equipment, support staff and site/side marked as required   Supporting Documentation  Indications: pain   Procedure Details  Location: shoulder - R acromioclavicular  Needle size: 22 G  Ultrasound guidance: no  Approach: superior  Medications administered: 2 mL bupivacaine 0 25 %; 6 mg betamethasone acetate-betamethasone sodium phosphate 6 (3-3) mg/mL    Patient tolerance: patient tolerated the procedure well with no immediate complications  Dressing:  Sterile dressing applied        Imaging was reviewed in PACs today and my interpretation is as follows:  Right shoulder x-rays demonstrates no fracture or dislocation, AC joint degenerative changes       Scribe Attestation    I,:   Robert Olmedo am acting as a scribe while in the presence of the attending physician :        I,:   Bradley Slater MD personally performed the services described in this documentation    as scribed in my presence :

## 2020-09-25 ENCOUNTER — TELEPHONE (OUTPATIENT)
Dept: OBGYN CLINIC | Facility: HOSPITAL | Age: 55
End: 2020-09-25

## 2020-09-25 NOTE — TELEPHONE ENCOUNTER
Spoke to patient  He stated he has a lot of pain in the shoulder again  He stated that he could have sworn Dr Beth Galvan told him to see another doctor for the lump to be removed  Advised patient that Dr Beth Galvan is willing to see him for follow up of shoulder concerns but patient stated he definitely was told someone else would have to do the surgery to remove the arthritic lump  Patient insisted that I re-clarify with Dr Beth Galvan before he has to come all the way into the office to be told this needs another surgeon to look at it  Please advise

## 2020-09-25 NOTE — TELEPHONE ENCOUNTER
When patient called his exact words were that "Dr Jessie Perez was referring him to another doctor for his shoulder but he could not remember the name of the doctor that he was told to see "  I read to him from the ovn & told him that it appeared to be Dr Magdalena Paris, Dr Mariel Fonseca for his elbow but patient said that was for something else   I then also said that he was also to schedule with a Rheumatologist & asked him if that sounded more like it but patient said "no, it was specifically for my shoulder "

## 2020-09-25 NOTE — TELEPHONE ENCOUNTER
gianna was very clear in his response  As a shoulder specialist, he is the most qualified to address his shoulder issues and will continue to care for him  He was never told he would be seeing anyone else for his shoulder - only his other complaints (elbow, etc)

## 2020-09-25 NOTE — TELEPHONE ENCOUNTER
Patient is calling asking for the name of the doctor that Dr Bettie Green wanted him to see for his shoulder  I told him rheumatology, Dr Chani Canales were recommended  Patient does already see a rheumatologist & will see him in a couple months      I did offer patient an appt with Dr Vincent Caicedo in [place] because he has sooner appts but patient declined until hearing back from Dr Pito Hanson also because he wants to take care of his shoulder first      [phone]

## 2020-09-25 NOTE — TELEPHONE ENCOUNTER
Reiterated this again tot he patient, we are sure he can see Dr Kellee Starr for his concerns  Patient verbalized understanding and scheduled for follow up on 9/28  Thank you!

## 2020-09-28 ENCOUNTER — OFFICE VISIT (OUTPATIENT)
Dept: OBGYN CLINIC | Facility: OTHER | Age: 55
End: 2020-09-28
Payer: COMMERCIAL

## 2020-09-28 VITALS
SYSTOLIC BLOOD PRESSURE: 121 MMHG | DIASTOLIC BLOOD PRESSURE: 80 MMHG | HEIGHT: 69 IN | HEART RATE: 78 BPM | BODY MASS INDEX: 31.7 KG/M2 | WEIGHT: 214 LBS

## 2020-09-28 DIAGNOSIS — Z98.890 S/P RIGHT ROTATOR CUFF REPAIR: ICD-10-CM

## 2020-09-28 DIAGNOSIS — M19.011 OSTEOARTHRITIS OF RIGHT ACROMIOCLAVICULAR JOINT: Primary | ICD-10-CM

## 2020-09-28 PROCEDURE — 99213 OFFICE O/P EST LOW 20 MIN: CPT | Performed by: ORTHOPAEDIC SURGERY

## 2020-09-28 RX ORDER — LORAZEPAM 1 MG/1
1 TABLET ORAL
Qty: 3 TABLET | Refills: 0 | Status: SHIPPED | OUTPATIENT
Start: 2020-09-28 | End: 2021-01-27 | Stop reason: ALTCHOICE

## 2020-09-28 NOTE — PROGRESS NOTES
Assessment  Diagnoses and all orders for this visit:    Osteoarthritis of right acromioclavicular joint    S/P right rotator cuff repair        Discussion and Plan:    · At this time patient is indicated for a repeat MRI of his right shoulder to evaluate the healing of the rotator cuff  I believe that his symptoms are stemming from his [facility] joint as the cortisone injection performed on 9/14/2020 provided him with significant relief for about 2 weeks  If his rotator cuff is healed on MRI, then the patient will be indicated for a distal clavicle excision  He understood and all questions were answered  · Follow up after MRI for discussion of results and further treatment options based on these results  Subjective:   Patient ID: Adrián Armstrong is a 48 y o  male      HPI  49 y/o male who presents today for a follow up visit for his right shoulder  Patient has a history of a rotator cuff repair on 2/28/2020  He also had a cortisone injection on 9/14/2020 into his [facility] joint with relief of his symptoms for 2 weeks  Patient states that he was helping his wife put up curtains over the weekend and felt an increase in his symptoms  He localizes his symptoms directly over the [facility] joint, worse with overhead motions  Denies numbness and tingling, fevers or chills  The following portions of the patient's history were reviewed and updated as appropriate: allergies, current medications, past family history, past medical history, past social history, past surgical history and problem list     Review of Systems   Constitutional: Negative for chills, fatigue, fever and unexpected weight change  HENT: Negative for hearing loss, nosebleeds and sore throat  Eyes: Negative for pain, redness and visual disturbance  Respiratory: Negative for cough, shortness of breath and wheezing  Cardiovascular: Negative for chest pain, palpitations and leg swelling     Gastrointestinal: Negative for abdominal pain, nausea and vomiting  Endocrine: Negative for polydipsia and polyuria  Genitourinary: Negative for frequency and urgency  Skin: Negative for color change, rash and wound  Neurological: Negative for dizziness, weakness, numbness and headaches  Psychiatric/Behavioral: Negative for behavioral problems, self-injury and suicidal ideas  Objective:  /80   Pulse 78   Ht 5' 9" (1 753 m)   Wt 97 1 kg (214 lb)   BMI 31 60 kg/m²       Right Shoulder Exam     Tenderness   The patient is experiencing tenderness in the acromioclavicular joint  Range of Motion   The patient has normal right shoulder ROM  Muscle Strength   Abduction: 5/5   External rotation: 5/5     Tests   Drop arm: negative    Other   Erythema: absent  Sensation: normal  Pulse: present            Physical Exam  Constitutional:       General: He is not in acute distress  Appearance: He is well-developed  Eyes:      Conjunctiva/sclera: Conjunctivae normal       Pupils: Pupils are equal, round, and reactive to light  Neck:      Musculoskeletal: Normal range of motion and neck supple  Cardiovascular:      Rate and Rhythm: Normal rate and regular rhythm  Pulmonary:      Effort: Pulmonary effort is normal       Breath sounds: Normal breath sounds  Abdominal:      General: Bowel sounds are normal       Palpations: Abdomen is soft  Skin:     General: Skin is warm and dry  Findings: No erythema or rash  Neurological:      Mental Status: He is alert and oriented to person, place, and time  Deep Tendon Reflexes: Reflexes are normal and symmetric     Psychiatric:         Behavior: Behavior normal            Scribe Attestation    I,:   Fany Snyder am acting as a scribe while in the presence of the attending physician :        I,:   Suman Chowdhury MD personally performed the services described in this documentation    as scribed in my presence :

## 2020-10-07 ENCOUNTER — HOSPITAL ENCOUNTER (OUTPATIENT)
Dept: RADIOLOGY | Facility: HOSPITAL | Age: 55
Discharge: HOME | End: 2020-10-07
Attending: ORTHOPAEDIC SURGERY
Payer: COMMERCIAL

## 2020-10-07 DIAGNOSIS — Z98.890 S/P RIGHT ROTATOR CUFF REPAIR: ICD-10-CM

## 2020-10-07 DIAGNOSIS — M19.011 OSTEOARTHRITIS OF RIGHT ACROMIOCLAVICULAR JOINT: ICD-10-CM

## 2020-10-07 PROCEDURE — G1004 CDSM NDSC: HCPCS

## 2020-10-07 PROCEDURE — 73221 MRI JOINT UPR EXTREM W/O DYE: CPT

## 2020-10-12 ENCOUNTER — OFFICE VISIT (OUTPATIENT)
Dept: OBGYN CLINIC | Facility: OTHER | Age: 55
End: 2020-10-12
Payer: COMMERCIAL

## 2020-10-12 VITALS
TEMPERATURE: 97 F | DIASTOLIC BLOOD PRESSURE: 75 MMHG | SYSTOLIC BLOOD PRESSURE: 107 MMHG | HEART RATE: 86 BPM | BODY MASS INDEX: 32.02 KG/M2 | WEIGHT: 216.8 LBS

## 2020-10-12 DIAGNOSIS — M19.011 OSTEOARTHRITIS OF RIGHT ACROMIOCLAVICULAR JOINT: Primary | ICD-10-CM

## 2020-10-12 PROCEDURE — 99213 OFFICE O/P EST LOW 20 MIN: CPT | Performed by: ORTHOPAEDIC SURGERY

## 2020-10-12 RX ORDER — OXYCODONE HCL 40 MG/1
40 TABLET, FILM COATED, EXTENDED RELEASE ORAL EVERY 12 HOURS
COMMUNITY
Start: 2020-09-30 | End: 2021-01-27 | Stop reason: ALTCHOICE

## 2020-10-12 RX ORDER — OXYCODONE HYDROCHLORIDE 80 MG/1
80 TABLET, FILM COATED, EXTENDED RELEASE ORAL EVERY 12 HOURS
COMMUNITY
Start: 2020-09-23 | End: 2021-01-27 | Stop reason: ALTCHOICE

## 2020-10-12 RX ORDER — METHADONE HYDROCHLORIDE 10 MG/1
TABLET ORAL
COMMUNITY
Start: 2020-09-30

## 2020-10-22 ENCOUNTER — IMMUNIZATIONS (OUTPATIENT)
Dept: FAMILY MEDICINE CLINIC | Facility: CLINIC | Age: 55
End: 2020-10-22
Payer: COMMERCIAL

## 2020-10-22 DIAGNOSIS — Z23 NEED FOR IMMUNIZATION AGAINST INFLUENZA: Primary | ICD-10-CM

## 2020-10-22 PROCEDURE — 90471 IMMUNIZATION ADMIN: CPT

## 2020-10-22 PROCEDURE — 90682 RIV4 VACC RECOMBINANT DNA IM: CPT

## 2020-10-28 DIAGNOSIS — I10 ESSENTIAL HYPERTENSION: ICD-10-CM

## 2020-10-28 RX ORDER — LISINOPRIL 20 MG/1
20 TABLET ORAL DAILY
Qty: 90 TABLET | Refills: 1 | Status: SHIPPED | OUTPATIENT
Start: 2020-10-28 | End: 2021-02-03 | Stop reason: SDUPTHER

## 2020-10-29 ENCOUNTER — TRANSCRIBE ORDERS (OUTPATIENT)
Dept: LAB | Facility: CLINIC | Age: 55
End: 2020-10-29

## 2020-10-29 ENCOUNTER — OFFICE VISIT (OUTPATIENT)
Dept: LAB | Facility: CLINIC | Age: 55
End: 2020-10-29
Payer: COMMERCIAL

## 2020-10-29 DIAGNOSIS — Z79.891 ENCOUNTER FOR LONG-TERM METHADONE USE: Primary | ICD-10-CM

## 2020-10-29 DIAGNOSIS — Z79.891 ENCOUNTER FOR LONG-TERM METHADONE USE: ICD-10-CM

## 2020-10-29 LAB
ATRIAL RATE: 77 BPM
P AXIS: 41 DEGREES
PR INTERVAL: 164 MS
QRS AXIS: 35 DEGREES
QRSD INTERVAL: 86 MS
QT INTERVAL: 386 MS
QTC INTERVAL: 436 MS
T WAVE AXIS: 38 DEGREES
VENTRICULAR RATE: 77 BPM

## 2020-10-29 PROCEDURE — 93005 ELECTROCARDIOGRAM TRACING: CPT

## 2020-10-29 PROCEDURE — 93010 ELECTROCARDIOGRAM REPORT: CPT | Performed by: INTERNAL MEDICINE

## 2020-11-02 DIAGNOSIS — E29.1 TESTICULAR HYPOGONADISM: ICD-10-CM

## 2020-11-02 RX ORDER — TESTOSTERONE CYPIONATE 200 MG/ML
VIAL (ML) INTRAMUSCULAR
Qty: 5 ML | Refills: 0 | Status: SHIPPED | OUTPATIENT
Start: 2020-11-02 | End: 2021-01-25

## 2020-12-01 DIAGNOSIS — E78.5 HYPERLIPIDEMIA, UNSPECIFIED HYPERLIPIDEMIA TYPE: ICD-10-CM

## 2020-12-01 RX ORDER — ATORVASTATIN CALCIUM 20 MG/1
20 TABLET, FILM COATED ORAL
Qty: 30 TABLET | Refills: 0 | Status: SHIPPED | OUTPATIENT
Start: 2020-12-01 | End: 2021-01-05 | Stop reason: SDUPTHER

## 2021-01-05 DIAGNOSIS — E78.5 HYPERLIPIDEMIA, UNSPECIFIED HYPERLIPIDEMIA TYPE: ICD-10-CM

## 2021-01-05 DIAGNOSIS — E11.9 TYPE 2 DIABETES MELLITUS WITHOUT COMPLICATION, WITHOUT LONG-TERM CURRENT USE OF INSULIN (HCC): ICD-10-CM

## 2021-01-05 RX ORDER — METFORMIN HYDROCHLORIDE 500 MG/1
TABLET, EXTENDED RELEASE ORAL
Qty: 120 TABLET | Refills: 0 | Status: SHIPPED | OUTPATIENT
Start: 2021-01-05 | End: 2021-02-03 | Stop reason: SDUPTHER

## 2021-01-05 RX ORDER — ATORVASTATIN CALCIUM 20 MG/1
20 TABLET, FILM COATED ORAL
Qty: 30 TABLET | Refills: 0 | Status: SHIPPED | OUTPATIENT
Start: 2021-01-05 | End: 2021-02-03 | Stop reason: SDUPTHER

## 2021-01-23 DIAGNOSIS — E29.1 TESTICULAR HYPOGONADISM: ICD-10-CM

## 2021-01-25 RX ORDER — TESTOSTERONE CYPIONATE 200 MG/ML
INJECTION INTRAMUSCULAR
Qty: 4 ML | Refills: 0 | Status: SHIPPED | OUTPATIENT
Start: 2021-01-25 | End: 2021-04-19 | Stop reason: SDUPTHER

## 2021-01-26 ENCOUNTER — TELEPHONE (OUTPATIENT)
Dept: ENDOCRINOLOGY | Facility: CLINIC | Age: 56
End: 2021-01-26

## 2021-01-26 NOTE — TELEPHONE ENCOUNTER
Submitted P A  through covermymeds for Testosterone Cypionate 200MG/ML intramuscular solution   Key: [id number]) - [id number]    FormCapital Rx Prescription Drug Prior Authorization FormGeneral Prior Authorizations for [phone] [phone]

## 2021-01-27 ENCOUNTER — OFFICE VISIT (OUTPATIENT)
Dept: FAMILY MEDICINE CLINIC | Facility: CLINIC | Age: 56
End: 2021-01-27
Payer: COMMERCIAL

## 2021-01-27 VITALS
HEART RATE: 78 BPM | HEIGHT: 69 IN | SYSTOLIC BLOOD PRESSURE: 120 MMHG | DIASTOLIC BLOOD PRESSURE: 78 MMHG | RESPIRATION RATE: 14 BRPM | BODY MASS INDEX: 32.58 KG/M2 | TEMPERATURE: 97.5 F | WEIGHT: 220 LBS

## 2021-01-27 DIAGNOSIS — M45.0 ANKYLOSING SPONDYLITIS OF MULTIPLE SITES IN SPINE (HCC): ICD-10-CM

## 2021-01-27 DIAGNOSIS — M19.042 PRIMARY OSTEOARTHRITIS OF BOTH HANDS: ICD-10-CM

## 2021-01-27 DIAGNOSIS — E11.42 DIABETIC POLYNEUROPATHY ASSOCIATED WITH TYPE 2 DIABETES MELLITUS (HCC): ICD-10-CM

## 2021-01-27 DIAGNOSIS — M19.011 OSTEOARTHRITIS OF RIGHT ACROMIOCLAVICULAR JOINT: Primary | ICD-10-CM

## 2021-01-27 DIAGNOSIS — M19.041 PRIMARY OSTEOARTHRITIS OF BOTH HANDS: ICD-10-CM

## 2021-01-27 PROBLEM — M48.061 DEGENERATIVE LUMBAR SPINAL STENOSIS: Status: ACTIVE | Noted: 2021-01-13

## 2021-01-27 PROBLEM — Z79.891: Status: ACTIVE | Noted: 2021-01-13

## 2021-01-27 PROBLEM — M45.9 ANKYLOSING SPONDYLITIS (HCC): Status: ACTIVE | Noted: 2021-01-13

## 2021-01-27 PROBLEM — M51.369 DEGENERATION OF INTERVERTEBRAL DISC OF LUMBAR REGION: Status: ACTIVE | Noted: 2021-01-13

## 2021-01-27 PROBLEM — M51.36 DEGENERATION OF INTERVERTEBRAL DISC OF LUMBAR REGION: Status: ACTIVE | Noted: 2021-01-13

## 2021-01-27 PROBLEM — Z97.8 PRESENCE OF INTRATHECAL PUMP: Status: ACTIVE | Noted: 2021-01-13

## 2021-01-27 PROCEDURE — 99213 OFFICE O/P EST LOW 20 MIN: CPT | Performed by: FAMILY MEDICINE

## 2021-01-27 RX ORDER — PREDNISONE 10 MG/1
TABLET ORAL
COMMUNITY
Start: 2021-01-18 | End: 2021-08-30 | Stop reason: ALTCHOICE

## 2021-01-27 NOTE — PROGRESS NOTES
Assessment/Plan:         Diagnoses and all orders for this visit:    Osteoarthritis of right acromioclavicular joint    Primary osteoarthritis of both hands    Ankylosing spondylitis of multiple sites in spine [facility])    Diabetic polyneuropathy associated with type 2 diabetes mellitus ([facility] [address] )    Other orders  -     predniSONE 10 mg tablet          Clinical findings correspond to OA of right AC joint  Previous steroid injection 09/2020  History of intolerance to oral NSAIDs  I suggested trying topical Voltaren gel to both hands  Could consider rheumatology re-evaluation for persistent symptoms  Recheck as needed    BMI Counseling: Body mass index is 32 49 kg/m²  The BMI is above normal  Nutrition recommendations include reducing portion sizes, decreasing overall calorie intake, consuming healthier snacks, moderation in carbohydrate intake, reducing intake of saturated fat and trans fat and reducing intake of cholesterol  Patient ID: Roseann Salgado  is a 48 y o  male  Patient presents complaining of a painful "lump" right shoulder  Right handed  No specific trauma or injury  He ambulates with a cane using his right hand alternating with his left hand  Status post right rotator cuff surgery 02/2020-Arthroscopic rotator cuff repair and debridement-subacromial bursitis and CA ligament  MRI right shoulder 10/2020 status post rotator cuff repair with suture anchor in place  Fluid signal and a bursal surface of distal supraspinatus tendon  Mild subscapularis and infraspinatus tendinosis without tear  Moderate AC osteoarthritis with small synovial cyst   Os acromiale  Current medications reviewed  Type 2 diabetes mellitus followed by endocrinology on Metformin    08/2020 A1c-6 9      Lab Results   Component Value Date    HGBA1C 6 9 (H) 08/11/2020         The following portions of the patient's history were reviewed and updated as appropriate: allergies, current medications, past family history, past medical history, past social history, past surgical history and problem list     Review of Systems   Constitutional: Positive for unexpected weight change (10 lb weight gain from 10/2020)  Negative for appetite change, chills and fever  Musculoskeletal: Positive for back pain  Ankylosing spondylitis  No longer being followed by rheumatology  Chronic pain syndrome status post intrathecal pain pump in addition to long-term opioid therapy-methadone  He is followed by pain management  Recently he has been experiencing intermittent pain in his left hand and left wrist          Objective:      /78   Pulse 78   Temp 97 5 °F (36 4 °C)   Resp 14   Ht 5' 9" (1 753 m)   Wt 99 8 kg (220 lb)   BMI 32 49 kg/m²       Wt Readings from Last 3 Encounters:   01/27/21 99 8 kg (220 lb)   10/12/20 98 3 kg (216 lb 12 8 oz)   10/07/20 95 3 kg (210 lb)        Physical Exam  Constitutional:       General: He is not in acute distress  Musculoskeletal:         General: Deformity ([place]ber[place]'s nodes of both hands  Tenderness over left thumb CMC  No active synovitis of either hand or wrist   No MCP tenderness) present  Comments: Bony enlargement of right AC joint with crepitus on range of motion  No tenderness over AC joint today  Negative cross-arm test   Full range of motion of right shoulder   Negative Speed test  Negative Yergason sign  No impingement sign  Negative empty can sign  Negative sulcus sign  Negative [place]   Neurological:      Mental Status: He is alert

## 2021-02-03 ENCOUNTER — OFFICE VISIT (OUTPATIENT)
Dept: ENDOCRINOLOGY | Facility: CLINIC | Age: 56
End: 2021-02-03
Payer: COMMERCIAL

## 2021-02-03 VITALS
BODY MASS INDEX: 32.7 KG/M2 | DIASTOLIC BLOOD PRESSURE: 68 MMHG | HEART RATE: 90 BPM | HEIGHT: 69 IN | WEIGHT: 220.8 LBS | SYSTOLIC BLOOD PRESSURE: 104 MMHG

## 2021-02-03 DIAGNOSIS — E78.5 HYPERLIPIDEMIA, UNSPECIFIED HYPERLIPIDEMIA TYPE: ICD-10-CM

## 2021-02-03 DIAGNOSIS — E11.9 TYPE 2 DIABETES MELLITUS WITHOUT COMPLICATION, WITHOUT LONG-TERM CURRENT USE OF INSULIN (HCC): ICD-10-CM

## 2021-02-03 DIAGNOSIS — M81.0 AGE-RELATED OSTEOPOROSIS WITHOUT CURRENT PATHOLOGICAL FRACTURE: ICD-10-CM

## 2021-02-03 DIAGNOSIS — E29.1 TESTICULAR HYPOGONADISM: ICD-10-CM

## 2021-02-03 DIAGNOSIS — I10 ESSENTIAL HYPERTENSION: ICD-10-CM

## 2021-02-03 DIAGNOSIS — IMO0002 UNCONTROLLED TYPE 2 DIABETES MELLITUS WITH COMPLICATION: Primary | ICD-10-CM

## 2021-02-03 DIAGNOSIS — E78.2 MIXED HYPERLIPIDEMIA: ICD-10-CM

## 2021-02-03 PROCEDURE — 99214 OFFICE O/P EST MOD 30 MIN: CPT | Performed by: PHYSICIAN ASSISTANT

## 2021-02-03 RX ORDER — RIBOFLAVIN (VITAMIN B2) 100 MG
100 TABLET ORAL DAILY
COMMUNITY
End: 2021-08-30 | Stop reason: ALTCHOICE

## 2021-02-03 RX ORDER — ATORVASTATIN CALCIUM 20 MG/1
20 TABLET, FILM COATED ORAL
Qty: 90 TABLET | Refills: 1 | Status: SHIPPED | OUTPATIENT
Start: 2021-02-03 | End: 2021-08-02 | Stop reason: SDUPTHER

## 2021-02-03 RX ORDER — VITAMIN B COMPLEX
1 CAPSULE ORAL DAILY
COMMUNITY
End: 2021-08-30 | Stop reason: ALTCHOICE

## 2021-02-03 RX ORDER — METFORMIN HYDROCHLORIDE 500 MG/1
TABLET, EXTENDED RELEASE ORAL
Qty: 360 TABLET | Refills: 1 | Status: SHIPPED | OUTPATIENT
Start: 2021-02-03 | End: 2021-08-02 | Stop reason: SDUPTHER

## 2021-02-03 RX ORDER — LISINOPRIL 20 MG/1
20 TABLET ORAL DAILY
Qty: 90 TABLET | Refills: 1 | Status: SHIPPED | OUTPATIENT
Start: 2021-02-03 | End: 2021-05-03 | Stop reason: SDUPTHER

## 2021-02-03 NOTE — ASSESSMENT & PLAN NOTE
Well controlled based on last A1C of 6 9 and reports of sporadic glucose monitoring  Continue Current regimen  Complete lab testing as ordered and focus on dietary improvements since exercise is limited due to pain  Refer for diabetic eye exam and will request note from podiatry     Lab Results   Component Value Date    HGBA1C 6 9 (H) 08/11/2020

## 2021-02-03 NOTE — PROGRESS NOTES
Established Patient Progress Note      Chief Complaint   Patient presents with    Diabetes Type 2    Hypogonadism        History of Present Illness:   King Lopez  is a 48 y o  male with a history of type 2 diabetes without long term use of insulin since 2017  Reports complications of none  Denies recent illness or hospitalizations  Denies recent severe hypoglycemic or severe hyperglycemic episodes  Denies any issues with his current regimen  home glucose monitoring: are performed sporadically and blood sugar readings were 80s-140s  One reading after a very big lunch was 240  He reports eating sandwich with chips and macaroni salad  He had attended [facility] in the past    Also was on prednisone x 1 week for pain and did have blurry vision for a little it which resolved  Fingerstick  at visit today about 2 hours after lunch  Diet wasn't great over holidays, can't exercise due to pain  Current regimen:   Metformin ER 500mg 2 tabs twice per day     Last Eye Exam: Overdue for eye exam    Last Foot Exam: UTD Dr José Luis Armendariz- has neuromas and plantar fasciitis  Has hypertension: Taking lisinopril  Has hyperlipidemia: Taking atorvastatin     For hypogonadism, taking weekly testosterone injection  Denies any problems with urination       Patient Active Problem List   Diagnosis    Cervical radiculopathy    Chronic pain disorder    Essential hypertension    Lumbar postlaminectomy syndrome    Mixed hyperlipidemia    Osteoporosis    Other chronic nonalcoholic liver disease    Parotid gland enlargement    Testicular hypogonadism    Uncontrolled type 2 diabetes mellitus with complication (HCC)    Tear of right supraspinatus tendon    Osteoarthritis of right acromioclavicular joint    Ankylosing spondylitis ([address] )    Degeneration of intervertebral disc of lumbar region    Degenerative lumbar spinal stenosis    Diabetic polyneuropathy ([facility] [address] )    Encounter for long-term methadone use for pain control    Presence of intrathecal pump    Osteoarthritis of both hands      Past Medical History:   Diagnosis Date    Chronic pain     Depression     Diabetes (HCC)     Fibromyalgia, primary     GERD (gastroesophageal reflux disease)     Hyperlipidemia     Hypertension     Low testosterone     Neuropathy     Pilonidal cyst     last assessed 3/17/2014    Sleep apnea     no cpap      Past Surgical History:   Procedure Laterality Date    BACK SURGERY      discectomy    ELBOW SURGERY Right     OTHER SURGICAL HISTORY  03/13/2015    Electr analysis of progr impl pump w/reprogram refill, requiring physician    Replacement of right abdomen intrathecal  pain pump filled with Dilaudid with electronic analysis and programming   managed by [facility]ann[facility] Contre[facility] PILONIDAL CYST EXCISION      AR SHLDR ARTHROSCOP,SURG,W/ROTAT CUFF REPR Right 2/28/2020    Procedure: SHOULDER ARTHROSCOPIC ROTATOR CUFF REPAIR AND DEBRIDEMENT;  Surgeon: Andrea Gaston MD;  [facility]: [facility];  Service: Orthopedics    ROTATOR CUFF REPAIR Left     WISDOM TOOTH EXTRACTION        Family History   Problem Relation Age of Onset    Diabetes unspecified Sister     Hypertension Sister     Hyperlipidemia Sister         pure hypercholesterolemia    Diabetes unspecified Brother         DM    Hypertension Brother     Hyperlipidemia Brother         pure hypercholesterolemia    Coronary artery disease Father     Heart disease Father     Diabetes Brother         DM    Hypertension Family      Social History     Tobacco Use    Smoking status: Former Smoker     Start date: 2/24/2018    Smokeless tobacco: Never Used   Substance Use Topics    Alcohol use: No     No Known Allergies      Current Outpatient Medications:     Ascorbic Acid (vitamin C) 100 MG tablet, Take 100 mg by mouth daily, Disp: , Rfl:     atorvastatin (LIPITOR) 20 mg tablet, Take 1 tablet (20 mg total) by mouth daily at bedtime, Disp: 90 tablet, Rfl: 1    b complex vitamins capsule, Take 1 capsule by mouth daily, Disp: , Rfl:     Cholecalciferol (VITAMIN D) 2000 units CAPS, Take 1 capsule by mouth daily, Disp: , Rfl:     gabapentin (NEURONTIN) 300 mg capsule, Take 600 mg by mouth daily Taking two 600 mg at night, Disp: , Rfl:     glucose blood (ONETOUCH VERIO) test strip, 1 each by Other route 3 (three) times a day, Disp: 300 each, Rfl: 3    HYDROmorphone (DILAUDID) 4 mg/mL injection, Inject 1 Device as directed daily , Disp: , Rfl:     lisinopril (ZESTRIL) 20 mg tablet, Take 1 tablet (20 mg total) by mouth daily, Disp: 90 tablet, Rfl: 1    metFORMIN (GLUCOPHAGE-XR) 500 mg 24 hr tablet, Take 2 tablets daily with breakfast and supper, Disp: 360 tablet, Rfl: 1    methadone (DOLOPHINE) 10 mg tablet, Take 2 tablets by mouth 2 (two) times a day  Indications: For breakthrough pain, , Disp: , Rfl:     omeprazole (PriLOSEC) 40 MG capsule, Take 1 capsule by mouth daily, Disp: , Rfl:     ONETOUCH DELICA LANCETS 76G MISC, 1 applicator by Does not apply route 3 (three) times a day, Disp: , Rfl:     oxyCODONE (OxyCONTIN) 40 mg 12 hr tablet, Take 1 tablet by mouth every 12 (twelve) hours, Disp: , Rfl:     oxyCODONE (OXYCONTIN) 80 mg 12 hr tablet, Take 1 tablet by mouth every 12 (twelve) hours, Disp: , Rfl:     SYRINGE-NEEDLE, DISP, 3 ML (B-D 3CC LUER-HAFSA SYR 21GX1") 21G X 1" 3 ML MISC, by Does not apply route once a week, Disp: , Rfl:     testosterone cypionate (DEPO-TESTOSTERONE) 200 mg/mL SOLN, INJECT 0 3ML (60MG) WEEKLY USE 1 VIAL FOR 1 WEEKLY DOSE, DISCARD THE REMAINDER, Disp: 4 mL, Rfl: 0    methadone (DOLOPHINE) 10 mg tablet, Take 2 tablets every 6 hours for pain,, Disp: , Rfl:     predniSONE 10 mg tablet, , Disp: , Rfl:     Review of Systems   Constitutional: Negative for activity change, appetite change and fatigue  HENT: Negative for sore throat, trouble swallowing and voice change      Eyes: Positive for visual disturbance (vision blurry briefly while on prednisone)  Respiratory: Negative for choking, chest tightness and shortness of breath  Cardiovascular: Negative for chest pain, palpitations and leg swelling  Gastrointestinal: Negative for abdominal pain, constipation and diarrhea  Endocrine: Negative for cold intolerance, heat intolerance, polydipsia, polyphagia and polyuria  Genitourinary: Negative for frequency  Musculoskeletal: Positive for arthralgias, back pain and gait problem  Negative for myalgias  Skin: Negative for rash  Neurological: Negative for dizziness and syncope  Hematological: Negative for adenopathy  Psychiatric/Behavioral: Negative for sleep disturbance  All other systems reviewed and are negative  Physical Exam:  Body mass index is 32 61 kg/m²  /68   Pulse 90   Ht 5' 9" (1 753 m)   Wt 100 kg (220 lb 12 8 oz)   BMI 32 61 kg/m²    Wt Readings from Last 3 Encounters:   02/03/21 100 kg (220 lb 12 8 oz)   01/27/21 99 8 kg (220 lb)   10/12/20 98 3 kg (216 lb 12 8 oz)       Physical Exam  Vitals signs reviewed  Constitutional:       General: He is not in acute distress  Appearance: He is well-developed  HENT:      Head: Normocephalic and atraumatic  Eyes:      Conjunctiva/sclera: Conjunctivae normal       Pupils: Pupils are equal, round, and reactive to light  Neck:      Musculoskeletal: Normal range of motion and neck supple  Thyroid: No thyromegaly  Cardiovascular:      Rate and Rhythm: Normal rate and regular rhythm  Heart sounds: Normal heart sounds  No murmur  Pulmonary:      Effort: Pulmonary effort is normal  No respiratory distress  Breath sounds: Normal breath sounds  No wheezing or rales  Abdominal:      General: Bowel sounds are normal  There is no distension  Palpations: Abdomen is soft  Tenderness: There is no abdominal tenderness  Musculoskeletal: Normal range of motion  Lymphadenopathy:      Cervical: No cervical adenopathy     Skin:     General: Skin is warm and dry  Neurological:      Mental Status: He is alert and oriented to person, place, and time         Labs:     Component      Latest Ref Rng & Units 8/11/2020   WBC      4 31 - 10 16 Thousand/uL 6 34   Red Blood Cell Count      3 88 - 5 62 Million/uL 4 26   Hemoglobin      12 0 - 17 0 g/dL 13 8   HCT      36 5 - 49 3 % 40 9   MCV      82 - 98 fL 96   MCH      26 8 - 34 3 pg 32 4   MCHC      31 4 - 37 4 g/dL 33 7   RDW      11 6 - 15 1 % 12 5   MPV      8 9 - 12 7 fL 11 4   Platelet Count      949 - 390 Thousands/uL 200   nRBC      /100 WBCs 0   Neutrophils %      43 - 75 % 41 (L)   Immat GRANS %      0 - 2 % 0   Lymphocytes Relative      14 - 44 % 47 (H)   Monocytes Relative      4 - 12 % 8   Eosinophils      0 - 6 % 3   Basophils Relative      0 - 1 % 1   Absolute Neutrophils      1 85 - 7 62 Thousands/µL 2 60   Immature Grans Absolute      0 00 - 0 20 Thousand/uL 0 01   Lymphocytes Absolute      0 60 - 4 47 Thousands/µL 3 03   Absolute Monocytes      0 17 - 1 22 Thousand/µL 0 51   Absolute Eosinophils      0 00 - 0 61 Thousand/µL 0 16   Basophils Absolute      0 00 - 0 10 Thousands/µL 0 03   Sodium      136 - 145 mmol/L 137   Potassium      3 5 - 5 3 mmol/L 4 2   Chloride      100 - 108 mmol/L 99 (L)   CO2      21 - 32 mmol/L 30   Anion Gap      4 - 13 mmol/L 8   BUN      5 - 25 mg/dL 19   Creatinine      0 60 - 1 30 mg/dL 0 94   GLUCOSE FASTING      65 - 99 mg/dL 119 (H)   Calcium      8 3 - 10 1 mg/dL 9 2   AST      5 - 45 U/L 29   ALT      12 - 78 U/L 56   Alkaline Phosphatase      46 - 116 U/L 56   Total Protein      6 4 - 8 2 g/dL 7 7   Albumin      3 5 - 5 0 g/dL 3 9   TOTAL BILIRUBIN      0 20 - 1 00 mg/dL 0 49   eGFR      ml/min/1 73sq m 92   Cholesterol      50 - 200 mg/dL 133   Triglycerides      <=150 mg/dL 84   HDL      >=40 mg/dL 37 (L)   LDL Calculated      0 - 100 mg/dL 79   EXT Creatinine Urine      mg/dL 117 0   MICROALBUM ,U,RANDOM      0 0 - 20 0 mg/L 39 4 (H) MICROALBUMIN/CREATININE RATIO      0 - 30 mg/g creatinine 34 (H)   Hemoglobin A1C      Normal 3 8-5 6%; PreDiabetic 5 7-6 4%; Diabetic >=6 5%; Glycemic control for adults with diabetes <7 0% % 6 9 (H)   eAG, EST AVG Glucose      mg/dl 151   TESTOSTERONE FREE      7 2 - 24 0 pg/mL 9 9   Testosterone, Total, LC/MS      264 - 916 ng/dL 493   Free T4      0 76 - 1 46 ng/dL 0 83   TSH 3RD GENERATON      0 358 - 3 740 uIU/mL 0 988   Vit D, 25-Hydroxy      30 0 - 100 0 ng/mL 49 0       Impression & Plan:    Problem List Items Addressed This Visit        Endocrine    Testicular hypogonadism     Continue weekly testosterone injections         Relevant Orders    Testosterone, free, total    CBC and differential    Uncontrolled type 2 diabetes mellitus with complication ([address] ) - Primary     Well controlled based on last A1C of 6 9 and reports of sporadic glucose monitoring  Continue Current regimen  Complete lab testing as ordered and focus on dietary improvements since exercise is limited due to pain  Refer for diabetic eye exam and will request note from podiatry  Lab Results   Component Value Date    HGBA1C 6 9 (H) 08/11/2020            Relevant Medications    metFORMIN (GLUCOPHAGE-XR) 500 mg 24 hr tablet    Other Relevant Orders    Ambulatory referral to Optometry    Hemoglobin A1C       Cardiovascular and Mediastinum    Essential hypertension     Well controlled on lisinopril            Relevant Medications    lisinopril (ZESTRIL) 20 mg tablet       Musculoskeletal and Integument    Osteoporosis     DEXA scan is normal              Other    Mixed hyperlipidemia     Continue Statin         Relevant Medications    atorvastatin (LIPITOR) 20 mg tablet    Other Relevant Orders    Comprehensive metabolic panel      Other Visit Diagnoses     Type 2 diabetes mellitus without complication, without long-term current use of insulin (HCC)        Relevant Medications    metFORMIN (GLUCOPHAGE-XR) 500 mg 24 hr tablet Hyperlipidemia, unspecified hyperlipidemia type        Relevant Medications    atorvastatin (LIPITOR) 20 mg tablet          Orders Placed This Encounter   Procedures    Hemoglobin A1C     Standing Status:   Future     Standing Expiration Date:   8/3/2021    Comprehensive metabolic panel     This is a patient instruction: Patient fasting for 8 hours or longer recommended  Standing Status:   Future     Standing Expiration Date:   8/3/2021    Testosterone, free, total     This is a patient instruction: Fasting preferred  Collections for men not undergoing treatment must be completed between 7am-9am ONLY  Collection time restrictions are not applicable to women or men already undergoing treatment  Standing Status:   Future     Standing Expiration Date:   8/3/2021    CBC and differential     This is a patient instruction: This test is non-fasting  Please drink two glasses of water morning of bloodwork  Standing Status:   Future     Standing Expiration Date:   8/3/2021    Ambulatory referral to Optometry     Standing Status:   Future     Standing Expiration Date:   2/3/2022     Referral Priority:   Routine     Referral Type:   Optometry     Referral Reason:   Specialty Services Required     Referred to Provider:   Aimee Turner OD     Requested Specialty:   Optometry     Number of Visits Requested:   1     Expiration Date:   2/3/2022       There are no Patient Instructions on file for this visit  Discussed with the patient and all questioned fully answered  He will call me if any problems arise  Follow-up appointment in 6 months       Counseled patient on diagnostic results, prognosis, risk and benefit of treatment options, instruction for management, importance of treatment compliance, Risk  factor reduction and impressions    Mayelin Tinoco PA-C

## 2021-02-08 ENCOUNTER — TELEPHONE (OUTPATIENT)
Dept: ADMINISTRATIVE | Facility: OTHER | Age: 56
End: 2021-02-08

## 2021-02-08 NOTE — TELEPHONE ENCOUNTER
Upon review of the In Basket request and the patient's chart, initial outreach has been made via fax, please see Contacts section for details       Thank you  Serena Patterson

## 2021-02-08 NOTE — LETTER
Diabetic Foot Exam Form    Date Requested: 02/15/21  Patient: Harry Mukherjee Sr   Patient : 1965   Referring Provider: Silverio Ellison MD    Diabetic Foot Exam Performed with shoes and socks removed        Yes         No     Date of Diabetic Foot Exam ______________________________  Risk Score ____________________________________________    Left Foot       Visual Inspection         Monofilament Testing Sensory Exam        Pedal Pulses         Additional Comments         Right Foot      Visual Inspection         Monofilament Testing Sensory Exam       Pedal Pulses         Additional Comments         Comments __________________________________________________________    Practice Providing Exam ______________________________________________    Exam Performed By (print name) _______________________________________      Provider Signature ___________________________________________________      These reports are needed for  compliance    Please fax this completed form and a copy of the Diabetic Foot Exam report to our office located at [place] [address] as soon as possible to [phone] alisa Worthy: Phone [phone]    We thank you for your assistance in treating our mutual patient

## 2021-02-08 NOTE — TELEPHONE ENCOUNTER
----- Message from Jaylene See sent at 2/5/2021  1:32 PM EST -----  Regarding: HM FOOT EXAM  02/05/21 1:33 PM    Hello, our patient Mosie Blizzard  has had Diabetic Foot Exam completed/performed   Please assist in updating the patient chart  by  calling Dr Rory Lomax office @ [phone]  Thank you,  Roslyn Bush  [facility]

## 2021-02-08 NOTE — LETTER
Diabetic Foot Exam Form    Date Requested: 21  Patient: Sia Erazo Sr   Patient : 1965   Referring Provider: Win Knox MD    Diabetic Foot Exam Performed with shoes and socks removed        Yes         No     Date of Diabetic Foot Exam ______________________________  Risk Score ____________________________________________    Left Foot       Visual Inspection         Monofilament Testing Sensory Exam        Pedal Pulses         Additional Comments         Right Foot      Visual Inspection         Monofilament Testing Sensory Exam       Pedal Pulses         Additional Comments         Comments __________________________________________________________    Practice Providing Exam ______________________________________________    Exam Performed By (print name) _______________________________________      Provider Signature ___________________________________________________      These reports are needed for  compliance    Please fax this completed form and a copy of the Diabetic Foot Exam report to our office located at [place] [address] as soon as possible to [phone] alisa Salazar: Phone [phone]    We thank you for your assistance in treating our mutual patient

## 2021-02-09 ENCOUNTER — IMMUNIZATIONS (OUTPATIENT)
Dept: FAMILY MEDICINE CLINIC | Facility: HOSPITAL | Age: 56
End: 2021-02-09

## 2021-02-09 DIAGNOSIS — Z23 ENCOUNTER FOR IMMUNIZATION: Primary | ICD-10-CM

## 2021-02-09 PROCEDURE — 91301 SARS-COV-2 / COVID-19 MRNA VACCINE (MODERNA) 100 MCG: CPT

## 2021-02-09 PROCEDURE — 0011A SARS-COV-2 / COVID-19 MRNA VACCINE (MODERNA) 100 MCG: CPT

## 2021-02-15 NOTE — TELEPHONE ENCOUNTER
As a follow-up, a second attempt has been made for outreach via fax, please see Contacts section for details      Thank you  Beverly Christian

## 2021-02-18 NOTE — TELEPHONE ENCOUNTER
As a final attempt, a third outreach has been made via telephone call  Please see Contacts section for details  This encounter will be closed and completed by end of day  Should we receive the requested information because of previous outreach attempts, the requested patient's chart will be updated appropriately       Thank you  Cody Burns

## 2021-03-08 ENCOUNTER — IMMUNIZATIONS (OUTPATIENT)
Dept: FAMILY MEDICINE CLINIC | Facility: HOSPITAL | Age: 56
End: 2021-03-08

## 2021-03-08 DIAGNOSIS — Z23 ENCOUNTER FOR IMMUNIZATION: Primary | ICD-10-CM

## 2021-03-08 PROCEDURE — 91301 SARS-COV-2 / COVID-19 MRNA VACCINE (MODERNA) 100 MCG: CPT

## 2021-03-08 PROCEDURE — 0012A SARS-COV-2 / COVID-19 MRNA VACCINE (MODERNA) 100 MCG: CPT

## 2021-04-19 ENCOUNTER — APPOINTMENT (OUTPATIENT)
Dept: LAB | Facility: CLINIC | Age: 56
End: 2021-04-19
Payer: COMMERCIAL

## 2021-04-19 ENCOUNTER — TRANSCRIBE ORDERS (OUTPATIENT)
Dept: LAB | Facility: CLINIC | Age: 56
End: 2021-04-19

## 2021-04-19 DIAGNOSIS — Z00.8 HEALTH EXAMINATION IN POPULATION SURVEY: Primary | ICD-10-CM

## 2021-04-19 DIAGNOSIS — E29.1 TESTICULAR HYPOGONADISM: ICD-10-CM

## 2021-04-19 DIAGNOSIS — E78.2 MIXED HYPERLIPIDEMIA: ICD-10-CM

## 2021-04-19 DIAGNOSIS — IMO0002 UNCONTROLLED TYPE 2 DIABETES MELLITUS WITH COMPLICATION: ICD-10-CM

## 2021-04-19 LAB
ALBUMIN SERPL BCP-MCNC: 3.8 G/DL (ref 3.5–5)
ALP SERPL-CCNC: 56 U/L (ref 46–116)
ALT SERPL W P-5'-P-CCNC: 48 U/L (ref 12–78)
ANION GAP SERPL CALCULATED.3IONS-SCNC: 3 MMOL/L (ref 4–13)
AST SERPL W P-5'-P-CCNC: 24 U/L (ref 5–45)
BASOPHILS # BLD AUTO: 0.05 THOUSANDS/ΜL (ref 0–0.1)
BASOPHILS NFR BLD AUTO: 1 % (ref 0–1)
BILIRUB SERPL-MCNC: 0.97 MG/DL (ref 0.2–1)
BUN SERPL-MCNC: 16 MG/DL (ref 5–25)
CALCIUM SERPL-MCNC: 9.1 MG/DL (ref 8.3–10.1)
CHLORIDE SERPL-SCNC: 101 MMOL/L (ref 100–108)
CHOLEST SERPL-MCNC: 122 MG/DL (ref 50–200)
CO2 SERPL-SCNC: 31 MMOL/L (ref 21–32)
CREAT SERPL-MCNC: 0.88 MG/DL (ref 0.6–1.3)
EOSINOPHIL # BLD AUTO: 0.18 THOUSAND/ΜL (ref 0–0.61)
EOSINOPHIL NFR BLD AUTO: 2 % (ref 0–6)
ERYTHROCYTE [DISTWIDTH] IN BLOOD BY AUTOMATED COUNT: 12.6 % (ref 11.6–15.1)
EST. AVERAGE GLUCOSE BLD GHB EST-MCNC: 160 MG/DL
GFR SERPL CREATININE-BSD FRML MDRD: 97 ML/MIN/1.73SQ M
GLUCOSE P FAST SERPL-MCNC: 127 MG/DL (ref 65–99)
HBA1C MFR BLD: 7.2 %
HCT VFR BLD AUTO: 39.8 % (ref 36.5–49.3)
HDLC SERPL-MCNC: 33 MG/DL
HGB BLD-MCNC: 13.3 G/DL (ref 12–17)
IMM GRANULOCYTES # BLD AUTO: 0.03 THOUSAND/UL (ref 0–0.2)
IMM GRANULOCYTES NFR BLD AUTO: 0 % (ref 0–2)
LDLC SERPL CALC-MCNC: 70 MG/DL (ref 0–100)
LYMPHOCYTES # BLD AUTO: 3.47 THOUSANDS/ΜL (ref 0.6–4.47)
LYMPHOCYTES NFR BLD AUTO: 42 % (ref 14–44)
MCH RBC QN AUTO: 31.7 PG (ref 26.8–34.3)
MCHC RBC AUTO-ENTMCNC: 33.4 G/DL (ref 31.4–37.4)
MCV RBC AUTO: 95 FL (ref 82–98)
MONOCYTES # BLD AUTO: 0.79 THOUSAND/ΜL (ref 0.17–1.22)
MONOCYTES NFR BLD AUTO: 10 % (ref 4–12)
NEUTROPHILS # BLD AUTO: 3.68 THOUSANDS/ΜL (ref 1.85–7.62)
NEUTS SEG NFR BLD AUTO: 45 % (ref 43–75)
NONHDLC SERPL-MCNC: 89 MG/DL
NRBC BLD AUTO-RTO: 0 /100 WBCS
PLATELET # BLD AUTO: 216 THOUSANDS/UL (ref 149–390)
PMV BLD AUTO: 10.7 FL (ref 8.9–12.7)
POTASSIUM SERPL-SCNC: 4.2 MMOL/L (ref 3.5–5.3)
PROT SERPL-MCNC: 7.4 G/DL (ref 6.4–8.2)
RBC # BLD AUTO: 4.2 MILLION/UL (ref 3.88–5.62)
SODIUM SERPL-SCNC: 135 MMOL/L (ref 136–145)
TRIGL SERPL-MCNC: 96 MG/DL
WBC # BLD AUTO: 8.2 THOUSAND/UL (ref 4.31–10.16)

## 2021-04-19 PROCEDURE — 80053 COMPREHEN METABOLIC PANEL: CPT

## 2021-04-19 PROCEDURE — 84403 ASSAY OF TOTAL TESTOSTERONE: CPT

## 2021-04-19 PROCEDURE — 84402 ASSAY OF FREE TESTOSTERONE: CPT

## 2021-04-19 PROCEDURE — 36415 COLL VENOUS BLD VENIPUNCTURE: CPT

## 2021-04-19 PROCEDURE — 80061 LIPID PANEL: CPT

## 2021-04-19 PROCEDURE — 85025 COMPLETE CBC W/AUTO DIFF WBC: CPT

## 2021-04-19 PROCEDURE — 83036 HEMOGLOBIN GLYCOSYLATED A1C: CPT

## 2021-04-19 RX ORDER — TESTOSTERONE CYPIONATE 200 MG/ML
INJECTION INTRAMUSCULAR
Qty: 4 ML | Refills: 5 | Status: SHIPPED | OUTPATIENT
Start: 2021-04-19 | End: 2021-12-20 | Stop reason: SDUPTHER

## 2021-04-20 LAB
TESTOST FREE SERPL-MCNC: 17 PG/ML (ref 7.2–24)
TESTOST SERPL-MCNC: 677 NG/DL (ref 264–916)

## 2021-04-26 ENCOUNTER — TELEPHONE (OUTPATIENT)
Dept: ENDOCRINOLOGY | Facility: CLINIC | Age: 56
End: 2021-04-26

## 2021-04-26 DIAGNOSIS — IMO0002 UNCONTROLLED TYPE 2 DIABETES MELLITUS WITH COMPLICATION: Primary | ICD-10-CM

## 2021-05-03 DIAGNOSIS — I10 ESSENTIAL HYPERTENSION: ICD-10-CM

## 2021-05-03 RX ORDER — LISINOPRIL 20 MG/1
20 TABLET ORAL DAILY
Qty: 90 TABLET | Refills: 1 | Status: SHIPPED | OUTPATIENT
Start: 2021-05-03 | End: 2021-11-01 | Stop reason: SDUPTHER

## 2021-05-13 ENCOUNTER — OFFICE VISIT (OUTPATIENT)
Dept: FAMILY MEDICINE CLINIC | Facility: CLINIC | Age: 56
End: 2021-05-13
Payer: COMMERCIAL

## 2021-05-13 VITALS
SYSTOLIC BLOOD PRESSURE: 120 MMHG | BODY MASS INDEX: 31.55 KG/M2 | TEMPERATURE: 97.8 F | WEIGHT: 213 LBS | DIASTOLIC BLOOD PRESSURE: 74 MMHG | OXYGEN SATURATION: 94 % | HEART RATE: 80 BPM | HEIGHT: 69 IN

## 2021-05-13 DIAGNOSIS — M45.0 ANKYLOSING SPONDYLITIS OF MULTIPLE SITES IN SPINE (HCC): ICD-10-CM

## 2021-05-13 DIAGNOSIS — G89.4 CHRONIC PAIN DISORDER: ICD-10-CM

## 2021-05-13 DIAGNOSIS — I10 ESSENTIAL HYPERTENSION: ICD-10-CM

## 2021-05-13 DIAGNOSIS — K21.9 HIATAL HERNIA WITH GERD: Primary | ICD-10-CM

## 2021-05-13 DIAGNOSIS — Z00.00 ROUTINE GENERAL MEDICAL EXAMINATION AT A HEALTH CARE FACILITY: ICD-10-CM

## 2021-05-13 DIAGNOSIS — K44.9 HIATAL HERNIA WITH GERD: Primary | ICD-10-CM

## 2021-05-13 DIAGNOSIS — IMO0002 UNCONTROLLED TYPE 2 DIABETES MELLITUS WITH COMPLICATION: ICD-10-CM

## 2021-05-13 DIAGNOSIS — E78.2 MIXED HYPERLIPIDEMIA: ICD-10-CM

## 2021-05-13 DIAGNOSIS — E29.1 TESTICULAR HYPOGONADISM: ICD-10-CM

## 2021-05-13 DIAGNOSIS — Z12.11 COLON CANCER SCREENING: ICD-10-CM

## 2021-05-13 DIAGNOSIS — E11.42 DIABETIC POLYNEUROPATHY ASSOCIATED WITH TYPE 2 DIABETES MELLITUS (HCC): ICD-10-CM

## 2021-05-13 PROCEDURE — 99396 PREV VISIT EST AGE 40-64: CPT | Performed by: PHYSICIAN ASSISTANT

## 2021-05-13 RX ORDER — OMEPRAZOLE 40 MG/1
40 CAPSULE, DELAYED RELEASE ORAL DAILY
Qty: 90 CAPSULE | Refills: 3 | Status: SHIPPED | OUTPATIENT
Start: 2021-05-13 | End: 2022-06-07 | Stop reason: SDUPTHER

## 2021-05-13 NOTE — ASSESSMENT & PLAN NOTE
Managed by Encocr[facility]  Lab Results   Component Value Date    HGBA1C 7 2 (H) 04/19/2021   Performed foot exam today

## 2021-05-13 NOTE — ASSESSMENT & PLAN NOTE
Moderately well managed  Symptoms are primarily positional  - Refilled Prilosec  Discussed potential long term impact including osteoporosis  Patient is aware if risks

## 2021-05-13 NOTE — PROGRESS NOTES
[facility]    NAME: Caro Samayoa Sr   AGE: 48 y o  SEX: male  : 1965     DATE: 2021     Assessment and Plan:     Problem List Items Addressed This Visit        Digestive    Hiatal hernia with GERD - Primary     Moderately well managed  Symptoms are primarily positional  - Refilled Prilosec  Discussed potential long term impact including osteoporosis  Patient is aware if risks  Relevant Medications    omeprazole (PriLOSEC) 40 MG capsule       Endocrine    Testicular hypogonadism     Managed by Endo         [facility]     Managed by Encocrinology  Lab Results   Component Value Date    HGBA1C 7 2 (H) 2021   Performed foot exam today         Diabetic polyneuropathy [facility])       Cardiovascular and Mediastinum    Essential hypertension       Musculoskeletal and Integument    Ankylosing spondylitis ([facility] )     Managed by Physiatry             Other    Chronic pain disorder     Managed by Physiatry   Has pain pump         Mixed hyperlipidemia     Managed by Endocrine  Currently on atorvastatin 20 mg           Other Visit Diagnoses     Routine general medical examination at a health care facility        Colon cancer screening        Relevant Orders    Cologuard      Pt is a 48 y o  male  Vaccines: UTD including COVID  Discussed Shingrix  Family history: little known of parents health  "Private people "  - Labs: per orders      Immunizations and preventive care screenings were discussed with patient today  Appropriate education was printed on patient's after visit summary  Counseling:  Alcohol/drug use: discussed moderation in alcohol intake, the recommendations for healthy alcohol use, and avoidance of illicit drug use  Dental Health: discussed importance of regular tooth brushing, flossing, and dental visits    Sexual health: discussed sexually transmitted diseases, partner selection, use of condoms, avoidance of unintended pregnancy, and contraceptive alternatives  · Exercise: the importance of regular exercise/physical activity was discussed  Recommend exercise 3-5 times per week for at least 30 minutes  BMI Counseling: Body mass index is 31 45 kg/m²  The BMI is above normal  Nutrition recommendations include decreasing portion sizes, encouraging healthy choices of fruits and vegetables, limiting drinks that contain sugar and reducing intake of cholesterol  Exercise recommendations include moderate physical activity 150 minutes/week  No pharmacotherapy was ordered  No follow-ups on file  Chief Complaint:     No chief complaint on file  History of Present Illness:     Adult Annual Physical   Patient here for a comprehensive physical exam  The patient reports no problems  Requesting refill of omeprazole  Has Hiatal hernia and GERD  Acid in mouth when laying down any time of day in bed  It leads to chills, sweats and vomiting  Sees Physiatry and Endocrine  Never had colon cancer screening  Not inclined to get colonoscopy    Diet and Physical Activity  · Diet/Nutrition: well balanced diet and heart healthy (low sodium) diet  · Exercise: no formal exercise  Depression Screening  PHQ-9 Depression Screening    PHQ-9:   Frequency of the following problems over the past two weeks:           General Health  · Sleep: sleeps well  Unable to tolerate CPAP  Improved with gabapentin  · Hearing: normal - bilateral   · Vision: no vision problems  · Dental: no dental visits for >1 year and brushes teeth twice daily  Last seen 3 years ago  Panic attacks with visits   Health  · Symptoms include: none     Review of Systems:     Review of Systems   Constitutional: Negative for activity change, chills, fatigue, fever and unexpected weight change  Respiratory: Negative for cough, shortness of breath and wheezing      Cardiovascular: Negative for chest pain, palpitations and leg swelling  Gastrointestinal: Negative for constipation, diarrhea, nausea and vomiting  Musculoskeletal: Positive for arthralgias ("all over"), back pain (baseline), myalgias, neck pain and neck stiffness  Allergic/Immunologic: Negative for environmental allergies and food allergies  Neurological: Negative for dizziness, weakness and headaches  Psychiatric/Behavioral: Negative for dysphoric mood and sleep disturbance  The patient is not nervous/anxious  Past Medical History:     Past Medical History:   Diagnosis Date    Chronic pain     Depression     Diabetes (HCC)     Fibromyalgia, primary     GERD (gastroesophageal reflux disease)     Hyperlipidemia     Hypertension     Low testosterone     Neuropathy     Pilonidal cyst     last assessed 3/17/2014    Sleep apnea     no cpap      Past Surgical History:     Past Surgical History:   Procedure Laterality Date    BACK SURGERY      discectomy    ELBOW SURGERY Right     OTHER SURGICAL HISTORY  03/13/2015    Electr analysis of progr impl pump w/reprogram refill, requiring physician    Replacement of right abdomen intrathecal  pain pump filled with Dilaudid with electronic analysis and programming   managed by [facility] PILONIDAL CYST EXCISION      [place] SHLDR ARTHROSCOP,SURG,W/ROTAT CUFF REPR Right 2/28/2020    Procedure: SHOULDER ARTHROSCOPIC ROTATOR CUFF REPAIR AND DEBRIDEMENT;  Surgeon: Barrett Bingham MD;  [facility]: [facility];  Service: Orthopedics    ROTATOR CUFF REPAIR Left     WISDOM TOOTH EXTRACTION        Family History:     Family History   Problem Relation Age of Onset    Diabetes unspecified Sister     Hypertension Sister     Hyperlipidemia Sister         pure hypercholesterolemia    Diabetes unspecified Brother         DM    Hypertension Brother     Hyperlipidemia Brother         pure hypercholesterolemia    Coronary artery disease Father     Heart disease Father    Julianna Ho Diabetes Brother         DM    Hypertension Family     Alcohol abuse Mother     Liver disease Mother       Social History:        Social History     Socioeconomic History    Marital status: /Civil Union     Spouse name: None    Number of children: None    Years of education: None    Highest education level: None   Occupational History    Occupation: retired construction   Social Needs    Financial resource strain: None    Food insecurity     Worry: None     Inability: None    Transportation needs     Medical: None     Non-medical: None   Tobacco Use    Smoking status: Former Smoker     Start date: 2/24/2018    Smokeless tobacco: Never Used   Substance and Sexual Activity    Alcohol use: No    Drug use: Never    Sexual activity: None   Lifestyle    Physical activity     Days per week: None     Minutes per session: None    Stress: None   Relationships    Social connections     Talks on phone: None     Gets together: None     Attends Sabianist service: None     Active member of club or organization: None     Attends meetings of clubs or organizations: None     Relationship status: None    Intimate partner violence     Fear of current or ex partner: None     Emotionally abused: None     Physically abused: None     Forced sexual activity: None   Other Topics Concern    None   Social History Narrative    None      Current Medications:     Current Outpatient Medications   Medication Sig Dispense Refill    Ascorbic Acid (vitamin C) 100 MG tablet Take 100 mg by mouth daily      b complex vitamins capsule Take 1 capsule by mouth daily      Cholecalciferol (VITAMIN D) 2000 units CAPS Take 1 capsule by mouth daily      gabapentin (NEURONTIN) 300 mg capsule Take 600 mg by mouth daily Taking two 600 mg at night      glucose blood (ONETOUCH VERIO) test strip 1 each by Other route 3 (three) times a day 300 each 3    HYDROmorphone (DILAUDID) 4 mg/mL injection Inject 1 Device as directed daily  lisinopril (ZESTRIL) 20 mg tablet Take 1 tablet (20 mg total) by mouth daily 90 tablet 1    metFORMIN (GLUCOPHAGE-XR) 500 mg 24 hr tablet Take 2 tablets daily with breakfast and supper 360 tablet 1    methadone (DOLOPHINE) 10 mg tablet Take 2 tablets by mouth 2 (two) times a day  Indications: For breakthrough pain,       methadone (DOLOPHINE) 10 mg tablet Take 2 tablets every 6 hours for pain,      omeprazole (PriLOSEC) 40 MG capsule Take 1 capsule (40 mg total) by mouth daily 90 capsule 3    ONETOUCH DELICA LANCETS 65I MISC 1 applicator by Does not apply route 3 (three) times a day      oxyCODONE (OxyCONTIN) 40 mg 12 hr tablet Take 1 tablet by mouth every 12 (twelve) hours      oxyCODONE (OXYCONTIN) 80 mg 12 hr tablet Take 1 tablet by mouth every 12 (twelve) hours      predniSONE 10 mg tablet       SYRINGE-NEEDLE, DISP, 3 ML (B-D 3CC LUER-HAFSA SYR 21GX1") 21G X 1" 3 ML MISC by Does not apply route once a week      testosterone cypionate (DEPO-TESTOSTERONE) 200 mg/mL SOLN INJECT 0 3ML (60MG) WEEKLY USE 1 VIAL FOR 1 WEEKLY DOSE, DISCARD THE REMAINDER 4 mL 5    atorvastatin (LIPITOR) 20 mg tablet Take 1 tablet (20 mg total) by mouth daily at bedtime 90 tablet 1     No current facility-administered medications for this visit  Allergies:     No Known Allergies   Physical Exam:     /74 (BP Location: Left arm, Patient Position: Sitting, Cuff Size: Standard)   Pulse 80   Temp 97 8 °F (36 6 °C)   Ht 5' 9" (1 753 m)   Wt 96 6 kg (213 lb)   SpO2 94%   BMI 31 45 kg/m²     Physical Exam  Constitutional:       Appearance: He is well-developed  HENT:      Head: Normocephalic and atraumatic  Mouth/Throat:      Pharynx: No oropharyngeal exudate  Eyes:      Pupils: Pupils are equal, round, and reactive to light  Neck:      Musculoskeletal: Normal range of motion and neck supple  Thyroid: No thyromegaly  Cardiovascular:      Rate and Rhythm: Normal rate and regular rhythm  Pulses: no weak pulses          Dorsalis pedis pulses are 2+ on the right side and 2+ on the left side  Heart sounds: Normal heart sounds  No murmur  Pulmonary:      Effort: Pulmonary effort is normal  No respiratory distress  Breath sounds: Normal breath sounds  No wheezing  Abdominal:      General: Bowel sounds are normal  There is no distension  Palpations: Abdomen is soft  Tenderness: There is no abdominal tenderness  Hernia: No hernia is present  Musculoskeletal: Normal range of motion  Comments: Ambulates with cane   Feet:      Right foot:      Skin integrity: No ulcer, skin breakdown, erythema, warmth, callus or dry skin  Left foot:      Skin integrity: No ulcer, skin breakdown, erythema, warmth, callus or dry skin  Lymphadenopathy:      Cervical: No cervical adenopathy  Skin:     General: Skin is warm  Neurological:      Mental Status: He is alert and oriented to person, place, and time  Psychiatric:         Behavior: Behavior normal          Thought Content: Thought content normal      Patient's shoes and socks removed  Right Foot/Ankle   Right Foot Inspection  Skin Exam: skin normal skin not intact, no dry skin, no warmth, no callus, no erythema, no maceration, no abnormal color, no pre-ulcer, no ulcer and no callus                          Toe Exam: ROM and strength within normal limits  Sensory   Vibration: intact  Proprioception: intact   Monofilament testing: intact  Vascular  Capillary refills: < 3 seconds  The right DP pulse is 2+  Left Foot/Ankle  Left Foot Inspection  Skin Exam: skin normalskin not intact, no dry skin, no warmth, no erythema, no maceration, normal color, no pre-ulcer, no ulcer and no callus                         Toe Exam: ROM and strength within normal limits                   Sensory   Vibration: intact  Proprioception: intact  Monofilament: intact  Vascular  Capillary refills: < 3 seconds  The left DP pulse is 2+     Assign Risk Category:  No deformity present; No loss of protective sensation;  No weak pulses       Risk: 0        Taryn Larios PA-C  [id number]

## 2021-06-25 DIAGNOSIS — E11.9 TYPE 2 DIABETES MELLITUS WITHOUT COMPLICATION, WITHOUT LONG-TERM CURRENT USE OF INSULIN (HCC): ICD-10-CM

## 2021-06-25 RX ORDER — BLOOD SUGAR DIAGNOSTIC
1 STRIP MISCELLANEOUS 4 TIMES DAILY
Qty: 400 EACH | Refills: 3 | Status: SHIPPED | OUTPATIENT
Start: 2021-06-25 | End: 2021-06-28 | Stop reason: CLARIF

## 2021-06-28 DIAGNOSIS — E11.9 TYPE 2 DIABETES MELLITUS WITHOUT COMPLICATION, WITHOUT LONG-TERM CURRENT USE OF INSULIN (HCC): Primary | ICD-10-CM

## 2021-06-28 RX ORDER — BLOOD-GLUCOSE METER
1 EACH MISCELLANEOUS 4 TIMES DAILY
Qty: 1 KIT | Refills: 0 | Status: SHIPPED | OUTPATIENT
Start: 2021-06-28

## 2021-06-28 RX ORDER — LANCETS
EACH MISCELLANEOUS 4 TIMES DAILY
Qty: 400 EACH | Refills: 1 | Status: SHIPPED | OUTPATIENT
Start: 2021-06-28

## 2021-06-28 RX ORDER — BLOOD SUGAR DIAGNOSTIC
1 STRIP MISCELLANEOUS 4 TIMES DAILY
Qty: 400 EACH | Refills: 1 | Status: SHIPPED | OUTPATIENT
Start: 2021-06-28

## 2021-07-08 ENCOUNTER — TELEPHONE (OUTPATIENT)
Dept: FAMILY MEDICINE CLINIC | Facility: CLINIC | Age: 56
End: 2021-07-08

## 2021-07-21 ENCOUNTER — APPOINTMENT (OUTPATIENT)
Dept: LAB | Facility: CLINIC | Age: 56
End: 2021-07-21
Payer: COMMERCIAL

## 2021-07-21 DIAGNOSIS — IMO0002 UNCONTROLLED TYPE 2 DIABETES MELLITUS WITH COMPLICATION: ICD-10-CM

## 2021-07-21 LAB
ANION GAP SERPL CALCULATED.3IONS-SCNC: 4 MMOL/L (ref 4–13)
BUN SERPL-MCNC: 17 MG/DL (ref 5–25)
CALCIUM SERPL-MCNC: 9.1 MG/DL (ref 8.3–10.1)
CHLORIDE SERPL-SCNC: 105 MMOL/L (ref 100–108)
CO2 SERPL-SCNC: 27 MMOL/L (ref 21–32)
CREAT SERPL-MCNC: 0.81 MG/DL (ref 0.6–1.3)
CREAT UR-MCNC: 153 MG/DL
EST. AVERAGE GLUCOSE BLD GHB EST-MCNC: 126 MG/DL
GFR SERPL CREATININE-BSD FRML MDRD: 101 ML/MIN/1.73SQ M
GLUCOSE P FAST SERPL-MCNC: 127 MG/DL (ref 65–99)
HBA1C MFR BLD: 6 %
MICROALBUMIN UR-MCNC: 51.5 MG/L (ref 0–20)
MICROALBUMIN/CREAT 24H UR: 34 MG/G CREATININE (ref 0–30)
POTASSIUM SERPL-SCNC: 3.9 MMOL/L (ref 3.5–5.3)
SODIUM SERPL-SCNC: 136 MMOL/L (ref 136–145)

## 2021-07-21 PROCEDURE — 80048 BASIC METABOLIC PNL TOTAL CA: CPT

## 2021-07-21 PROCEDURE — 82043 UR ALBUMIN QUANTITATIVE: CPT

## 2021-07-21 PROCEDURE — 83036 HEMOGLOBIN GLYCOSYLATED A1C: CPT

## 2021-07-21 PROCEDURE — 82570 ASSAY OF URINE CREATININE: CPT

## 2021-07-21 PROCEDURE — 36415 COLL VENOUS BLD VENIPUNCTURE: CPT

## 2021-08-02 ENCOUNTER — OFFICE VISIT (OUTPATIENT)
Dept: ENDOCRINOLOGY | Facility: CLINIC | Age: 56
End: 2021-08-02
Payer: COMMERCIAL

## 2021-08-02 VITALS
WEIGHT: 196 LBS | BODY MASS INDEX: 29.03 KG/M2 | HEIGHT: 69 IN | SYSTOLIC BLOOD PRESSURE: 130 MMHG | HEART RATE: 74 BPM | DIASTOLIC BLOOD PRESSURE: 80 MMHG

## 2021-08-02 DIAGNOSIS — I10 ESSENTIAL HYPERTENSION: ICD-10-CM

## 2021-08-02 DIAGNOSIS — E29.1 TESTICULAR HYPOGONADISM: Primary | ICD-10-CM

## 2021-08-02 DIAGNOSIS — Z12.5 ENCOUNTER FOR SCREENING FOR MALIGNANT NEOPLASM OF PROSTATE: ICD-10-CM

## 2021-08-02 DIAGNOSIS — E11.9 TYPE 2 DIABETES MELLITUS WITHOUT COMPLICATION, WITHOUT LONG-TERM CURRENT USE OF INSULIN (HCC): ICD-10-CM

## 2021-08-02 DIAGNOSIS — E78.5 HYPERLIPIDEMIA, UNSPECIFIED HYPERLIPIDEMIA TYPE: ICD-10-CM

## 2021-08-02 DIAGNOSIS — E78.2 MIXED HYPERLIPIDEMIA: ICD-10-CM

## 2021-08-02 DIAGNOSIS — IMO0002 UNCONTROLLED TYPE 2 DIABETES MELLITUS WITH COMPLICATION: ICD-10-CM

## 2021-08-02 PROCEDURE — 99214 OFFICE O/P EST MOD 30 MIN: CPT | Performed by: PHYSICIAN ASSISTANT

## 2021-08-02 RX ORDER — METFORMIN HYDROCHLORIDE 500 MG/1
TABLET, EXTENDED RELEASE ORAL
Qty: 360 TABLET | Refills: 1 | Status: SHIPPED | OUTPATIENT
Start: 2021-08-02 | End: 2021-11-01 | Stop reason: SDUPTHER

## 2021-08-02 RX ORDER — ATORVASTATIN CALCIUM 20 MG/1
20 TABLET, FILM COATED ORAL
Qty: 90 TABLET | Refills: 1 | Status: SHIPPED | OUTPATIENT
Start: 2021-08-02 | End: 2021-11-01 | Stop reason: SDUPTHER

## 2021-08-02 NOTE — ASSESSMENT & PLAN NOTE
A1C has improved to 6 0  Continue metformin and keep up the good work with dietary improvements/weight loss     Lab Results   Component Value Date    HGBA1C 6 0 (H) 07/21/2021

## 2021-08-02 NOTE — PROGRESS NOTES
Established Patient Progress Note      Chief Complaint   Patient presents with    Diabetes Type 2    Hypogonadism        History of Present Illness:   Mary Chacon  is a 48 y o  male with a history of type 2 diabetes without long term use of insulin since 2017  Reports complications of microalbuminuria  Denies recent illness or hospitalizations  Denies recent severe hypoglycemic or severe hyperglycemic episodes  Denies any issues with his current regimen  home glucose monitoring: are performed regularly and readings   He has lost about 24 pounds since last visit  He has significantly reduced amount of sugar he is using in coffee/tea and is eating less junk food  He is trying to move around and stand more which does increase his pain but makes him less likely to eat  He has noticed pain level does cause blood sugars to increase  Current regimen:   Metformin ER 1000mg twice per day     Last Eye Exam: Needs exam, his wife made (or is making?) appt  Last Foot Exam: UTD    Has hypertension: Taking lisinopril  Has hyperlipidemia: Taking atorvastatin      For Hypogonadism, taking weekly testosterone injections  Last prosate exam-- about 3 yrs ago  No urinary problems except when in pain has difficulty       Patient Active Problem List   Diagnosis    Cervical radiculopathy    Chronic pain disorder    Essential hypertension    Lumbar postlaminectomy syndrome    Mixed hyperlipidemia    Osteoporosis    Other chronic nonalcoholic liver disease    Parotid gland enlargement    Testicular hypogonadism    Uncontrolled type 2 diabetes mellitus with complication (HCC)    Tear of right supraspinatus tendon    Osteoarthritis of right acromioclavicular joint    Ankylosing spondylitis ([address] )    Degeneration of intervertebral disc of lumbar region    Degenerative lumbar spinal stenosis    Diabetic polyneuropathy ([address] )    Encounter for long-term methadone use for pain control    Presence of intrathecal pump    Osteoarthritis of both hands    Hiatal hernia with GERD      Past Medical History:   Diagnosis Date    Chronic pain     Depression     Diabetes (HCC)     Fibromyalgia, primary     GERD (gastroesophageal reflux disease)     Hyperlipidemia     Hypertension     Low testosterone     Neuropathy     Pilonidal cyst     last assessed 3/17/2014    Sleep apnea     no cpap      Past Surgical History:   Procedure Laterality Date    BACK SURGERY      discectomy    ELBOW SURGERY Right     OTHER SURGICAL HISTORY  03/13/2015    Electr analysis of progr impl pump w/reprogram refill, requiring physician    Replacement of right abdomen intrathecal  pain pump filled with Dilaudid with electronic analysis and programming   managed by [facility] PILONIDAL CYST EXCISION      NH SHLDR ARTHROSCOP,SURG,W/ROTAT CUFF REPR Right 2/28/2020    Procedure: SHOULDER ARTHROSCOPIC ROTATOR CUFF REPAIR AND DEBRIDEMENT;  Surgeon: Lucas Vuong MD;  [facility]: [facility];  Service: Orthopedics    ROTATOR CUFF REPAIR Left     WISDOM TOOTH EXTRACTION        Family History   Problem Relation Age of Onset    Diabetes unspecified Sister     Hypertension Sister     Hyperlipidemia Sister         pure hypercholesterolemia    Diabetes unspecified Brother         DM    Hypertension Brother     Hyperlipidemia Brother         pure hypercholesterolemia    Coronary artery disease Father     Heart disease Father     Diabetes Brother         DM    Hypertension Family     Alcohol abuse Mother     Liver disease Mother      Social History     Tobacco Use    Smoking status: Former Smoker     Start date: 2/24/2018    Smokeless tobacco: Never Used   Substance Use Topics    Alcohol use: No     No Known Allergies      Current Outpatient Medications:     atorvastatin (LIPITOR) 20 mg tablet, Take 1 tablet (20 mg total) by mouth daily at bedtime, Disp: 90 tablet, Rfl: 1    Blood Glucose Monitoring Suppl (Contour Next EZ) w/Device KIT, Use 1 each 4 (four) times a day, Disp: 1 kit, Rfl: 0    Cholecalciferol (VITAMIN D) 2000 units CAPS, Take 1 capsule by mouth daily, Disp: , Rfl:     gabapentin (NEURONTIN) 300 mg capsule, Take 600 mg by mouth daily Taking two 600 mg at night, Disp: , Rfl:     glucose blood (Contour Next Test) test strip, Use 1 each 4 (four) times a day, Disp: 400 each, Rfl: 1    HYDROmorphone (DILAUDID) 4 mg/mL injection, Inject 1 Device as directed daily , Disp: , Rfl:     lisinopril (ZESTRIL) 20 mg tablet, Take 1 tablet (20 mg total) by mouth daily, Disp: 90 tablet, Rfl: 1    metFORMIN (GLUCOPHAGE-XR) 500 mg 24 hr tablet, Take 2 tablets daily with breakfast and supper, Disp: 360 tablet, Rfl: 1    methadone (DOLOPHINE) 10 mg tablet, Take 2 tablets by mouth 2 (two) times a day  Indications:  For breakthrough pain, , Disp: , Rfl:     Microlet Lancets MISC, Use 4 (four) times a day, Disp: 400 each, Rfl: 1    omeprazole (PriLOSEC) 40 MG capsule, Take 1 capsule (40 mg total) by mouth daily, Disp: 90 capsule, Rfl: 3    oxyCODONE (OxyCONTIN) 40 mg 12 hr tablet, Take 1 tablet by mouth every 12 (twelve) hours, Disp: , Rfl:     oxyCODONE (OXYCONTIN) 80 mg 12 hr tablet, Take 1 tablet by mouth every 12 (twelve) hours, Disp: , Rfl:     SYRINGE-NEEDLE, DISP, 3 ML (B-D 3CC LUER-HAFSA SYR 21GX1") 21G X 1" 3 ML MISC, by Does not apply route once a week, Disp: , Rfl:     testosterone cypionate (DEPO-TESTOSTERONE) 200 mg/mL SOLN, INJECT 0 3ML (60MG) WEEKLY USE 1 VIAL FOR 1 WEEKLY DOSE, DISCARD THE REMAINDER, Disp: 4 mL, Rfl: 5    Ascorbic Acid (vitamin C) 100 MG tablet, Take 100 mg by mouth daily (Patient not taking: Reported on 8/2/2021), Disp: , Rfl:     b complex vitamins capsule, Take 1 capsule by mouth daily (Patient not taking: Reported on 8/2/2021), Disp: , Rfl:     methadone (DOLOPHINE) 10 mg tablet, Take 2 tablets every 6 hours for pain, (Patient not taking: Reported on 8/2/2021), Disp: , Rfl:    predniSONE 10 mg tablet, , Disp: , Rfl:     Review of Systems   Constitutional: Negative for activity change, appetite change and fatigue  HENT: Negative for sore throat, trouble swallowing and voice change  Eyes: Negative for visual disturbance  Respiratory: Negative for choking, chest tightness and shortness of breath  Cardiovascular: Negative for chest pain, palpitations and leg swelling  Gastrointestinal: Negative for abdominal pain, constipation and diarrhea  Endocrine: Negative for cold intolerance, heat intolerance, polydipsia, polyphagia and polyuria  Genitourinary: Negative for frequency  Musculoskeletal: Positive for back pain and gait problem  Negative for arthralgias and myalgias  Skin: Negative for rash  Neurological: Negative for dizziness and syncope  Hematological: Negative for adenopathy  Psychiatric/Behavioral: Negative for sleep disturbance  All other systems reviewed and are negative  Physical Exam:  Body mass index is 28 94 kg/m²  /80 (BP Location: Left arm, Patient Position: Sitting)   Pulse 74   Ht 5' 9" (1 753 m)   Wt 88 9 kg (196 lb)   BMI 28 94 kg/m²    Wt Readings from Last 3 Encounters:   08/02/21 88 9 kg (196 lb)   05/13/21 96 6 kg (213 lb)   02/03/21 100 kg (220 lb 12 8 oz)       Physical Exam  Vitals reviewed  Constitutional:       General: He is not in acute distress  Appearance: He is well-developed  HENT:      Head: Normocephalic and atraumatic  Eyes:      Conjunctiva/sclera: Conjunctivae normal       Pupils: Pupils are equal, round, and reactive to light  Neck:      Thyroid: No thyromegaly  Cardiovascular:      Rate and Rhythm: Normal rate and regular rhythm  Heart sounds: Normal heart sounds  No murmur heard  Pulmonary:      Effort: Pulmonary effort is normal  No respiratory distress  Breath sounds: Normal breath sounds  No wheezing or rales     Abdominal:      General: Bowel sounds are normal  There is no distension  Palpations: Abdomen is soft  Tenderness: There is no abdominal tenderness  Musculoskeletal:         General: Normal range of motion  Cervical back: Normal range of motion and neck supple  Lymphadenopathy:      Cervical: No cervical adenopathy  Skin:     General: Skin is warm and dry  Neurological:      Mental Status: He is alert and oriented to person, place, and time         Labs:   Component      Latest Ref Rng & Units 8/11/2020 8/11/2020 8/11/2020           7:28 AM  7:28 AM  7:28 AM   WBC      4 31 - 10 16 Thousand/uL 6 34     Red Blood Cell Count      3 88 - 5 62 Million/uL 4 26     Hemoglobin      12 0 - 17 0 g/dL 13 8     HCT      36 5 - 49 3 % 40 9     MCV      82 - 98 fL 96     MCH      26 8 - 34 3 pg 32 4     MCHC      31 4 - 37 4 g/dL 33 7     RDW      11 6 - 15 1 % 12 5     MPV      8 9 - 12 7 fL 11 4     Platelet Count      503 - 390 Thousands/uL 200     nRBC      /100 WBCs 0     Neutrophils %      43 - 75 % 41 (L)     Immat GRANS %      0 - 2 % 0     Lymphocytes Relative      14 - 44 % 47 (H)     Monocytes Relative      4 - 12 % 8     Eosinophils      0 - 6 % 3     Basophils Relative      0 - 1 % 1     Absolute Neutrophils      1 85 - 7 62 Thousands/µL 2 60     Immature Grans Absolute      0 00 - 0 20 Thousand/uL 0 01     Lymphocytes Absolute      0 60 - 4 47 Thousands/µL 3 03     Absolute Monocytes      0 17 - 1 22 Thousand/µL 0 51     Absolute Eosinophils      0 00 - 0 61 Thousand/µL 0 16     Basophils Absolute      0 00 - 0 10 Thousands/µL 0 03     Sodium      136 - 145 mmol/L      Potassium      3 5 - 5 3 mmol/L      Chloride      100 - 108 mmol/L      CO2      21 - 32 mmol/L      Anion Gap      4 - 13 mmol/L      BUN      5 - 25 mg/dL      Creatinine      0 60 - 1 30 mg/dL      GLUCOSE FASTING      65 - 99 mg/dL      Calcium      8 3 - 10 1 mg/dL      AST      5 - 45 U/L      ALT      12 - 78 U/L      Alkaline Phosphatase      46 - 116 U/L      Total Protein 6 4 - 8 2 g/dL      Albumin      3 5 - 5 0 g/dL      TOTAL BILIRUBIN      0 20 - 1 00 mg/dL      eGFR      ml/min/1 73sq m      Cholesterol      50 - 200 mg/dL      Triglycerides      <=150 mg/dL      HDL      >=40 mg/dL      LDL Calculated      0 - 100 mg/dL      Non-HDL Cholesterol      mg/dl      EXT Creatinine Urine      mg/dL      MICROALBUM ,U,RANDOM      0 0 - 20 0 mg/L      MICROALBUMIN/CREATININE RATIO      0 - 30 mg/g creatinine      TESTOSTERONE FREE      7 2 - 24 0 pg/mL      Testosterone, Total, LC/MS      264 - 916 ng/dL      Hemoglobin A1C      Normal 3 8-5 6%; PreDiabetic 5 7-6 4%;  Diabetic >=6 5%; Glycemic control for adults with diabetes <7 0% %      eAG, EST AVG Glucose      mg/dl      Vit D, 25-Hydroxy      30 0 - 100 0 ng/mL   49 0   PSA, Total      0 0 - 4 0 ng/mL  0 7      Component      Latest Ref Rng & Units 8/11/2020 4/19/2021 4/19/2021           7:28 AM  7:28 AM  7:28 AM   WBC      4 31 - 10 16 Thousand/uL  8 20    Red Blood Cell Count      3 88 - 5 62 Million/uL  4 20    Hemoglobin      12 0 - 17 0 g/dL  13 3    HCT      36 5 - 49 3 %  39 8    MCV      82 - 98 fL  95    MCH      26 8 - 34 3 pg  31 7    MCHC      31 4 - 37 4 g/dL  33 4    RDW      11 6 - 15 1 %  12 6    MPV      8 9 - 12 7 fL  10 7    Platelet Count      078 - 390 Thousands/uL  216    nRBC      /100 WBCs  0    Neutrophils %      43 - 75 %  45    Immat GRANS %      0 - 2 %  0    Lymphocytes Relative      14 - 44 %  42    Monocytes Relative      4 - 12 %  10    Eosinophils      0 - 6 %  2    Basophils Relative      0 - 1 %  1    Absolute Neutrophils      1 85 - 7 62 Thousands/µL  3 68    Immature Grans Absolute      0 00 - 0 20 Thousand/uL  0 03    Lymphocytes Absolute      0 60 - 4 47 Thousands/µL  3 47    Absolute Monocytes      0 17 - 1 22 Thousand/µL  0 79    Absolute Eosinophils      0 00 - 0 61 Thousand/µL  0 18    Basophils Absolute      0 00 - 0 10 Thousands/µL  0 05    Sodium      136 - 145 mmol/L   135 (L) Potassium      3 5 - 5 3 mmol/L   4 2   Chloride      100 - 108 mmol/L   101   CO2      21 - 32 mmol/L   31   Anion Gap      4 - 13 mmol/L   3 (L)   BUN      5 - 25 mg/dL   16   Creatinine      0 60 - 1 30 mg/dL   0 88   GLUCOSE FASTING      65 - 99 mg/dL   127 (H)   Calcium      8 3 - 10 1 mg/dL   9 1   AST      5 - 45 U/L   24   ALT      12 - 78 U/L   48   Alkaline Phosphatase      46 - 116 U/L   56   Total Protein      6 4 - 8 2 g/dL   7 4   Albumin      3 5 - 5 0 g/dL   3 8   TOTAL BILIRUBIN      0 20 - 1 00 mg/dL   0 97   eGFR      ml/min/1 73sq m   97   Cholesterol      50 - 200 mg/dL      Triglycerides      <=150 mg/dL      HDL      >=40 mg/dL      LDL Calculated      0 - 100 mg/dL      Non-HDL Cholesterol      mg/dl      EXT Creatinine Urine      mg/dL      MICROALBUM ,U,RANDOM      0 0 - 20 0 mg/L      MICROALBUMIN/CREATININE RATIO      0 - 30 mg/g creatinine      TESTOSTERONE FREE      7 2 - 24 0 pg/mL 9 9     Testosterone, Total, LC/MS      264 - 916 ng/dL 493     Hemoglobin A1C      Normal 3 8-5 6%; PreDiabetic 5 7-6 4%;  Diabetic >=6 5%; Glycemic control for adults with diabetes <7 0% %      eAG, EST AVG Glucose      mg/dl      Vit D, 25-Hydroxy      30 0 - 100 0 ng/mL      PSA, Total      0 0 - 4 0 ng/mL        Component      Latest Ref Rng & Units 4/19/2021 4/19/2021 4/19/2021           7:28 AM  7:28 AM  7:28 AM   WBC      4 31 - 10 16 Thousand/uL      Red Blood Cell Count      3 88 - 5 62 Million/uL      Hemoglobin      12 0 - 17 0 g/dL      HCT      36 5 - 49 3 %      MCV      82 - 98 fL      MCH      26 8 - 34 3 pg      MCHC      31 4 - 37 4 g/dL      RDW      11 6 - 15 1 %      MPV      8 9 - 12 7 fL      Platelet Count      841 - 390 Thousands/uL      nRBC      /100 WBCs      Neutrophils %      43 - 75 %      Immat GRANS %      0 - 2 %      Lymphocytes Relative      14 - 44 %      Monocytes Relative      4 - 12 %      Eosinophils      0 - 6 %      Basophils Relative      0 - 1 %      Absolute Neutrophils      1 85 - 7 62 Thousands/µL      Immature Grans Absolute      0 00 - 0 20 Thousand/uL      Lymphocytes Absolute      0 60 - 4 47 Thousands/µL      Absolute Monocytes      0 17 - 1 22 Thousand/µL      Absolute Eosinophils      0 00 - 0 61 Thousand/µL      Basophils Absolute      0 00 - 0 10 Thousands/µL      Sodium      136 - 145 mmol/L      Potassium      3 5 - 5 3 mmol/L      Chloride      100 - 108 mmol/L      CO2      21 - 32 mmol/L      Anion Gap      4 - 13 mmol/L      BUN      5 - 25 mg/dL      Creatinine      0 60 - 1 30 mg/dL      GLUCOSE FASTING      65 - 99 mg/dL      Calcium      8 3 - 10 1 mg/dL      AST      5 - 45 U/L      ALT      12 - 78 U/L      Alkaline Phosphatase      46 - 116 U/L      Total Protein      6 4 - 8 2 g/dL      Albumin      3 5 - 5 0 g/dL      TOTAL BILIRUBIN      0 20 - 1 00 mg/dL      eGFR      ml/min/1 73sq m      Cholesterol      50 - 200 mg/dL 122     Triglycerides      <=150 mg/dL 96     HDL      >=40 mg/dL 33 (L)     LDL Calculated      0 - 100 mg/dL 70     Non-HDL Cholesterol      mg/dl 89     EXT Creatinine Urine      mg/dL      MICROALBUM ,U,RANDOM      0 0 - 20 0 mg/L      MICROALBUMIN/CREATININE RATIO      0 - 30 mg/g creatinine      TESTOSTERONE FREE      7 2 - 24 0 pg/mL   17 0   Testosterone, Total, LC/MS      264 - 916 ng/dL   677   Hemoglobin A1C      Normal 3 8-5 6%; PreDiabetic 5 7-6 4%;  Diabetic >=6 5%; Glycemic control for adults with diabetes <7 0% %  7 2 (H)    eAG, EST AVG Glucose      mg/dl  160    Vit D, 25-Hydroxy      30 0 - 100 0 ng/mL      PSA, Total      0 0 - 4 0 ng/mL        Component      Latest Ref Rng & Units 7/21/2021 7/21/2021 7/21/2021           7:32 AM  7:32 AM  7:32 AM   WBC      4 31 - 10 16 Thousand/uL      Red Blood Cell Count      3 88 - 5 62 Million/uL      Hemoglobin      12 0 - 17 0 g/dL      HCT      36 5 - 49 3 %      MCV      82 - 98 fL      MCH      26 8 - 34 3 pg      MCHC      31 4 - 37 4 g/dL      RDW      11 6 - 15 1 %      MPV      8 9 - 12 7 fL      Platelet Count      816 - 390 Thousands/uL      nRBC      /100 WBCs      Neutrophils %      43 - 75 %      Immat GRANS %      0 - 2 %      Lymphocytes Relative      14 - 44 %      Monocytes Relative      4 - 12 %      Eosinophils      0 - 6 %      Basophils Relative      0 - 1 %      Absolute Neutrophils      1 85 - 7 62 Thousands/µL      Immature Grans Absolute      0 00 - 0 20 Thousand/uL      Lymphocytes Absolute      0 60 - 4 47 Thousands/µL      Absolute Monocytes      0 17 - 1 22 Thousand/µL      Absolute Eosinophils      0 00 - 0 61 Thousand/µL      Basophils Absolute      0 00 - 0 10 Thousands/µL      Sodium      136 - 145 mmol/L  136    Potassium      3 5 - 5 3 mmol/L  3 9    Chloride      100 - 108 mmol/L  105    CO2      21 - 32 mmol/L  27    Anion Gap      4 - 13 mmol/L  4    BUN      5 - 25 mg/dL  17    Creatinine      0 60 - 1 30 mg/dL  0 81    GLUCOSE FASTING      65 - 99 mg/dL  127 (H)    Calcium      8 3 - 10 1 mg/dL  9 1    AST      5 - 45 U/L      ALT      12 - 78 U/L      Alkaline Phosphatase      46 - 116 U/L      Total Protein      6 4 - 8 2 g/dL      Albumin      3 5 - 5 0 g/dL      TOTAL BILIRUBIN      0 20 - 1 00 mg/dL      eGFR      ml/min/1 73sq m  101    Cholesterol      50 - 200 mg/dL      Triglycerides      <=150 mg/dL      HDL      >=40 mg/dL      LDL Calculated      0 - 100 mg/dL      Non-HDL Cholesterol      mg/dl      EXT Creatinine Urine      mg/dL   153 0   MICROALBUM ,U,RANDOM      0 0 - 20 0 mg/L   51 5 (H)   MICROALBUMIN/CREATININE RATIO      0 - 30 mg/g creatinine   34 (H)   TESTOSTERONE FREE      7 2 - 24 0 pg/mL      Testosterone, Total, LC/MS      264 - 916 ng/dL      Hemoglobin A1C      Normal 3 8-5 6%; PreDiabetic 5 7-6 4%;  Diabetic >=6 5%; Glycemic control for adults with diabetes <7 0% % 6 0 (H)     eAG, EST AVG Glucose      mg/dl 126     Vit D, 25-Hydroxy      30 0 - 100 0 ng/mL      PSA, Total      0 0 - 4 0 ng/mL Impression & Plan:    Problem List Items Addressed This Visit     None      Visit Diagnoses     Type 2 diabetes mellitus without complication, without long-term current use of insulin (HCC)        Relevant Medications    metFORMIN (GLUCOPHAGE-XR) 500 mg 24 hr tablet    Hyperlipidemia, unspecified hyperlipidemia type        Relevant Medications    atorvastatin (LIPITOR) 20 mg tablet          No orders of the defined types were placed in this encounter  There are no Patient Instructions on file for this visit  Discussed with the patient and all questioned fully answered  He will call me if any problems arise  Follow-up appointment in 6 months       Counseled patient on diagnostic results, prognosis, risk and benefit of treatment options, instruction for management, importance of treatment compliance, Risk  factor reduction and impressions    Miriam Weems PA-C

## 2021-08-02 NOTE — ASSESSMENT & PLAN NOTE
Continue weekly testosterone injections  Complete lab testing as ordered  Discussed importance of annual prostate exam and he will discuss at next appt with PCP

## 2021-08-10 ENCOUNTER — NURSE TRIAGE (OUTPATIENT)
Dept: OTHER | Facility: OTHER | Age: 56
End: 2021-08-10

## 2021-08-10 DIAGNOSIS — Z20.822 ENCOUNTER FOR SCREENING LABORATORY TESTING FOR COVID-19 VIRUS IN ASYMPTOMATIC PATIENT: Primary | ICD-10-CM

## 2021-08-11 NOTE — TELEPHONE ENCOUNTER
Patient is traveling to [place] and needs a swab for Covid  He will go to the [facility] [address] when he needs it done

## 2021-08-11 NOTE — TELEPHONE ENCOUNTER
Regarding: Travel swab -  2 of 2  ----- Message from Rafael Kimbrough RN sent at 8/10/2021  8:52 PM EDT -----  "I need a travel swab to go to [place] "

## 2021-08-30 ENCOUNTER — OFFICE VISIT (OUTPATIENT)
Dept: FAMILY MEDICINE CLINIC | Facility: CLINIC | Age: 56
End: 2021-08-30
Payer: COMMERCIAL

## 2021-08-30 VITALS
HEIGHT: 69 IN | DIASTOLIC BLOOD PRESSURE: 68 MMHG | OXYGEN SATURATION: 97 % | SYSTOLIC BLOOD PRESSURE: 102 MMHG | WEIGHT: 190 LBS | TEMPERATURE: 97.5 F | RESPIRATION RATE: 18 BRPM | BODY MASS INDEX: 28.14 KG/M2 | HEART RATE: 85 BPM

## 2021-08-30 DIAGNOSIS — M51.36 DEGENERATION OF INTERVERTEBRAL DISC OF LUMBAR REGION: ICD-10-CM

## 2021-08-30 DIAGNOSIS — IMO0002 UNCONTROLLED TYPE 2 DIABETES MELLITUS WITH COMPLICATION: ICD-10-CM

## 2021-08-30 DIAGNOSIS — E11.42 DIABETIC POLYNEUROPATHY ASSOCIATED WITH TYPE 2 DIABETES MELLITUS (HCC): Primary | ICD-10-CM

## 2021-08-30 DIAGNOSIS — Z97.8 PRESENCE OF INTRATHECAL PUMP: ICD-10-CM

## 2021-08-30 DIAGNOSIS — R10.2 PERINEAL PAIN IN MALE: ICD-10-CM

## 2021-08-30 DIAGNOSIS — M45.0 ANKYLOSING SPONDYLITIS OF MULTIPLE SITES IN SPINE (HCC): ICD-10-CM

## 2021-08-30 PROCEDURE — 99214 OFFICE O/P EST MOD 30 MIN: CPT | Performed by: PHYSICIAN ASSISTANT

## 2021-08-30 NOTE — PROGRESS NOTES
PRE-OPERATIVE EVALUATION  [facility]    NAME: Fabienne Wooten Sr   AGE: 55 y o  SEX: male  : 1965     DATE: 2021     Pre-Operative Evaluation      Chief Complaint: Pre-operative Evaluation     Surgery: replacement of pain pump (3rd unit)  Anticipated Date of Surgery: 2021  Referring Provider: Dr Shilpa Lake      History of Present Illness:     Jewell Hernandez  is a 55 y o  male who presents to the office today for a preoperative consultation at the request of surgeon  Planned anesthesia is general  Patient has a bleeding risk of: no recent abnormal bleeding and no remote history of abnormal bleeding  Patient does not have objections to receiving blood products if needed  Current anti-platelet/anti-coagulation medications that the patient is prescribed includes: none  Assessment of Chronic Conditions:   - Diabetes Mellitus: well controlled with metformin   - Hypertension: well managed on lisinopril  - ALEXIS, unmanaged with mask  Not able to tolerate, improved with weight loss     Assessment of Cardiac Risk:  · Denies unstable or severe angina or MI in the last 6 weeks or history of stent placement in the last year   · Denies decompensated heart failure (e g  New onset heart failure, NYHA functional class IV heart failure, or worsening existing heart failure)  · Denies significant arrhythmias such as high grade AV block, symptomatic ventricular arrhythmia, newly recognized ventricular tachycardia, supraventricular tachycardia with resting heart rate >100, or symptomatic bradycardia  · Denies severe heart valve disease including aortic stenosis or symptomatic mitral stenosis     Exercise Capacity:  · Able to walk 4 blocks without symptoms?: Yes  · Able to walk 2 flights without symptoms?: Yes    Prior Anesthesia Reactions: No     Personal history of venous thromboembolic disease?  No       Review of Systems:     Review of Systems   Constitutional: Negative for activity change, fatigue, fever and unexpected weight change  HENT: Negative for rhinorrhea and sore throat  Respiratory: Negative for cough, shortness of breath and wheezing  Cardiovascular: Negative for chest pain, palpitations and leg swelling  Gastrointestinal: Negative for constipation, diarrhea, nausea and vomiting  Musculoskeletal: Positive for arthralgias, back pain, myalgias, neck pain and neck stiffness  Skin: Negative for rash  Neurological: Positive for numbness (hands, legs/feet)         Current Problem List:     Patient Active Problem List   Diagnosis    Cervical radiculopathy    Chronic pain disorder    Essential hypertension    Lumbar postlaminectomy syndrome    Mixed hyperlipidemia    Osteoporosis    Other chronic nonalcoholic liver disease    Parotid gland enlargement    Testicular hypogonadism    Uncontrolled type 2 diabetes mellitus with complication (HCC)    Tear of right supraspinatus tendon    Osteoarthritis of right acromioclavicular joint    [address]ylosing spondylitis ([facility] [address] )    Degeneration of intervertebral disc of lumbar region    Degenerative lumbar spinal stenosis    Diabetic polyneuropathy ([facility] [address] )    Encounter for long-term methadone use for pain control    Presence of intrathecal pump    Osteoarthritis of both hands    Hiatal hernia with GERD       Allergies:     No Known Allergies    Current Medications:       Current Outpatient Medications:     atorvastatin (LIPITOR) 20 mg tablet, Take 1 tablet (20 mg total) by mouth daily at bedtime, Disp: 90 tablet, Rfl: 1    Blood Glucose Monitoring Suppl (Contour Next EZ) w/Device KIT, Use 1 each 4 (four) times a day, Disp: 1 kit, Rfl: 0    Cholecalciferol (VITAMIN D) 2000 units CAPS, Take 1 capsule by mouth daily, Disp: , Rfl:     gabapentin (NEURONTIN) 300 mg capsule, Take 600 mg by mouth daily Taking two 600 mg at night, Disp: , Rfl:     glucose blood (Contour Next Test) test strip, Use 1 each 4 (four) times a day, Disp: 400 each, Rfl: 1    HYDROmorphone (DILAUDID) 4 mg/mL injection, Inject 1 Device as directed daily , Disp: , Rfl:     lisinopril (ZESTRIL) 20 mg tablet, Take 1 tablet (20 mg total) by mouth daily, Disp: 90 tablet, Rfl: 1    metFORMIN (GLUCOPHAGE-XR) 500 mg 24 hr tablet, Take 2 tablets daily with breakfast and supper (Patient taking differently: 1,000 mg 2 (two) times a day with meals Take 2 tablets daily with breakfast and supper), Disp: 360 tablet, Rfl: 1    methadone (DOLOPHINE) 10 mg tablet, Take 2 tablets every 6 hours for pain,, Disp: , Rfl:     Microlet Lancets MISC, Use 4 (four) times a day, Disp: 400 each, Rfl: 1    omeprazole (PriLOSEC) 40 MG capsule, Take 1 capsule (40 mg total) by mouth daily, Disp: 90 capsule, Rfl: 3    oxyCODONE (OxyCONTIN) 40 mg 12 hr tablet, Take 1 tablet by mouth every 12 (twelve) hours, Disp: , Rfl:     oxyCODONE (OXYCONTIN) 80 mg 12 hr tablet, Take 1 tablet by mouth every 12 (twelve) hours, Disp: , Rfl:     SYRINGE-NEEDLE, DISP, 3 ML (B-D 3CC LUER-HAFSA SYR 21GX1") 21G X 1" 3 ML MISC, by Does not apply route once a week, Disp: , Rfl:     testosterone cypionate (DEPO-TESTOSTERONE) 200 mg/mL SOLN, INJECT 0 3ML (60MG) WEEKLY USE 1 VIAL FOR 1 WEEKLY DOSE, DISCARD THE REMAINDER, Disp: 4 mL, Rfl: 5    Past Medical History:       Past Medical History:   Diagnosis Date    Chronic pain     Depression     Diabetes (HCC)     Fibromyalgia, primary     GERD (gastroesophageal reflux disease)     Hyperlipidemia     Hypertension     Low testosterone     Neuropathy     Pilonidal cyst     last assessed 3/17/2014    Sleep apnea     no cpap        Past Surgical History:   Procedure Laterality Date    BACK SURGERY      discectomy    ELBOW SURGERY Right     OTHER SURGICAL HISTORY  03/13/2015    Electr analysis of progr impl pump w/reprogram refill, requiring physician    Replacement of right abdomen intrathecal  pain pump filled with Dilaudid with electronic analysis and programming   managed by Will Benjamin    PILONIDAL CYST EXCISION      RI SHLDR ARTHROSCOP,SURG,W/ROTAT CUFF REPR Right 2/28/2020    Procedure: SHOULDER ARTHROSCOPIC ROTATOR CUFF REPAIR AND DEBRIDEMENT;  Surgeon: Jenelle Padron MD;  [facility];  Service: Orthopedics    ROTATOR CUFF REPAIR Left     WISDOM TOOTH EXTRACTION          Family History   Problem Relation Age of Onset    Diabetes unspecified Sister     Hypertension Sister     Hyperlipidemia Sister         pure hypercholesterolemia    Diabetes unspecified Brother         DM    Hypertension Brother     Hyperlipidemia Brother         pure hypercholesterolemia    Coronary artery disease Father     Heart disease Father     Diabetes Brother         DM    Hypertension Family     Alcohol abuse Mother     Liver disease Mother         Social History     Socioeconomic History    Marital status: /Civil Union     Spouse name: Not on file    Number of children: Not on file    Years of education: Not on file    Highest education level: Not on file   Occupational History    Occupation: retired construction   Tobacco Use    Smoking status: Former Smoker     Start date: 2/24/2018    Smokeless tobacco: Never Used   Vaping Use    Vaping Use: Never used   Substance and Sexual Activity    Alcohol use: No    Drug use: Never    Sexual activity: Not on file   Other Topics Concern    Not on file   Social History Narrative    Not on file     Social Determinants of Health     Financial Resource Strain:     Difficulty of Paying Living Expenses:    Food Insecurity:     Worried About [facility] in the Last Year:     [facility] in the Last Year:    Transportation Needs:     Lack of Transportation (Medical):      Lack of Transportation (Non-Medical):    Physical Activity:     Days of Exercise per Week:     Minutes of Exercise per Session:    Stress:     Feeling of Stress :    Social Connections:     Frequency of Communication with Friends and Family:     Frequency of Social Gatherings with Friends and Family:     Attends [employer] Services:     Active Member of Clubs or Organizations:     Attends Club or Organization Meetings:     Marital Status:    Intimate Partner Violence:     Fear of Current or Ex-Partner:     Emotionally Abused:     Physically Abused:     Sexually Abused:         Physical Exam:     Physical Exam  Vitals reviewed  Constitutional:       General: He is not in acute distress  Appearance: He is well-developed  He is not diaphoretic  HENT:      Head: Normocephalic and atraumatic  Eyes:      Pupils: Pupils are equal, round, and reactive to light  Cardiovascular:      Rate and Rhythm: Normal rate and regular rhythm  Heart sounds: Normal heart sounds  No murmur heard  No friction rub  No gallop  Pulmonary:      Effort: Pulmonary effort is normal  No respiratory distress  Breath sounds: Normal breath sounds  No wheezing  Musculoskeletal:      Lumbar back: Tenderness present  Skin:     General: Skin is warm and dry  Neurological:      Mental Status: He is alert and oriented to person, place, and time  Psychiatric:         Behavior: Behavior normal          Thought Content: Thought content normal           Data:     Pre-operative work-up    Laboratory Results: I have personally reviewed the pertinent laboratory results/reports     EKG: I have personally reviewed pertinent reports  Chest x-ray: I have personally reviewed pertinent reports  Assessment & Recommendations:     1  Diabetic polyneuropathy associated with type 2 diabetes mellitus ([address] )     2  Uncontrolled type 2 diabetes mellitus with complication ([address] )     3  Ankylosing spondylitis of multiple sites in spine ([address] )     4  Degeneration of intervertebral disc of lumbar region     5  Presence of intrathecal pump     6   Perineal pain in male  US scrotum and testicles       Pre-Op Evaluation Assessment  55 y o  male with planned surgery: pain pump replacement  Known risk factors for perioperative complications: Diabetes mellitus  Cardiac Risk Estimation: per the Revised Cardiac Risk Index (Circ  100:1043, 1999), the patient's risk factors for cardiac complications include none, putting him in: RCI RISK CLASS I (0 risk factors, risk of major cardiac compl  appr  0 5%)  Current medications which may produce withdrawal symptoms if withheld perioperatively: methadone, oxycontin  Pre-Op Evaluation Plan  1  Further preoperative workup as follows:   - None; no further preoperative work-up is required    2  Medication Management/Recommendations:   - None, continue medication regimen including morning of surgery, with sip of water    3  Prophylaxis for cardiac events with perioperative beta-blockers: not indicated  4  Patient requires further consultation with: None    Clearance  Patient is CLEARED for surgery without any additional cardiac testing       Miroslava Romeo PA-C  [facility]  [address] [place] [id number]  Phone#  [phone]  Fax#  [phone]

## 2021-09-28 ENCOUNTER — APPOINTMENT (OUTPATIENT)
Dept: LAB | Facility: CLINIC | Age: 56
End: 2021-09-28
Payer: COMMERCIAL

## 2021-10-20 ENCOUNTER — IMMUNIZATIONS (OUTPATIENT)
Dept: FAMILY MEDICINE CLINIC | Facility: CLINIC | Age: 56
End: 2021-10-20
Payer: MEDICARE

## 2021-10-20 DIAGNOSIS — Z23 ENCOUNTER FOR IMMUNIZATION: Primary | ICD-10-CM

## 2021-10-20 PROCEDURE — G0008 ADMIN INFLUENZA VIRUS VAC: HCPCS

## 2021-10-20 PROCEDURE — 90682 RIV4 VACC RECOMBINANT DNA IM: CPT

## 2021-11-01 DIAGNOSIS — E11.9 TYPE 2 DIABETES MELLITUS WITHOUT COMPLICATION, WITHOUT LONG-TERM CURRENT USE OF INSULIN (HCC): ICD-10-CM

## 2021-11-01 DIAGNOSIS — E78.5 HYPERLIPIDEMIA, UNSPECIFIED HYPERLIPIDEMIA TYPE: ICD-10-CM

## 2021-11-01 DIAGNOSIS — I10 ESSENTIAL HYPERTENSION: ICD-10-CM

## 2021-11-01 RX ORDER — METFORMIN HYDROCHLORIDE 500 MG/1
1000 TABLET, EXTENDED RELEASE ORAL 2 TIMES DAILY WITH MEALS
Qty: 360 TABLET | Refills: 1 | Status: SHIPPED | OUTPATIENT
Start: 2021-11-01 | End: 2022-02-07 | Stop reason: SDUPTHER

## 2021-11-01 RX ORDER — LISINOPRIL 20 MG/1
20 TABLET ORAL DAILY
Qty: 90 TABLET | Refills: 1 | Status: SHIPPED | OUTPATIENT
Start: 2021-11-01 | End: 2022-05-04 | Stop reason: SDUPTHER

## 2021-11-01 RX ORDER — ATORVASTATIN CALCIUM 20 MG/1
20 TABLET, FILM COATED ORAL
Qty: 90 TABLET | Refills: 1 | Status: SHIPPED | OUTPATIENT
Start: 2021-11-01 | End: 2022-01-31 | Stop reason: SDUPTHER

## 2021-12-20 DIAGNOSIS — E29.1 TESTICULAR HYPOGONADISM: ICD-10-CM

## 2021-12-20 RX ORDER — TESTOSTERONE CYPIONATE 200 MG/ML
INJECTION INTRAMUSCULAR
Qty: 4 ML | Refills: 5 | Status: SHIPPED | OUTPATIENT
Start: 2021-12-20

## 2022-01-12 ENCOUNTER — APPOINTMENT (OUTPATIENT)
Dept: LAB | Facility: CLINIC | Age: 57
End: 2022-01-12
Payer: MEDICARE

## 2022-01-12 DIAGNOSIS — E78.5 HYPERLIPIDEMIA, UNSPECIFIED HYPERLIPIDEMIA TYPE: ICD-10-CM

## 2022-01-12 DIAGNOSIS — E11.9 TYPE 2 DIABETES MELLITUS WITHOUT COMPLICATION, WITHOUT LONG-TERM CURRENT USE OF INSULIN (HCC): ICD-10-CM

## 2022-01-12 DIAGNOSIS — E29.1 TESTICULAR HYPOGONADISM: ICD-10-CM

## 2022-01-12 DIAGNOSIS — Z12.5 ENCOUNTER FOR SCREENING FOR MALIGNANT NEOPLASM OF PROSTATE: ICD-10-CM

## 2022-01-12 LAB
ALBUMIN SERPL BCP-MCNC: 4.1 G/DL (ref 3.5–5)
ALP SERPL-CCNC: 56 U/L (ref 46–116)
ALT SERPL W P-5'-P-CCNC: 38 U/L (ref 12–78)
ANION GAP SERPL CALCULATED.3IONS-SCNC: 6 MMOL/L (ref 4–13)
AST SERPL W P-5'-P-CCNC: 24 U/L (ref 5–45)
BASOPHILS # BLD AUTO: 0.05 THOUSANDS/ΜL (ref 0–0.1)
BASOPHILS NFR BLD AUTO: 1 % (ref 0–1)
BILIRUB SERPL-MCNC: 0.86 MG/DL (ref 0.2–1)
BUN SERPL-MCNC: 20 MG/DL (ref 5–25)
CALCIUM SERPL-MCNC: 9.3 MG/DL (ref 8.3–10.1)
CHLORIDE SERPL-SCNC: 101 MMOL/L (ref 100–108)
CHOLEST SERPL-MCNC: 125 MG/DL
CO2 SERPL-SCNC: 29 MMOL/L (ref 21–32)
CREAT SERPL-MCNC: 0.86 MG/DL (ref 0.6–1.3)
EOSINOPHIL # BLD AUTO: 0.12 THOUSAND/ΜL (ref 0–0.61)
EOSINOPHIL NFR BLD AUTO: 2 % (ref 0–6)
ERYTHROCYTE [DISTWIDTH] IN BLOOD BY AUTOMATED COUNT: 12.9 % (ref 11.6–15.1)
EST. AVERAGE GLUCOSE BLD GHB EST-MCNC: 120 MG/DL
GFR SERPL CREATININE-BSD FRML MDRD: 97 ML/MIN/1.73SQ M
GLUCOSE P FAST SERPL-MCNC: 128 MG/DL (ref 65–99)
HBA1C MFR BLD: 5.8 %
HCT VFR BLD AUTO: 36.8 % (ref 36.5–49.3)
HDLC SERPL-MCNC: 47 MG/DL
HGB BLD-MCNC: 12.6 G/DL (ref 12–17)
IMM GRANULOCYTES # BLD AUTO: 0.01 THOUSAND/UL (ref 0–0.2)
IMM GRANULOCYTES NFR BLD AUTO: 0 % (ref 0–2)
LDLC SERPL CALC-MCNC: 71 MG/DL (ref 0–100)
LYMPHOCYTES # BLD AUTO: 2.36 THOUSANDS/ΜL (ref 0.6–4.47)
LYMPHOCYTES NFR BLD AUTO: 36 % (ref 14–44)
MCH RBC QN AUTO: 31.9 PG (ref 26.8–34.3)
MCHC RBC AUTO-ENTMCNC: 34.2 G/DL (ref 31.4–37.4)
MCV RBC AUTO: 93 FL (ref 82–98)
MONOCYTES # BLD AUTO: 0.59 THOUSAND/ΜL (ref 0.17–1.22)
MONOCYTES NFR BLD AUTO: 9 % (ref 4–12)
NEUTROPHILS # BLD AUTO: 3.44 THOUSANDS/ΜL (ref 1.85–7.62)
NEUTS SEG NFR BLD AUTO: 52 % (ref 43–75)
NONHDLC SERPL-MCNC: 78 MG/DL
NRBC BLD AUTO-RTO: 0 /100 WBCS
PLATELET # BLD AUTO: 196 THOUSANDS/UL (ref 149–390)
PMV BLD AUTO: 10.7 FL (ref 8.9–12.7)
POTASSIUM SERPL-SCNC: 4.2 MMOL/L (ref 3.5–5.3)
PROT SERPL-MCNC: 7.7 G/DL (ref 6.4–8.2)
PSA SERPL-MCNC: 0.8 NG/ML (ref 0–4)
RBC # BLD AUTO: 3.95 MILLION/UL (ref 3.88–5.62)
SODIUM SERPL-SCNC: 136 MMOL/L (ref 136–145)
TRIGL SERPL-MCNC: 33 MG/DL
WBC # BLD AUTO: 6.57 THOUSAND/UL (ref 4.31–10.16)

## 2022-01-12 PROCEDURE — G0103 PSA SCREENING: HCPCS

## 2022-01-12 PROCEDURE — 85025 COMPLETE CBC W/AUTO DIFF WBC: CPT

## 2022-01-12 PROCEDURE — 83036 HEMOGLOBIN GLYCOSYLATED A1C: CPT

## 2022-01-12 PROCEDURE — 84403 ASSAY OF TOTAL TESTOSTERONE: CPT

## 2022-01-12 PROCEDURE — 36415 COLL VENOUS BLD VENIPUNCTURE: CPT

## 2022-01-12 PROCEDURE — 84402 ASSAY OF FREE TESTOSTERONE: CPT

## 2022-01-12 PROCEDURE — 80053 COMPREHEN METABOLIC PANEL: CPT

## 2022-01-12 PROCEDURE — 80061 LIPID PANEL: CPT

## 2022-01-13 LAB
TESTOST FREE SERPL-MCNC: 4 PG/ML (ref 7.2–24)
TESTOST SERPL-MCNC: 116 NG/DL (ref 264–916)

## 2022-01-31 DIAGNOSIS — E78.5 HYPERLIPIDEMIA, UNSPECIFIED HYPERLIPIDEMIA TYPE: ICD-10-CM

## 2022-01-31 RX ORDER — ATORVASTATIN CALCIUM 20 MG/1
20 TABLET, FILM COATED ORAL
Qty: 90 TABLET | Refills: 1 | Status: SHIPPED | OUTPATIENT
Start: 2022-01-31 | End: 2022-07-12 | Stop reason: SDUPTHER

## 2022-02-07 ENCOUNTER — OFFICE VISIT (OUTPATIENT)
Dept: ENDOCRINOLOGY | Facility: CLINIC | Age: 57
End: 2022-02-07
Payer: COMMERCIAL

## 2022-02-07 VITALS
SYSTOLIC BLOOD PRESSURE: 100 MMHG | HEART RATE: 77 BPM | WEIGHT: 192.8 LBS | HEIGHT: 69 IN | BODY MASS INDEX: 28.56 KG/M2 | DIASTOLIC BLOOD PRESSURE: 66 MMHG

## 2022-02-07 DIAGNOSIS — E11.9 TYPE 2 DIABETES MELLITUS WITHOUT COMPLICATION, WITHOUT LONG-TERM CURRENT USE OF INSULIN (HCC): Primary | ICD-10-CM

## 2022-02-07 DIAGNOSIS — E29.1 TESTICULAR HYPOGONADISM: ICD-10-CM

## 2022-02-07 PROCEDURE — 99214 OFFICE O/P EST MOD 30 MIN: CPT | Performed by: INTERNAL MEDICINE

## 2022-02-07 RX ORDER — METFORMIN HYDROCHLORIDE 500 MG/1
500 TABLET, EXTENDED RELEASE ORAL 2 TIMES DAILY WITH MEALS
Qty: 180 TABLET | Refills: 3 | Status: SHIPPED | OUTPATIENT
Start: 2022-02-07 | End: 2022-08-06

## 2022-02-07 RX ORDER — METFORMIN HYDROCHLORIDE 500 MG/1
1000 TABLET, EXTENDED RELEASE ORAL 2 TIMES DAILY WITH MEALS
Qty: 360 TABLET | Refills: 3 | Status: SHIPPED | OUTPATIENT
Start: 2022-02-07 | End: 2022-02-07

## 2022-02-07 RX ORDER — DIPHENOXYLATE HYDROCHLORIDE AND ATROPINE SULFATE 2.5; .025 MG/1; MG/1
1 TABLET ORAL DAILY
COMMUNITY

## 2022-02-07 NOTE — PATIENT INSTRUCTIONS
Please check your testosterone M[facility] between your weekly injections  Decrease down to metformin 500mg twice per day

## 2022-02-07 NOTE — PROGRESS NOTES
Established Patient Endocrinology Progress Note    Referring Provider  No referring provider defined for this encounter  CC:  Type 2 diabetes    History of Present Illness:   Whitney Leach Sr  is a 55 y o  male with a history of ankylosing spondylitis who is presenting as an established patient to endocrinology for the management of type 2 DM  He has had DM since about 2017  He reports complications of diabetes including microalbuminuria  He reports losing about 40 lbs in 2021, he made changes to his diet  Weight has been stable  Regarding testosterone misses injections once in a while but he does not remember if he missed any doses prior to last set of labs  Denies any symptoms of low testosterone like increased fatigue or decreased libido  Current regimen: metformin ER 1000mg Twice per day  Has never been on insulin or any other medications for his diabetes    Home blood glucose monitoring: check once a day recently  Was previously checking 3x a day  Before breakfast: on average less than 120s  Diet:      Reports eating well, does not eat much sweets  No sugar in coffee etc  Activity: trouble exercising because of musculoskeletal problems  Diabetes education: has been to DM education  Healthcare maintenance:  Opthamology: last saw 2 months ago   Denies retinopathy    Patient Active Problem List   Diagnosis    Cervical radiculopathy    Chronic pain disorder    Essential hypertension    Lumbar postlaminectomy syndrome    Mixed hyperlipidemia    Osteoporosis    Other chronic nonalcoholic liver disease    Parotid gland enlargement    Testicular hypogonadism    Uncontrolled type 2 diabetes mellitus with complication (HCC)    Tear of right supraspinatus tendon    Osteoarthritis of right acromioclavicular joint    Ankylosing spondylitis ([address] )    Degeneration of intervertebral disc of lumbar region    Degenerative lumbar spinal stenosis    Diabetic polyneuropathy ([address] )    Encounter for long-term methadone use for pain control    Presence of intrathecal pump    Osteoarthritis of both hands    Hiatal hernia with GERD      Past Medical History:   Diagnosis Date    Chronic pain     Depression     Diabetes (HCC)     Fibromyalgia, primary     GERD (gastroesophageal reflux disease)     Hyperlipidemia     Hypertension     Low testosterone     Neuropathy     Pilonidal cyst     last assessed 3/17/2014    Sleep apnea     no cpap      Past Surgical History:   Procedure Laterality Date    BACK SURGERY      discectomy    ELBOW SURGERY Right     OTHER SURGICAL HISTORY  03/13/2015    Electr analysis of progr impl pump w/reprogram refill, requiring physician    Replacement of right abdomen intrathecal  pain pump filled with Dilaudid with electronic analysis and programming   managed by Rhonda Calvert PILONIDAL CYST EXCISION      AR SHLDR ARTHROSCOP,SURG,W/ROTAT CUFF REPR Right 2/28/2020    Procedure: SHOULDER ARTHROSCOPIC ROTATOR CUFF REPAIR AND DEBRIDEMENT;  Surgeon: Patrick Ruiz MD;  [facility];  Service: Orthopedics    ROTATOR CUFF REPAIR Left     WISDOM TOOTH EXTRACTION        Family History   Problem Relation Age of Onset    Diabetes unspecified Sister     Hypertension Sister     Hyperlipidemia Sister         pure hypercholesterolemia    Diabetes unspecified Brother         DM    Hypertension Brother     Hyperlipidemia Brother         pure hypercholesterolemia    Coronary artery disease Father     Heart disease Father     Diabetes Brother         DM    Hypertension Family     Alcohol abuse Mother     Liver disease Mother      Social History     Tobacco Use    Smoking status: Former Smoker     Start date: 2/24/2018    Smokeless tobacco: Never Used   Substance Use Topics    Alcohol use: No     No Known Allergies      Current Outpatient Medications:     atorvastatin (LIPITOR) 20 mg tablet, Take 1 tablet (20 mg total) by mouth daily at bedtime, Disp: 90 tablet, Rfl: 1    Blood Glucose Monitoring Suppl (Contour Next EZ) w/Device KIT, Use 1 each 4 (four) times a day, Disp: 1 kit, Rfl: 0    Cholecalciferol (VITAMIN D) 2000 units CAPS, Take 1 capsule by mouth daily, Disp: , Rfl:     gabapentin (NEURONTIN) 300 mg capsule, Take 600 mg by mouth daily Taking two 600 mg at night, Disp: , Rfl:     glucose blood (Contour Next Test) test strip, Use 1 each 4 (four) times a day, Disp: 400 each, Rfl: 1    HYDROmorphone (DILAUDID) 4 mg/mL injection, Inject 1 Device as directed daily , Disp: , Rfl:     lisinopril (ZESTRIL) 20 mg tablet, Take 1 tablet (20 mg total) by mouth daily, Disp: 90 tablet, Rfl: 1    metFORMIN (GLUCOPHAGE-XR) 500 mg 24 hr tablet, Take 1 tablet (500 mg total) by mouth 2 (two) times a day with meals, Disp: 180 tablet, Rfl: 3    methadone (DOLOPHINE) 10 mg tablet, Take 2 tablets every 6 hours for pain,, Disp: , Rfl:     Microlet Lancets MISC, Use 4 (four) times a day, Disp: 400 each, Rfl: 1    multivitamin (THERAGRAN) TABS, Take 1 tablet by mouth daily, Disp: , Rfl:     omeprazole (PriLOSEC) 40 MG capsule, Take 1 capsule (40 mg total) by mouth daily, Disp: 90 capsule, Rfl: 3    oxyCODONE (OxyCONTIN) 40 mg 12 hr tablet, Take 1 tablet by mouth every 12 (twelve) hours, Disp: , Rfl:     oxyCODONE (OXYCONTIN) 80 mg 12 hr tablet, Take 1 tablet by mouth every 12 (twelve) hours, Disp: , Rfl:     SYRINGE-NEEDLE, DISP, 3 ML (B-D 3CC LUER-HAFSA SYR 21GX1") 21G X 1" 3 ML MISC, by Does not apply route once a week, Disp: , Rfl:     testosterone cypionate (DEPO-TESTOSTERONE) 200 mg/mL SOLN, INJECT 0 3ML (60MG) WEEKLY USE 1 VIAL FOR 1 WEEKLY DOSE, DISCARD THE REMAINDER, Disp: 4 mL, Rfl: 5  Review of Systems   Constitutional: Negative for chills, fatigue and fever  HENT: Negative for rhinorrhea and sore throat  Respiratory: Negative for choking, chest tightness, shortness of breath, wheezing and stridor      Cardiovascular: Negative for chest pain, palpitations and leg swelling  Gastrointestinal: Negative for abdominal pain, blood in stool, constipation, diarrhea, nausea and vomiting  Genitourinary: Negative for hematuria  Musculoskeletal:        Chronic pain   Neurological: Negative for dizziness and light-headedness  All other systems reviewed and are negative  Physical Exam:  Body mass index is 28 47 kg/m²  /66   Pulse 77   Ht 5' 9" (1 753 m)   Wt 87 5 kg (192 lb 12 8 oz)   BMI 28 47 kg/m²    Wt Readings from Last 3 Encounters:   02/07/22 87 5 kg (192 lb 12 8 oz)   08/30/21 86 2 kg (190 lb)   08/02/21 88 9 kg (196 lb)       Physical Exam  Constitutional:       Appearance: He is well-developed  HENT:      Head: Normocephalic and atraumatic  Eyes:      General:         Right eye: No discharge  Left eye: No discharge  Cardiovascular:      Rate and Rhythm: Normal rate and regular rhythm  Heart sounds: Normal heart sounds  No murmur heard  No friction rub  No gallop  Pulmonary:      Effort: Pulmonary effort is normal  No respiratory distress  Breath sounds: Normal breath sounds  No wheezing or rales  Chest:      Chest wall: No tenderness  Abdominal:      General: Bowel sounds are normal  There is no distension  Palpations: Abdomen is soft  There is no mass  Tenderness: There is no abdominal tenderness  Musculoskeletal:         General: Normal range of motion  Cervical back: Normal range of motion and neck supple  Skin:     General: Skin is warm and dry  Neurological:      Mental Status: He is alert and oriented to person, place, and time     Psychiatric:         Behavior: Behavior normal          Labs:   Lab Results   Component Value Date    HGBA1C 5 8 (H) 01/12/2022       Lab Results   Component Value Date    IGE0KTCWIEUA 0 988 08/11/2020       Lab Results   Component Value Date    CREATININE 0 86 01/12/2022    CREATININE 0 81 07/21/2021    CREATININE 0 88 04/19/2021    BUN 20 01/12/2022     11/30/2015    K 4 2 01/12/2022     01/12/2022    CO2 29 01/12/2022     eGFR   Date Value Ref Range Status   01/12/2022 97 ml/min/1 73sq m Final     No components found for: [facility]    Lab Results   Component Value Date    CHOL 227 11/10/2015    HDL 47 01/12/2022    TRIG 33 01/12/2022       Lab Results   Component Value Date    ALT 38 01/12/2022    AST 24 01/12/2022    ALKPHOS 56 01/12/2022    BILITOT 0 27 11/30/2015 06/24/2020     Impression:  1  Type 2 diabetes mellitus without complication, without long-term current use of insulin ([address] )    2  Testicular hypogonadism         Plan:  Diagnoses and all orders for this visit:    Type 2 diabetes mellitus without complication, without long-term current use of insulin ([address] )  -     Discontinue: metFORMIN (GLUCOPHAGE-XR) 500 mg 24 hr tablet; Take 2 tablets (1,000 mg total) by mouth 2 (two) times a day with meals Take 2 tablets daily with breakfast and supper  -     metFORMIN (GLUCOPHAGE-XR) 500 mg 24 hr tablet; Take 1 tablet (500 mg total) by mouth 2 (two) times a day with meals  Decrease metformin to 500 mg b i d  Follow-up in 6 months for type 2 diabetes    Testicular hypogonadism  -     Testosterone, free, total- Lab Collect; Future  -     PSA, total screen Lab Collect; Future  -     CBC and Platelet- Lab Collect; Future  -     Lipid panel- Lab Collect; Future  Recheck lab work in 6 months, patient has so far been doing well on current dose of testosterone which has not been decreased for the last 2 5 years,  suspect that low levels of testosterone on recent testing are secondary to timing of lab work  Continue current dose of 60mg weekly injection testosterone  Discussed with the patient and all questioned fully answered  He will call me if any problems arise

## 2022-02-23 ENCOUNTER — IMMUNIZATIONS (OUTPATIENT)
Dept: FAMILY MEDICINE CLINIC | Facility: HOSPITAL | Age: 57
End: 2022-02-23

## 2022-02-23 DIAGNOSIS — Z23 ENCOUNTER FOR IMMUNIZATION: Primary | ICD-10-CM

## 2022-02-23 PROCEDURE — 91306 COVID-19 MODERNA VACC 0.25 ML BOOSTER: CPT

## 2022-02-23 PROCEDURE — 0064A COVID-19 MODERNA VACC 0.25 ML BOOSTER: CPT

## 2022-03-31 ENCOUNTER — OFFICE VISIT (OUTPATIENT)
Dept: FAMILY MEDICINE CLINIC | Facility: CLINIC | Age: 57
End: 2022-03-31
Payer: COMMERCIAL

## 2022-03-31 VITALS
DIASTOLIC BLOOD PRESSURE: 70 MMHG | HEART RATE: 84 BPM | WEIGHT: 194 LBS | SYSTOLIC BLOOD PRESSURE: 110 MMHG | HEIGHT: 69 IN | OXYGEN SATURATION: 96 % | BODY MASS INDEX: 28.73 KG/M2 | TEMPERATURE: 97.6 F

## 2022-03-31 DIAGNOSIS — H61.21 IMPACTED CERUMEN OF RIGHT EAR: Primary | ICD-10-CM

## 2022-03-31 DIAGNOSIS — J30.1 SEASONAL ALLERGIC RHINITIS DUE TO POLLEN: ICD-10-CM

## 2022-03-31 PROCEDURE — 69210 REMOVE IMPACTED EAR WAX UNI: CPT | Performed by: NURSE PRACTITIONER

## 2022-03-31 PROCEDURE — 99214 OFFICE O/P EST MOD 30 MIN: CPT | Performed by: NURSE PRACTITIONER

## 2022-03-31 NOTE — PROGRESS NOTES
Assessment/Plan:     Diagnoses and all orders for this visit:    Impacted cerumen of right ear    Seasonal allergic rhinitis due to pollen    Other orders  -     OxyCONTIN 20 MG 12 hr tablet    #1 Impacted cerumen of right ear  Flushed ear and removed cerumen   #2 Seasonal allergic rhinitis  Discussed with patient plan to treat with an over the counter anti-histamine  Patient instructed to call if no improvement in 72 hours or symptoms worsen    Subjective:      Patient ID: Cristopher An  is a 55 y o  male  55 y  o male presenting with decreased hearing in right ear and some nasal congestion for the past few days  He denies fever, chills, generalized body aches, headache, respiratory or GI symptoms  Once cerumen removed from right ear hearing reminded decreased so recommended use of an antihistamine to open up sinuses and potnetial improvement of hearing        Family History   Problem Relation Age of Onset    Diabetes unspecified Sister     Hypertension Sister     Hyperlipidemia Sister         pure hypercholesterolemia    Diabetes unspecified Brother         DM    Hypertension Brother     Hyperlipidemia Brother         pure hypercholesterolemia    Coronary artery disease Father     Heart disease Father     Diabetes Brother         DM    Hypertension Family     Alcohol abuse Mother     Liver disease Mother      Social History     Socioeconomic History    Marital status: /Civil Union     Spouse name: Not on file    Number of children: Not on file    Years of education: Not on file    Highest education level: Not on file   Occupational History    Occupation: retired construction   Tobacco Use    Smoking status: Former Smoker     Start date: 2/24/2018    Smokeless tobacco: Never Used   Vaping Use    Vaping Use: Never used   Substance and Sexual Activity    Alcohol use: No    Drug use: Never    Sexual activity: Not on file   Other Topics Concern    Not on file   Social History Narrative    Not on file     Social Determinants of Health     Financial Resource Strain: Not on file   Food Insecurity: Not on file   Transportation Needs: Not on file   Physical Activity: Not on file   Stress: Not on file   Social Connections: Not on file   Intimate Partner Violence: Not on file   Housing Stability: Not on file     E-Cigarette/Vaping    E-Cigarette Use Never User      E-Cigarette/Vaping Substances     Past Medical History:   Diagnosis Date    Chronic pain     Depression     Diabetes ([address] )     Fibromyalgia, primary     GERD (gastroesophageal reflux disease)     Hyperlipidemia     Hypertension     Low testosterone     Neuropathy     Pilonidal cyst     last assessed 3/17/2014    Sleep apnea     no cpap     Past Surgical History:   Procedure Laterality Date    BACK SURGERY      discectomy    ELBOW SURGERY Right     OTHER SURGICAL HISTORY  03/13/2015    Electr analysis of progr impl pump w/reprogram refill, requiring physician    Replacement of right abdomen intrathecal  pain pump filled with Dilaudid with electronic analysis and programming   managed by [facility]      [place] SHLDR ARTHROSCOP,SURG,W/ROTAT CUFF REPR Right 2/28/2020    Procedure: SHOULDER ARTHROSCOPIC ROTATOR CUFF REPAIR AND DEBRIDEMENT;  Surgeon: Santa Valenzuela MD;  Location: [facility];  Service: Orthopedics    ROTATOR CUFF REPAIR Left     WISDOM TOOTH EXTRACTION       No Known Allergies    Current Outpatient Medications:     atorvastatin (LIPITOR) 20 mg tablet, Take 1 tablet (20 mg total) by mouth daily at bedtime, Disp: 90 tablet, Rfl: 1    Blood Glucose Monitoring Suppl (Contour Next EZ) w/Device KIT, Use 1 each 4 (four) times a day, Disp: 1 kit, Rfl: 0    Cholecalciferol (VITAMIN D) 2000 units CAPS, Take 1 capsule by mouth daily, Disp: , Rfl:     gabapentin (NEURONTIN) 300 mg capsule, Take 600 mg by mouth daily Taking two 600 mg at night, Disp: , Rfl:     glucose blood (Contour Next Test) test strip, Use 1 each 4 (four) times a day, Disp: 400 each, Rfl: 1    HYDROmorphone (DILAUDID) 4 mg/mL injection, Inject 1 Device as directed daily , Disp: , Rfl:     lisinopril (ZESTRIL) 20 mg tablet, Take 1 tablet (20 mg total) by mouth daily, Disp: 90 tablet, Rfl: 1    metFORMIN (GLUCOPHAGE-XR) 500 mg 24 hr tablet, Take 1 tablet (500 mg total) by mouth 2 (two) times a day with meals, Disp: 180 tablet, Rfl: 3    methadone (DOLOPHINE) 10 mg tablet, Take 2 tablets every 6 hours for pain,, Disp: , Rfl:     Microlet Lancets MISC, Use 4 (four) times a day, Disp: 400 each, Rfl: 1    multivitamin (THERAGRAN) TABS, Take 1 tablet by mouth daily, Disp: , Rfl:     omeprazole (PriLOSEC) 40 MG capsule, Take 1 capsule (40 mg total) by mouth daily, Disp: 90 capsule, Rfl: 3    oxyCODONE (OxyCONTIN) 40 mg 12 hr tablet, Take 1 tablet by mouth every 12 (twelve) hours, Disp: , Rfl:     oxyCODONE (OXYCONTIN) 80 mg 12 hr tablet, Take 1 tablet by mouth every 12 (twelve) hours, Disp: , Rfl:     OxyCONTIN 20 MG 12 hr tablet, , Disp: , Rfl:     SYRINGE-NEEDLE, DISP, 3 ML (B-D 3CC LUER-HAFSA SYR 21GX1") 21G X 1" 3 ML MISC, by Does not apply route once a week, Disp: , Rfl:     testosterone cypionate (DEPO-TESTOSTERONE) 200 mg/mL SOLN, INJECT 0 3ML (60MG) WEEKLY USE 1 VIAL FOR 1 WEEKLY DOSE, DISCARD THE REMAINDER, Disp: 4 mL, Rfl: 5    Review of Systems   Constitutional: Negative for chills, fatigue and fever  HENT: Positive for congestion and hearing loss  Negative for ear pain, postnasal drip, rhinorrhea, sinus pressure and sore throat  Respiratory: Negative for cough, chest tightness, shortness of breath and wheezing  Cardiovascular: Negative for chest pain and palpitations  Gastrointestinal: Negative for abdominal distention, abdominal pain, diarrhea, nausea and vomiting  Musculoskeletal: Negative for myalgias  Neurological: Negative for dizziness, light-headedness and headaches  Psychiatric/Behavioral: Negative  Objective:    /70 (BP Location: Left arm, Patient Position: Sitting, Cuff Size: Standard)   Pulse 84   Temp 97 6 °F (36 4 °C)   Ht 5' 9" (1 753 m)   Wt 88 kg (194 lb)   SpO2 96%   BMI 28 65 kg/m² (Reviewed)     Physical Exam  Vitals reviewed  Constitutional:       General: He is not in acute distress  Appearance: He is well-developed and well-groomed  He is not ill-appearing  HENT:      Head: Normocephalic and atraumatic  Right Ear: Tympanic membrane, ear canal and external ear normal  There is impacted cerumen  Left Ear: Tympanic membrane, ear canal and external ear normal  There is no impacted cerumen  Eyes:      General: Lids are normal       Extraocular Movements: Extraocular movements intact  Conjunctiva/sclera: Conjunctivae normal       Pupils: Pupils are equal, round, and reactive to light  Neck:      Thyroid: No thyromegaly or thyroid tenderness  Trachea: Trachea and phonation normal    Cardiovascular:      Rate and Rhythm: Normal rate and regular rhythm  Heart sounds: Normal heart sounds  Pulmonary:      Effort: Pulmonary effort is normal       Breath sounds: Normal breath sounds  Musculoskeletal:      Cervical back: Neck supple  Skin:     General: Skin is warm and dry  Neurological:      General: No focal deficit present  Mental Status: He is alert and oriented to person, place, and time  Psychiatric:         Mood and Affect: Mood normal          Behavior: Behavior normal  Behavior is cooperative  Thought Content: Thought content normal            Ear cerumen removal    Date/Time: 3/31/2022 2:15 AM  Performed by: Carilyn Goldberg, CRNP  Authorized by: Carilyn Goldberg, CRNP   Universal Protocol:  Consent: Verbal consent obtained    Consent given by: patient  Timeout called at: 3/31/2022 2:15 AM   Patient understanding: patient states understanding of the procedure being performed  Patient identity confirmed: verbally with patient      Patient location:  Clinic  Procedure details:     Local anesthetic:  None    Location:  R ear    Procedure type: irrigation with instrumentation      Instrumentation: loop    Post-procedure details:     Complication:  None    Hearing quality:  Diminished    Patient tolerance of procedure:   Tolerated well, no immediate complications

## 2022-04-07 ENCOUNTER — HOSPITAL ENCOUNTER (EMERGENCY)
Facility: HOSPITAL | Age: 57
Discharge: HOME | End: 2022-04-07
Attending: EMERGENCY MEDICINE
Payer: COMMERCIAL

## 2022-04-07 ENCOUNTER — APPOINTMENT (EMERGENCY)
Dept: RADIOLOGY | Facility: HOSPITAL | Age: 57
End: 2022-04-07
Payer: COMMERCIAL

## 2022-04-07 VITALS
RESPIRATION RATE: 20 BRPM | TEMPERATURE: 98.3 F | OXYGEN SATURATION: 99 % | DIASTOLIC BLOOD PRESSURE: 66 MMHG | HEART RATE: 86 BPM | SYSTOLIC BLOOD PRESSURE: 113 MMHG

## 2022-04-07 DIAGNOSIS — M25.521 RIGHT ELBOW PAIN: Primary | ICD-10-CM

## 2022-04-07 PROCEDURE — 73080 X-RAY EXAM OF ELBOW: CPT

## 2022-04-07 PROCEDURE — 99283 EMERGENCY DEPT VISIT LOW MDM: CPT

## 2022-04-07 PROCEDURE — 99283 EMERGENCY DEPT VISIT LOW MDM: CPT | Performed by: EMERGENCY MEDICINE

## 2022-04-07 RX ADMIN — DICLOFENAC SODIUM 2 G: 10 GEL TOPICAL at 11:12

## 2022-04-07 NOTE — DISCHARGE INSTRUCTIONS
Diagnosis; right elbow pain     - can use the diclofenac gel - apply 2 gram strip to right elbow up to 4 times per day and rub in     - please call  the orthopedic doctor when you get home to schedule an appointment

## 2022-04-11 NOTE — ED PROVIDER NOTES
History  Chief Complaint   Patient presents with    Elbow Pain     pt was bringing a frying pan out from a cabinet when he felt something pull in his R elbow, pain is not as severe but is still there, pt approx states that he has a hx of some type of joint clean out surgery in same elbow in [id number]     55 yr male with hx of previous r elbow surg in remote past-- yesterday after getting an object from cabinet felt a pull in r elbow -- c/o pain in posterior elbow- no other comps      History provided by:  Patient and spouse   used: No    Elbow Pain  Location:  Elbow  Elbow location:  R elbow  Injury: yes        Prior to Admission Medications   Prescriptions Last Dose Informant Patient Reported? Taking?    Blood Glucose Monitoring Suppl (Contour Next EZ) w/Device KIT  Self No No   Sig: Use 1 each 4 (four) times a day   Cholecalciferol (VITAMIN D) 2000 units CAPS  Self Yes No   Sig: Take 1 capsule by mouth daily   HYDROmorphone (Dilaudid) 4 mg/mL injection  Self Yes No   Sig: Inject 1 Device as directed daily     Microlet Lancets MISC  Self No No   Sig: Use 4 (four) times a day   OxyCONTIN 20 MG 12 hr tablet   Yes No   SYRINGE-NEEDLE, DISP, 3 ML (B-D 3CC LUER-HAFSA SYR 21GX1") 21G X 1" 3 ML MISC  Self Yes No   Sig: by Does not apply route once a week   atorvastatin (LIPITOR) 20 mg tablet   No No   Sig: Take 1 tablet (20 mg total) by mouth daily at bedtime   gabapentin (NEURONTIN) 300 mg capsule  Self Yes No   Sig: Take 600 mg by mouth daily Taking two 600 mg at night   glucose blood (Contour Next Test) test strip  Self No No   Sig: Use 1 each 4 (four) times a day   lisinopril (ZESTRIL) 20 mg tablet   No No   Sig: Take 1 tablet (20 mg total) by mouth daily   metFORMIN (GLUCOPHAGE-XR) 500 mg 24 hr tablet   No No   Sig: Take 1 tablet (500 mg total) by mouth 2 (two) times a day with meals   methadone (DOLOPHINE) 10 mg tablet  Self Yes No   Sig: Take 2 tablets every 6 hours for pain,   multivitamin [facility]) TABS   Yes No   Sig: Take 1 tablet by mouth daily   omeprazole (PriLOSEC) 40 MG capsule  Self No No   Sig: Take 1 capsule (40 mg total) by mouth daily   oxyCODONE (OXYCONTIN) 80 mg 12 hr tablet  Self Yes No   Sig: Take 1 tablet by mouth every 12 (twelve) hours   oxyCODONE (OxyCONTIN) 40 mg 12 hr tablet  Self Yes No   Sig: Take 1 tablet by mouth every 12 (twelve) hours   testosterone cypionate (DEPO-TESTOSTERONE) 200 mg/mL SOLN   No No   Sig: INJECT 0 3ML (60MG) WEEKLY USE 1 VIAL FOR 1 WEEKLY DOSE, DISCARD THE REMAINDER      Facility-Administered Medications: None       Past Medical History:   Diagnosis Date    Chronic pain     Depression     Diabetes (HCC)     Fibromyalgia, primary     GERD (gastroesophageal reflux disease)     Hyperlipidemia     Hypertension     Low testosterone     Neuropathy     Pilonidal cyst     last assessed 3/17/2014    Sleep apnea     no cpap       Past Surgical History:   Procedure Laterality Date    BACK SURGERY      discectomy    ELBOW SURGERY Right     OTHER SURGICAL HISTORY  03/13/2015    Electr analysis of progr impl pump w/reprogram refill, requiring physician    Replacement of right abdomen intrathecal  pain pump filled with Dilaudid with electronic analysis and programming   managed by Nahun Villatoro PILONID[facility]ST [facility]      [place] SHLDR ARTHROSCOP,SURG,W/ROTAT CUFF REPR Right 2/28/2020    Procedure: SHOULDER ARTHROSCOPIC ROTATOR CUFF REPAIR AND DEBRIDEMENT;  Surgeon: Sharonda Woods MD;  [facility];  Service: Orthopedics    ROTATOR CUFF REPAIR Left     WISDOM TOOTH EXTRACTION         Family History   Problem Relation Age of Onset    Diabetes unspecified Sister     Hypertension Sister     Hyperlipidemia Sister         pure hypercholesterolemia    Diabetes unspecified Brother         DM    Hypertension Brother     Hyperlipidemia Brother         pure hypercholesterolemia    Coronary artery disease Father     Heart disease Father  Diabetes Brother         DM    Hypertension Family     Alcohol abuse Mother     Liver disease Mother      I have reviewed and agree with the history as documented  E-Cigarette/Vaping    E-Cigarette Use Never User      E-Cigarette/Vaping Substances     Social History     Tobacco Use    Smoking status: Former Smoker     Start date: 2/24/2018    Smokeless tobacco: Never Used   Vaping Use    Vaping Use: Never used   Substance Use Topics    Alcohol use: No    Drug use: Never       Review of Systems   Constitutional: Negative  HENT: Negative  Eyes: Negative  Respiratory: Negative  Cardiovascular: Negative  Gastrointestinal: Negative  Endocrine: Negative  Genitourinary: Negative  Musculoskeletal: Negative  R elbow pain    Skin: Negative  Allergic/Immunologic: Negative  Neurological: Negative  Hematological: Negative  Psychiatric/Behavioral: Negative  Physical Exam  Physical Exam  Vitals and nursing note reviewed  Constitutional:       General: He is not in acute distress  Appearance: Normal appearance  He is not ill-appearing, toxic-appearing or diaphoretic  Comments: avss-- pulse ox 99 % on ra- interpretation is normal- no intervention    Musculoskeletal:         General: Tenderness and signs of injury present  No swelling or deformity  Right lower leg: No edema  Left lower leg: No edema  Comments: r elbow- posterior tenderness to palpation/ active rom - mild decreased flex/ext at elbow- no effusion - no olecranon bursitis- no radial head tenderess- normal rue distal pulse/sensation/ strenght/rom/cap refill   Neurological:      Mental Status: He is alert           Vital Signs  ED Triage Vitals   Temperature Pulse Respirations Blood Pressure SpO2   04/07/22 0958 04/07/22 0958 04/07/22 0958 04/07/22 0958 04/07/22 0958   98 3 °F (36 8 °C) 86 20 113/66 99 %      Temp Source Heart Rate Source Patient Position - Orthostatic VS BP Location FiO2 (%)   04/07/22 0958 04/07/22 0958 04/07/22 0958 04/07/22 0958 --   Oral Monitor Sitting Left arm       Pain Score       04/07/22 1021       3           Vitals:    04/07/22 0958   BP: 113/66   Pulse: 86   Patient Position - Orthostatic VS: Sitting         Visual Acuity      ED Medications  Medications   Diclofenac Sodium (VOLTAREN) 1 % topical gel 2 g (2 g Topical Given 4/7/22 1112)       Diagnostic Studies  Results Reviewed     None                 XR elbow 3+ views RIGHT   Final Result by Austen Alexandra MD (04/07 1120)      No acute osseous abnormality  Workstation performed: [id number]                    Procedures  Procedures         ED Course  ED Course as of 04/10/22 2024   Thu Apr 07, 2022   1130 R elbow xray - pos djd - osteophyte of radial head- no fx/ bony lesion   1213 - er md note- pt -reports  rel;ief with diclofenac gel-- will d/c                                             MDM    Disposition  Final diagnoses:   Right elbow pain     Time reflects when diagnosis was documented in both MDM as applicable and the Disposition within this note     Time User Action Codes Description Comment    4/7/2022 12:13 PM Jeffrey Briceño Add [G58 055] Right elbow pain       ED Disposition     ED Disposition Condition Date/Time Comment    Discharge Stable u Apr 7, 2022 12:13 PM Shadi Knee Sr  discharge to home/self care              Follow-up Information     Follow up With Specialties Details Why Contact Sergo Whaley MD Orthopedic Surgery Call today  [address]  [phone]            Discharge Medication List as of 4/7/2022 12:16 PM      CONTINUE these medications which have NOT CHANGED    Details   atorvastatin (LIPITOR) 20 mg tablet Take 1 tablet (20 mg total) by mouth daily at bedtime, Starting Mon 1/31/2022, Until Sat 7/30/2022, Normal      Blood Glucose Monitoring Suppl (Contour Next EZ) w/Device KIT Use 1 each 4 (four) times a day, Starting Mon 6/28/2021, Normal      Cholecalciferol (VITAMIN D) 2000 units CAPS Take 1 capsule by mouth daily, Historical Med      gabapentin (NEURONTIN) 300 mg capsule Take 600 mg by mouth daily Taking two 600 mg at night, Historical Med      glucose blood (Contour Next Test) test strip Use 1 each 4 (four) times a day, Starting Mon 6/28/2021, Normal      HYDROmorphone (Dilaudid) 4 mg/mL injection Inject 1 Device as directed daily  , Historical Med      lisinopril (ZESTRIL) 20 mg tablet Take 1 tablet (20 mg total) by mouth daily, Starting Mon 11/1/2021, Normal      metFORMIN (GLUCOPHAGE-XR) 500 mg 24 hr tablet Take 1 tablet (500 mg total) by mouth 2 (two) times a day with meals, Starting Mon 2/7/2022, Until Sat 8/6/2022, Normal      methadone (DOLOPHINE) 10 mg tablet Take 2 tablets every 6 hours for pain,, Historical Med      Microlet Lancets MISC Use 4 (four) times a day, Starting Mon 6/28/2021, Normal      multivitamin (THERAGRAN) TABS Take 1 tablet by mouth daily, Historical Med      omeprazole (PriLOSEC) 40 MG capsule Take 1 capsule (40 mg total) by mouth daily, Starting Thu 5/13/2021, Normal      !! oxyCODONE (OxyCONTIN) 40 mg 12 hr tablet Take 1 tablet by mouth every 12 (twelve) hours, Starting Thu 1/12/2012, Historical Med      !! oxyCODONE (OXYCONTIN) 80 mg 12 hr tablet Take 1 tablet by mouth every 12 (twelve) hours, Starting Thu 1/12/2012, Historical Med      !! OxyCONTIN 20 MG 12 hr tablet Starting Thu 2/17/2022, Historical Med      SYRINGE-NEEDLE, DISP, 3 ML (B-D 3CC LUER-HAFSA SYR 21GX1") 21G X 1" 3 ML MISC by Does not apply route once a week, Starting Tue 1/24/2012, Historical Med      testosterone cypionate (DEPO-TESTOSTERONE) 200 mg/mL SOLN INJECT 0 3ML (60MG) WEEKLY USE 1 VIAL FOR 1 WEEKLY DOSE, DISCARD THE REMAINDER, Normal       !! - Potential duplicate medications found  Please discuss with provider  No discharge procedures on file      PDMP Review       Value Time User    PDMP Reviewed  Yes 8/30/2021 11:32 AM Paula Still PA-C          ED Provider  Electronically Signed by           Fausto Trujillo MD  04/10/22 2030

## 2022-04-28 ENCOUNTER — OFFICE VISIT (OUTPATIENT)
Dept: FAMILY MEDICINE CLINIC | Facility: CLINIC | Age: 57
End: 2022-04-28
Payer: COMMERCIAL

## 2022-04-28 VITALS
BODY MASS INDEX: 29.03 KG/M2 | OXYGEN SATURATION: 96 % | HEART RATE: 88 BPM | TEMPERATURE: 97.2 F | SYSTOLIC BLOOD PRESSURE: 138 MMHG | WEIGHT: 196 LBS | HEIGHT: 69 IN | DIASTOLIC BLOOD PRESSURE: 82 MMHG

## 2022-04-28 DIAGNOSIS — H69.81 EUSTACHIAN TUBE DYSFUNCTION, RIGHT: Primary | ICD-10-CM

## 2022-04-28 PROCEDURE — 99214 OFFICE O/P EST MOD 30 MIN: CPT | Performed by: NURSE PRACTITIONER

## 2022-04-28 RX ORDER — PREDNISONE 10 MG/1
TABLET ORAL
Qty: 26 TABLET | Refills: 0 | Status: SHIPPED | OUTPATIENT
Start: 2022-04-28 | End: 2022-05-03 | Stop reason: HOSPADM

## 2022-04-28 NOTE — PROGRESS NOTES
Assessment/Plan:     Diagnoses and all orders for this visit:    Eustachian tube dysfunction, right  -     predniSONE 10 mg tablet; Take 3 tabs twice a day x2 days, then 2 tabs twice a day x2 days, then 1 tab twice a day x2 days, then 1 tablet daily x2 days      Discussed with patient plan to treat with a tapering dose of prednisone  Patient instructed to call if no improvement in 72 hours or symptoms worsen    Subjective:      Patient ID: Ivette Rocha  is a 55 y o  male  55 y  o male presenting with clogged ear feeling, nasal congestion for the past week  Patient was seen on 03/31/2022 and had his ears flushed for impacted cerumen  He reports that symptoms had improved but he has been working down in his basement the past few weeks  Since that time his present symptoms started  He denies fever, chills, generalized body aches, cough, headache, shortness of breath or GI symptoms  He has been using over the counter allergy medication without significant releif         Family History   Problem Relation Age of Onset    Diabetes unspecified Sister     Hypertension Sister     Hyperlipidemia Sister         pure hypercholesterolemia    Diabetes unspecified Brother         DM    Hypertension Brother     Hyperlipidemia Brother         pure hypercholesterolemia    Coronary artery disease Father     Heart disease Father     Diabetes Brother         DM    Hypertension Family     Alcohol abuse Mother     Liver disease Mother      Social History     Socioeconomic History    Marital status: /Civil Union     Spouse name: Not on file    Number of children: Not on file    Years of education: Not on file    Highest education level: Not on file   Occupational History    Occupation: retired construction   Tobacco Use    Smoking status: Former Smoker     Start date: 2/24/2018    Smokeless tobacco: Never Used   Vaping Use    Vaping Use: Never used   Substance and Sexual Activity    Alcohol use: No    Drug use: Never    Sexual activity: Not on file   Other Topics Concern    Not on file   Social History Narrative    Not on file     Social Determinants of Health     Financial Resource Strain: Not on file   Food Insecurity: Not on file   Transportation Needs: Not on file   Physical Activity: Not on file   Stress: Not on file   Social Connections: Not on file   Intimate Partner Violence: Not on file   Housing Stability: Not on file     E-Cigarette/Vaping    E-Cigarette Use Never User      E-Cigarette/Vaping Substances     Past Medical History:   Diagnosis Date    Chronic pain     Depression     Diabetes ([facility] [address] )     Fibromyalgia, primary     GERD (gastroesophageal reflux disease)     Hyperlipidemia     Hypertension     Low testosterone     Neuropathy     Pilonidal cyst     last assessed 3/17/2014    Sleep apnea     no cpap     Past Surgical History:   Procedure Laterality Date    BACK SURGERY      discectomy    ELBOW SURGERY Right     OTHER SURGICAL HISTORY  03/13/2015    Electr analysis of progr impl pump w/reprogram refill, requiring physician    Replacement of right abdomen intrathecal  pain pump filled with Dilaudid with electronic analysis and programming   managed by Glenda Mullen PILONIDAL CYST EXCISION      [place] SHLDR ARTHROSCOP,SURG,W/ROTAT CUFF REPR Right 2/28/2020    Procedure: SHOULDER ARTHROSCOPIC ROTATOR CUFF REPAIR AND DEBRIDEMENT;  Surgeon: Bacilio Chen MD;  [facility]: [facility];  Service: Orthopedics    ROTATOR CUFF REPAIR Left     WISDOM TOOTH EXTRACTION       No Known Allergies    Current Outpatient Medications:     atorvastatin (LIPITOR) 20 mg tablet, Take 1 tablet (20 mg total) by mouth daily at bedtime, Disp: 90 tablet, Rfl: 1    Blood Glucose Monitoring Suppl (Contour Next EZ) w/Device KIT, Use 1 each 4 (four) times a day, Disp: 1 kit, Rfl: 0    Cholecalciferol (VITAMIN D) 2000 units CAPS, Take 1 capsule by mouth daily, Disp: , Rfl:     gabapentin (NEURONTIN) 300 mg capsule, Take 600 mg by mouth daily Taking two 600 mg at night, Disp: , Rfl:     glucose blood (Contour Next Test) test strip, Use 1 each 4 (four) times a day, Disp: 400 each, Rfl: 1    HYDROmorphone (Dilaudid) 4 mg/mL injection, Inject 1 Device as directed daily  , Disp: , Rfl:     lisinopril (ZESTRIL) 20 mg tablet, Take 1 tablet (20 mg total) by mouth daily, Disp: 90 tablet, Rfl: 1    metFORMIN (GLUCOPHAGE-XR) 500 mg 24 hr tablet, Take 1 tablet (500 mg total) by mouth 2 (two) times a day with meals, Disp: 180 tablet, Rfl: 3    methadone (DOLOPHINE) 10 mg tablet, Take 2 tablets every 6 hours for pain,, Disp: , Rfl:     Microlet Lancets MISC, Use 4 (four) times a day, Disp: 400 each, Rfl: 1    multivitamin (THERAGRAN) TABS, Take 1 tablet by mouth daily, Disp: , Rfl:     omeprazole (PriLOSEC) 40 MG capsule, Take 1 capsule (40 mg total) by mouth daily, Disp: 90 capsule, Rfl: 3    oxyCODONE (OxyCONTIN) 40 mg 12 hr tablet, Take 1 tablet by mouth every 12 (twelve) hours, Disp: , Rfl:     oxyCODONE (OXYCONTIN) 80 mg 12 hr tablet, Take 1 tablet by mouth every 12 (twelve) hours, Disp: , Rfl:     OxyCONTIN 20 MG 12 hr tablet, , Disp: , Rfl:     predniSONE 10 mg tablet, Take 3 tabs twice a day x2 days, then 2 tabs twice a day x2 days, then 1 tab twice a day x2 days, then 1 tablet daily x2 days, Disp: 26 tablet, Rfl: 0    SYRINGE-NEEDLE, DISP, 3 ML (B-D 3CC LUER-HAFSA SYR 21GX1") 21G X 1" 3 ML MISC, by Does not apply route once a week, Disp: , Rfl:     testosterone cypionate (DEPO-TESTOSTERONE) 200 mg/mL SOLN, INJECT 0 3ML (60MG) WEEKLY USE 1 VIAL FOR 1 WEEKLY DOSE, DISCARD THE REMAINDER, Disp: 4 mL, Rfl: 5    Review of Systems   HENT: Positive for congestion and ear pain ( ear pressure)  Respiratory: Negative  Cardiovascular: Negative  Gastrointestinal: Negative  Neurological: Negative  Psychiatric/Behavioral: Negative          Objective:    /82 (BP Location: Left arm, Patient Position: Sitting, Cuff Size: Standard)   Pulse 88   Temp (!) 97 2 °F (36 2 °C)   Ht 5' 9" (1 753 m)   Wt 88 9 kg (196 lb)   SpO2 96%   BMI 28 94 kg/m² (Reviewed)     Physical Exam  Vitals reviewed  Constitutional:       General: He is not in acute distress  Appearance: He is well-developed and well-groomed  He is not ill-appearing  HENT:      Head: Normocephalic and atraumatic  Right Ear: Ear canal and external ear normal  There is no impacted cerumen  Left Ear: Tympanic membrane, ear canal and external ear normal  There is no impacted cerumen  Nose: Congestion present  Mouth/Throat:      Mouth: Mucous membranes are moist       Pharynx: Oropharynx is clear  Eyes:      General: Lids are normal       Extraocular Movements: Extraocular movements intact  Conjunctiva/sclera: Conjunctivae normal       Pupils: Pupils are equal, round, and reactive to light  Neck:      Thyroid: No thyromegaly or thyroid tenderness  Trachea: Trachea and phonation normal    Cardiovascular:      Rate and Rhythm: Normal rate and regular rhythm  Pulses: Normal pulses  Heart sounds: Normal heart sounds  Pulmonary:      Effort: Pulmonary effort is normal       Breath sounds: Normal breath sounds  Abdominal:      General: Abdomen is flat  Bowel sounds are normal  There is no distension  Palpations: Abdomen is soft  There is no mass  Tenderness: There is no abdominal tenderness  Musculoskeletal:      Cervical back: Neck supple  Skin:     General: Skin is warm and dry  Capillary Refill: Capillary refill takes less than 2 seconds  Neurological:      General: No focal deficit present  Mental Status: He is alert and oriented to person, place, and time  Psychiatric:         Mood and Affect: Mood normal          Behavior: Behavior normal  Behavior is cooperative  Thought Content:  Thought content normal

## 2022-05-01 ENCOUNTER — HOSPITAL ENCOUNTER (EMERGENCY)
Facility: HOSPITAL | Age: 57
End: 2022-05-01
Attending: EMERGENCY MEDICINE
Payer: COMMERCIAL

## 2022-05-01 ENCOUNTER — HOSPITAL ENCOUNTER (INPATIENT)
Facility: HOSPITAL | Age: 57
LOS: 2 days | Discharge: HOME | DRG: 885 | End: 2022-05-03
Attending: STUDENT IN AN ORGANIZED HEALTH CARE EDUCATION/TRAINING PROGRAM | Admitting: PSYCHIATRY & NEUROLOGY
Payer: COMMERCIAL

## 2022-05-01 VITALS
TEMPERATURE: 98.1 F | OXYGEN SATURATION: 96 % | HEART RATE: 78 BPM | DIASTOLIC BLOOD PRESSURE: 74 MMHG | SYSTOLIC BLOOD PRESSURE: 134 MMHG | BODY MASS INDEX: 28.39 KG/M2 | WEIGHT: 192.24 LBS | RESPIRATION RATE: 18 BRPM

## 2022-05-01 DIAGNOSIS — H69.80 EUSTACHIAN TUBE DYSFUNCTION: Primary | ICD-10-CM

## 2022-05-01 DIAGNOSIS — R45.851 DEPRESSION WITH SUICIDAL IDEATION: Primary | ICD-10-CM

## 2022-05-01 DIAGNOSIS — R45.851 DEPRESSION WITH SUICIDAL IDEATION: ICD-10-CM

## 2022-05-01 DIAGNOSIS — F32.A DEPRESSION WITH SUICIDAL IDEATION: Primary | ICD-10-CM

## 2022-05-01 DIAGNOSIS — F32.A DEPRESSION WITH SUICIDAL IDEATION: ICD-10-CM

## 2022-05-01 LAB
ALBUMIN SERPL BCP-MCNC: 4.2 G/DL (ref 3.5–5)
ALP SERPL-CCNC: 61 U/L (ref 46–116)
ALT SERPL W P-5'-P-CCNC: 32 U/L (ref 12–78)
AMPHETAMINES SERPL QL SCN: NEGATIVE
ANION GAP SERPL CALCULATED.3IONS-SCNC: 9 MMOL/L (ref 4–13)
APAP SERPL-MCNC: <2 UG/ML (ref 10–20)
AST SERPL W P-5'-P-CCNC: 21 U/L (ref 5–45)
ATRIAL RATE: 107 BPM
BARBITURATES UR QL: NEGATIVE
BASOPHILS # BLD AUTO: 0.04 THOUSANDS/ΜL (ref 0–0.1)
BASOPHILS NFR BLD AUTO: 0 % (ref 0–1)
BENZODIAZ UR QL: NEGATIVE
BILIRUB SERPL-MCNC: 0.53 MG/DL (ref 0.2–1)
BUN SERPL-MCNC: 19 MG/DL (ref 5–25)
CALCIUM SERPL-MCNC: 9.2 MG/DL (ref 8.3–10.1)
CHLORIDE SERPL-SCNC: 100 MMOL/L (ref 100–108)
CO2 SERPL-SCNC: 28 MMOL/L (ref 21–32)
COCAINE UR QL: NEGATIVE
CREAT SERPL-MCNC: 1.02 MG/DL (ref 0.6–1.3)
EOSINOPHIL # BLD AUTO: 0 THOUSAND/ΜL (ref 0–0.61)
EOSINOPHIL NFR BLD AUTO: 0 % (ref 0–6)
ERYTHROCYTE [DISTWIDTH] IN BLOOD BY AUTOMATED COUNT: 13.1 % (ref 11.6–15.1)
ETHANOL SERPL-MCNC: <3 MG/DL (ref 0–3)
FLUAV RNA RESP QL NAA+PROBE: NEGATIVE
FLUBV RNA RESP QL NAA+PROBE: NEGATIVE
GFR SERPL CREATININE-BSD FRML MDRD: 82 ML/MIN/1.73SQ M
GLUCOSE SERPL-MCNC: 145 MG/DL (ref 65–140)
HCT VFR BLD AUTO: 38.3 % (ref 36.5–49.3)
HGB BLD-MCNC: 13.2 G/DL (ref 12–17)
IMM GRANULOCYTES # BLD AUTO: 0.09 THOUSAND/UL (ref 0–0.2)
IMM GRANULOCYTES NFR BLD AUTO: 1 % (ref 0–2)
LYMPHOCYTES # BLD AUTO: 2.47 THOUSANDS/ΜL (ref 0.6–4.47)
LYMPHOCYTES NFR BLD AUTO: 17 % (ref 14–44)
MCH RBC QN AUTO: 31.7 PG (ref 26.8–34.3)
MCHC RBC AUTO-ENTMCNC: 34.5 G/DL (ref 31.4–37.4)
MCV RBC AUTO: 92 FL (ref 82–98)
METHADONE UR QL: POSITIVE
MONOCYTES # BLD AUTO: 1.17 THOUSAND/ΜL (ref 0.17–1.22)
MONOCYTES NFR BLD AUTO: 8 % (ref 4–12)
NEUTROPHILS # BLD AUTO: 11.05 THOUSANDS/ΜL (ref 1.85–7.62)
NEUTS SEG NFR BLD AUTO: 74 % (ref 43–75)
NRBC BLD AUTO-RTO: 0 /100 WBCS
OPIATES UR QL SCN: POSITIVE
OXYCODONE+OXYMORPHONE UR QL SCN: POSITIVE
P AXIS: 47 DEGREES
PCP UR QL: NEGATIVE
PLATELET # BLD AUTO: 250 THOUSANDS/UL (ref 149–390)
PMV BLD AUTO: 10.1 FL (ref 8.9–12.7)
POTASSIUM SERPL-SCNC: 3.4 MMOL/L (ref 3.5–5.3)
PR INTERVAL: 148 MS
PROT SERPL-MCNC: 7.9 G/DL (ref 6.4–8.2)
QRS AXIS: 40 DEGREES
QRSD INTERVAL: 84 MS
QT INTERVAL: 312 MS
QTC INTERVAL: 416 MS
RBC # BLD AUTO: 4.16 MILLION/UL (ref 3.88–5.62)
RSV RNA RESP QL NAA+PROBE: NEGATIVE
SALICYLATES SERPL-MCNC: <3 MG/DL (ref 3–20)
SARS-COV-2 RNA RESP QL NAA+PROBE: NEGATIVE
SODIUM SERPL-SCNC: 137 MMOL/L (ref 136–145)
T WAVE AXIS: 34 DEGREES
THC UR QL: NEGATIVE
TSH SERPL DL<=0.05 MIU/L-ACNC: 0.43 UIU/ML (ref 0.45–4.5)
VENTRICULAR RATE: 107 BPM
WBC # BLD AUTO: 14.82 THOUSAND/UL (ref 4.31–10.16)

## 2022-05-01 PROCEDURE — 0241U HB NFCT DS VIR RESP RNA 4 TRGT: CPT | Performed by: EMERGENCY MEDICINE

## 2022-05-01 PROCEDURE — 80307 DRUG TEST PRSMV CHEM ANLYZR: CPT | Performed by: EMERGENCY MEDICINE

## 2022-05-01 PROCEDURE — 99285 EMERGENCY DEPT VISIT HI MDM: CPT

## 2022-05-01 PROCEDURE — 80143 DRUG ASSAY ACETAMINOPHEN: CPT | Performed by: EMERGENCY MEDICINE

## 2022-05-01 PROCEDURE — 93010 ELECTROCARDIOGRAM REPORT: CPT | Performed by: INTERNAL MEDICINE

## 2022-05-01 PROCEDURE — 99285 EMERGENCY DEPT VISIT HI MDM: CPT | Performed by: EMERGENCY MEDICINE

## 2022-05-01 PROCEDURE — 85025 COMPLETE CBC W/AUTO DIFF WBC: CPT | Performed by: EMERGENCY MEDICINE

## 2022-05-01 PROCEDURE — 82077 ASSAY SPEC XCP UR&BREATH IA: CPT | Performed by: EMERGENCY MEDICINE

## 2022-05-01 PROCEDURE — 36415 COLL VENOUS BLD VENIPUNCTURE: CPT | Performed by: EMERGENCY MEDICINE

## 2022-05-01 PROCEDURE — 80179 DRUG ASSAY SALICYLATE: CPT | Performed by: EMERGENCY MEDICINE

## 2022-05-01 PROCEDURE — 84443 ASSAY THYROID STIM HORMONE: CPT | Performed by: EMERGENCY MEDICINE

## 2022-05-01 PROCEDURE — 93005 ELECTROCARDIOGRAM TRACING: CPT

## 2022-05-01 PROCEDURE — 80053 COMPREHEN METABOLIC PANEL: CPT | Performed by: EMERGENCY MEDICINE

## 2022-05-01 RX ORDER — OLANZAPINE 2.5 MG/1
5 TABLET ORAL
Status: CANCELLED | OUTPATIENT
Start: 2022-05-01

## 2022-05-01 RX ORDER — HYDROXYZINE HYDROCHLORIDE 25 MG/1
25 TABLET, FILM COATED ORAL EVERY 6 HOURS PRN
Status: CANCELLED | OUTPATIENT
Start: 2022-05-01

## 2022-05-01 RX ORDER — ACETAMINOPHEN 325 MG/1
650 TABLET ORAL EVERY 4 HOURS PRN
Status: CANCELLED | OUTPATIENT
Start: 2022-05-01

## 2022-05-01 RX ORDER — OXYCODONE HCL 20 MG/1
80 TABLET, FILM COATED, EXTENDED RELEASE ORAL EVERY 12 HOURS SCHEDULED
Status: DISCONTINUED | OUTPATIENT
Start: 2022-05-01 | End: 2022-05-01

## 2022-05-01 RX ORDER — PANTOPRAZOLE SODIUM 40 MG/1
40 TABLET, DELAYED RELEASE ORAL
Status: DISCONTINUED | OUTPATIENT
Start: 2022-05-01 | End: 2022-05-01 | Stop reason: HOSPADM

## 2022-05-01 RX ORDER — BENZTROPINE MESYLATE 1 MG/ML
1 INJECTION INTRAMUSCULAR; INTRAVENOUS
Status: CANCELLED | OUTPATIENT
Start: 2022-05-01

## 2022-05-01 RX ORDER — TRAZODONE HYDROCHLORIDE 50 MG/1
50 TABLET ORAL
Status: DISCONTINUED | OUTPATIENT
Start: 2022-05-01 | End: 2022-05-03 | Stop reason: HOSPADM

## 2022-05-01 RX ORDER — LANOLIN ALCOHOL/MO/W.PET/CERES
3 CREAM (GRAM) TOPICAL
Status: DISCONTINUED | OUTPATIENT
Start: 2022-05-01 | End: 2022-05-03 | Stop reason: HOSPADM

## 2022-05-01 RX ORDER — ACETAMINOPHEN 325 MG/1
975 TABLET ORAL EVERY 6 HOURS PRN
Status: DISCONTINUED | OUTPATIENT
Start: 2022-05-01 | End: 2022-05-02

## 2022-05-01 RX ORDER — HYDROXYZINE 50 MG/1
50 TABLET, FILM COATED ORAL
Status: DISCONTINUED | OUTPATIENT
Start: 2022-05-01 | End: 2022-05-03 | Stop reason: HOSPADM

## 2022-05-01 RX ORDER — LISINOPRIL 10 MG/1
20 TABLET ORAL DAILY
Status: DISCONTINUED | OUTPATIENT
Start: 2022-05-01 | End: 2022-05-01 | Stop reason: HOSPADM

## 2022-05-01 RX ORDER — OLANZAPINE 2.5 MG/1
2.5 TABLET ORAL
Status: CANCELLED | OUTPATIENT
Start: 2022-05-01

## 2022-05-01 RX ORDER — BENZTROPINE MESYLATE 1 MG/ML
1 INJECTION INTRAMUSCULAR; INTRAVENOUS
Status: DISCONTINUED | OUTPATIENT
Start: 2022-05-01 | End: 2022-05-03 | Stop reason: HOSPADM

## 2022-05-01 RX ORDER — BENZTROPINE MESYLATE 0.5 MG/1
0.5 TABLET ORAL
Status: CANCELLED | OUTPATIENT
Start: 2022-05-01

## 2022-05-01 RX ORDER — METHADONE HYDROCHLORIDE 10 MG/1
10 TABLET ORAL EVERY 6 HOURS PRN
Status: DISCONTINUED | OUTPATIENT
Start: 2022-05-01 | End: 2022-05-01 | Stop reason: HOSPADM

## 2022-05-01 RX ORDER — OXYCODONE HCL 20 MG/1
80 TABLET, FILM COATED, EXTENDED RELEASE ORAL EVERY 12 HOURS SCHEDULED
Status: DISCONTINUED | OUTPATIENT
Start: 2022-05-01 | End: 2022-05-01 | Stop reason: HOSPADM

## 2022-05-01 RX ORDER — OLANZAPINE 5 MG/1
5 TABLET ORAL
Status: DISCONTINUED | OUTPATIENT
Start: 2022-05-01 | End: 2022-05-03 | Stop reason: HOSPADM

## 2022-05-01 RX ORDER — LORAZEPAM 2 MG/ML
1 INJECTION INTRAMUSCULAR
Status: CANCELLED | OUTPATIENT
Start: 2022-05-01

## 2022-05-01 RX ORDER — ACETAMINOPHEN 325 MG/1
975 TABLET ORAL EVERY 6 HOURS PRN
Status: CANCELLED | OUTPATIENT
Start: 2022-05-01

## 2022-05-01 RX ORDER — TRAZODONE HYDROCHLORIDE 50 MG/1
50 TABLET ORAL
Status: CANCELLED | OUTPATIENT
Start: 2022-05-01

## 2022-05-01 RX ORDER — PANTOPRAZOLE SODIUM 40 MG/1
40 TABLET, DELAYED RELEASE ORAL
Status: DISCONTINUED | OUTPATIENT
Start: 2022-05-02 | End: 2022-05-01

## 2022-05-01 RX ORDER — OLANZAPINE 10 MG/1
5 INJECTION, POWDER, LYOPHILIZED, FOR SOLUTION INTRAMUSCULAR
Status: CANCELLED | OUTPATIENT
Start: 2022-05-01

## 2022-05-01 RX ORDER — HYDROXYZINE HYDROCHLORIDE 25 MG/1
50 TABLET, FILM COATED ORAL
Status: CANCELLED | OUTPATIENT
Start: 2022-05-01

## 2022-05-01 RX ORDER — LORAZEPAM 2 MG/ML
1 INJECTION INTRAMUSCULAR
Status: DISCONTINUED | OUTPATIENT
Start: 2022-05-01 | End: 2022-05-03 | Stop reason: HOSPADM

## 2022-05-01 RX ORDER — BENZTROPINE MESYLATE 0.5 MG/1
0.5 TABLET ORAL
Status: DISCONTINUED | OUTPATIENT
Start: 2022-05-01 | End: 2022-05-03 | Stop reason: HOSPADM

## 2022-05-01 RX ORDER — LANOLIN ALCOHOL/MO/W.PET/CERES
3 CREAM (GRAM) TOPICAL
Status: CANCELLED | OUTPATIENT
Start: 2022-05-01

## 2022-05-01 RX ORDER — ACETAMINOPHEN 325 MG/1
650 TABLET ORAL EVERY 4 HOURS PRN
Status: DISCONTINUED | OUTPATIENT
Start: 2022-05-01 | End: 2022-05-03 | Stop reason: HOSPADM

## 2022-05-01 RX ORDER — OLANZAPINE 10 MG/1
5 INJECTION, POWDER, LYOPHILIZED, FOR SOLUTION INTRAMUSCULAR
Status: DISCONTINUED | OUTPATIENT
Start: 2022-05-01 | End: 2022-05-03 | Stop reason: HOSPADM

## 2022-05-01 RX ORDER — GABAPENTIN 300 MG/1
600 CAPSULE ORAL
Status: DISCONTINUED | OUTPATIENT
Start: 2022-05-01 | End: 2022-05-01 | Stop reason: HOSPADM

## 2022-05-01 RX ORDER — HYDROXYZINE HYDROCHLORIDE 25 MG/1
25 TABLET, FILM COATED ORAL EVERY 6 HOURS PRN
Status: DISCONTINUED | OUTPATIENT
Start: 2022-05-01 | End: 2022-05-03 | Stop reason: HOSPADM

## 2022-05-01 RX ORDER — OLANZAPINE 2.5 MG/1
2.5 TABLET ORAL
Status: DISCONTINUED | OUTPATIENT
Start: 2022-05-01 | End: 2022-05-03 | Stop reason: HOSPADM

## 2022-05-01 RX ADMIN — LISINOPRIL 20 MG: 10 TABLET ORAL at 17:20

## 2022-05-01 RX ADMIN — METFORMIN HYDROCHLORIDE 500 MG: 500 TABLET ORAL at 17:20

## 2022-05-01 RX ADMIN — PANTOPRAZOLE SODIUM 40 MG: 40 TABLET, DELAYED RELEASE ORAL at 22:44

## 2022-05-01 RX ADMIN — GABAPENTIN 600 MG: 300 CAPSULE ORAL at 22:15

## 2022-05-01 RX ADMIN — METHADONE HYDROCHLORIDE 10 MG: 10 TABLET ORAL at 18:35

## 2022-05-01 RX ADMIN — OXYCODONE HYDROCHLORIDE 80 MG: 20 TABLET, FILM COATED, EXTENDED RELEASE ORAL at 17:20

## 2022-05-01 NOTE — ED NOTES
Patient resting with room lights on and tv off   1;1 sitter is present       Reji Mayers  05/01/22 8639

## 2022-05-01 NOTE — ED NOTES
Notified patient's wife of acceptance to [facility] and  time of 2230  Advised there are no visiting hours at this time  Provided her with the unit's phone number

## 2022-05-01 NOTE — ED NOTES
Patient is calm and cooperative at this time with no wants or complaints  1;1 sitter is present       Sarah Zazueta  05/01/22 8556

## 2022-05-01 NOTE — LETTER
Keyanna Barbosa [address] [place] [id number]  Dept: [phone]      EMTALA TRANSFER CONSENT    NAME [address] [facility],[address]                                         1965                              MRN [id number]    I have been informed of my rights regarding examination, treatment, and transfer   by Dr Candance Neptune,     Benefits: Specialized equipment and/or services available at the receiving facility (Include comment)________________________ [facility])    Risks: Potential for delay in receiving treatment      Transfer Request   I acknowledge that my medical condition has been evaluated and explained to me by the emergency department physician or other qualified medical person and/or my attending physician who has recommended and offered to me further medical examination and treatment  I understand the Hospital's obligation with respect to the treatment and stabilization of my emergency medical condition  I nevertheless request to be transferred  I release the Hospital, the doctor, and any other persons caring for me from all responsibility or liability for any injury or ill effects that may result from my transfer and agree to accept all responsibility for the consequences of my choice to transfer, rather than receive stabilizing treatment at the Hospital  I understand that because the transfer is my request, my insurance may not provide reimbursement for the services  The Hospital will assist and direct me and my family in how to make arrangements for transfer, but the hospital is not liable for any fees charged by the transport service  In spite of this understanding, I refuse to consent to further medical examination and treatment which has been offered to me, and request transfer to  [address], [address] : [address] [place] [address]   I authorize the performance of emergency medical procedures and treatments upon me in both transit and upon arrival at the receiving facility  Additionally, I authorize the release of any and all medical records to the receiving facility and request they be transported with me, if possible  I authorize the performance of emergency medical procedures and treatments upon me in both transit and upon arrival at the receiving facility  Additionally, I authorize the release of any and all medical records to the receiving facility and request they be transported with me, if possible  I understand that the safest mode of transportation during a medical emergency is an ambulance and that the Hospital advocates the use of this mode of transport  Risks of traveling to the receiving facility by car, including absence of medical control, life sustaining equipment, such as oxygen, and medical personnel has been explained to me and I fully understand them  (AVTAR CORRECT BOX BELOW)  [  ]  I consent to the stated transfer and to be transported by ambulance/helicopter  [  ]  I consent to the stated transfer, but refuse transportation by ambulance and accept full responsibility for my transportation by car  I understand the risks of non-ambulance transfers and I exonerate the Hospital and its staff from any deterioration in my condition that results from this refusal     X___________________________________________    DATE  22  TIME________  Signature of patient or legally responsible individual signing on patient behalf           RELATIONSHIP TO PATIENT_________________________          Provider Certification    NAME Bryan Menezes                                           1965                              MRN [id number]    A medical screening exam was performed on the above named patient  Based on the examination:    Condition Necessitating Transfer The encounter diagnosis was Depression with suicidal ideation      Patient Condition: The patient has been stabilized such that within reasonable medical probability, no material deterioration of the patient condition or the condition of the unborn child(kareem) is likely to result from the transfer    Reason for Transfer: Level of Care needed not available at this facility [address])    Transfer Requirements: Facility [address]   · Space available and qualified personnel available for treatment as acknowledged by Herrera Porter [phone]  · Agreed to accept transfer and to provide appropriate medical treatment as acknowledged by       Dr Clifton Benson  · Appropriate medical records of the examination and treatment of the patient are provided at the time of transfer   [address], [address] _______  · Transfer will be performed by qualified personnel from    and appropriate transfer equipment as required, including the use of necessary and appropriate life support measures  Provider Certification: I have examined the patient and explained the following risks and benefits of being transferred/refusing transfer to the patient/family:  General risk, such as traffic hazards, adverse weather conditions, rough terrain or turbulence, possible failure of equipment (including vehicle or aircraft), or consequences of actions of persons outside the control of the transport personnel      Based on these reasonable risks and benefits to the patient and/or the unborn child(kareem), and based upon the information available at the time of the patients examination, I certify that the medical benefits reasonably to be expected from the provision of appropriate medical treatments at another medical facility outweigh the increasing risks, if any, to the individuals medical condition, and in the case of labor to the unborn child, from effecting the transfer      X____________________________________________ DATE 05/01/22        TIME_______      ORIGINAL - SEND TO MEDICAL RECORDS   COPY - SEND WITH PATIENT DURING TRANSFER

## 2022-05-01 NOTE — ED NOTES
Patient is accepted at [facility] 6T  Patient is accepted by Dr Katharine Narayanan per [address] [facility] is arranged with [facility] is scheduled for 2230  Patient may go to the floor upon arrival      Nurse report is to be called to [phone] prior to patient transfer

## 2022-05-01 NOTE — ED NOTES
Patient calm and cooperative at this time and in no distress  1;1 sitter is present  Offered patient menu to order dinner  Patient declined at this time         Boaz Wallace  05/01/22 6259

## 2022-05-01 NOTE — ED NOTES
Patient calm and cooperative at this time and in no distress  1;1 sitter is present         Brittney Castro  05/01/22 8395

## 2022-05-01 NOTE — ED NOTES
Insurance      Patient has [employer]:  Per automated system at [phone], no authorization is required

## 2022-05-01 NOTE — ED NOTES
Met with patient to complete crisis assessments  Patient arrived to ER via EMS  Per report, police were called on patient for threat of going into the woods and shooting self in the head  Police found the patient driving with weapon and escorted him to the ER  Patient advised that he never meant to harm himself  He reported that he "wanted to see if his wife cared "  Patient reported that he has been having relationship issues with his wife  He stated that she is filing for divorce, and he found out because a letter from the  came to the house for his wife giving the cost of the divorce  Patient presented as despairing and confused regarding the relationship issue  He was angry regarding his current situation of [facility] hospitalization  Patient denied current SI, HI, AVH, paranoia, and substance abuse  Patient reported that he is only sleeping 2 hrs and he has a loss of appetite  He endorsed anxiety and depression  Patient is on disability for arthritis and chronic pain  He takes oral medication for diabetes  Patient was advised that he would need inpatient treatment and that if he wouldn't sign a voluntary commitment, the doctors would delegate a 2 Doc 302  The differences between the 201 and 302 were explained and the patient chose to sign the 201  Rights were explained, served, and understood

## 2022-05-01 NOTE — ED NOTES
Attempt to change patient and obtain labs per protocol  Pt adamantly refusing to change  Stating that he will not change "with out a fight"  This RN left room s/p explaining protocol to pt  Multiple staff called to bedside to assist and deescalate  Many staff members at bedside, pt presented with changing, asking to please change  Pt unwilling at this time  Wife suddenly appears in room with purse, placed self in between staff and patient  Attempted to remove wife from room to deescalate patient, unwilling to cooperate  Pt again presented with changing into paper scrubs  Pt agreeable to change with one staff member, security, at bedside  Wife escorted to family room  Pt changed, belongings removed from room and inventoried  Labs being obtained        nAgela Beck RN  05/01/22 9044

## 2022-05-01 NOTE — ED NOTES
Pt offered comfort needs, pillow/blanket/water/food  Pt denies  Advised to make POA aware when he is hungry or needs anything        [place], RN  05/01/22 3987

## 2022-05-01 NOTE — ED PROVIDER NOTES
History  Chief Complaint   Patient presents with    Psychiatric Evaluation     pt arrives ems, walking in with staff and security  Per report, police were called on pt for threat of "going to the woods and shooting self in the head"  Police found pt driving with weapon  escorted to er  52-year-old gentleman comes in for psychiatric evaluation  Patient's wife called the police this morning after he threatened to shoot himself in the head with a gun  Patient was found by the police driving with a weapon in his car and was brought to the emergency department via EMS  Patient has a history of depression is not on any medication for it  Patient and wife report they are currently going through a divorce  Patient is also on chronic pain medication which she is trying to be weaned off of  Patient is reluctant to give any information  He has minimal insight      History provided by:  Patient, police, EMS personnel and spouse  History limited by:  Psychiatric disorder   used: No    Psychiatric Evaluation  Presenting symptoms: suicidal threats    Patient accompanied by:  Law enforcement  Degree of incapacity (severity):  Severe  Onset quality:  Unable to specify  Timing:  Unable to specify  Progression:  Unable to specify  Chronicity:  New  Context: stressful life event    Treatment compliance:  Untreated  Ineffective treatments:  None tried  Associated symptoms: anhedonia and poor judgment    Risk factors: hx of mental illness        Prior to Admission Medications   Prescriptions Last Dose Informant Patient Reported? Taking?    Blood Glucose Monitoring Suppl (Contour Next EZ) w/Device KIT  Self No No   Sig: Use 1 each 4 (four) times a day   Cholecalciferol (VITAMIN D) 2000 units CAPS  Self Yes No   Sig: Take 1 capsule by mouth daily   HYDROmorphone (Dilaudid) 4 mg/mL injection  Self Yes No   Sig: Inject 1 Device as directed daily     Microlet Lancets MISC  Self No No   Sig: Use 4 (four) times a day   OxyCONTIN 20 MG 12 hr tablet   Yes No   SYRINGE-NEEDLE, DISP, 3 ML (B-D 3CC LUER-HAFSA SYR 21GX1") 21G X 1" 3 ML MISC  Self Yes No   Sig: by Does not apply route once a week   atorvastatin (LIPITOR) 20 mg tablet   No No   Sig: Take 1 tablet (20 mg total) by mouth daily at bedtime   gabapentin (NEURONTIN) 300 mg capsule  Self Yes No   Sig: Take 600 mg by mouth daily Taking two 600 mg at night   glucose blood (Contour Next Test) test strip  Self No No   Sig: Use 1 each 4 (four) times a day   lisinopril (ZESTRIL) 20 mg tablet   No No   Sig: Take 1 tablet (20 mg total) by mouth daily   metFORMIN (GLUCOPHAGE-XR) 500 mg 24 hr tablet   No No   Sig: Take 1 tablet (500 mg total) by mouth 2 (two) times a day with meals   methadone (DOLOPHINE) 10 mg tablet  Self Yes No   Sig: Take 2 tablets every 6 hours for pain,   multivitamin (THERAGRAN) TABS   Yes No   Sig: Take 1 tablet by mouth daily   omeprazole (PriLOSEC) 40 MG capsule  Self No No   Sig: Take 1 capsule (40 mg total) by mouth daily   oxyCODONE (OXYCONTIN) 80 mg 12 hr tablet  Self Yes No   Sig: Take 1 tablet by mouth every 12 (twelve) hours   oxyCODONE (OxyCONTIN) 40 mg 12 hr tablet  Self Yes No   Sig: Take 1 tablet by mouth every 12 (twelve) hours   predniSONE 10 mg tablet   No No   Sig: Take 3 tabs twice a day x2 days, then 2 tabs twice a day x2 days, then 1 tab twice a day x2 days, then 1 tablet daily x2 days   testosterone cypionate (DEPO-TESTOSTERONE) 200 mg/mL SOLN   No No   Sig: INJECT 0 3ML (60MG) WEEKLY USE 1 VIAL FOR 1 WEEKLY DOSE, DISCARD THE REMAINDER      Facility-Administered Medications: None       Past Medical History:   Diagnosis Date    Chronic pain     Depression     Diabetes (HCC)     Fibromyalgia, primary     GERD (gastroesophageal reflux disease)     Hyperlipidemia     Hypertension     Low testosterone     Neuropathy     Pilonidal cyst     last assessed 3/17/2014    Sleep apnea     no cpap       Past Surgical History:   Procedure Laterality Date    BACK SURGERY      discectomy    ELBOW SURGERY Right     OTHER SURGICAL HISTORY  03/13/2015    Electr analysis of progr impl pump w/reprogram refill, requiring physician  Replacement of right abdomen intrathecal  pain pump filled with Dilaudid with electronic analysis and programming   managed by [facility]      NC SHLDR ARTHROSCOP,SURG,W/ROTAT CUFF REPR Right 2/28/2020    Procedure: SHOULDER ARTHROSCOPIC ROTATOR CUFF REPAIR AND DEBRIDEMENT;  Surgeon: Sharonda Woods MD;  Location: [facility];  Service: Orthopedics    ROTATOR CUFF REPAIR Left     WISDOM TOOTH EXTRACTION         Family History   Problem Relation Age of Onset    Diabetes unspecified Sister     Hypertension Sister     Hyperlipidemia Sister         pure hypercholesterolemia    Diabetes unspecified Brother         DM    Hypertension Brother     Hyperlipidemia Brother         pure hypercholesterolemia    Coronary artery disease Father     Heart disease Father     Diabetes Brother         DM    Hypertension Family     Alcohol abuse Mother     Liver disease Mother      I have reviewed and agree with the history as documented  E-Cigarette/Vaping    E-Cigarette Use Never User      E-Cigarette/Vaping Substances     Social History     Tobacco Use    Smoking status: Former Smoker     Start date: 2/24/2018    Smokeless tobacco: Never Used   Vaping Use    Vaping Use: Never used   Substance Use Topics    Alcohol use: No    Drug use: Never       Review of Systems   Unable to perform ROS: Psychiatric disorder       Physical Exam  Physical Exam  Vitals and nursing note reviewed  Constitutional:       Appearance: He is well-developed  He is not toxic-appearing  HENT:      Head: Normocephalic and atraumatic        Right Ear: Tympanic membrane normal       Left Ear: Tympanic membrane normal       Nose: Nose normal    Eyes:      General: Lids are normal       Conjunctiva/sclera: Conjunctivae normal       Pupils: Pupils are equal, round, and reactive to light  Neck:      Vascular: No carotid bruit or JVD  Trachea: Trachea normal    Cardiovascular:      Rate and Rhythm: Normal rate and regular rhythm  No extrasystoles are present  Heart sounds: Normal heart sounds  Pulmonary:      Effort: Pulmonary effort is normal       Breath sounds: No decreased breath sounds, wheezing, rhonchi or rales  Chest:      Chest wall: No deformity or tenderness  Abdominal:      General: Bowel sounds are normal       Palpations: Abdomen is soft  Tenderness: There is no abdominal tenderness  There is no guarding or rebound  Musculoskeletal:      Right shoulder: No swelling, deformity or tenderness  Normal range of motion  Cervical back: Normal range of motion and neck supple  No deformity, tenderness or bony tenderness  Lymphadenopathy:      Cervical: No cervical adenopathy  Skin:     General: Skin is warm and dry  Neurological:      Mental Status: He is alert and oriented to person, place, and time  Cranial Nerves: No cranial nerve deficit  Sensory: No sensory deficit  Deep Tendon Reflexes: Reflexes are normal and symmetric  Psychiatric:         Mood and Affect: Mood is anxious  Behavior: Behavior is withdrawn  Thought Content: Thought content includes suicidal ideation  Thought content includes suicidal plan  Judgment: Judgment is impulsive and inappropriate           Vital Signs  ED Triage Vitals [05/01/22 1028]   Temperature Pulse Respirations Blood Pressure SpO2   98 1 °F (36 7 °C) (!) 123 18 150/76 97 %      Temp Source Heart Rate Source Patient Position - Orthostatic VS BP Location FiO2 (%)   Oral Monitor Lying Right arm --      Pain Score       10 - Worst Possible Pain           Vitals:    05/01/22 1028 05/01/22 1600   BP: 150/76 138/71   Pulse: (!) 123 86   Patient Position - Orthostatic VS: Lying Sitting         Visual Acuity      ED Medications  Medications   gabapentin (NEURONTIN) capsule 600 mg (has no administration in time range)   metFORMIN (GLUCOPHAGE) tablet 500 mg (500 mg Oral Given 5/1/22 1720)   methadone (DOLOPHINE) tablet 10 mg (has no administration in time range)   oxyCODONE (OxyCONTIN) 12 hr tablet 80 mg (80 mg Oral Given 5/1/22 1720)   lisinopril (ZESTRIL) tablet 20 mg (20 mg Oral Given 5/1/22 1720)       Diagnostic Studies  Results Reviewed     Procedure Component Value Units Date/Time    Rapid drug screen, urine [[id number]]  (Abnormal) Collected: 05/01/22 1251    Lab Status: Final result Specimen: Urine, Clean Catch Updated: 05/01/22 1325     Amph/Meth UR Negative     Barbiturate Ur Negative     Benzodiazepine Urine Negative     Cocaine Urine Negative     Methadone Urine Positive     Opiate Urine Positive     PCP Ur Negative     THC Urine Negative     Oxycodone Urine Positive    Narrative:      Presumptive report  If requested, specimen will be sent to reference lab for confirmation  FOR MEDICAL PURPOSES ONLY  IF CONFIRMATION NEEDED PLEASE CONTACT THE LAB WITHIN 5 DAYS  Drug Screen Cutoff Levels:  AMPHETAMINE/METHAMPHETAMINES  1000 ng/mL  BARBITURATES     200 ng/mL  BENZODIAZEPINES     200 ng/mL  COCAINE      300 ng/mL  METHADONE      300 ng/mL  OPIATES      300 ng/mL  PHENCYCLIDINE     25 ng/mL  THC       50 ng/mL  OXYCODONE      100 ng/mL    COVID/FLU/RSV - 2 hour TAT [[id number]]  (Normal) Collected: 05/01/22 1050    Lab Status: Final result Specimen: Nares from Nose Updated: 05/01/22 1136     SARS-CoV-2 Negative     INFLUENZA A PCR Negative     INFLUENZA B PCR Negative     RSV PCR Negative    Narrative:      FOR PEDIATRIC PATIENTS - copy/paste COVID Guidelines URL to browser: [email or web address]    SARS-CoV-2 assay is a Nucleic Acid Amplification assay intended for the  qualitative detection of nucleic acid from SARS-CoV-2 in nasopharyngeal  swabs  Results are for the presumptive identification of SARS-CoV-2 RNA  Positive results are indicative of infection with SARS-CoV-2, the virus  causing COVID-19, but do not rule out bacterial infection or co-infection  with other viruses  Laboratories within the [place] and its  territories are required to report all positive results to the appropriate  public health authorities  Negative results do not preclude SARS-CoV-2  infection and should not be used as the sole basis for treatment or other  patient management decisions  Negative results must be combined with  clinical observations, patient history, and epidemiological information  This test has not been FDA cleared or approved  This test has been authorized by FDA under an Emergency Use Authorization  (EUA)  This test is only authorized for the duration of time the  declaration that circumstances exist justifying the authorization of the  emergency use of an in vitro diagnostic tests for detection of SARS-CoV-2  virus and/or diagnosis of COVID-19 infection under section 564(b)(1) of  the Act, 21 U  S C  977URY-4(Z)(4), unless the authorization is terminated  or revoked sooner  The test has been validated but independent review by FDA  and CLIA is pending  Test performed using [employer] Gene[employer]pert: This RT-PCR assay targets N2,  a region unique to SARS-CoV-2  A conserved region in the E-gene was chosen  for pan-Sarbecovirus detection which includes SARS-CoV-2  TSH [[id number]]  (Abnormal) Collected: 05/01/22 1050    Lab Status: Final result Specimen: Blood from Arm, Left Updated: 05/01/22 1127     TSH 3RD GENERATON 0 435 uIU/mL     Narrative:      Patients undergoing fluorescein dye angiography may retain small amounts of fluorescein in the body for 48-72 hours post procedure  Samples containing fluorescein can produce falsely depressed TSH values  If the patient had this procedure,a specimen should be resubmitted post fluorescein clearance  Salicylate level [[id number]]  (Abnormal) Collected: 05/01/22 1050    Lab Status: Final result Specimen: Blood from Arm, Left Updated: 47/77/06 6160     Salicylate Lvl <3 mg/dL     Acetaminophen level-If concentration is detectable, please discuss with medical  on call   [[id number]]  (Abnormal) Collected: 05/01/22 1050    Lab Status: Final result Specimen: Blood from Arm, Left Updated: 05/01/22 1127     Acetaminophen Level <2 ug/mL     Ethanol [[id number]]  (Normal) Collected: 05/01/22 1050    Lab Status: Final result Specimen: Blood from Arm, Left Updated: 05/01/22 1125     Ethanol Lvl <3 mg/dL     Comprehensive metabolic panel [[id number]]  (Abnormal) Collected: 05/01/22 1050    Lab Status: Final result Specimen: Blood from Arm, Left Updated: 05/01/22 1119     Sodium 137 mmol/L      Potassium 3 4 mmol/L      Chloride 100 mmol/L      CO2 28 mmol/L      ANION GAP 9 mmol/L      BUN 19 mg/dL      Creatinine 1 02 mg/dL      Glucose 145 mg/dL      Calcium 9 2 mg/dL      AST 21 U/L      ALT 32 U/L      Alkaline Phosphatase 61 U/L      Total Protein 7 9 g/dL      Albumin 4 2 g/dL      Total Bilirubin 0 53 mg/dL      eGFR 82 ml/min/1 73sq m     Narrative:      Zoraida guidelines for Chronic Kidney Disease (CKD):     Stage 1 with normal or high GFR (GFR > 90 mL/min/1 73 square meters)    Stage 2 Mild CKD (GFR = 60-89 mL/min/1 73 square meters)    Stage 3A Moderate CKD (GFR = 45-59 mL/min/1 73 square meters)    Stage 3B Moderate CKD (GFR = 30-44 mL/min/1 73 square meters)    Stage 4 Severe CKD (GFR = 15-29 mL/min/1 73 square meters)    Stage 5 End Stage CKD (GFR <15 mL/min/1 73 square meters)  Note: GFR calculation is accurate only with a steady state creatinine    CBC and differential [[id number]]  (Abnormal) Collected: 05/01/22 1050    Lab Status: Final result Specimen: Blood from Arm, Left Updated: 05/01/22 1059     WBC 14 82 Thousand/uL      RBC 4 16 Million/uL      Hemoglobin 13 2 g/dL      Hematocrit 38 3 %      MCV 92 fL      MCH 31 7 pg      MCHC 34 5 g/dL      RDW 13 1 %      MPV 10 1 fL      Platelets 271 Thousands/uL      nRBC 0 /100 WBCs      Neutrophils Relative 74 %      Immat GRANS % 1 %      Lymphocytes Relative 17 %      Monocytes Relative 8 %      Eosinophils Relative 0 %      Basophils Relative 0 %      Neutrophils Absolute 11 05 Thousands/µL      Immature Grans Absolute 0 09 Thousand/uL      Lymphocytes Absolute 2 47 Thousands/µL      Monocytes Absolute 1 17 Thousand/µL      Eosinophils Absolute 0 00 Thousand/µL      Basophils Absolute 0 04 Thousands/µL                  No orders to display              Procedures  ECG 12 Lead Documentation Only    Date/Time: 5/1/2022 10:46 AM  Performed by: Joni Philip DO  Authorized by: Eloy Etienne DO     Rate:     ECG rate:  107  Rhythm:     Rhythm: sinus tachycardia               ED Course  ED Course as of 05/01/22 1819   Sun May 01, 2022   1607 Patient requesting pain medication as well as gabapentin  Patient however is uncooperative with exact dosing  Reviewing his chart he does appear to be on quite a number of medications for pain                               SBIRT 22yo+      Most Recent Value   SBIRT (22 yo +)    In order to provide better care to our patients, we are screening all of our patients for alcohol and drug use  Would it be okay to ask you these screening questions? Yes Filed at: 05/01/2022 1615   Initial Alcohol Screen: US AUDIT-C     1  How often do you have a drink containing alcohol? 0 Filed at: 05/01/2022 1615   2  How many drinks containing alcohol do you have on a typical day you are drinking? 0 Filed at: 05/01/2022 1615   3a  Male UNDER 65: How often do you have five or more drinks on one occasion? 0 Filed at: 05/01/2022 1615   Audit-C Score 0 Filed at: 05/01/2022 1615   SHELLIE: How many times in the past year have you        Used an illegal drug or used a prescription medication for non-medical reasons? Never Filed at: 05/01/2022 1615                    [facility]  Number of Diagnoses or Management Options  Depression with suicidal ideation: new and requires workup     Amount and/or Complexity of Data Reviewed  Clinical lab tests: ordered and reviewed  Tests in the radiology section of CPT®: ordered and reviewed  Tests in the medicine section of CPT®: ordered and reviewed  Discuss the patient with other providers: yes  Independent visualization of images, tracings, or specimens: yes    Patient Progress  Patient progress: stable      Disposition  Final diagnoses:   Depression with suicidal ideation     Time reflects when diagnosis was documented in both MDM as applicable and the Disposition within this note     Time User Action Codes Description Comment    5/1/2022 12:05 PM Madalyn Marine Add Darrylbobymingkerri Whitney PEREZ,  [id number]] Depression with suicidal ideation       ED Disposition     ED Disposition Condition Date/Time Comment    Transfer to [address] [place] [address] May 1, 2022  6:18 PM Link Yang Sr  should be transferred out to behavioral health and has been medically cleared    Is going to [facility] and should be picked up at approximately 2230 hours       MD Documentation      Most Recent Value   Patient Condition The patient has been stabilized such that within reasonable medical probability, no material deterioration of the patient condition or the condition of the unborn child(kareem) is likely to result from the transfer   Reason for Transfer Level of Care needed not available at this facility  [facility]]   Benefits of Transfer Specialized equipment and/or services available at the receiving facility (Include comment)________________________  [facility]]   Risks of Transfer Potential for delay in receiving treatment   Accepting Physician Dr Vanesa Lewis Name, [address]  [address] [place] [address]    (Name & Tel number) Chuy Black [phone]   Sending MD Dr Xochitl Pena Provider Certification General risk, such as traffic hazards, adverse weather conditions, rough terrain or turbulence, possible failure of equipment (including vehicle or aircraft), or consequences of actions of persons outside the control of the transport personnel      RN Documentation      [facility] [address] [facility] Name, [address]  [address] [place] [address]    (Name & Tel number) Jacklyn Menjivar [phone]   Medications Reviewed with Next Provider of Service Yes   Level of Care Basic life support   Copies of Medical Records Sent History and Physical   Patient Belongings Disposition Sent with patient      Follow-up Information    None         Patient's Medications   Discharge Prescriptions    No medications on file       No discharge procedures on file      PDMP Review       Value Time User    PDMP Reviewed  Yes 8/30/2021 11:32 AM Mike Wise PA-C          ED Provider  Electronically Signed by           Sydni Graves DO  05/01/22 9182

## 2022-05-02 PROBLEM — H69.80 EUSTACHIAN TUBE DYSFUNCTION: Status: ACTIVE | Noted: 2022-05-02

## 2022-05-02 PROBLEM — H69.90 EUSTACHIAN TUBE DYSFUNCTION: Status: ACTIVE | Noted: 2022-05-02

## 2022-05-02 PROBLEM — Z00.8 MEDICAL CLEARANCE FOR PSYCHIATRIC ADMISSION: Status: ACTIVE | Noted: 2021-01-13

## 2022-05-02 PROBLEM — F39 MOOD DISORDER (HCC): Status: ACTIVE | Noted: 2022-05-02

## 2022-05-02 LAB
GLUCOSE SERPL-MCNC: 132 MG/DL (ref 65–140)
GLUCOSE SERPL-MCNC: 163 MG/DL (ref 65–140)

## 2022-05-02 PROCEDURE — 99253 IP/OBS CNSLTJ NEW/EST LOW 45: CPT

## 2022-05-02 PROCEDURE — 82948 REAGENT STRIP/BLOOD GLUCOSE: CPT

## 2022-05-02 PROCEDURE — 99221 1ST HOSP IP/OBS SF/LOW 40: CPT | Performed by: STUDENT IN AN ORGANIZED HEALTH CARE EDUCATION/TRAINING PROGRAM

## 2022-05-02 RX ORDER — ACETAMINOPHEN 325 MG/1
975 TABLET ORAL EVERY 6 HOURS PRN
Status: DISCONTINUED | OUTPATIENT
Start: 2022-05-02 | End: 2022-05-03 | Stop reason: HOSPADM

## 2022-05-02 RX ORDER — POTASSIUM CHLORIDE 20 MEQ/1
40 TABLET, EXTENDED RELEASE ORAL ONCE
Status: COMPLETED | OUTPATIENT
Start: 2022-05-02 | End: 2022-05-02

## 2022-05-02 RX ORDER — OXYCODONE HCL 40 MG/1
80 TABLET, FILM COATED, EXTENDED RELEASE ORAL EVERY 12 HOURS SCHEDULED
Status: DISCONTINUED | OUTPATIENT
Start: 2022-05-02 | End: 2022-05-03 | Stop reason: HOSPADM

## 2022-05-02 RX ORDER — METFORMIN HYDROCHLORIDE 500 MG/1
500 TABLET, EXTENDED RELEASE ORAL 2 TIMES DAILY WITH MEALS
Status: DISCONTINUED | OUTPATIENT
Start: 2022-05-02 | End: 2022-05-03 | Stop reason: HOSPADM

## 2022-05-02 RX ORDER — PREDNISONE 1 MG/1
10 TABLET ORAL DAILY
Status: DISCONTINUED | OUTPATIENT
Start: 2022-05-04 | End: 2022-05-03 | Stop reason: HOSPADM

## 2022-05-02 RX ORDER — PREDNISONE 1 MG/1
10 TABLET ORAL 2 TIMES DAILY WITH MEALS
Status: DISCONTINUED | OUTPATIENT
Start: 2022-05-02 | End: 2022-05-03 | Stop reason: HOSPADM

## 2022-05-02 RX ORDER — ATORVASTATIN CALCIUM 20 MG/1
20 TABLET, FILM COATED ORAL
Status: DISCONTINUED | OUTPATIENT
Start: 2022-05-02 | End: 2022-05-03 | Stop reason: HOSPADM

## 2022-05-02 RX ORDER — GABAPENTIN 300 MG/1
600 CAPSULE ORAL DAILY
Status: DISCONTINUED | OUTPATIENT
Start: 2022-05-02 | End: 2022-05-02

## 2022-05-02 RX ORDER — GABAPENTIN 400 MG/1
1200 CAPSULE ORAL
Status: DISCONTINUED | OUTPATIENT
Start: 2022-05-02 | End: 2022-05-03 | Stop reason: HOSPADM

## 2022-05-02 RX ORDER — PANTOPRAZOLE SODIUM 40 MG/1
40 TABLET, DELAYED RELEASE ORAL
Status: DISCONTINUED | OUTPATIENT
Start: 2022-05-02 | End: 2022-05-03 | Stop reason: HOSPADM

## 2022-05-02 RX ORDER — LISINOPRIL 20 MG/1
20 TABLET ORAL DAILY
Status: DISCONTINUED | OUTPATIENT
Start: 2022-05-02 | End: 2022-05-03 | Stop reason: HOSPADM

## 2022-05-02 RX ORDER — METHADONE HYDROCHLORIDE 10 MG/1
20 TABLET ORAL EVERY 8 HOURS PRN
Status: DISCONTINUED | OUTPATIENT
Start: 2022-05-02 | End: 2022-05-03 | Stop reason: HOSPADM

## 2022-05-02 RX ADMIN — OXYCODONE HYDROCHLORIDE 80 MG: 40 TABLET, FILM COATED, EXTENDED RELEASE ORAL at 22:54

## 2022-05-02 RX ADMIN — PANTOPRAZOLE SODIUM 40 MG: 40 TABLET, DELAYED RELEASE ORAL at 21:54

## 2022-05-02 RX ADMIN — PREDNISONE 10 MG: 5 TABLET ORAL at 08:55

## 2022-05-02 RX ADMIN — POTASSIUM CHLORIDE 40 MEQ: 1500 TABLET, EXTENDED RELEASE ORAL at 08:55

## 2022-05-02 RX ADMIN — METFORMIN ER 500 MG 500 MG: 500 TABLET ORAL at 17:08

## 2022-05-02 RX ADMIN — MELATONIN TAB 3 MG 3 MG: 3 TAB at 21:17

## 2022-05-02 RX ADMIN — OXYCODONE HYDROCHLORIDE 80 MG: 40 TABLET, FILM COATED, EXTENDED RELEASE ORAL at 15:45

## 2022-05-02 RX ADMIN — METFORMIN ER 500 MG 500 MG: 500 TABLET ORAL at 08:55

## 2022-05-02 RX ADMIN — ATORVASTATIN CALCIUM 20 MG: 20 TABLET, FILM COATED ORAL at 17:08

## 2022-05-02 RX ADMIN — PREDNISONE 10 MG: 5 TABLET ORAL at 17:08

## 2022-05-02 RX ADMIN — PANTOPRAZOLE SODIUM 40 MG: 40 TABLET, DELAYED RELEASE ORAL at 08:56

## 2022-05-02 RX ADMIN — GABAPENTIN 1200 MG: 400 CAPSULE ORAL at 21:17

## 2022-05-02 RX ADMIN — LISINOPRIL 20 MG: 20 TABLET ORAL at 08:55

## 2022-05-02 NOTE — CONSULTS
[address] [facility]  1965, 55 y o  male MRN: [id number]  Unit/Bed#: [facility] 941-77 Encounter: [id number]  Primary Care Provider: Loki Sellers MD   Date and time admitted to hospital: 5/1/2022 11:42 PM    Inpatient consult for Medical Clearance for [facility] patient  Consult performed by: Sujata Garcia PA-C  Consult ordered by:  Shagufta Romero MD          Medical clearance for psychiatric admission  Assessment & Plan   Admission labs reviewed, acceptable   Vitals stable    UDS positive for methadone/opiates    COVID-19 negative   EKG reveals NSR ,    Medically stable for continued inpatient psychiatric treatment based on available results      Eustachian tube dysfunction  Assessment & Plan  · Patient seen by PCP on 04/28 for eustachian tube dysfunction at which time steroid taper was initiated as follows:  · Prednisone 10 mg tabs: 3 tabs b i d  X 2 days, 2 tabs BID x 2 days, 1 tab BID x 2 days, 1 tab daily x 2 days  · Will end on 5/5  · Likely cause of leukocytosis noted on CBC     Hiatal hernia with GERD  Assessment & Plan  · Omeprazole 40 mg at home, Protonix while inpatient    Diabetes mellitus type 2, noninsulin dependent ([facility] )  Assessment & Plan  Lab Results   Component Value Date    HGBA1C 5 8 (H) 01/12/2022     · Continue metformin 500 mg BID   · Carb controlled diet   · accucheks       Mixed hyperlipidemia  Assessment & Plan  · Continue atorvastatin 20 mg    Essential hypertension  Assessment & Plan  · Continue lisinopril 20 mg daily    Chronic pain disorder  Assessment & Plan  · Patient with chronic pain on chronic opioid medications in setting of cervical radiculopathy, ankylosing spondylitis, degenerative disc disease  · PDMP reviewed   · Last filled OxyContin ER 80 mg b i d  x 30 days on 4/21/22  · Last filled Methadone 10 mg (180 tablets) x 30 days on 4/14/22  · Continue outpatient regimen  · oxycontin 80 mg q 12 hr  · Methadone 10 mg tablets- take 2 tabs q 8hr for pain for total of 60 mg daily   · Gabapentin 1200 mg qHS   · Intrathecal dilaudid pump in place   · Recommend further outpatient follow up with pain management     ECT Clearance:   History of recent seizure or stroke:  No    History of pheochromocytoma:  No    History of active bleeding (Intracranial hemorrhage, aneurysm or AVM):  No    History of metallic implants in the head or neck:  No    History of increased intracranial pressure with mass effect:  No   ·   EKG within 3 months? yes  o If yes, was an arrhythmia present and at baseline? no  o If yes, what is the baseline QT interval? 312/416  o If no, obtain prior to ECT for arrhythmia evaluation and baseline QT interval     Based on above criteria, Patient is medically cleared for ECT should it be recommended  Counseling / Coordination of Care Time: 30 minutes  Greater than 50% of total time spent on patient counseling and coordination of care  Collaboration of Care: Were Recommendations Directly Discussed with Primary Treatment Team? - No     History of Present Illness:    Bryan Menezes Sr  is a 55 y o  male with PMH of GERD, DM type 2 without chronic insulin, HTN, HLD, chronic pain disorder on chronic opioid medications who is originally admitted to the psychiatry service due to expression of SI  We are consulted for medical clearance for admission to [address] [place] [address] Unit and treatment of underlying psychiatric illness  Patient should continue all prior to admission medications as prescribed by primary care provider/outpatient specialists  Patient denies smoking, drinking or drug use  Available admission lab work and vitals are acceptable  Patient endorses increased back pain due to not receiving pain medications however states he has chronic significant pain and is managing  He otherwise feels a baseline physical health    Patient appears medically stable for inpatient psychiatric treatment at this time  Review of Systems:    Review of Systems   Constitutional: Negative for chills, fatigue and fever  HENT: Negative for congestion, rhinorrhea and sore throat  Eyes: Negative for visual disturbance  Respiratory: Negative for cough, chest tightness, shortness of breath and wheezing  Cardiovascular: Negative for chest pain, palpitations and leg swelling  Gastrointestinal: Negative for abdominal pain, constipation, diarrhea, nausea and vomiting  Genitourinary: Negative for difficulty urinating, dysuria and frequency  Musculoskeletal: Positive for back pain  Negative for arthralgias and myalgias  Skin: Negative for rash and wound  Neurological: Negative for dizziness, light-headedness and headaches  All other systems reviewed and are negative  Past Medical and Surgical History:     Past Medical History:   Diagnosis Date    Chronic pain     Depression     Diabetes [facility]     Fibromyalgia, primary     GERD (gastroesophageal reflux disease)     Hyperlipidemia     Hypertension     Low testosterone     Neuropathy     Pilonidal cyst     last assessed 3/17/2014    Sleep apnea     no cpap       Past Surgical History:   Procedure Laterality Date    BACK SURGERY      discectomy    ELBOW SURGERY Right     OTHER SURGICAL HISTORY  03/13/2015    Electr analysis of progr impl pump w/reprogram refill, requiring physician    Replacement of right abdomen intrathecal  pain pump filled with Dilaudid with electronic analysis and programming   managed by Glenda Mullen PILONIDAL CYST EXCISION      RI SHLDR ARTHROSCOP,SURG,W/ROTAT CUFF REPR Right 2/28/2020    Procedure: SHOULDER ARTHROSCOPIC ROTATOR CUFF REPAIR AND DEBRIDEMENT;  Surgeon: Bacilio Chen MD;  Location: [facility];  Service: Orthopedics    ROTATOR CUFF REPAIR Left     WISDOM TOOTH EXTRACTION         Meds/Allergies:    PTA meds:   Prior to Admission Medications   Prescriptions Last Dose Informant Patient Reported? Taking?    Blood Glucose Monitoring Suppl (Contour Next EZ) w/Device KIT  Self No No   Sig: Use 1 each 4 (four) times a day   Cholecalciferol (VITAMIN D) 2000 units CAPS  Self Yes No   Sig: Take 1 capsule by mouth daily   HYDROmorphone (Dilaudid) 4 mg/mL injection  Self Yes No   Sig: Inject 1 Device as directed daily     Microlet Lancets MISC  Self No No   Sig: Use 4 (four) times a day   OxyCONTIN 20 MG 12 hr tablet   Yes No   SYRINGE-NEEDLE, DISP, 3 ML (B-D 3CC LUER-HAFSA SYR 21GX1") 21G X 1" 3 ML MISC  Self Yes No   Sig: by Does not apply route once a week   atorvastatin (LIPITOR) 20 mg tablet   No No   Sig: Take 1 tablet (20 mg total) by mouth daily at bedtime   gabapentin (NEURONTIN) 300 mg capsule  Self Yes No   Sig: Take 600 mg by mouth daily Taking two 600 mg at night   glucose blood (Contour Next Test) test strip  Self No No   Sig: Use 1 each 4 (four) times a day   lisinopril (ZESTRIL) 20 mg tablet   No No   Sig: Take 1 tablet (20 mg total) by mouth daily   metFORMIN (GLUCOPHAGE-XR) 500 mg 24 hr tablet   No No   Sig: Take 1 tablet (500 mg total) by mouth 2 (two) times a day with meals   methadone (DOLOPHINE) 10 mg tablet  Self Yes No   Sig: Take 2 tablets every 6 hours for pain,   multivitamin (THERAGRAN) TABS   Yes No   Sig: Take 1 tablet by mouth daily   omeprazole (PriLOSEC) 40 MG capsule  Self No No   Sig: Take 1 capsule (40 mg total) by mouth daily   oxyCODONE (OXYCONTIN) 80 mg 12 hr tablet  Self Yes No   Sig: Take 1 tablet by mouth every 12 (twelve) hours   oxyCODONE (OxyCONTIN) 40 mg 12 hr tablet  Self Yes No   Sig: Take 1 tablet by mouth every 12 (twelve) hours   predniSONE 10 mg tablet   No No   Sig: Take 3 tabs twice a day x2 days, then 2 tabs twice a day x2 days, then 1 tab twice a day x2 days, then 1 tablet daily x2 days   testosterone cypionate (DEPO-TESTOSTERONE) 200 mg/mL SOLN   No No   Sig: INJECT 0 3ML (60MG) WEEKLY USE 1 VIAL FOR 1 WEEKLY DOSE, DISCARD THE REMAINDER Facility-Administered Medications: None       Allergies: No Known Allergies    Social History:     Marital Status: /Civil Union    Substance Use History:   Social History     Substance and Sexual Activity   Alcohol Use No     Social History     Tobacco Use   Smoking Status Former Smoker    Start date: 2/24/2018   Smokeless Tobacco Never Used     Social History     Substance and Sexual Activity   Drug Use Never       Family History:    Family History   Problem Relation Age of Onset    Diabetes unspecified Sister     Hypertension Sister     Hyperlipidemia Sister         pure hypercholesterolemia    Diabetes unspecified Brother         DM    Hypertension Brother     Hyperlipidemia Brother         pure hypercholesterolemia    Coronary artery disease Father     Heart disease Father     Diabetes Brother         DM    Hypertension Family     Alcohol abuse Mother     Liver disease Mother        Physical Exam:     Vitals:   Blood Pressure: 136/73 (05/02/22 0815)  Pulse: 76 (05/02/22 0815)  Temperature: 97 8 °F (36 6 °C) (05/02/22 0815)  Temp Source: Temporal (05/02/22 0815)  Respirations: 18 (05/02/22 0815)  Height: 5' 9" (175 3 cm) (05/02/22 0010)  Weight - Scale: 87 kg (191 lb 12 8 oz) (05/02/22 0010)  SpO2: 97 % (05/02/22 0815)    Physical Exam  Vitals and nursing note reviewed  Constitutional:       General: He is not in acute distress  HENT:      Head: Normocephalic and atraumatic  Eyes:      General:         Right eye: No discharge  Left eye: No discharge  Cardiovascular:      Rate and Rhythm: Normal rate and regular rhythm  Heart sounds: Normal heart sounds  Pulmonary:      Effort: Pulmonary effort is normal  No respiratory distress  Breath sounds: Normal breath sounds  No wheezing, rhonchi or rales  Abdominal:      General: Bowel sounds are normal       Palpations: Abdomen is soft  Tenderness: There is no abdominal tenderness     Musculoskeletal:         General: Tenderness present  Right lower leg: No edema  Left lower leg: No edema  Comments: Patient with decreased ROM of back secondary to pain    Skin:     General: Skin is warm and dry  Neurological:      Mental Status: He is alert and oriented to person, place, and time  Psychiatric:         Mood and Affect: Mood normal          Behavior: Behavior normal          Additional Data:     Lab Results: I have personally reviewed pertinent reports  Results from last 7 days   Lab Units 05/01/22  1050   WBC Thousand/uL 14 82*   HEMOGLOBIN g/dL 13 2   HEMATOCRIT % 38 3   PLATELETS Thousands/uL 250   NEUTROS PCT % 74   LYMPHS PCT % 17   MONOS PCT % 8   EOS PCT % 0     Results from last 7 days   Lab Units 05/01/22  1050   SODIUM mmol/L 137   POTASSIUM mmol/L 3 4*   CHLORIDE mmol/L 100   CO2 mmol/L 28   BUN mg/dL 19   CREATININE mg/dL 1 02   ANION GAP mmol/L 9   CALCIUM mg/dL 9 2   ALBUMIN g/dL 4 2   TOTAL BILIRUBIN mg/dL 0 53   ALK PHOS U/L 61   ALT U/L 32   AST U/L 21   GLUCOSE RANDOM mg/dL 145*             Lab Results   Component Value Date/Time    HGBA1C 5 8 (H) 01/12/2022 08:47 AM    HGBA1C 6 4 (H) 08/18/2021 08:15 AM    HGBA1C 6 0 (H) 07/21/2021 07:32 AM    HGBA1C 7 2 (H) 04/19/2021 07:28 AM    HGBA1C 6 1 (H) 11/10/2015 05:35 PM    HGBA1C 6 1 (H) 06/20/2015 07:54 AM    HGBA1C 5 9 (H) 05/14/2015 07:28 AM     Results from last 7 days   Lab Units 05/02/22  1141   POC GLUCOSE mg/dl 132       EKG, Pathology, and Other Studies Reviewed on Admission:   · EKG (5/1/22): Sinus tachycardia,      ** Please Note: This note has been constructed using a voice recognition system   **

## 2022-05-02 NOTE — PLAN OF CARE
Pt  Did attend one of three groups superficially,Is social with other male peers    Problem: Ineffective Coping  Goal: Participates in unit activities  Description: Interventions:  - Provide therapeutic environment   - Provide required programming   - Redirect inappropriate behaviors   Outcome: Progressing

## 2022-05-02 NOTE — ED NOTES
Wife took with her patient's wedding ring and phone per patient's request   Patient left [address] [place] [address] with [facility] transport with all belongings from locker and proper paperwork     Lily Sheffield  05/01/22 [id number]

## 2022-05-02 NOTE — CASE MANAGEMENT
05/02/22 0901   Team Meeting   Meeting Type Daily Rounds   Initial Conference Date 05/02/22   Team Members Present   Team Members Present Physician;Nurse;;; Occupational Therapist   Physician Team Member Dr Crystal Langston Management Team Member [facility] Work Team Member Danelle   OT Team Member Karolina oRsen   Patient/Family Present   Patient Present No   Patient's Family Present No     Not a 30-day readmission  201 admission from [facility] ED for SI threat to shoot self with gun due to pending divorce with wife  Hx of depression  PD found gun in car   Dilaudid pump, managed by pain med specialist, signed 72 hr notice today, irritable

## 2022-05-02 NOTE — TREATMENT PLAN
TREATMENT PLAN REVIEW - [facility]  67  55 y o  1965 male MRN: [id number]    [address] [facility] Encounter: [id number]          Admit Date/Time:  5/1/2022 11:42 PM    Treatment Team: Attending Provider: Frank Rodas MD; Licensed Practical Nurse: Geno Tomlin LPN; Nurse Manager: Therese Salter RN; Registered Nurse: Kj Hill RN    Diagnosis: Principal Problem:    Mood disorder ([place] [address] ), r/o Substance/medication induced mood disorder  Active Problems:    Chronic pain disorder    Essential hypertension    Mixed hyperlipidemia    Diabetes mellitus type 2, noninsulin dependent ([place] [address] )    Medical clearance for psychiatric admission    Hiatal hernia with GERD    Eustachian tube dysfunction      Patient Strengths/Assets: ability for insight, adapts well, average or above intelligence, capable of independent living, cooperative, communication skills, family ties, financially secure, general fund of knowledge, good insight, good support system, negotiates basic needs, patient is on a voluntary commitment, supportive family/friends    Patient Barriers/Limitations: difficulty adapting, limited education, marital/family conflict    Short Term Goals: ability to stay safe on the unit, improvement in reasoning ability, mood stabilization, increase in socialization with peers on the unit    Long Term Goals: stabilization of mood, free of suicidal thoughts, appropriate interaction with family    Progress Towards Goals: No suicidal ideation at this time  Staying safe on the unit  Recommended Treatment: group therapy, milieu therapy, continued Behavioral Health psychiatric evaluation/assessment process, family/caregiver education    Treatment Frequency: group and milieu therapy daily, monitoring through interdisciplinary rounds, monitoring through weekly patient care conferences    Expected Discharge Date:   Within 67 hours    Discharge Plan: discharge to home, referrals as indicated, return to previous living arrangement    Treatment Plan Created/Updated By: Ismael Hilliard MD

## 2022-05-02 NOTE — ASSESSMENT & PLAN NOTE
· Patient with chronic pain on chronic opioid medications in setting of cervical radiculopathy, ankylosing spondylitis, degenerative disc disease  · PDMP reviewed   · Last filled OxyContin ER 80 mg b i d  x 30 days on 4/21/22  · Last filled Methadone 10 mg (180 tablets) x 30 days on 4/14/22  · Continue outpatient regimen  · oxycontin 80 mg q 12 hr  · Methadone 10 mg tablets- take 2 tabs q 8hr for pain for total of 60 mg daily   · Gabapentin 1200 mg qHS   · Intrathecal dilaudid pump in place   · Recommend further outpatient follow up with pain management

## 2022-05-02 NOTE — NURSING NOTE
Patient is guarded, calm, visible with no peers interaction  Patient is medication compliant  Patient denies anxiety, depression, but reports disgusted  Patient able to make needs known  Will continue to monitor and maintain Q7 min checks

## 2022-05-02 NOTE — ED NOTES
Insurance Authorization for admission:   Phone call placed to [employer] number: [phone]    Spoke to FAY Huerta  3 days approved    Level of care: [facility] tx   Review on 5/3 and to be called into [facility]  ([phone] ext [phone])  Authorization # [id number]

## 2022-05-02 NOTE — ASSESSMENT & PLAN NOTE
 Admission labs reviewed, acceptable   Vitals stable    UDS positive for methadone/opiates    COVID-19 negative   EKG reveals NSR ,    Medically stable for continued inpatient psychiatric treatment based on available results

## 2022-05-02 NOTE — ASSESSMENT & PLAN NOTE
· Patient seen by PCP on 04/28 for eustachian tube dysfunction at which time steroid taper was initiated as follows:  · Prednisone 10 mg tabs: 3 tabs b i d  X 2 days, 2 tabs BID x 2 days, 1 tab BID x 2 days, 1 tab daily x 2 days  · Will end on 5/5  · Likely cause of leukocytosis noted on CBC

## 2022-05-02 NOTE — DISCHARGE INSTR - APPOINTMENTS
Berkley Rao RN, our Graciela and Terence, will be calling you after your discharge, on the phone number that you provided  She will be available as an additional support, if needed  If you wish to speak with her, you may contact Gideon Essex at [phone]

## 2022-05-02 NOTE — CASE MANAGEMENT
Psychosocial Assessment 1:1 completed with pt in pt's bedroom  Calm, cooperative, A&O, mildly irritable  Admission / Details: 201 admission from [facility] ED for SI threat to shoot self with gun  County: [place] Status: 201  Insurance: [employer] Medicare Complete  Rx coverage: Yes  Marital Status:  x2,  x1   to current wife, Javi Augustine, for 10 yrs  Children: 3 children (2 bio sons and 1 step son)  Family: Parents , siblings, 3 kids, 4 grandkids  Residence: [address]  Can return home: Yes  Lives with: Wife and mother-in-law  Level of Ed: 10th  Work History: Unemployed  Income/Source: SSDI  Anabaptism: None  Transportation: Drives self  Legal Issues: Denies  [facility]:  [facility]    Tx HX: No previous [address] admissions  Family hx: Denies  D&A HX: Denies  Medical: PMH of chronic pain, HTN, GERD, fibromyalgia, neuropathy  DME: dilaudid pump  Tobacco: Denies   HX: Denies  Access to firearms: Yes, pt owns firearms and has conceal carry permit  Pt states that his firearms are always locked up at home d/t having grandchildren   PCP: Lisandra Yo  Psych: None  Therapist: None  ICM/ACT:  None  POA/guardian/rep-payee: Kristi (wife) is medical/financial POA  Stressors: Pt reports that for the past 6 months, his wife has been distant/cold/not present  He reports that he's tried to work on things and has suggested that they go to marital counseling but his wife has been unwilling  He reports that this is all a misunderstanding and he was simply trying to get a reaction from his wife  He has no plans to harm himself or anyone else  ROIS Signed: Javi Augustine (wife)  Treatment Plan Signed: TBD  IMM Signed: Yes  Upcoming Appointments: Unknown  Covid vaccine received: Yes  Discharge disposition: Home    Pt signed 72 hr notice this morning  Pt reports that he spoke with his wife yesterday and worked everything out   She now understands how he's been feeling and why he said what he said

## 2022-05-02 NOTE — H&P
Psychiatry Admission H&P Note   Marlen Sullivan  55 y o  male MRN: [id number]  Unit/Bed#: Vicky Olmedo 075-06 Encounter: [id number]    Assessment and Plan     Assessment:    Marlen Sullivan is a 55 y o  male with no known previous psychiatric history who is presenting with suicidal ideation  Patient did not make direct suicidal threats, and according to patient's wife, apparently insinuated that he might do something  Patient does not meet criteria for any known mood disorders or psychotic disorders  No SI/HI at this time  No symptoms of psychosis  Principal Problem:    Mood disorder ([address] )  a  Rule out:  Major depressive disorder, medication induced mood disorder    Active Problems:  Chronic pain disorder  Essential hypertension  Mixed hyperlipidemia  Type 2 diabetes  Hiatal hernia with GERD  Eustachian tube dysfunction    Plan:    No indication for pharmacotherapy at this time   Melatonin 3 mg q h s  for sleep   Neurontin 1200 mg at bedtime for neuropathy   Group therapy, milieu therapy, occupational therapy   Discharge plan pending  Patient signed 72 hour notice  HPI     Chief Complaint: "I did a stupid thing"    History of Present Illness     Tano Evangelista Sr  is a 55 y o   male with no known psychiatric history, GERD, type 2 diabetes, hypertension, hyperlipidemia, chronic pain syndrome, who presents, on a voluntarily 12 commitment basis, with reported suicidal ideation with a plan to shoot himself  Per ED notes, patient's wife called the police after he threatened to shoot himself in the head with a gun  Patient was found by the police and was brought to the emergency department via EMS  Patient wife reports they are currently going through a divorce per ED notes  During evaluation today, patient states that yesterday him and his wife were supposed to go to their grandchild's birthday party, and his wife said that she needed to go to work, and was taking a shower    Patient states that he was upset, became tearful, got dressed, said that he can not continue living like this, got into his truck with his concealed carry weapon, and parked his car where his wife typically goes for walks  He says that she found him as he was waiting for her, then they had a conversation, and were driving back  On the way, he was thought by police, and then was taken to the hospital   He states that he is licensed to carry firearms, and at the time, he had his hand gun on his person, but did not have bullets in his gun  He says that he would never want to hurt anyone or himself, but just wanted to see how his wife would react to see "if she still loves me " He says he never attempted suicide, has never had thoughts about hurting himself or others, and is a very peaceful person  He says that he used to frequently go target shooting before, but does not [place] as he does not want to hurt or kill animals  He states that he is having relationship issues with his wife with whom he has been together for approximately 20 years  He says that for the past 6 months, she has been cold, distant, and has not been interacting with him as she has been stating that she is always busy  She has apparently been also undergoing menopause  Her behavioral apparently also changed, and she has been getting dressed in leaving at times without notice and spending time with others, including female friends and a male friend  Recently, patient states that his wife told him that she does not think that she wants to remain , which made him upset  He also suspected her of having an affair with a male friend who she has been spending more time with recently  However, he says that he is a type of person who would not having affair with a  woman as he knows him well and his personality      With regards to psychiatric symptoms, patient states that he does not have any psychiatric issues, does not feel depressed, does not have anxiety, and was never diagnosed with any psychiatric disorders in the past   He says that he has been irritable recently, crying at times, but believes that steroids are contributing to this as he has experienced similar episode when he was taking steroids in the past   He denies suicidal ideation, homicidal ideation  He denies history of auditory or visual hallucinations  He denies history of lit/hypomania  Called patient's wife, Violet Stone with permission from the patient for collateral information  According to her, she states that they have been together for 20 years, and prior to coming to the hospital, they had planned to go to their grandchild's birthday party  However, she was notified that she needed to fill in for another person as a  working at [employer], and had to get dressed and leave  She told the patient that she does not have time, and that they can not talk another time, and went for a shower  Carol Kidd told her that he cannot live like this  While she was taking a shower, patient told her that he is taking family dog and sent something else which she did not hear  She got concerned, and went looking for him  She said that she also called the police, stating that  he potentially has a gun on him and she was worried for him  She found him sitting his car, and then they talked about how they missed walking together, and she said that they need to go back home, during which time they talked  On their way back home, please came, EMS was called, and took the patient to the emergency department  She says that she has been "overloaded, and I needed an out "  She said that she was not sure about what to do, and she went to a  to NATHANAEL [facility]SAY[facility]CASSID[facility] options   She says that she loves her  and does not want to get   Kristi states that patient has never made any suicidal statements before and has never stated that he wants to hurt anyone else    She says that the patient is very optimistic person, and does his best to do whatever is necessary to make his family happy  She says that the patient is physically active despite his chronic pain, and tries to help around the house  With her being distant from him, she said that he has been sad at times because of relationship issues that they are experiencing, but is generally in good mood and does not think he is depressed  She did notice that since he started prednisone for his ear problem, he has been slightly irritable, which she has noticed before when he was taking prednisone  She says that he is not a danger to himself and is not a danger to others, and would never hurt anyone or himself  She feels comfortable with him coming home  Psychiatric ROS and [facility]     Psychiatric Review Of Systems:    Sleep: no  Interest/pleasure/anhedonia: no  Guilty/hopeless: no  Energy/anergy: no  Concentration/thinking: no  Appetite/weight changes: no  Psychomotor changes: : no  Somatic symptoms: no  Suicidal Ideation: no    Anxiety/panic: no  Marian/hypomania: no  Auditory hallucinations: no  Visual hallucinations: no  Self injurious behavior/risky behavior: no  --------------------------------------------------------------------------------------------------  Historical Information   Past Psychiatric History:    Inpatient: No prior psychiatric admissions  Current Psychiatrist:  Does not have a psychiatrist   PCP is Charmaine Vasques      Past Suicide attempts: Denies  Past Violent behavior: Denies  Past Psychiatric medication trial:  Denies  Past ECT Treatments:  None    Substance Abuse History:  Social History     Tobacco History     Smoking Status  Former Smoker Smoking Start Date  2/24/2018    Smokeless Tobacco Use  Never Used          Alcohol History     Alcohol Use Status  No          Drug Use     Drug Use Status  Never          Sexual Activity     Sexually Active  Yes Partners  Female          Activities of Daily Living    Not Asked               Additional Substance Use Detail     Questions Responses    Problems Due to Past Use of Alcohol? No    Problems Due to Past Use of Substances? No    Substance Use Assessment Denies substance use within the past 12 months    Alcohol Use Frequency Denies use in past 12 months    Cannabis frequency Never used    Comment: Never used on 5/1/2022     Heroin Frequency Denies use in past 12 months    Cocaine frequency Never used    Comment: Never used on 5/1/2022     Crack Cocaine Frequency Denies use in past 12 months    Methamphetamine Frequency Denies use in past 12 months    Narcotic Frequency Denies use in past 12 months    Benzodiazepine Frequency Denies use in past 12 months    Amphetamine frequency Denies use in past 12 months    Barbituate Frequency Denies use use in past 12 months    Inhalant frequency Never used    Comment: Never used on 5/1/2022     Hallucinogen frequency Never used    Comment: Never used on 5/1/2022     Ecstasy frequency Never used    Comment: Never used on 5/1/2022     Other drug frequency Never used    Comment: Never used on 5/1/2022     Opiate frequency Denies use in past 12 months    Not reviewed  I have assessed this patient for substance use within the past 12 months    Current Substance Use:  Prescription opioids including OxyContin, methadone, and Dilaudid pump  Denies use of alcohol  Denies use of illicit substances such as heroin, Street fentanyl, amphetamines, methamphetamines, cocaine  History of IP/OP rehabilitation program:  Denies    Smoking history:  Denies  --------------------------------------------------------------------------------------------------    Family Psychiatric History:     Denies family history of psychiatric disorders  Denies family history of suicide attempts  Denies family history of substance use issues    --------------------------------------------------------------------------------------------------    Social History:  Education: 10th grade  Learning Disabilities: Reading and mathematics learning difficulties  Marital history:  , in the midst of divorce  Living arrangement, social support: Lives with wife, mother-in-law     Occupational History:  Currently unemployed  Functioning Relationships: good support system  Other Pertinent History: No legal issues  No  history  Firearms:  Patient has multiple firearms  Per wife, she took all of the firearms to her son's home      Traumatic History:     Abuse: Denies  Other Traumatic Events: Denies    Past Medical History:   Diagnosis Date    Chronic pain     Depression     Diabetes (HCC)     Fibromyalgia, primary     GERD (gastroesophageal reflux disease)     Hyperlipidemia     Hypertension     Low testosterone     Neuropathy     Pilonidal cyst     last assessed 3/17/2014    Sleep apnea     no cpap       Mental Status Evaluation and Medical ROS     Medical Review Of Systems:  Review of Systems - Negative except as mentioned in HPI    Meds/Allergies   all current active meds have been reviewed, current meds:   Current Facility-Administered Medications   Medication Dose Route Frequency    acetaminophen (TYLENOL) tablet 650 mg  650 mg Oral Q4H PRN    acetaminophen (TYLENOL) tablet 650 mg  650 mg Oral Q4H PRN    acetaminophen (TYLENOL) tablet 975 mg  975 mg Oral Q6H PRN    atorvastatin (LIPITOR) tablet 20 mg  20 mg Oral Daily With Dinner    benztropine (COGENTIN) injection 1 mg  1 mg Intramuscular Q4H PRN Max 6/day    benztropine (COGENTIN) tablet 0 5 mg  0 5 mg Oral Q4H PRN Max 6/day    gabapentin (NEURONTIN) capsule 1,200 mg  1,200 mg Oral HS    hydrOXYzine HCL (ATARAX) tablet 25 mg  25 mg Oral Q6H PRN    hydrOXYzine HCL (ATARAX) tablet 50 mg  50 mg Oral Q6H PRN Max 4/day    lisinopril (ZESTRIL) tablet 20 mg  20 mg Oral Daily    LORazepam (ATIVAN) injection 1 mg  1 mg Intramuscular Q6H PRN Max 3/day    melatonin tablet 3 mg  3 mg Oral HS    metFORMIN (GLUCOPHAGE-XR) 24 hr tablet 500 mg  500 mg Oral BID With Meals    methadone (DOLOPHINE) tablet 20 mg  20 mg Oral Q8H PRN    OLANZapine (ZyPREXA) IM injection 5 mg  5 mg Intramuscular Q3H PRN Max 3/day    OLANZapine (ZyPREXA) tablet 2 5 mg  2 5 mg Oral Q4H PRN Max 6/day    OLANZapine (ZyPREXA) tablet 5 mg  5 mg Oral Q4H PRN Max 3/day    OLANZapine (ZyPREXA) tablet 5 mg  5 mg Oral Q3H PRN Max 3/day    oxyCODONE (OxyCONTIN) 12 hr tablet 80 mg  80 mg Oral Q12H [address]    pantoprazole (PROTONIX) EC tablet 40 mg  40 mg Oral Early Morning    predniSONE tablet 10 mg  10 mg Oral BID With Meals    [START ON 5/4/2022] predniSONE tablet 10 mg  10 mg Oral Daily    traZODone (DESYREL) tablet 50 mg  50 mg Oral Q6H PRN Max 3/day    traZODone (DESYREL) tablet 50 mg  50 mg Oral HS PRN    and PTA meds:   Prior to Admission Medications   Prescriptions Last Dose Informant Patient Reported? Taking?    Blood Glucose Monitoring Suppl (Contour Next EZ) w/Device KIT  Self No No   Sig: Use 1 each 4 (four) times a day   Cholecalciferol (VITAMIN D) 2000 units CAPS  Self Yes No   Sig: Take 1 capsule by mouth daily   HYDROmorphone (Dilaudid) 4 mg/mL injection  Self Yes No   Sig: Inject 1 Device as directed daily     Microlet Lancets MISC  Self No No   Sig: Use 4 (four) times a day   OxyCONTIN 20 MG 12 hr tablet   Yes No   SYRINGE-NEEDLE, DISP, 3 ML (B-D 3CC LUER-HAFSA SYR 21GX1") 21G X 1" 3 ML MISC  Self Yes No   Sig: by Does not apply route once a week   atorvastatin (LIPITOR) 20 mg tablet   No No   Sig: Take 1 tablet (20 mg total) by mouth daily at bedtime   gabapentin (NEURONTIN) 300 mg capsule  Self Yes No   Sig: Take 600 mg by mouth daily Taking two 600 mg at night   glucose blood (Contour Next Test) test strip  Self No No   Sig: Use 1 each 4 (four) times a day   lisinopril (ZESTRIL) 20 mg tablet   No No   Sig: Take 1 tablet (20 mg total) by mouth daily   metFORMIN (GLUCOPHAGE-XR) 500 mg 24 hr tablet   No No   Sig: Take 1 tablet (500 mg total) by mouth 2 (two) times a day with meals   methadone (DOLOPHINE) 10 mg tablet  Self Yes No   Sig: Take 2 tablets every 6 hours for pain,   multivitamin (THERAGRAN) TABS   Yes No   Sig: Take 1 tablet by mouth daily   omeprazole (PriLOSEC) 40 MG capsule  Self No No   Sig: Take 1 capsule (40 mg total) by mouth daily   oxyCODONE (OXYCONTIN) 80 mg 12 hr tablet  Self Yes No   Sig: Take 1 tablet by mouth every 12 (twelve) hours   oxyCODONE (OxyCONTIN) 40 mg 12 hr tablet  Self Yes No   Sig: Take 1 tablet by mouth every 12 (twelve) hours   predniSONE 10 mg tablet   No No   Sig: Take 3 tabs twice a day x2 days, then 2 tabs twice a day x2 days, then 1 tab twice a day x2 days, then 1 tablet daily x2 days   testosterone cypionate (DEPO-TESTOSTERONE) 200 mg/mL SOLN   No No   Sig: INJECT 0 3ML (60MG) WEEKLY USE 1 VIAL FOR 1 WEEKLY DOSE, DISCARD THE REMAINDER      Facility-Administered Medications: None     No Known Allergies    Objective   Vital signs in last 24 hours:  Temp:  [97 8 °F (36 6 °C)-98 °F (36 7 °C)] 97 9 °F (36 6 °C)  HR:  [76-90] 90  Resp:  [18] 18  BP: (110-136)/(68-74) 110/73      Intake/Output Summary (Last 24 hours) at 5/2/2022 1652  Last data filed at 5/2/2022 1200  Gross per 24 hour   Intake 480 ml   Output --   Net 480 ml       Mental Status Evaluation:  Appearance:  age appropriate  male, dressed in casual clothing  Well groomed  Appears somewhat in pain  Behavior:  Calm, cooperative, makes good eye contact   Speech:  normal pitch and normal volume   Mood:  "Alright"   Affect:  constricted   Language: naming objects   Thought Process:  goal directed   Associations: intact associations   Thought Content:  normal   Perceptual Disturbances: No AVH  Does not appear to be RIS  Does not appear distracted     Risk Potential: Suicidal Ideations none, Homicidal Ideations none and Potential for Aggression No   Sensorium:  person, place and time/date   Memory:  recent and remote memory grossly intact   Cognition:  recent and remote memory grossly intact   Consciousness:  alert and awake    Attention: attention span and concentration were age appropriate   Intellect: within normal limits   Fund of Knowledge: awareness of current events: COVID-19   Insight:  fair   Judgment: fair   Muscle Strength and Tone: Within normal limits   Gait/Station: Limping secondary to pain   Motor Activity: no abnormal movements     Lab Results:   I have personally reviewed all pertinent laboratory/tests results    Most Recent Labs:   Lab Results   Component Value Date    WBC 14 82 (H) 05/01/2022    RBC 4 16 05/01/2022    HGB 13 2 05/01/2022    HCT 38 3 05/01/2022     05/01/2022    RDW 13 1 05/01/2022    NEUTROABS 11 05 (H) 05/01/2022    SODIUM 137 05/01/2022    K 3 4 (L) 05/01/2022     05/01/2022    CO2 28 05/01/2022    BUN 19 05/01/2022    CREATININE 1 02 05/01/2022    GLUC 145 (H) 05/01/2022    GLUF 128 (H) 01/12/2022    CALCIUM 9 2 05/01/2022    AST 21 05/01/2022    ALT 32 05/01/2022    ALKPHOS 61 05/01/2022    TP 7 9 05/01/2022    ALB 4 2 05/01/2022    TBILI 0 53 05/01/2022    CHOLESTEROL 125 01/12/2022    HDL 47 01/12/2022    TRIG 33 01/12/2022    LDLCALC 71 01/12/2022    NONHDLC 78 01/12/2022    UYQ4QEOQMYOK 0 435 (L) 05/01/2022    FREET4 0 83 08/11/2020    HGBA1C 5 8 (H) 01/12/2022     01/12/2022         Eli Powell MD    Psychiatry Resident, PGY-II

## 2022-05-02 NOTE — NURSING NOTE
Patient is a 201 admit from [facility] ED   patient made a suicidal threat of going to the woods and shooting his head with a gun because of an ongoing divorce proceeding and his wife called the  and  found him driving with a gun in his car and took him to the ED  Hx depression, chronic pain, HTN, GERD, fibromyalgia, and neuropathy  Patient was cooperative with the admission but scant with his response to assessment questions  Patient refused having access to a gun but per report and triage note the  found a gun in his car  Patient also refused ever having any thoughts of committing suicide in his lifetime  Patient is having dilaudid pump  Per report patient's wedding ring and phone were taken home by his wife  Q 7 minutes safety checks initiated

## 2022-05-02 NOTE — CASE MANAGEMENT
Spoke with pt's wife, Amanda Camp, to obtain collateral info  Kristi works for [employer]  She reports that about 1 month ago, she told pt that she wasn't sure if she wanted to be  to him anymore and since then, pt has been sad about the possibility of their marriage failing  Kristi also thinks that pt's health issues have been causing him a lot of stress because he has physical limitations (ie  Can't run around with grandchildren)  She states that "everything hit him all at once" and this behavior is completely out of the blue and out of character for pt  Kristi states that pt is in a lot of pain  He can't walk well without having shoes on  He can't sleep unless he's sitting up/propped up  At baseline, pt is caring, compassionate, positive, loves his family more than anything  Pt is not an aggressive or combative person  No hx of D/A, SI/SA, MH tx  Kristi reports that they're going through a johnson period but pt is able/welcome to return home after d/c  Ilenecharissa confirmed that pt owns firearms and has a conceal carry permit  She also confirmed that guns are always locked up at home  Amanda Camp is aware that pt signed 72 hr notice today  She doesn't have any concerns with pt's return home this week

## 2022-05-02 NOTE — ED NOTES
Patient is resting comfortably with wife at bedside  No wants or complaints at this time  1;1 sitter is present         Rosa Ramos  05/01/22 9868

## 2022-05-02 NOTE — PROGRESS NOTES
05/02/22 1000 05/02/22 1100 05/02/22 1330   Activity/Group Checklist   Group [employer] meeting Exercise Life Skills   Attendance Refused Refused Attended   Attendance Duration (min)  --   --  16-30   Interactions  --   --  Other (Comment)  (Pt  superficially engaged in task )   Affect/Mood  --   --  Other (Comment)  (pt  reports not feeling the need to be in therapy)   Goals Achieved  --   --  Able to listen to others      05/02/22 1430   Activity/Group Checklist   Group Admission/Discharge  (completed self assessment w/ staff assist for writing )   Attendance Attended  (1-1)   Attendance Duration (min) 0-15   Interactions Other (Comment)  (Pt stated not feeling a need to be admitted)   Affect/Mood Other (Comment)  (minimized a need for therapy )   Goals Achieved Able to reflect/comment on own behavior

## 2022-05-02 NOTE — ED NOTES
Patient is resting comfortably with lights and tv on in room  Patient finished eating his dinner tray  1;1 sitter is present        Alex Eisenmenger  05/01/22 2018

## 2022-05-02 NOTE — ASSESSMENT & PLAN NOTE
Lab Results   Component Value Date    HGBA1C 5 8 (H) 01/12/2022     · Continue metformin 500 mg BID   · Carb controlled diet   · accucheks

## 2022-05-02 NOTE — DISCHARGE INSTR - OTHER ORDERS
You are being discharged to: [facility], [address] [facility]    Triggers you have identified during your hospitalization that led to your admission include: marital issues  Coping skills you have identified during your hospitalization include: spending time with family  If you are unable to deal with your distressed mood alone, please contact your primary care provider, Dr Patience Chakraborty  If that is not effective and you continue to have a distressed mood or feel like you're in crisis, please contact 911 and go to the nearest emergency center  [facility] Crisis Intervention: [address] [facility] Suicide Prevention Lifeline:  2-836.585.5114  *Alcohol Anonymous: [address] [facility] ([place]) HELPLINE: 601.751.6110/Website: www filiberto org  *Substance Abuse and [facility]) [employer], which is a confidential, free, 24-hour-a-day, 365-day-a-year, information service for individuals and family members facing mental health and/or substance use disorders  This service provides referrals to local treatment facilities, support groups, and community-based organizations  Callers can also order free publications and other information  Call 2-320.859.7923/Website: www [facility] gov  *[employer] 2-1-1: This is a toll free, confidential, 24-hour-a-day service which connects you to a community  in your area who can help you find services and resources that are available to you locally and provide critical services that can improve and save lives    Call: 211  /Website: https://mer[facility]t/

## 2022-05-02 NOTE — PLAN OF CARE
Problem: Ineffective Coping  Goal: Cooperates with admission process  Description: Interventions:   - Complete admission process  Outcome: Not Progressing  Goal: Identifies ineffective coping skills  Outcome: Not Progressing  Goal: Identifies healthy coping skills  Outcome: Not Progressing  Goal: Demonstrates healthy coping skills  Outcome: Not Progressing  Goal: Participates in unit activities  Description: Interventions:  - Provide therapeutic environment   - Provide required programming   - Redirect inappropriate behaviors   Outcome: Not Progressing  Goal: Patient/Family participate in treatment and DC plans  Description: Interventions:  - Provide therapeutic environment  Outcome: Not Progressing  Goal: Patient/Family verbalizes awareness of resources  Outcome: Not Progressing  Goal: Understands least restrictive measures  Description: Interventions:  - Utilize least restrictive behavior  Outcome: Not Progressing  Goal: Free from restraint events  Description: - Utilize least restrictive measures   - Provide behavioral interventions   - Redirect inappropriate behaviors   Outcome: Not Progressing     Problem: Risk for Self Injury/Neglect  Goal: Treatment Goal: Remain safe during length of stay, learn and adopt new coping skills, and be free of self-injurious ideation, impulses and acts at the time of discharge  Outcome: Not Progressing  Goal: Verbalize thoughts and feelings  Description: Interventions:  - Assess and re-assess patient's lethality and potential for self-injury  - Engage patient in 1:1 interactions, daily, for a minimum of 15 minutes  - Encourage patient to express feelings, fears, frustrations, hopes  - Establish rapport/trust with patient   Outcome: Not Progressing  Goal: Refrain from harming self  Description: Interventions:  - Monitor patient closely, per order  - Develop a trusting relationship  - Supervise medication ingestion, monitor effects and side effects   Outcome: Not Progressing  Goal: Attend and participate in unit activities, including therapeutic, recreational, and educational groups  Description: Interventions:  - Provide therapeutic and educational activities daily, encourage attendance and participation, and document same in the medical record  - Obtain collateral information, encourage visitation and family involvement in care   Outcome: Not Progressing  Goal: Recognize maladaptive responses and adopt new coping mechanisms  Outcome: Not Progressing  Goal: Complete daily ADLs, including personal hygiene independently, as able  Description: Interventions:  - Observe, teach, and assist patient with ADLS  - Monitor and promote a balance of rest/activity, with adequate nutrition and elimination  Outcome: Not Progressing     Problem: Depression  Goal: Treatment Goal: Demonstrate behavioral control of depressive symptoms, verbalize feelings of improved mood/affect, and adopt new coping skills prior to discharge  Outcome: Not Progressing  Goal: Verbalize thoughts and feelings  Description: Interventions:  - Assess and re-assess patient's level of risk   - Engage patient in 1:1 interactions, daily, for a minimum of 15 minutes   - Encourage patient to express feelings, fears, frustrations, hopes   Outcome: Not Progressing  Goal: Refrain from harming self  Description: Interventions:  - Monitor patient closely, per order   - Supervise medication ingestion, monitor effects and side effects   Outcome: Not Progressing  Goal: Refrain from isolation  Description: Interventions:  - Develop a trusting relationship   - Encourage socialization   Outcome: Not Progressing  Goal: Refrain from self-neglect  Outcome: Not Progressing  Goal: Attend and participate in unit activities, including therapeutic, recreational, and educational groups  Description: Interventions:  - Provide therapeutic and educational activities daily, encourage attendance and participation, and document same in the medical record   Outcome: Not Progressing  Goal: Complete daily ADLs, including personal hygiene independently, as able  Description: Interventions:  - Observe, teach, and assist patient with ADLS  -  Monitor and promote a balance of rest/activity, with adequate nutrition and elimination   Outcome: Not Progressing

## 2022-05-03 VITALS
HEART RATE: 83 BPM | TEMPERATURE: 97.6 F | SYSTOLIC BLOOD PRESSURE: 127 MMHG | OXYGEN SATURATION: 98 % | BODY MASS INDEX: 27.4 KG/M2 | RESPIRATION RATE: 18 BRPM | HEIGHT: 69 IN | DIASTOLIC BLOOD PRESSURE: 85 MMHG | WEIGHT: 185 LBS

## 2022-05-03 PROBLEM — Z00.8 MEDICAL CLEARANCE FOR PSYCHIATRIC ADMISSION: Status: RESOLVED | Noted: 2021-01-13 | Resolved: 2022-05-03

## 2022-05-03 LAB — GLUCOSE SERPL-MCNC: 125 MG/DL (ref 65–140)

## 2022-05-03 PROCEDURE — 99238 HOSP IP/OBS DSCHRG MGMT 30/<: CPT | Performed by: STUDENT IN AN ORGANIZED HEALTH CARE EDUCATION/TRAINING PROGRAM

## 2022-05-03 PROCEDURE — 82948 REAGENT STRIP/BLOOD GLUCOSE: CPT

## 2022-05-03 RX ORDER — PREDNISONE 10 MG/1
10 TABLET ORAL 2 TIMES DAILY WITH MEALS
Qty: 1 TABLET | Refills: 0 | Status: SHIPPED | OUTPATIENT
Start: 2022-05-03 | End: 2022-05-04

## 2022-05-03 RX ORDER — PREDNISONE 10 MG/1
10 TABLET ORAL DAILY
Qty: 2 TABLET | Refills: 0 | Status: SHIPPED | OUTPATIENT
Start: 2022-05-04 | End: 2022-05-06

## 2022-05-03 RX ADMIN — METFORMIN ER 500 MG 500 MG: 500 TABLET ORAL at 08:37

## 2022-05-03 RX ADMIN — LISINOPRIL 20 MG: 20 TABLET ORAL at 08:37

## 2022-05-03 RX ADMIN — OXYCODONE HYDROCHLORIDE 80 MG: 40 TABLET, FILM COATED, EXTENDED RELEASE ORAL at 08:36

## 2022-05-03 RX ADMIN — PREDNISONE 10 MG: 5 TABLET ORAL at 08:37

## 2022-05-03 NOTE — PROGRESS NOTES
Pt cooperative during completion of a relapse prevention plan and was provided with a tip sheet on early warning signs/coping options and more serious mental health signs that might warrant seeking a Dr  If needed in the future  Pt  desires to be discharged and expressed no concerns about returning home  05/03/22 0935 05/03/22 1000   Activity/Group Checklist   Group Admission/Discharge  (Pt  completed a relapse prevention plan) Community meeting   Attendance Attended Refused   Attendance Duration (min) 0-15  (1-1)  --    Interactions Other (Comment)  (needed assist to Identify signs/symptoms)  --    Affect/Mood Calm  (Pt minimized need to attend groups)  --    Goals Achieved Able to engage in interactions; Able to listen to others; Identified relapse prevention strategies; Identified resources and support systems; Discussed coping strategies  --

## 2022-05-03 NOTE — NURSING NOTE
Patient is calm and pleasant on approach, visible , and social  Patient is medication compliant, and cooperative with assessment questions  Patient is D/C home today, medication and F/U instruction reviewed with patient  Patient verbalizes understanding  Patient left unit with staff to lobby to meet with his wife  Patient denies anxiety, depression, SI, AV,VH

## 2022-05-03 NOTE — PLAN OF CARE
Problem: DISCHARGE PLANNING  Goal: Discharge to home or other facility with appropriate resources  Description: INTERVENTIONS:  - Identify barriers to discharge w/patient and caregiver  - Arrange for needed discharge resources and transportation as appropriate  - Identify discharge learning needs (meds, wound care, etc )  - Arrange for interpretive services to assist at discharge as needed  - Refer to Case Management Department for coordinating discharge planning if the patient needs post-hospital services based on physician/advanced practitioner order or complex needs related to functional status, cognitive ability, or social support system  Outcome: Completed     Discharge planning discussed with pt and pt's wife  IMM letter signed  Transportation provided by pt's wife -- 1:30pm

## 2022-05-03 NOTE — TREATMENT TEAM
05/03/22 1126   Team Meeting   Meeting Type Tx Team Meeting   Initial Conference Date 05/02/22   Team Members Present   Team Members Present Physician;Nurse;   Physician Team Member Dr Angel Siegel Team Member [facility] CTR Management Team Member Chayo   Patient/Family Present   Patient Present No  (Pt declined)   Patient's Family Present No     Treatment goals include: decreasing depression, remaining safe on unit, building effective coping skills, discharge planning

## 2022-05-03 NOTE — DISCHARGE SUMMARY
Discharge Summary - [address]  55 y o  male MRN: [id number]  Unit/Bed#: [facility] 821-80 Encounter: [id number]      Admission Date:   Admission Orders (From admission, onward)     Ordered        05/01/22 2344  ED TO SAME [facility] UNIT (using Admission Navigator) - Admit Patient to [address] [place] [address]  Once                          Discharge Date: 5/3/2022    Attending Psychiatrist: Rianna De La Rosa MD    Reason for Admission:   Alexis Jorge is a 55 y o  male, admitted to the inpatient behavioral health unit at [address], as a voluntarily 201 commitment, subsequent to suicidal ideation as documented in the ED  Marcial Chopra told his wife that  Please refer to the initial H&P for full details  H&P by this writer on 5/2/22:  "Alexis Jorge  is a 55 y o   male with no known psychiatric history, GERD, type 2 diabetes, hypertension, hyperlipidemia, chronic pain syndrome, who presents, on a voluntarily 12 commitment basis, with reported suicidal ideation with a plan to shoot himself  Per ED notes, patient's wife called the police after he threatened to shoot himself in the head with a gun  Patient was found by the police and was brought to the emergency department via EMS  Patient wife reports they are currently going through a divorce per ED notes      During evaluation today, patient states that yesterday him and his wife were supposed to go to their grandchild's birthday party, and his wife said that she needed to go to work, and was taking a shower  Patient states that he was upset, became tearful, got dressed, said that he can not continue living like this, got into his truck with his concealed carry weapon, and parked his car where his wife typically goes for walks  He says that she found him as he was waiting for her, then they had a conversation, and were driving back   On the way, he was thought by police, and then was taken to the hospital   He states that he is licensed to carry firearms, and at the time, he had his hand gun on his person, but did not have bullets in his gun  He says that he would never want to hurt anyone or himself, but just wanted to see how his wife would react to see "if she still loves me " He says he never attempted suicide, has never had thoughts about hurting himself or others, and is a very peaceful person  He says that he used to frequently go target shooting before, but does not [place] as he does not want to hurt or kill animals       He states that he is having relationship issues with his wife with whom he has been together for approximately 20 years  He says that for the past 6 months, she has been cold, distant, and has not been interacting with him as she has been stating that she is always busy  She has apparently been also undergoing menopause  Her behavioral apparently also changed, and she has been getting dressed in leaving at times without notice and spending time with others, including female friends and a male friend  Recently, patient states that his wife told him that she does not think that she wants to remain , which made him upset  He also suspected her of having an affair with a male friend who she has been spending more time with recently  However, he says that he is a type of person who would not having affair with a  woman as he knows him well and his personality      With regards to psychiatric symptoms, patient states that he does not have any psychiatric issues, does not feel depressed, does not have anxiety, and was never diagnosed with any psychiatric disorders in the past   He says that he has been irritable recently, crying at times, but believes that steroids are contributing to this as he has experienced similar episode when he was taking steroids in the past   He denies suicidal ideation, homicidal ideation    He denies history of auditory or visual hallucinations  He denies history of lit/hypomania      Called patient's wife, Beverly Lyons with permission from the patient for collateral information  According to her, she states that they have been together for 20 years, and prior to coming to the hospital, they had planned to go to their grandchild's birthday party  However, she was notified that she needed to fill in for another person as a  working at [address] [place] [address], and had to get dressed and leave  She told the patient that she does not have time, and that they can not talk another time, and went for a shower  Anselmo Cortez told her that he cannot live like this  While she was taking a shower, patient told her that he is taking family dog and sent something else which she did not hear  She got concerned, and went looking for him  She said that she also called the police, stating that  he potentially has a gun on him and she was worried for him  She found him sitting his car, and then they talked about how they missed walking together, and she said that they need to go back home, during which time they talked  On their way back home, please came, EMS was called, and took the patient to the emergency department  She says that she has been "overloaded, and I needed an out "  She said that she was not sure about what to do, and she went to a  to NATHANAEL RAMSAY'CASSID[facility] options   She says that she loves her  and does not want to get       [facility] states that patient has never made any suicidal statements before and has never stated that he wants to hurt anyone else  She says that the patient is very optimistic person, and does his best to do whatever is necessary to make his family happy  She says that the patient is physically active despite his chronic pain, and tries to help around the house   With her being distant from him, she said that he has been sad at times because of relationship issues that they are experiencing, but is generally in good mood and does not think he is depressed  She did notice that since he started prednisone for his ear problem, he has been slightly irritable, which she has noticed before when he was taking prednisone  She says that he is not a danger to himself and is not a danger to others, and would never hurt anyone or himself  She feels comfortable with him coming home "    Hospital Course:   Upon admission, patient remained appropriate, cooperative with staff, and in good behavioral control  Patient did not meet criteria for major depressive disorder, anxiety disorder, or a primary psychotic disorder, and was stable from psychiatric standpoint, and therefore psychiatric medications were not indicated at this time  Medications prior to admission including gabapentin and opioid medications were continued during admission  Patient's mood remained stable during the course of the hospitalization, and he interacted appropriately with staff and other peers  Heaven Silva did not demonstrate dangerous behavior to self or their peers his inpatient stay  Heaven Silva denied suicidal and homicidal ideations at any point during his psychiatric hospitalization  Collateral information was obtained from his spouse, which is documented above in the H&P  Based on information obtained from spouse, and hospital records, as well as appropriate behavior on the unit, it was appropriate for the patient to be discharged from inpatient psychiatric unit  During hospitalization, this writer sat down with patient to discuss stressors in his life, and identify which of these stressors were impactful for the patient  We explored how these stressors were affecting him, and how he can utilize his coping skills to deal with them    He notes that it has been difficult in the last several months as his wife has been distant from him, but states that all he needed was some answer whether his wife left him or not, and said that even if she did not love him, it was okay as long as she let him know that  He states that the uncertainty of his relationship with his wife was what was causing issues for him  He says that if she wants a divorce, he will be obviously upset, but will not hold it against her as he will a was love her and wants what is best for her  When asked what he would do if he were to get , he says that he will move out if necessary and sell his significant assets  He says that he has supportive children and friends who are always supportive of him regardless  He says that this hospitalization is embarrassing for him as he believes it was a lapse of judgment on his behalf and learned his lesson  He says that his wife will pick him up from the hospital and they will go home, and he wants to see his children  He says that he needs to follow-up with his pain medicine specialist as he wants to get off of the opioid medications  When asked what the patient would do if he were to have worsening of his mood symptoms, he says that he will reach out to his wife, friends, and if necessary, to crisis hotline  He will also go to the emergency department for psychiatric evaluation if necessary      Behavioral Health Medications:   all current active meds have been reviewed, continue current psychiatric medications and current meds:   Current Facility-Administered Medications   Medication Dose Route Frequency    acetaminophen (TYLENOL) tablet 650 mg  650 mg Oral Q4H PRN    acetaminophen (TYLENOL) tablet 650 mg  650 mg Oral Q4H PRN    acetaminophen (TYLENOL) tablet 975 mg  975 mg Oral Q6H PRN    atorvastatin (LIPITOR) tablet 20 mg  20 mg Oral Daily With Dinner    benztropine (COGENTIN) injection 1 mg  1 mg Intramuscular Q4H PRN Max 6/day    benztropine (COGENTIN) tablet 0 5 mg  0 5 mg Oral Q4H PRN Max 6/day    gabapentin (NEURONTIN) capsule 1,200 mg  1,200 mg Oral HS    hydrOXYzine HCL (ATARAX) tablet 25 mg  25 mg Oral Q6H PRN    hydrOXYzine HCL (ATARAX) tablet 50 mg  50 mg Oral Q6H PRN Max 4/day    lisinopril (ZESTRIL) tablet 20 mg  20 mg Oral Daily    LORazepam (ATIVAN) injection 1 mg  1 mg Intramuscular Q6H PRN Max 3/day    melatonin tablet 3 mg  3 mg Oral HS    metFORMIN (GLUCOPHAGE-XR) 24 hr tablet 500 mg  500 mg Oral BID With Meals    methadone (DOLOPHINE) tablet 20 mg  20 mg Oral Q8H PRN    OLANZapine (ZyPREXA) IM injection 5 mg  5 mg Intramuscular Q3H PRN Max 3/day    OLANZapine (ZyPREXA) tablet 2 5 mg  2 5 mg Oral Q4H PRN Max 6/day    OLANZapine (ZyPREXA) tablet 5 mg  5 mg Oral Q4H PRN Max 3/day    OLANZapine (ZyPREXA) tablet 5 mg  5 mg Oral Q3H PRN Max 3/day    oxyCODONE (OxyCONTIN) 12 hr tablet 80 mg  80 mg Oral Q12H MACKENZIE    pantoprazole (PROTONIX) EC tablet 40 mg  40 mg Oral Early Morning    predniSONE tablet 10 mg  10 mg Oral BID With Meals    [START ON 5/4/2022] predniSONE tablet 10 mg  10 mg Oral Daily    traZODone (DESYREL) tablet 50 mg  50 mg Oral Q6H PRN Max 3/day    traZODone (DESYREL) tablet 50 mg  50 mg Oral HS PRN     Labs/Imaging:   I have personally reviewed all pertinent laboratory/tests results    Most Recent Labs:   Lab Results   Component Value Date    WBC 14 82 (H) 05/01/2022    RBC 4 16 05/01/2022    HGB 13 2 05/01/2022    HCT 38 3 05/01/2022     05/01/2022    RDW 13 1 05/01/2022    NEUTROABS 11 05 (H) 05/01/2022    SODIUM 137 05/01/2022    K 3 4 (L) 05/01/2022     05/01/2022    CO2 28 05/01/2022    BUN 19 05/01/2022    CREATININE 1 02 05/01/2022    GLUC 145 (H) 05/01/2022    GLUF 128 (H) 01/12/2022    CALCIUM 9 2 05/01/2022    AST 21 05/01/2022    ALT 32 05/01/2022    ALKPHOS 61 05/01/2022    TP 7 9 05/01/2022    ALB 4 2 05/01/2022    TBILI 0 53 05/01/2022    CHOLESTEROL 125 01/12/2022    HDL 47 01/12/2022    TRIG 33 01/12/2022    LDLCALC 71 01/12/2022    NONHDLC 78 01/12/2022    RKF4LNHPLOTP 0 435 (L) 05/01/2022    FREET4 0 83 08/11/2020    HGBA1C 5 8 (H) 01/12/2022     01/12/2022     Mental Status at time of Discharge:   Appearance:  age appropriate, dressed appropriately, looks stated age  sitting comfortably in chair, adequate hygiene and grooming   Behavior:  cooperative and makes good eye contact   Speech:  normal rate and volume   Mood:  "Great"   Affect:  Constricted but reactive   Language Within normal limits   Thought Process:  goal directed   Thought Content:  No verbalized delusions   Perceptual Disturbances: Denies auditory or visual hallucinations and Does not appear to be responding to internal stimuli   Risk Potential: Denies suicidal or homicidal ideation, intent, or plan   Sensorium:  person, place, time and current situation   Cognition:  Grossly intact   Consciousness:  alert and awake   Attention: attention span and concentration were age appropriate   Insight:  fair   Judgment: fair   Intellect appears to be of average intelligence   Gait/Station: normal gait/station   Motor Activity: no abnormal movements     Risk of Harm to Self:   The following ratings are based on assessment at the time of discharge, review of the hospital stay progress, assessment at the time of the interview and review of records  Demographic risk factors include: , male, age: over 48 or older  Historical Risk Factors include: History of suicidal ideation, which were not directly according to wife  Current Specific Risk Factors include: recent inpatient psychiatric admission - being discharged today, recent suicidal gesture  Protective Factors: no current suicidal ideation, no current depressive symptoms, no current anxiety symptoms, no current psychotic symptoms, ability to adapt to change, ability to make plans for the future, no current suicidal plan or intent, family support established, being a parent, being , stable housing, connection to own children, effective coping skills, having a desire to be alive, having a desire to live, having pets, healthy fear of risky behaviors and pain, no substance use problems, responsibilities and duties to others, sense of personal control, strong relationships, supportive family, supportive friends, ability to contract for safety with staff, ability to communicate with staff  Weapons/Firearms: guns  The following steps have been taken to ensure weapons are properly secured: removed, by son  Based on today's assessment, Lee Arteaga presents the following risk of harm to self: minimal    Risk of Harm to Others: The following ratings are based on assessment at the time of discharge, review of the hospital stay progress, assessment at the time of the interview and review of records  Demographic Risk Factors include: male  Historical Risk Factors include: none  Current Specific Risk Factors include: access to weapons  Protective Factors: no current homicidal ideation, good impulse control, stable mood, no current psychotic symptoms, able to manage anger well, effective coping skills, no current substance use problems, no prior history of violence, stable living environment, good support system, supportive family, supportive friends, strong relationships, responsibilities and duties to others, being a parent, being , good self-esteem, personal beliefs  Weapons/Firearms: guns   The following steps have been taken to ensure weapons are properly secured: removed, by son  Based on today's assessment, Lee Harrisbrit presents the following risk of harm to others: minimal      Discharge Diagnosis:   Patient Active Problem List   Diagnosis    Cervical radiculopathy    Chronic pain disorder    Essential hypertension    Lumbar postlaminectomy syndrome    Mixed hyperlipidemia    Osteoporosis    Other chronic nonalcoholic liver disease    Parotid gland enlargement    Testicular hypogonadism    Diabetes mellitus type 2, noninsulin dependent [facility]    Tear of right supraspinatus tendon    Osteoarthritis of right acromioclavicular joint    Ankylosing spondylitis ([facility] )    Degeneration of intervertebral disc of lumbar region    Degenerative lumbar spinal stenosis    Diabetic polyneuropathy (HCC)    Medical clearance for psychiatric admission    Presence of intrathecal pump    Osteoarthritis of both hands    Hiatal hernia with GERD    Eustachian tube dysfunction    Mood disorder ([facility] ), r/o Substance/medication induced mood disorder       Discharge Medications:  See list above, as well as the after visit summary containing reconciled discharge medications provided to patient and family  Discharge instructions/Information to patient and family:   See after visit summary for information provided to patient and family  Provisions for Follow-Up Care:  See after visit summary for information related to follow-up care and any pertinent home health orders  This note has been constructed using a voice recognition system  There may be translation, syntax,  or grammatical errors  If you have any questions, please contact the dictating provider      Jose Luis Nina MD  Psychiatry Resident, PGY-2

## 2022-05-03 NOTE — BH TRANSITION RECORD
Contact Information: If you have any questions, concerns, pended studies, tests and/or procedures, or emergencies regarding your inpatient behavioral health visit  Please contact [address] [place] [address] older adult behavioral health unit 6T [phone] and ask to speak to a , nurse or physician  A contact is available 24 hours/ 7 days a week at this number  Summary of Procedures Performed During your Stay:  Below is a list of major procedures performed during your hospital stay and a summary of results:  - Cardiac Procedures/Studies: ECG-12 on 5/1/22: sinus tachycardia, otherwife normal ECG  Pending Studies (From admission, onward)    None        If studies are pending at discharge, follow up with your PCP and/or referring provider

## 2022-05-03 NOTE — CASE MANAGEMENT
Spoke with pt's wife, Inderjit Lopez [phone], regarding progress and d/c plan for today  She is in agreement and will pick pt up at 1:30pm  No concerns/issues with pt returning home

## 2022-05-03 NOTE — NURSING NOTE
Patient was visible in the milieu socializing with select peers  VSS  Denies all psych s/s but c/o generalized body pain 7/10  Patient had scheduled oxycodone 80 mg  Had snack  Cooperative and compliant with HS medications  Patient requested his Protonix to be rescheduled for HS instead of early morning  Safety checks ongoing

## 2022-05-03 NOTE — PLAN OF CARE
Problem: Ineffective Coping  Goal: Cooperates with admission process  Description: Interventions:   - Complete admission process  Outcome: Adequate for Discharge  Goal: Identifies ineffective coping skills  Outcome: Adequate for Discharge  Goal: Identifies healthy coping skills  Outcome: Adequate for Discharge  Goal: Demonstrates healthy coping skills  Outcome: Adequate for Discharge  Goal: Participates in unit activities  Description: Interventions:  - Provide therapeutic environment   - Provide required programming   - Redirect inappropriate behaviors   Outcome: Adequate for Discharge  Goal: Patient/Family participate in treatment and DC plans  Description: Interventions:  - Provide therapeutic environment  Outcome: Adequate for Discharge  Goal: Patient/Family verbalizes awareness of resources  Outcome: Adequate for Discharge  Goal: Understands least restrictive measures  Description: Interventions:  - Utilize least restrictive behavior  Outcome: Adequate for Discharge  Goal: Free from restraint events  Description: - Utilize least restrictive measures   - Provide behavioral interventions   - Redirect inappropriate behaviors   Outcome: Adequate for Discharge     Problem: Risk for Self Injury/Neglect  Goal: Treatment Goal: Remain safe during length of stay, learn and adopt new coping skills, and be free of self-injurious ideation, impulses and acts at the time of discharge  Outcome: Adequate for Discharge  Goal: Verbalize thoughts and feelings  Description: Interventions:  - Assess and re-assess patient's lethality and potential for self-injury  - Engage patient in 1:1 interactions, daily, for a minimum of 15 minutes  - Encourage patient to express feelings, fears, frustrations, hopes  - Establish rapport/trust with patient   Outcome: Adequate for Discharge  Goal: Refrain from harming self  Description: Interventions:  - Monitor patient closely, per order  - Develop a trusting relationship  - Supervise medication ingestion, monitor effects and side effects   Outcome: Adequate for Discharge  Goal: Attend and participate in unit activities, including therapeutic, recreational, and educational groups  Description: Interventions:  - Provide therapeutic and educational activities daily, encourage attendance and participation, and document same in the medical record  - Obtain collateral information, encourage visitation and family involvement in care   Outcome: Adequate for Discharge  Goal: Recognize maladaptive responses and adopt new coping mechanisms  Outcome: Adequate for Discharge  Goal: Complete daily ADLs, including personal hygiene independently, as able  Description: Interventions:  - Observe, teach, and assist patient with ADLS  - Monitor and promote a balance of rest/activity, with adequate nutrition and elimination  Outcome: Adequate for Discharge     Problem: Depression  Goal: Treatment Goal: Demonstrate behavioral control of depressive symptoms, verbalize feelings of improved mood/affect, and adopt new coping skills prior to discharge  Outcome: Adequate for Discharge  Goal: Verbalize thoughts and feelings  Description: Interventions:  - Assess and re-assess patient's level of risk   - Engage patient in 1:1 interactions, daily, for a minimum of 15 minutes   - Encourage patient to express feelings, fears, frustrations, hopes   Outcome: Adequate for Discharge  Goal: Refrain from harming self  Description: Interventions:  - Monitor patient closely, per order   - Supervise medication ingestion, monitor effects and side effects   Outcome: Adequate for Discharge  Goal: Refrain from isolation  Description: Interventions:  - Develop a trusting relationship   - Encourage socialization   Outcome: Adequate for Discharge  Goal: Refrain from self-neglect  Outcome: Adequate for Discharge  Goal: Attend and participate in unit activities, including therapeutic, recreational, and educational groups  Description: Interventions:  - Provide therapeutic and educational activities daily, encourage attendance and participation, and document same in the medical record   Outcome: Adequate for Discharge  Goal: Complete daily ADLs, including personal hygiene independently, as able  Description: Interventions:  - Observe, teach, and assist patient with ADLS  -  Monitor and promote a balance of rest/activity, with adequate nutrition and elimination   Outcome: Adequate for Discharge

## 2022-05-03 NOTE — CASE MANAGEMENT
05/03/22 0900   Team Meeting   Meeting Type Daily Rounds   Initial Conference Date 05/03/22   Team Members Present   Team Members Present Physician;Nurse;;; Occupational Therapist   Physician Team Member Dr Tomas Cunningham Management Team Member [facility] Work Team Member Danelle   OT Team Member Kimberly Cisneros   Patient/Family Present   Patient Present No   Patient's Family Present No     Visible, denies s/s, c/o 7/10 body pain, cooperative, med compliant, slept, attended 1 group, 72 hr notice expires on Thurs, guarded, anticipating d/c today

## 2022-05-04 DIAGNOSIS — I10 ESSENTIAL HYPERTENSION: ICD-10-CM

## 2022-05-04 RX ORDER — LISINOPRIL 20 MG/1
20 TABLET ORAL DAILY
Qty: 90 TABLET | Refills: 1 | Status: SHIPPED | OUTPATIENT
Start: 2022-05-04 | End: 2022-07-12 | Stop reason: SDUPTHER

## 2022-05-11 ENCOUNTER — OFFICE VISIT (OUTPATIENT)
Dept: FAMILY MEDICINE CLINIC | Facility: CLINIC | Age: 57
End: 2022-05-11
Payer: COMMERCIAL

## 2022-05-11 VITALS
SYSTOLIC BLOOD PRESSURE: 108 MMHG | RESPIRATION RATE: 16 BRPM | WEIGHT: 191 LBS | HEART RATE: 72 BPM | TEMPERATURE: 97.5 F | HEIGHT: 69 IN | DIASTOLIC BLOOD PRESSURE: 62 MMHG | BODY MASS INDEX: 28.29 KG/M2

## 2022-05-11 DIAGNOSIS — H69.81 DYSFUNCTION OF RIGHT EUSTACHIAN TUBE: Primary | ICD-10-CM

## 2022-05-11 PROBLEM — F11.20 CONTINUOUS OPIOID DEPENDENCE (HCC): Status: ACTIVE | Noted: 2022-05-11

## 2022-05-11 PROCEDURE — 99213 OFFICE O/P EST LOW 20 MIN: CPT | Performed by: FAMILY MEDICINE

## 2022-05-11 RX ORDER — AZITHROMYCIN 250 MG/1
TABLET, FILM COATED ORAL
Qty: 6 TABLET | Refills: 0 | Status: SHIPPED | OUTPATIENT
Start: 2022-05-11 | End: 2022-05-15

## 2022-05-11 NOTE — PROGRESS NOTES
Assessment/Plan:         Diagnoses and all orders for this visit:    Dysfunction of right eustachian tube  -     azithromycin (ZITHROMAX) 250 mg tablet; Take 2 tablets today then 1 tablet daily x 4 days          Trial of antibiotics  Start Flonase nasal spray  ENT referral for persistent symptoms  Patient ID: Lisandra Verde  is a 55 y o  male  Recurrent R ear pain  No ear drainage  No hearing loss  +nasal congestion  He was treated at the end of April for right ETD with oral steroids  Symptoms improved  The following portions of the patient's history were reviewed and updated as appropriate: allergies, current medications, past family history, past medical history, past social history, past surgical history and problem list     Review of Systems   Constitutional: Negative for chills and fever  HENT: Positive for congestion  Negative for ear discharge, ear pain, hearing loss, rhinorrhea, sinus pain and sore throat  See HPI    Respiratory: Negative for cough  Neurological: Negative for dizziness and headaches  Hematological: Negative for adenopathy  Objective:      /62   Pulse 72   Temp 97 5 °F (36 4 °C)   Resp 16   Ht 5' 9" (1 753 m)   Wt 86 6 kg (191 lb)   BMI 28 21 kg/m²          Physical Exam  Vitals and nursing note reviewed  Constitutional:       General: He is not in acute distress  Appearance: He is well-developed  HENT:      Head:      Comments: No R TMJ tenderness  Right Ear: Hearing normal  No middle ear effusion  Tympanic membrane is retracted  Tympanic membrane is not erythematous  Left Ear: Hearing, tympanic membrane and ear canal normal       Ears:      Comments: Hearing normal to rubbing fingers bilaterally      Nose:      Right Sinus: No maxillary sinus tenderness or frontal sinus tenderness  Left Sinus: No maxillary sinus tenderness or frontal sinus tenderness  Mouth/Throat:      Mouth: No oral lesions        Pharynx: Oropharynx is clear  No posterior oropharyngeal erythema  Eyes:      Conjunctiva/sclera: Conjunctivae normal    Neck:      Thyroid: No thyroid mass or thyromegaly  Cardiovascular:      Rate and Rhythm: Normal rate and regular rhythm  Heart sounds: Normal heart sounds  No murmur heard  No gallop  Pulmonary:      Breath sounds: Normal breath sounds  No wheezing or rales  Lymphadenopathy:      Cervical: No cervical adenopathy  Skin:     Findings: No rash  Neurological:      Mental Status: He is alert and oriented to person, place, and time

## 2022-06-07 DIAGNOSIS — K44.9 HIATAL HERNIA WITH GERD: ICD-10-CM

## 2022-06-07 DIAGNOSIS — K21.9 HIATAL HERNIA WITH GERD: ICD-10-CM

## 2022-06-07 RX ORDER — OMEPRAZOLE 40 MG/1
40 CAPSULE, DELAYED RELEASE ORAL DAILY
Qty: 90 CAPSULE | Refills: 3 | Status: SHIPPED | OUTPATIENT
Start: 2022-06-07

## 2022-06-22 ENCOUNTER — TELEPHONE (OUTPATIENT)
Dept: ENDOCRINOLOGY | Facility: CLINIC | Age: 57
End: 2022-06-22

## 2022-06-22 NOTE — TELEPHONE ENCOUNTER
Returned call to [facility] and spoke [place]  She transferred me to the correct Rhina Chiu in the PA department for further assistance  Rhina Chiu was not available so I l/m requesting call back

## 2022-06-24 ENCOUNTER — OFFICE VISIT (OUTPATIENT)
Dept: FAMILY MEDICINE CLINIC | Facility: CLINIC | Age: 57
End: 2022-06-24
Payer: COMMERCIAL

## 2022-06-24 VITALS
HEIGHT: 69 IN | BODY MASS INDEX: 29.1 KG/M2 | DIASTOLIC BLOOD PRESSURE: 80 MMHG | SYSTOLIC BLOOD PRESSURE: 124 MMHG | WEIGHT: 196.5 LBS | RESPIRATION RATE: 17 BRPM | HEART RATE: 86 BPM | TEMPERATURE: 96.2 F | OXYGEN SATURATION: 97 %

## 2022-06-24 DIAGNOSIS — F11.20 CONTINUOUS OPIOID DEPENDENCE (HCC): ICD-10-CM

## 2022-06-24 DIAGNOSIS — E11.9 DIABETES MELLITUS TYPE 2, NONINSULIN DEPENDENT (HCC): ICD-10-CM

## 2022-06-24 DIAGNOSIS — M75.42 IMPINGEMENT SYNDROME OF LEFT SHOULDER: Primary | ICD-10-CM

## 2022-06-24 DIAGNOSIS — E11.42 DIABETIC POLYNEUROPATHY ASSOCIATED WITH TYPE 2 DIABETES MELLITUS (HCC): ICD-10-CM

## 2022-06-24 DIAGNOSIS — G89.4 CHRONIC PAIN DISORDER: ICD-10-CM

## 2022-06-24 DIAGNOSIS — M45.6 ANKYLOSING SPONDYLITIS OF LUMBAR REGION (HCC): ICD-10-CM

## 2022-06-24 PROCEDURE — 3008F BODY MASS INDEX DOCD: CPT | Performed by: FAMILY MEDICINE

## 2022-06-24 PROCEDURE — 20610 DRAIN/INJ JOINT/BURSA W/O US: CPT | Performed by: FAMILY MEDICINE

## 2022-06-24 PROCEDURE — 1036F TOBACCO NON-USER: CPT | Performed by: FAMILY MEDICINE

## 2022-06-24 PROCEDURE — 99213 OFFICE O/P EST LOW 20 MIN: CPT | Performed by: FAMILY MEDICINE

## 2022-06-24 RX ORDER — METHYLPREDNISOLONE ACETATE 40 MG/ML
80 INJECTION, SUSPENSION INTRA-ARTICULAR; INTRALESIONAL; INTRAMUSCULAR; SOFT TISSUE ONCE
Status: COMPLETED | OUTPATIENT
Start: 2022-06-24 | End: 2022-06-24

## 2022-06-24 RX ADMIN — METHYLPREDNISOLONE ACETATE 80 MG: 40 INJECTION, SUSPENSION INTRA-ARTICULAR; INTRALESIONAL; INTRAMUSCULAR; SOFT TISSUE at 09:49

## 2022-06-24 NOTE — PROGRESS NOTES
Assessment/Plan:         Diagnoses and all orders for this visit:    Impingement syndrome of left shoulder  -     methylPREDNISolone acetate (DEPO-MEDROL) injection 80 mg  -     Large joint arthrocentesis: L subacromial bursa    Chronic pain disorder    Continuous opioid dependence (HCC)    Diabetes mellitus type 2, noninsulin dependent (HCC)    Ankylosing spondylitis of lumbar region [facility])    Diabetic polyneuropathy associated with type 2 diabetes mellitus ([address] )          Continue with current pain regimen  PRN ES Tylenol for pain  As a separate procedure today I performed a steroid injection left shoulder-see procedure note  Consider x-rays left shoulder, trial of PT for persistent symptoms  Advised to call if any changes     Patient ID: Bryanna Jones  is a 55 y o  male  Several day history of increasing left shoulder pain  No specific trauma or injury  Remote history of left shoulder surgery 20+ years ago  Prior right rotator cuff surgery  History of ankylosing spondylitis- chronic pain syndrome on long-term opiates-followed by Pain management  Type 2 diabetes mellitus on Metformin  mg twice a day      Lab Results   Component Value Date    HGBA1C 5 8 (H) 01/12/2022         The following portions of the patient's history were reviewed and updated as appropriate: allergies, current medications, past family history, past medical history, past social history, past surgical history and problem list     Review of Systems   Constitutional: Positive for unexpected weight change (11 lb weight gain from 05/2022)  Negative for appetite change, chills and fever  Musculoskeletal: Positive for arthralgias and gait problem (Ambulates with a cane)  Negative for joint swelling  See HPI   Skin: Negative for rash  Neurological: Negative for weakness and numbness           Objective:      /80   Pulse 86   Temp (!) 96 2 °F (35 7 °C)   Resp 17   Ht 5' 9" (1 753 m)   Wt 89 1 kg (196 lb 8 oz) SpO2 97%   BMI 29 02 kg/m²     Wt Readings from Last 3 Encounters:   06/24/22 89 1 kg (196 lb 8 oz)   05/11/22 86 6 kg (191 lb)   05/03/22 83 9 kg (185 lb)          Physical Exam  Constitutional:       General: He is not in acute distress  Musculoskeletal:         General: Tenderness present  Comments: Inspection L shoulder normal  No effusion  No warmth or redness  + L subacromial tenderness  Decreased ROM with abduction, internal and external rotation due to pain  Negative cross arm test  Negative Speed test  Negative Yergason sign  + impingement sign  + empty can sign  Negative sulcus sign  Negative [place]   Lymphadenopathy:      Cervical: No cervical adenopathy  Neurological:      Mental Status: He is alert  Motor: No weakness  Psychiatric:         Mood and Affect: Mood normal          Behavior: Behavior normal          Large joint arthrocentesis: L subacromial bursa  Universal Protocol:  Consent: Verbal consent obtained  Risks and benefits: risks, benefits and alternatives were discussed  Consent given by: patient  Site marked: the operative site was marked  Supporting Documentation  Indications: pain (Impingement syndrome left shoulder)   Procedure Details  Location: shoulder - L subacromial bursa  Preparation: Betadine    Needle size: 22 G  Ultrasound guidance: no  Approach: posterior    Patient tolerance: patient tolerated the procedure well with no immediate complications  Dressing:  Sterile dressing applied    DepoMedrol 40 mg/ML-2 ML and Lidocaine 1% 3 ML

## 2022-07-11 ENCOUNTER — TELEPHONE (OUTPATIENT)
Dept: FAMILY MEDICINE CLINIC | Facility: CLINIC | Age: 57
End: 2022-07-11

## 2022-07-11 DIAGNOSIS — M25.512 ACUTE PAIN OF LEFT SHOULDER: Primary | ICD-10-CM

## 2022-07-11 NOTE — TELEPHONE ENCOUNTER
Patient was seen for severe left shoulder pain on 6/24/2022    He cannot stand the pain and wants to be referred out to a specialist

## 2022-07-12 ENCOUNTER — APPOINTMENT (OUTPATIENT)
Dept: LAB | Facility: CLINIC | Age: 57
End: 2022-07-12
Payer: COMMERCIAL

## 2022-07-12 ENCOUNTER — OFFICE VISIT (OUTPATIENT)
Dept: ENDOCRINOLOGY | Facility: CLINIC | Age: 57
End: 2022-07-12
Payer: COMMERCIAL

## 2022-07-12 VITALS
WEIGHT: 194 LBS | HEART RATE: 90 BPM | BODY MASS INDEX: 28.73 KG/M2 | DIASTOLIC BLOOD PRESSURE: 88 MMHG | HEIGHT: 69 IN | SYSTOLIC BLOOD PRESSURE: 120 MMHG

## 2022-07-12 DIAGNOSIS — I10 ESSENTIAL HYPERTENSION: ICD-10-CM

## 2022-07-12 DIAGNOSIS — E29.1 TESTICULAR HYPOGONADISM: ICD-10-CM

## 2022-07-12 DIAGNOSIS — E78.5 HYPERLIPIDEMIA, UNSPECIFIED HYPERLIPIDEMIA TYPE: ICD-10-CM

## 2022-07-12 DIAGNOSIS — E11.9 DIABETES MELLITUS TYPE 2, NONINSULIN DEPENDENT (HCC): ICD-10-CM

## 2022-07-12 DIAGNOSIS — E11.9 TYPE 2 DIABETES MELLITUS WITHOUT COMPLICATION, WITHOUT LONG-TERM CURRENT USE OF INSULIN (HCC): ICD-10-CM

## 2022-07-12 DIAGNOSIS — E78.2 MIXED HYPERLIPIDEMIA: ICD-10-CM

## 2022-07-12 DIAGNOSIS — E11.9 TYPE 2 DIABETES MELLITUS WITHOUT COMPLICATION, WITHOUT LONG-TERM CURRENT USE OF INSULIN (HCC): Primary | ICD-10-CM

## 2022-07-12 LAB
ALBUMIN SERPL BCP-MCNC: 3.8 G/DL (ref 3.5–5)
ALP SERPL-CCNC: 65 U/L (ref 46–116)
ALT SERPL W P-5'-P-CCNC: 32 U/L (ref 12–78)
ANION GAP SERPL CALCULATED.3IONS-SCNC: 3 MMOL/L (ref 4–13)
AST SERPL W P-5'-P-CCNC: 19 U/L (ref 5–45)
BASOPHILS # BLD AUTO: 0.06 THOUSANDS/ΜL (ref 0–0.1)
BASOPHILS NFR BLD AUTO: 1 % (ref 0–1)
BILIRUB SERPL-MCNC: 0.38 MG/DL (ref 0.2–1)
BUN SERPL-MCNC: 15 MG/DL (ref 5–25)
CALCIUM SERPL-MCNC: 9.2 MG/DL (ref 8.3–10.1)
CHLORIDE SERPL-SCNC: 101 MMOL/L (ref 100–108)
CO2 SERPL-SCNC: 31 MMOL/L (ref 21–32)
CREAT SERPL-MCNC: 0.77 MG/DL (ref 0.6–1.3)
CREAT UR-MCNC: 47.9 MG/DL
EOSINOPHIL # BLD AUTO: 0.08 THOUSAND/ΜL (ref 0–0.61)
EOSINOPHIL NFR BLD AUTO: 1 % (ref 0–6)
ERYTHROCYTE [DISTWIDTH] IN BLOOD BY AUTOMATED COUNT: 12.7 % (ref 11.6–15.1)
GFR SERPL CREATININE-BSD FRML MDRD: 102 ML/MIN/1.73SQ M
GLUCOSE P FAST SERPL-MCNC: 105 MG/DL (ref 65–99)
HCT VFR BLD AUTO: 38.8 % (ref 36.5–49.3)
HGB BLD-MCNC: 13.1 G/DL (ref 12–17)
IMM GRANULOCYTES # BLD AUTO: 0.04 THOUSAND/UL (ref 0–0.2)
IMM GRANULOCYTES NFR BLD AUTO: 1 % (ref 0–2)
LYMPHOCYTES # BLD AUTO: 2.91 THOUSANDS/ΜL (ref 0.6–4.47)
LYMPHOCYTES NFR BLD AUTO: 36 % (ref 14–44)
MCH RBC QN AUTO: 32.1 PG (ref 26.8–34.3)
MCHC RBC AUTO-ENTMCNC: 33.8 G/DL (ref 31.4–37.4)
MCV RBC AUTO: 95 FL (ref 82–98)
MICROALBUMIN UR-MCNC: 9.2 MG/L (ref 0–20)
MICROALBUMIN/CREAT 24H UR: 19 MG/G CREATININE (ref 0–30)
MONOCYTES # BLD AUTO: 0.63 THOUSAND/ΜL (ref 0.17–1.22)
MONOCYTES NFR BLD AUTO: 8 % (ref 4–12)
NEUTROPHILS # BLD AUTO: 4.41 THOUSANDS/ΜL (ref 1.85–7.62)
NEUTS SEG NFR BLD AUTO: 53 % (ref 43–75)
NRBC BLD AUTO-RTO: 0 /100 WBCS
PLATELET # BLD AUTO: 195 THOUSANDS/UL (ref 149–390)
PMV BLD AUTO: 10.5 FL (ref 8.9–12.7)
POTASSIUM SERPL-SCNC: 4.2 MMOL/L (ref 3.5–5.3)
PROT SERPL-MCNC: 7.7 G/DL (ref 6.4–8.2)
RBC # BLD AUTO: 4.08 MILLION/UL (ref 3.88–5.62)
SL AMB POCT HEMOGLOBIN AIC: 6.3 (ref ?–6.5)
SODIUM SERPL-SCNC: 135 MMOL/L (ref 136–145)
T4 FREE SERPL-MCNC: 0.9 NG/DL (ref 0.76–1.46)
TSH SERPL DL<=0.05 MIU/L-ACNC: 0.61 UIU/ML (ref 0.45–4.5)
WBC # BLD AUTO: 8.13 THOUSAND/UL (ref 4.31–10.16)

## 2022-07-12 PROCEDURE — 3061F NEG MICROALBUMINURIA REV: CPT | Performed by: PHYSICIAN ASSISTANT

## 2022-07-12 PROCEDURE — 82570 ASSAY OF URINE CREATININE: CPT

## 2022-07-12 PROCEDURE — 3044F HG A1C LEVEL LT 7.0%: CPT | Performed by: PHYSICIAN ASSISTANT

## 2022-07-12 PROCEDURE — 4010F ACE/ARB THERAPY RXD/TAKEN: CPT | Performed by: PHYSICIAN ASSISTANT

## 2022-07-12 PROCEDURE — 80053 COMPREHEN METABOLIC PANEL: CPT

## 2022-07-12 PROCEDURE — 82043 UR ALBUMIN QUANTITATIVE: CPT

## 2022-07-12 PROCEDURE — 84403 ASSAY OF TOTAL TESTOSTERONE: CPT

## 2022-07-12 PROCEDURE — 3074F SYST BP LT 130 MM HG: CPT | Performed by: PHYSICIAN ASSISTANT

## 2022-07-12 PROCEDURE — 84443 ASSAY THYROID STIM HORMONE: CPT

## 2022-07-12 PROCEDURE — 84439 ASSAY OF FREE THYROXINE: CPT

## 2022-07-12 PROCEDURE — 84402 ASSAY OF FREE TESTOSTERONE: CPT

## 2022-07-12 PROCEDURE — 99214 OFFICE O/P EST MOD 30 MIN: CPT | Performed by: PHYSICIAN ASSISTANT

## 2022-07-12 PROCEDURE — 83036 HEMOGLOBIN GLYCOSYLATED A1C: CPT | Performed by: PHYSICIAN ASSISTANT

## 2022-07-12 PROCEDURE — 3079F DIAST BP 80-89 MM HG: CPT | Performed by: PHYSICIAN ASSISTANT

## 2022-07-12 PROCEDURE — 85025 COMPLETE CBC W/AUTO DIFF WBC: CPT

## 2022-07-12 PROCEDURE — 36415 COLL VENOUS BLD VENIPUNCTURE: CPT

## 2022-07-12 RX ORDER — ATORVASTATIN CALCIUM 20 MG/1
20 TABLET, FILM COATED ORAL
Qty: 90 TABLET | Refills: 3 | Status: SHIPPED | OUTPATIENT
Start: 2022-07-12 | End: 2023-01-08

## 2022-07-12 RX ORDER — LISINOPRIL 20 MG/1
20 TABLET ORAL DAILY
Qty: 90 TABLET | Refills: 3 | Status: SHIPPED | OUTPATIENT
Start: 2022-07-12

## 2022-07-12 NOTE — ASSESSMENT & PLAN NOTE
Diabetes under good control  Continue current regimen and lifestyle modification     Lab Results   Component Value Date    HGBA1C 6 3 07/12/2022

## 2022-07-12 NOTE — ASSESSMENT & PLAN NOTE
Not at goal today but typically well controlled  Blood pressure was 124/80 and 108/62 at past two medical visits  Continue lisinopril and will monitor

## 2022-07-12 NOTE — PROGRESS NOTES
Established Patient Progress Note      Chief Complaint   Patient presents with    Diabetes Type 2        History of Present Illness:   Geronimo Snell  is a 55 y o  male with a history of type 2 diabetes without long term use of insulin since 2017  Reports complications of microalbuminuria  Denies recent illness or hospitalizations  Denies recent severe hypoglycemic or severe hyperglycemic episodes  Denies any issues with his current regimen  home glucose monitoring: are performed regularly about 2 weeks ago and blood sugars have been around 120  He was using livongo meter which he liked but now has new meter through insurance he hasn't set up yet  Weaning off pain Meds  Blood sugars were high after steroids 3 months ago for ear problem  Trying to avoid additional treatment with steroids  Current regimen:   Metformin ER 500mg- 2x per day with food    Last Eye Exam: UTD, need note, [facility] [place]  Last Foot Exam: today at [facility]    Has hypertension: Taking lisinopril  Has hyperlipidemia: Taking atorvastatin      Testosterone cypionate 200mg/ml-- 0 3ml (60mg) weekly  Denies any problems with urination       Patient Active Problem List   Diagnosis    Cervical radiculopathy    Chronic pain disorder    Essential hypertension    Lumbar postlaminectomy syndrome    Mixed hyperlipidemia    Osteoporosis    Other chronic nonalcoholic liver disease    Parotid gland enlargement    Testicular hypogonadism    Diabetes mellitus type 2, noninsulin dependent (HCC)    Tear of right supraspinatus tendon    Osteoarthritis of right acromioclavicular joint    Ankylosing spondylitis (HCC)    Degeneration of intervertebral disc of lumbar region    Degenerative lumbar spinal stenosis    Diabetic polyneuropathy (HCC)    Presence of intrathecal pump    Osteoarthritis of both hands    Hiatal hernia with GERD    Eustachian tube dysfunction    Mood disorder ([address] ), r/o Substance/medication induced mood disorder    Continuous opioid dependence ([facility] [address] )      Past Medical History:   Diagnosis Date    Chronic pain     Depression     Diabetes (HCC)     Fibromyalgia, primary     GERD (gastroesophageal reflux disease)     Hyperlipidemia     Hypertension     Low testosterone     Neuropathy     Pilonidal cyst     last assessed 3/17/2014    Sleep apnea     no cpap      Past Surgical History:   Procedure Laterality Date    BACK SURGERY      discectomy    ELBOW SURGERY Right     OTHER SURGICAL HISTORY  03/13/2015    Electr analysis of progr impl pump w/reprogram refill, requiring physician    Replacement of right abdomen intrathecal  pain pump filled with Dilaudid with electronic analysis and programming   managed by Roberta [facility]      AR SHLDR ARTHROSCOP,SURG,W/ROTAT CUFF REPR Right 2/28/2020    Procedure: SHOULDER ARTHROSCOPIC ROTATOR CUFF REPAIR AND DEBRIDEMENT;  Surgeon: Harlene Kanner, MD;  Location: [facility];  Service: Orthopedics    ROTATOR CUFF REPAIR Left     WISDOM TOOTH EXTRACTION        Family History   Problem Relation Age of Onset    Diabetes unspecified Sister     Hypertension Sister     Hyperlipidemia Sister         pure hypercholesterolemia    Diabetes unspecified Brother         DM    Hypertension Brother     Hyperlipidemia Brother         pure hypercholesterolemia    Coronary artery disease Father     Heart disease Father     Diabetes Brother         DM    Hypertension Family     Alcohol abuse Mother     Liver disease Mother      Social History     Tobacco Use    Smoking status: Former Smoker     Start date: 2/24/2018    Smokeless tobacco: Never Used   Substance Use Topics    Alcohol use: No     No Known Allergies      Current Outpatient Medications:     atorvastatin (LIPITOR) 20 mg tablet, Take 1 tablet (20 mg total) by mouth daily at bedtime, Disp: 90 tablet, Rfl: 3    Blood Glucose Monitoring Suppl (Contour Next EZ) w/Device KIT, Use 1 each 4 (four) times a day, Disp: 1 kit, Rfl: 0    Cholecalciferol (VITAMIN D) 2000 units CAPS, Take 1 capsule by mouth daily, Disp: , Rfl:     gabapentin (NEURONTIN) 300 mg capsule, Take 600 mg by mouth daily Taking two 600 mg at night, Disp: , Rfl:     glucose blood (Contour Next Test) test strip, Use 1 each 4 (four) times a day, Disp: 400 each, Rfl: 1    HYDROmorphone (Dilaudid) 4 mg/mL injection, Inject 1 Device as directed daily  , Disp: , Rfl:     lisinopril (ZESTRIL) 20 mg tablet, Take 1 tablet (20 mg total) by mouth daily, Disp: 90 tablet, Rfl: 3    metFORMIN (GLUCOPHAGE-XR) 500 mg 24 hr tablet, Take 1 tablet (500 mg total) by mouth 2 (two) times a day with meals, Disp: 180 tablet, Rfl: 3    methadone (DOLOPHINE) 10 mg tablet, Take 2 tablets every 6 hours for pain,, Disp: , Rfl:     Microlet Lancets MISC, Use 4 (four) times a day, Disp: 400 each, Rfl: 1    multivitamin (THERAGRAN) TABS, Take 1 tablet by mouth daily, Disp: , Rfl:     omeprazole (PriLOSEC) 40 MG capsule, Take 1 capsule (40 mg total) by mouth daily, Disp: 90 capsule, Rfl: 3    oxyCODONE (OxyCONTIN) 80 mg 12 hr tablet, Take 1 tablet by mouth every 12 (twelve) hours, Disp: , Rfl:     SYRINGE-NEEDLE, DISP, 3 ML 21G X 1" 3 ML MISC, by Does not apply route once a week, Disp: , Rfl:     testosterone cypionate (DEPO-TESTOSTERONE) 200 mg/mL SOLN, INJECT 0 3ML (60MG) WEEKLY USE 1 VIAL FOR 1 WEEKLY DOSE, DISCARD THE REMAINDER, Disp: 4 mL, Rfl: 5    Review of Systems   Constitutional: Negative for activity change, appetite change and fatigue  HENT: Negative for sore throat, trouble swallowing and voice change  Eyes: Negative for visual disturbance  Respiratory: Negative for choking, chest tightness and shortness of breath  Cardiovascular: Negative for chest pain, palpitations and leg swelling  Gastrointestinal: Negative for abdominal pain, constipation and diarrhea     Endocrine: Negative for cold intolerance, heat intolerance, polydipsia, polyphagia and polyuria  Genitourinary: Negative for frequency  Musculoskeletal: Positive for arthralgias and gait problem  Negative for myalgias  Skin: Negative for rash  Neurological: Negative for dizziness and syncope  Hematological: Negative for adenopathy  Psychiatric/Behavioral: Negative for sleep disturbance  All other systems reviewed and are negative  Physical Exam:  Body mass index is 28 65 kg/m²  /88   Pulse 90   Ht 5' 9" (1 753 m)   Wt 88 kg (194 lb)   BMI 28 65 kg/m²    Wt Readings from Last 3 Encounters:   07/12/22 88 kg (194 lb)   06/24/22 89 1 kg (196 lb 8 oz)   05/11/22 86 6 kg (191 lb)       Physical Exam  Vitals reviewed  Constitutional:       General: He is not in acute distress  Appearance: He is well-developed  HENT:      Head: Normocephalic and atraumatic  Eyes:      Conjunctiva/sclera: Conjunctivae normal       Pupils: Pupils are equal, round, and reactive to light  Neck:      Thyroid: No thyromegaly  Cardiovascular:      Rate and Rhythm: Normal rate and regular rhythm  Pulses: no weak pulses          Dorsalis pedis pulses are 2+ on the right side and 2+ on the left side  Posterior tibial pulses are 2+ on the right side and 2+ on the left side  Heart sounds: Normal heart sounds  No murmur heard  Pulmonary:      Effort: Pulmonary effort is normal  No respiratory distress  Breath sounds: Normal breath sounds  No wheezing or rales  Abdominal:      General: Bowel sounds are normal  There is no distension  Palpations: Abdomen is soft  Tenderness: There is no abdominal tenderness  Musculoskeletal:         General: Normal range of motion  Cervical back: Normal range of motion and neck supple  Feet:      Right foot:      Skin integrity: No ulcer, skin breakdown, erythema, warmth, callus or dry skin  Left foot:      Skin integrity: No ulcer, skin breakdown, erythema, warmth, callus or dry skin  Lymphadenopathy:      Cervical: No cervical adenopathy  Skin:     General: Skin is warm and dry  Neurological:      Mental Status: He is alert and oriented to person, place, and time  Patient's shoes and socks removed  Right Foot/Ankle   Right Foot Inspection  Skin Exam: skin normal and skin intact  No dry skin, no warmth, no callus, no erythema, no maceration, no abnormal color, no pre-ulcer, no ulcer and no callus  Toe Exam: ROM and strength within normal limits and right toe deformity (multiple toenails removed)  No swelling, no tenderness and erythema    Sensory   Vibration: intact  Monofilament testing: intact    Vascular  Capillary refills: < 3 seconds  The right DP pulse is 2+  The right PT pulse is 2+  Left Foot/Ankle  Left Foot Inspection  Skin Exam: skin normal and skin intact  No dry skin, no warmth, no erythema, no maceration, normal color, no pre-ulcer, no ulcer and no callus  Toe Exam: ROM and strength within normal limits and left toe deformity (mulitple toenails removed)  No swelling, no tenderness and no erythema  Sensory   Vibration: intact  Monofilament testing: intact    Vascular  Capillary refills: < 3 seconds  The left DP pulse is 2+  The left PT pulse is 2+       Assign Risk Category  No deformity present  No loss of protective sensation  No weak pulses  Risk: 0      Labs:   Component      Latest Ref Rng & Units 7/21/2021 7/21/2021 8/18/2021 1/12/2022           7:32 AM  7:32 AM  8:15 AM  8:47 AM   WBC      4 31 - 10 16 Thousand/uL    6 57   Red Blood Cell Count      3 88 - 5 62 Million/uL    3 95   Hemoglobin      12 0 - 17 0 g/dL    12 6   HCT      36 5 - 49 3 %    36 8   MCV      82 - 98 fL    93   MCH      26 8 - 34 3 pg    31 9   MCHC      31 4 - 37 4 g/dL    34 2   RDW      11 6 - 15 1 %    12 9   MPV      8 9 - 12 7 fL    10 7   Platelet Count      494 - 390 Thousands/uL    196   nRBC      /100 WBCs    0   Neutrophils %      43 - 75 %    52   Immat GRANS % 0 - 2 %    0   Lymphocytes Relative      14 - 44 %    36   Monocytes Relative      4 - 12 %    9   Eosinophils      0 - 6 %    2   Basophils Relative      0 - 1 %    1   Absolute Neutrophils      1 85 - 7 62 Thousands/µL    3 44   Immature Grans Absolute      0 00 - 0 20 Thousand/uL    0 01   Lymphocytes Absolute      0 60 - 4 47 Thousands/µL    2 36   Absolute Monocytes      0 17 - 1 22 Thousand/µL    0 59   Absolute Eosinophils      0 00 - 0 61 Thousand/µL    0 12   Basophils Absolute      0 00 - 0 10 Thousands/µL    0 05   Sodium      136 - 145 mmol/L  136     Potassium      3 5 - 5 3 mmol/L  3 9     Chloride      100 - 108 mmol/L  105     CO2      21 - 32 mmol/L  27     Anion Gap      4 - 13 mmol/L  4     BUN      5 - 25 mg/dL  17     Creatinine      0 60 - 1 30 mg/dL  0 81     GLUCOSE FASTING      65 - 99 mg/dL  127 (H)     Calcium      8 3 - 10 1 mg/dL  9 1     AST      5 - 45 U/L       ALT      12 - 78 U/L       Alkaline Phosphatase      46 - 116 U/L       Total Protein      6 4 - 8 2 g/dL       Albumin      3 5 - 5 0 g/dL       TOTAL BILIRUBIN      0 20 - 1 00 mg/dL       eGFR      ml/min/1 73sq m  101     Glucose, Random      65 - 140 mg/dL       Cholesterol      See Comment mg/dL       Triglycerides      See Comment mg/dL       HDL      >=40 mg/dL       LDL Calculated      0 - 100 mg/dL       Non-HDL Cholesterol      mg/dl       Hemoglobin A1C      6 5 6 0 (H)  6 4 (H)    eAG, EST AVG Glucose      mg/dl 126  137    TESTOSTERONE FREE      7 2 - 24 0 pg/mL       Testosterone, Total, LC/MS      264 - 916 ng/dL       TSH 3RD GENERATON      0 450 - 4 500 uIU/mL         Component      Latest Ref Rng & Units 1/12/2022 1/12/2022 1/12/2022 1/12/2022           8:47 AM  8:47 AM  8:47 AM  8:47 AM   WBC      4 31 - 10 16 Thousand/uL       Red Blood Cell Count      3 88 - 5 62 Million/uL       Hemoglobin      12 0 - 17 0 g/dL       HCT      36 5 - 49 3 %       MCV      82 - 98 fL       MCH      26 8 - 34 3 pg MCHC      31 4 - 37 4 g/dL       RDW      11 6 - 15 1 %       MPV      8 9 - 12 7 fL       Platelet Count      313 - 390 Thousands/uL       nRBC      /100 WBCs       Neutrophils %      43 - 75 %       Immat GRANS %      0 - 2 %       Lymphocytes Relative      14 - 44 %       Monocytes Relative      4 - 12 %       Eosinophils      0 - 6 %       Basophils Relative      0 - 1 %       Absolute Neutrophils      1 85 - 7 62 Thousands/µL       Immature Grans Absolute      0 00 - 0 20 Thousand/uL       Lymphocytes Absolute      0 60 - 4 47 Thousands/µL       Absolute Monocytes      0 17 - 1 22 Thousand/µL       Absolute Eosinophils      0 00 - 0 61 Thousand/µL       Basophils Absolute      0 00 - 0 10 Thousands/µL       Sodium      136 - 145 mmol/L  136     Potassium      3 5 - 5 3 mmol/L  4 2     Chloride      100 - 108 mmol/L  101     CO2      21 - 32 mmol/L  29     Anion Gap      4 - 13 mmol/L  6     BUN      5 - 25 mg/dL  20     Creatinine      0 60 - 1 30 mg/dL  0 86     GLUCOSE FASTING      65 - 99 mg/dL  128 (H)     Calcium      8 3 - 10 1 mg/dL  9 3     AST      5 - 45 U/L  24     ALT      12 - 78 U/L  38     Alkaline Phosphatase      46 - 116 U/L  56     Total Protein      6 4 - 8 2 g/dL  7 7     Albumin      3 5 - 5 0 g/dL  4 1     TOTAL BILIRUBIN      0 20 - 1 00 mg/dL  0 86     eGFR      ml/min/1 73sq m  97     Glucose, Random      65 - 140 mg/dL       Cholesterol      See Comment mg/dL   125    Triglycerides      See Comment mg/dL   33    HDL      >=40 mg/dL   47    LDL Calculated      0 - 100 mg/dL   71    Non-HDL Cholesterol      mg/dl   78    Hemoglobin A1C      6 5 5 8 (H)      eAG, EST AVG Glucose      mg/dl 120      TESTOSTERONE FREE      7 2 - 24 0 pg/mL    4 0 (L)   Testosterone, Total, LC/MS      264 - 916 ng/dL    116 (L)   TSH 3RD GENERATON      0 450 - 4 500 uIU/mL         Component      Latest Ref Rng & Units 5/1/2022 5/1/2022 7/12/2022          10:50 AM 10:50 AM 10:08 AM   WBC      4 31 - 10 16 Thousand/uL      Red Blood Cell Count      3 88 - 5 62 Million/uL      Hemoglobin      12 0 - 17 0 g/dL      HCT      36 5 - 49 3 %      MCV      82 - 98 fL      MCH      26 8 - 34 3 pg      MCHC      31 4 - 37 4 g/dL      RDW      11 6 - 15 1 %      MPV      8 9 - 12 7 fL      Platelet Count      688 - 390 Thousands/uL      nRBC      /100 WBCs      Neutrophils %      43 - 75 %      Immat GRANS %      0 - 2 %      Lymphocytes Relative      14 - 44 %      Monocytes Relative      4 - 12 %      Eosinophils      0 - 6 %      Basophils Relative      0 - 1 %      Absolute Neutrophils      1 85 - 7 62 Thousands/µL      Immature Grans Absolute      0 00 - 0 20 Thousand/uL      Lymphocytes Absolute      0 60 - 4 47 Thousands/µL      Absolute Monocytes      0 17 - 1 22 Thousand/µL      Absolute Eosinophils      0 00 - 0 61 Thousand/µL      Basophils Absolute      0 00 - 0 10 Thousands/µL      Sodium      136 - 145 mmol/L 137     Potassium      3 5 - 5 3 mmol/L 3 4 (L)     Chloride      100 - 108 mmol/L 100     CO2      21 - 32 mmol/L 28     Anion Gap      4 - 13 mmol/L 9     BUN      5 - 25 mg/dL 19     Creatinine      0 60 - 1 30 mg/dL 1 02     GLUCOSE FASTING      65 - 99 mg/dL      Calcium      8 3 - 10 1 mg/dL 9 2     AST      5 - 45 U/L 21     ALT      12 - 78 U/L 32     Alkaline Phosphatase      46 - 116 U/L 61     Total Protein      6 4 - 8 2 g/dL 7 9     Albumin      3 5 - 5 0 g/dL 4 2     TOTAL BILIRUBIN      0 20 - 1 00 mg/dL 0 53     eGFR      ml/min/1 73sq m 82     Glucose, Random      65 - 140 mg/dL 145 (H)     Cholesterol      See Comment mg/dL      Triglycerides      See Comment mg/dL      HDL      >=40 mg/dL      LDL Calculated      0 - 100 mg/dL      Non-HDL Cholesterol      mg/dl      Hemoglobin A1C      6 5   6 3   eAG, EST AVG Glucose      mg/dl      TESTOSTERONE FREE      7 2 - 24 0 pg/mL      Testosterone, Total, LC/MS      264 - 916 ng/dL      TSH 3RD GENERATON      0 450 - 4 500 uIU/mL  0 435 (L) Impression & Plan:    Problem List Items Addressed This Visit        Endocrine    Testicular hypogonadism     Continue weekly injections  Complete lab testing as ordered  Relevant Orders    Testosterone, free, total    Diabetes mellitus type 2, noninsulin dependent ([facility] [address] )     Diabetes under good control  Continue current regimen and lifestyle modification  Lab Results   Component Value Date    HGBA1C 6 3 07/12/2022                 Cardiovascular and Mediastinum    Essential hypertension     Not at goal today but typically well controlled  Blood pressure was 124/80 and 108/62 at past two medical visits  Continue lisinopril and will monitor  Relevant Medications    lisinopril (ZESTRIL) 20 mg tablet       Other    Mixed hyperlipidemia     Continue atorvastatin  Relevant Medications    atorvastatin (LIPITOR) 20 mg tablet    Other Relevant Orders    TSH, 3rd generation Lab Collect    T4, free Lab Collect    CBC and differential    Comprehensive metabolic panel      Other Visit Diagnoses     Type 2 diabetes mellitus without complication, without long-term current use of insulin ([facility])    -  Primary    Relevant Orders    POCT hemoglobin A1c (Completed)    Comprehensive metabolic panel    Microalbumin / creatinine urine ratio    Hyperlipidemia, unspecified hyperlipidemia type        Relevant Medications    atorvastatin (LIPITOR) 20 mg tablet          Orders Placed This Encounter   Procedures    TSH, 3rd generation Lab Collect     This is a patient instruction: This test is non-fasting  Please drink two glasses of water morning of bloodwork          Standing Status:   Future     Standing Expiration Date:   7/12/2023    T4, free Lab Collect     Standing Status:   Future     Standing Expiration Date:   7/12/2023    Testosterone, free, total     Can be done anytime     Standing Status:   Future     Standing Expiration Date:   1/12/2023    CBC and differential     This is a patient instruction: This test is non-fasting  Please drink two glasses of water morning of bloodwork  Standing Status:   Future     Standing Expiration Date:   1/12/2023    Comprehensive metabolic panel     Nonfasting     Standing Status:   Future     Standing Expiration Date:   1/12/2023    Microalbumin / creatinine urine ratio     Standing Status:   Future     Standing Expiration Date:   1/12/2023    POCT hemoglobin A1c       There are no Patient Instructions on file for this visit  Discussed with the patient and all questioned fully answered  He will call me if any problems arise  Follow-up appointment in 6 months       Counseled patient on diagnostic results, prognosis, risk and benefit of treatment options, instruction for management, importance of treatment compliance, Risk  factor reduction and impressions    Paramjit Rios PA-C

## 2022-07-13 LAB
TESTOST FREE SERPL-MCNC: 15.1 PG/ML (ref 7.2–24)
TESTOST SERPL-MCNC: 540 NG/DL (ref 264–916)

## 2022-08-10 ENCOUNTER — TELEPHONE (OUTPATIENT)
Dept: ENDOCRINOLOGY | Facility: CLINIC | Age: 57
End: 2022-08-10

## 2022-08-10 NOTE — TELEPHONE ENCOUNTER
Left message for patient asking if okay to fill out form from [address] [facility] Patient care solution for his testing supplies

## 2022-08-22 ENCOUNTER — APPOINTMENT (OUTPATIENT)
Dept: RADIOLOGY | Facility: OTHER | Age: 57
End: 2022-08-22
Payer: COMMERCIAL

## 2022-08-22 ENCOUNTER — OFFICE VISIT (OUTPATIENT)
Dept: OBGYN CLINIC | Facility: OTHER | Age: 57
End: 2022-08-22
Payer: COMMERCIAL

## 2022-08-22 VITALS
DIASTOLIC BLOOD PRESSURE: 90 MMHG | SYSTOLIC BLOOD PRESSURE: 144 MMHG | HEART RATE: 85 BPM | HEIGHT: 69 IN | BODY MASS INDEX: 28.14 KG/M2 | WEIGHT: 190 LBS

## 2022-08-22 DIAGNOSIS — M25.512 ACUTE PAIN OF LEFT SHOULDER: ICD-10-CM

## 2022-08-22 DIAGNOSIS — M75.02 ADHESIVE CAPSULITIS OF LEFT SHOULDER: Primary | ICD-10-CM

## 2022-08-22 PROCEDURE — 73030 X-RAY EXAM OF SHOULDER: CPT

## 2022-08-22 PROCEDURE — 99214 OFFICE O/P EST MOD 30 MIN: CPT | Performed by: ORTHOPAEDIC SURGERY

## 2022-08-22 NOTE — PROGRESS NOTES
I personally examined the patient and reviewed the history provided  I agree with the note and the assessment and plan by Dr Angelica Wilder MD        Assessment  Diagnoses and all orders for this visit:    Adhesive capsulitis of left shoulder          Discussion and Plan:    Patient was counseled on likely diagnosis of adhesive capsulitis of left shoulder  It was explained to the patient that due to his recent shoulder injection 1 month ago, he is not a candidate for a repeat injection at this visit  Patient was advised to begin working with PT for stretching exercises of his left shoulder to help with range of motion and pain associated with his left shoulder adhesive capsulitis  Patient demonstrated understanding and agreement with this plan  If the patient's range of motion improves and he still has symptoms them consideration to an MRI would be provided to evaluate for any surgical pathology    Subjective:   Patient ID: Andrzej Lund is a 65 y o  male      Patient had recent rotator cuff repair surgery on his right shoulder in 2020, and is doing well from that standpoint  He is here today for symptoms of pain and weakness associated with limited ROM of his left shoulder  Patient states that this began bothering him about 4-5 months ago, although he first noticed it a few months postop from his right shoulder surgery  He denies any inciting event or recent trauma to the left shoulder  Patient saw his PCP last month, at which point he received a shoulder CSI which provided him with 1 week of pain relief  He is here today for further management of his left shoulder pain  The following portions of the patient's history were reviewed and updated as appropriate: allergies, current medications, past family history, past medical history, past social history, past surgical history and problem list     Review of Systems   All other systems reviewed and are negative        Objective:  /90 (BP Location: Left arm, Patient Position: Sitting, Cuff Size: Adult)   Pulse 85   Ht 5' 9" (1 753 m)   Wt 86 2 kg (190 lb)   BMI 28 06 kg/m²       Left Shoulder Exam     Tenderness   The patient is experiencing tenderness in the acromioclavicular joint  Range of Motion   Active abduction: 100   Passive abduction: 110   Extension: 30   External rotation: 20   Forward flexion: 130   Internal rotation 0 degrees: normal     Muscle Strength   Abduction: 4/5   Internal rotation: 5/5   External rotation: 4/5   Supraspinatus: 4/5   Subscapularis: 5/5     Other   Sensation: normal  Pulse: present             Physical Exam  Vitals reviewed  Constitutional:       Appearance: Normal appearance  HENT:      Head: Normocephalic and atraumatic  Mouth/Throat:      Mouth: Mucous membranes are moist    Eyes:      Pupils: Pupils are equal, round, and reactive to light  Cardiovascular:      Pulses: Normal pulses  Pulmonary:      Effort: Pulmonary effort is normal    Abdominal:      General: Abdomen is flat  Palpations: Abdomen is soft  Skin:     General: Skin is warm and dry  Capillary Refill: Capillary refill takes less than 2 seconds  Neurological:      General: No focal deficit present  Mental Status: He is alert and oriented to person, place, and time  Psychiatric:         Mood and Affect: Mood normal            I have personally reviewed pertinent films in PACS and my interpretation is as follows  XR of left shoulder demonstrates mild arthritic changes of the acromioclavicular joint with no evidence of fracture or dislocation

## 2022-08-29 ENCOUNTER — EVALUATION (OUTPATIENT)
Dept: PHYSICAL THERAPY | Facility: CLINIC | Age: 57
End: 2022-08-29
Payer: COMMERCIAL

## 2022-08-29 DIAGNOSIS — M75.02 ADHESIVE CAPSULITIS OF LEFT SHOULDER: ICD-10-CM

## 2022-08-29 PROCEDURE — 97162 PT EVAL MOD COMPLEX 30 MIN: CPT | Performed by: PHYSICAL THERAPIST

## 2022-08-29 PROCEDURE — 97110 THERAPEUTIC EXERCISES: CPT | Performed by: PHYSICAL THERAPIST

## 2022-08-29 NOTE — PROGRESS NOTES
PT EVALUATION    Today's date: 22  Patient name: Madonna Veras  : 1965  MRN: [id number]  Referring provider: Gareth Stanley MD  Dx:   1  Adhesive capsulitis of left shoulder        Assessment:   Madonna Veras  is a 65 y o  male who presents with signs and symptoms consistent of chronic shoulder pain  Overall, patient presents with pain, decreased strength, decreased ROM and decreased joint mobility  Patient's pain seems to be multifactorial in nature secondary to his past medical history of chronic pain and ankylosing spondylitis  Patient does have some musculoskeletal deficits that are contributing to his pain as well  Patient's primary movement problem is related to muscle imbalances and hypomobility throughout the glenohumeral joint  Due to these impairments, Patient has difficulty performing a/iadls and recreational activities  Patient would benefit from skilled physical therapy to address the impairments, improve their level of function, and to improve their overall quality of life  Impairments:    restricted ROM    decreased strength   pain with function   activity intolerance   weight bearing intolerance   Postural dysfunction   scapular dyskinesis     Prognosis:  Good  Positive and negative prognostic indicator(s):  pain >3 months, multiple comorbidities and multiple surgeries    Goals:    Short Term Goals: to be achieved by 4 weeks  1) Patient to be independent with basic HEP  2) Decrease pain to 4 and 5/10 at its worst   3) Increase shoulder ROM by 5-10 degrees in all planes  4) Increase shoulder strength by 1/2 MMT grade in all deficient planes  Long Term Goals: to be achieved by discharge  1) FOTO equal to or greater than target score indicating improvements with overall function  2) Patient to be independent with comprehensive HEP  3) Patient will demonstrate functional over head reaching  4) Increase UE strength to 5/5 MMT grade in all planes to improve a/iadls    5) Patient to report no sleep interruption secondary to pain  Planned interventions:  home exercise program, patient education, manual therapy, graded activity, flexibility, functional range of motion exercises and strengthening    Duration in visits:  4-8  Frequency: 2 visits per week  Duration in weeks:  4-6    History of Current Injury: Patient notes that he has been have pain in the left shoulder for years  Patient notes that within the last 4-5 months the pain has increased and he has been having less function about 2/3 out of the month where he can barely move  Patient denies having a certain MARY  Patient notes that sometimes he'll go to bed and then he'll muscle with twitch and then he'll have full time arm throbbing  Patient notes that he is not getting good sleep  Patient notes that his hand will swell up from the arthritis  Patient notes that the other days he is fine  Patient notes that the pain will come and go and if he does over do it it will get very painful the day after  Patient notes that that when the pain gets so severe that it'll feel like surgical pain  Patient notes that he is also in the process of getting off a lot of his pain medication so the doctor said he might have some new onsets of pain with it and hoping that is what's causing the increased shoulder pain  Pain location: global shoulder pain; deep inside the joint and armpit   Pain descriptors:  "tooth ache" throbbing"   Pain at Currently: 4-5/10  Pain at Best: 2/10   Pain at Worst:  10-12/10       Aggravating factors: overhead activity, pushing, pulling, outdoor yard work, reaching back behind him  Most movements  Easing factors: n/a     Imaging: see imaging   Special Questions: Patient denies any new onset of numbness and tingling  Patient denies recent weight changes that would unexplained         Hobbies/Interests: staying active   Occupation: n/a   Patient goals: Patient reports goals for physical therapy would be decreased pain and increased mobility        Objective     Postural Observations    Additional Postural Observation Details  Muscle tension throughout the cervical spine and shoulders     Cervical/Thoracic Screen   Cervical range of motion within normal limits with the following exceptions: Limited all planes of motion     Neurological Testing     Sensation     Shoulder   Left Shoulder   Intact: light touch    Right Shoulder   Intact: light touch    Active Range of Motion   Left Shoulder   Flexion: 85 degrees   Extension: 30 degrees   Abduction: 85 degrees   External rotation 0°: 10 degrees   Internal rotation 0°: WFL    Right Shoulder   Normal active range of motion    Passive Range of Motion   Left Shoulder   Flexion: 130 degrees   Abduction: 90 degrees   External rotation 90°: 20 degrees   Internal rotation 90°: 75 degrees     Joint Play   Left Shoulder  Hypomobile in the anterior capsule, posterior capsule and inferior capsule  Strength/Myotome Testing     Left Shoulder     Planes of Motion   Flexion: 4+   Abduction: 4+   External rotation at 0°: 4+   Internal rotation at 0°: 4+ (pain with resistence )     Additional Strength Details  Patient's strength limited by pain  Precautions:  Ankylosing spondilitis, CHRONIC PAIN, diabetes, GERD     **Stretching only no resistance training per doctor   Manuals             Manual shoulder stretching GENTLE to tolerance                                                     Neuro Re-Ed             isometrics HOLD on this for first few sessions                                                                                          Ther Ex             Table slides with incline flexion  NV            Table slides with incline scaption NV            ER table stretch  NV            Sleeper stretch              Finger ladder  NV            Wall slide with either towel or pball  NV            Low range pec stretch  NV WARM-UP: lucila WATKINS            Ther Activity                                       Gait Training                                       Modalities

## 2022-09-01 DIAGNOSIS — E29.1 TESTICULAR HYPOGONADISM: ICD-10-CM

## 2022-09-02 ENCOUNTER — OFFICE VISIT (OUTPATIENT)
Dept: PHYSICAL THERAPY | Facility: CLINIC | Age: 57
End: 2022-09-02
Payer: COMMERCIAL

## 2022-09-02 DIAGNOSIS — M75.02 ADHESIVE CAPSULITIS OF LEFT SHOULDER: Primary | ICD-10-CM

## 2022-09-02 PROCEDURE — 97110 THERAPEUTIC EXERCISES: CPT

## 2022-09-02 PROCEDURE — 97140 MANUAL THERAPY 1/> REGIONS: CPT

## 2022-09-02 RX ORDER — TESTOSTERONE CYPIONATE 200 MG/ML
INJECTION INTRAMUSCULAR
Qty: 4 ML | Refills: 0 | Status: SHIPPED | OUTPATIENT
Start: 2022-09-02

## 2022-09-02 NOTE — PROGRESS NOTES
Daily Note     Today's date: 2022  Patient name: Carlos Valdez  : 1965  MRN: [id number]  Referring provider: Gavino Ledesma MD  Dx:   Encounter Diagnosis     ICD-10-CM    1  Adhesive capsulitis of left shoulder  M75 02                   Subjective: Patient reports compliance with HEP though complains of significant pain in L shoulder lasting 2 days following IE  Objective: See treatment diary below      Assessment: Initiated PT POC as indicated and patient tolerated treatment fair  Patient exhibited good technique with therapeutic exercises and would benefit from continued PT  PROM is progress well with significant improvement in overhead mobility and minimal discomfort during manual stretching  ER continues to be most restricted and is most painful with passive and active assistive stretching  Kept TE program light due to significant soreness following IE and HEP  Plan: Progress treatment as tolerated  Precautions:  Ankylosing spondilitis, CHRONIC PAIN, diabetes, GERD     **Stretching only no resistance training per doctor   Manuals             Manual shoulder stretching GENTLE to tolerance   MC                                                  Neuro Re-Ed             isometrics HOLD on this for first few sessions                                                                                          Ther Ex             Table slides with incline flexion  NV :05x2' no incline           Table slides with incline scaption NV :05x2' no incline           ER table stretch  NV :05x2'           Sleeper stretch              Finger ladder  NV            Wall slide with either towel or pball  NV            Low range pec stretch  NV            AAROM ER with cane supine  :05x2'                                     WARM-UP: pullies  NV 2'/2'           Ther Activity                                       Gait Training                                       Modalities

## 2022-09-06 ENCOUNTER — APPOINTMENT (OUTPATIENT)
Dept: PHYSICAL THERAPY | Facility: CLINIC | Age: 57
End: 2022-09-06
Payer: COMMERCIAL

## 2022-09-09 ENCOUNTER — APPOINTMENT (OUTPATIENT)
Dept: PHYSICAL THERAPY | Facility: CLINIC | Age: 57
End: 2022-09-09
Payer: COMMERCIAL

## 2022-09-12 ENCOUNTER — OFFICE VISIT (OUTPATIENT)
Dept: PHYSICAL THERAPY | Facility: CLINIC | Age: 57
End: 2022-09-12
Payer: COMMERCIAL

## 2022-09-12 DIAGNOSIS — M75.02 ADHESIVE CAPSULITIS OF LEFT SHOULDER: Primary | ICD-10-CM

## 2022-09-12 PROCEDURE — 97140 MANUAL THERAPY 1/> REGIONS: CPT | Performed by: PHYSICAL THERAPIST

## 2022-09-12 PROCEDURE — 97110 THERAPEUTIC EXERCISES: CPT | Performed by: PHYSICAL THERAPIST

## 2022-09-12 NOTE — PROGRESS NOTES
Daily Note     Today's date: 2022  Patient name: Ange Holly  : 1965  MRN: [id number]  Referring provider: Steve Caballero MD  Dx:   Encounter Diagnosis     ICD-10-CM    1  Adhesive capsulitis of left shoulder  M75 02                   Subjective: Patient reports that both shoulders are really bothering him  Patient notes that the the left is still the worst and it is really bad in the morning  Patient notes that now that he is on his own all his paychecks go to his mortgage so money is tight right now  Patient notes that he will back down to 1x a week and perform HEP at home more  Objective: See treatment diary below      Assessment: Patient tolerated treatment fair  Continued to with established POC with focus on mobility of the shoulder and pain management  Patient limited but multiple body part soreness this date limiting positions to perform exercises  Patient exhibited good technique with therapeutic exercises and would benefit from continued PT  Plan: Progress treatment as tolerated  Precautions:  Ankylosing spondilitis, CHRONIC PAIN, diabetes, GERD     **Stretching only no resistance training per doctor   Manuals            Manual shoulder stretching GENTLE to tolerance   [facility] ACL                                                 Neuro Re-Ed             isometrics HOLD on this for first few sessions                                                                                          Ther Ex             Table slides with incline flexion  NV :05x2' no incline :05x2' no incline          Table slides with incline scaption NV :05x2' no incline :05x2' no incline          ER table stretch  NV :05x2' :05x2'          Sleeper stretch              Finger ladder  NV            Wall slide with either towel or pball  NV            Low range pec stretch  NV            AAROM ER with cane supine  :05x2' :05x2'           Pendulums    2x2'                       WARM-UP: lucila WATKINS 2'/2' 5 min           Ther Activity                                       Gait Training                                       Modalities

## 2022-09-15 ENCOUNTER — APPOINTMENT (OUTPATIENT)
Dept: PHYSICAL THERAPY | Facility: CLINIC | Age: 57
End: 2022-09-15
Payer: COMMERCIAL

## 2022-09-19 ENCOUNTER — APPOINTMENT (OUTPATIENT)
Dept: PHYSICAL THERAPY | Facility: CLINIC | Age: 57
End: 2022-09-19
Payer: COMMERCIAL

## 2022-09-22 ENCOUNTER — OFFICE VISIT (OUTPATIENT)
Dept: PHYSICAL THERAPY | Facility: CLINIC | Age: 57
End: 2022-09-22
Payer: COMMERCIAL

## 2022-09-22 DIAGNOSIS — M75.02 ADHESIVE CAPSULITIS OF LEFT SHOULDER: Primary | ICD-10-CM

## 2022-09-22 PROCEDURE — 97110 THERAPEUTIC EXERCISES: CPT

## 2022-09-22 PROCEDURE — 97140 MANUAL THERAPY 1/> REGIONS: CPT

## 2022-09-22 NOTE — PROGRESS NOTES
Daily Note     Today's date: 2022  Patient name: Madonna Veras  : 1965  MRN: [id number]  Referring provider: Gareth Stanley MD  Dx:   Encounter Diagnosis     ICD-10-CM    1  Adhesive capsulitis of left shoulder  M75 02                   Subjective: Patient reports he was barely able to move L arm after last session until today due to pain  He has not been compliant with HEP for this reason  Objective: See treatment diary below      Assessment: Tolerated treatment fair  Patient exhibited good technique with therapeutic exercises and would benefit from continued PT  Patient was able to perform pendulums today which he states is an improvement from last visit  Patient continues to have limited tolerance to exercise due to pain however, passive L shoulder mobility is progressing well with ER to 80 and flexion/abduction to ~150  Mild muscle guarding and bicep spasms noted at end range flexion and ER during manual stretching  Plan: Progress treatment as tolerated  Precautions:  Ankylosing spondilitis, CHRONIC PAIN, diabetes, GERD     **Stretching only no resistance training per doctor   Manuals           Manual shoulder stretching GENTLE to tolerance   [facility] ACL MC                                                Neuro Re-Ed             isometrics HOLD on this for first few sessions                                                                                          Ther Ex             Table slides with incline flexion  NV :05x2' no incline :05x2' no incline bilateral facing table :10x3'         Table slides with incline scaption NV :05x2' no incline :05x2' no incline :05x2' no incline         ER table stretch  NV :05x2' :05x2' :05x2'         Sleeper stretch              Finger ladder  NV            Wall slide with either towel or pball  NV            Low range pec stretch  NV            AAROM ER with cane supine  :05x2' :05x2'  :05x2'         AAROM cane flexion supine Niyah Em 73R2'         Pendulums    2x2' 2x2'                       WARM-UP: lucila WATKINS 2'/2' 5 min  5 min         Ther Activity                                       Gait Training                                       Modalities

## 2022-09-26 ENCOUNTER — APPOINTMENT (OUTPATIENT)
Dept: PHYSICAL THERAPY | Facility: CLINIC | Age: 57
End: 2022-09-26
Payer: COMMERCIAL

## 2022-09-29 ENCOUNTER — OFFICE VISIT (OUTPATIENT)
Dept: PHYSICAL THERAPY | Facility: CLINIC | Age: 57
End: 2022-09-29
Payer: COMMERCIAL

## 2022-09-29 DIAGNOSIS — M75.02 ADHESIVE CAPSULITIS OF LEFT SHOULDER: Primary | ICD-10-CM

## 2022-09-29 PROCEDURE — 97110 THERAPEUTIC EXERCISES: CPT | Performed by: PHYSICAL THERAPIST

## 2022-09-29 PROCEDURE — 97140 MANUAL THERAPY 1/> REGIONS: CPT | Performed by: PHYSICAL THERAPIST

## 2022-09-29 NOTE — PROGRESS NOTES
Re-Evaluation/Daily Note     Today's date: 2022  Patient name: Anne Wilkins  : 1965  MRN: [id number]  Referring provider: Zenaida Hoyos MD  Dx:   Encounter Diagnosis     ICD-10-CM    1  Adhesive capsulitis of left shoulder  M75 02        Start Time: 945  Stop Time:   Total time in clinic (min): 45 minutes    Subjective: Patient reports he is having an okay day today pain wise  Patient notes that he is now having some good days instead of all bad  Patient notes that he felt actually really good on Monday and was able to get some house work done because he has having more tolerable pain levels  Patient notes that he is starting to see some progress with the motion and the pain intensity as well  Patient notes that he will continue to try to get the exercises done at home and will continue therapy for another 2 weeks and see where he is at        Pain location: global shoulder pain; deep inside the joint and armpit   Pain descriptors:  "tooth ache" throbbing"   Pain at Currently: 4-5/10  Pain at Best: 2/10   Pain at Worst: 8-10 (FROM 10-12/10)     Objective: See treatment diary below      Postural Observations    Additional Postural Observation Details  Muscle tension throughout the cervical spine and shoulders     Cervical/Thoracic Screen   Cervical range of motion within normal limits with the following exceptions: Limited all planes of motion     Neurological Testing     Sensation     Shoulder   Left Shoulder   Intact: light touch    Right Shoulder   Intact: light touch    Active Range of Motion   Left Shoulder   Flexion: 145 degrees (FROM 85 degrees   Extension: 55 degrees (FROM 30 degrees)   Abduction: 165 degrees (FROM 85 degrees)   External rotation 0°: 10 degrees   Internal rotation 0°: WFL    Right Shoulder   Normal active range of motion    Passive Range of Motion   Left Shoulder   Flexion: 165 degrees (FROM 130 degrees)   Abduction: 170 degrees (FROM 90 degrees)   External rotation 90°: 40 degrees (FROM 20 degrees)   Internal rotation 90°: 75 degrees (FROM 75 degrees )     Joint Play   Left Shoulder  Hypomobile in the anterior capsule, posterior capsule and inferior capsule  Strength/Myotome Testing     Left Shoulder     Planes of Motion   Flexion: 4+   Abduction: 4+   External rotation at 0°: 4+   Internal rotation at 0°: 4+ (pain with resistence)     Additional Strength Details  Patient's strength limited by pain  Assessment: Patient tolerated treatment fair  Patient had some discomfort with manual stretching this date  Upon evaluation, patient demonstrate great improvements with both AROM and PROM  Patient is demonstrating improved joint mobility and is most limited by pain and muscle spasms with PROM  Patient continues to have some days with high pain levels but now having some good days where he can do more functional activities  Patient continues to have pain with sleeping  Patient has met all of her short term goals and is progressing appropriately toward long term goals  Patient would benefit from continued skilled physical therapy to address the impairments, improve their level of function, and to improve their overall quality of life  Goals:    Short Term Goals: to be achieved by 4 weeks  1) Patient to be independent with basic HEP  2) Decrease pain to 4 and 5/10 at its worst   3) Increase shoulder ROM by 5-10 degrees in all planes  4) Increase shoulder strength by 1/2 MMT grade in all deficient planes  Long Term Goals: to be achieved by discharge  1) FOTO equal to or greater than target score indicating improvements with overall function  2) Patient to be independent with comprehensive HEP  3) Patient will demonstrate functional over head reaching  4) Increase UE strength to 5/5 MMT grade in all planes to improve a/iadls  5) Patient to report no sleep interruption secondary to pain        Planned interventions:  home exercise program, patient education, manual therapy, graded activity, flexibility, functional range of motion exercises and strengthening    Duration in visits:  4-8  Frequency: 2 visits per week  Duration in weeks:  4-6     Precautions:  Ankylosing spondilitis, CHRONIC PAIN, diabetes, GERD     **Stretching only no resistance training per doctor   Manuals  9/2 9/12 9/22 9/29        Manual shoulder stretching GENTLE to tolerance   [facility] ACL [facility] ACL                                               Neuro Re-Ed             isometrics HOLD on this for first few sessions                                                                                          Ther Ex             Table slides with incline flexion  NV :05x2' no incline :05x2' no incline bilateral facing table :10x3' bilateral facing table :10x3'        Table slides with incline scaption NV :05x2' no incline :05x2' no incline :05x2' no incline :05x2' no incline        ER table stretch  NV :05x2' :05x2' :05x2' :05x2'        Sleeper stretch              Finger ladder  NV            Wall slide with either towel or pball  NV            Low range pec stretch  NV            AAROM ER with cane supine  :05x2' :05x2'  :05x2' :05x2'        AAROM cane flexion supine    :05x2' :05x2'        Pendulums    2x2' 2x2'  2x2'                     WARM-UP: pullies  NV 2'/2' 5 min  5 min 5 min        Ther Activity                                       Gait Training                                       Modalities

## 2022-10-13 ENCOUNTER — OFFICE VISIT (OUTPATIENT)
Dept: PHYSICAL THERAPY | Facility: CLINIC | Age: 57
End: 2022-10-13
Payer: COMMERCIAL

## 2022-10-13 DIAGNOSIS — M75.02 ADHESIVE CAPSULITIS OF LEFT SHOULDER: Primary | ICD-10-CM

## 2022-10-13 PROCEDURE — 97140 MANUAL THERAPY 1/> REGIONS: CPT | Performed by: PHYSICAL THERAPIST

## 2022-10-13 PROCEDURE — 97110 THERAPEUTIC EXERCISES: CPT | Performed by: PHYSICAL THERAPIST

## 2022-10-13 NOTE — PROGRESS NOTES
Daily Note/Discharge     Today's date: 10/13/2022  Patient name: Thi Parker  : 1965  MRN: [id number]  Referring provider: Surendra Heard MD  Dx:   Encounter Diagnosis     ICD-10-CM    1  Adhesive capsulitis of left shoulder  M75 02        Start Time: 945  Stop Time: 0  Total time in clinic (min): 45 minutes    Subjective: Patient reports the that his missed last appointment because he had an emergency with his one dog and couldn't come in  Patient notes that the shoulder is actually still doing okay  Patient notes the pain intensity isn't as bad and he is able to do more things with it  He notes that he still does get the intense aching pain once and while in certain positions  Patient notes that he feels okay with today being his last day and continuing his stretches at home since he's been doing better  Objective: See treatment diary below      Assessment: Patient tolerated treatment fair  Patient continues to demonstrate improvements with PROM stretching with most ranges close to normal except external rotation  Patient continues to have the most tightness and pain into this direction  Increased table slides with incline this date with good tolerance  Discussed HEP and patient was competent in proper technique  Patient appropriate for DC this date  Plan: Discharged      Precautions:  Ankylosing spondilitis, CHRONIC PAIN, diabetes, GERD     **Stretching only no resistance training per doctor       Manuals  9/2 9/12 9/22 9/29 10/13       Manual shoulder stretching GENTLE to tolerance   [facility] ACL [facility] ACL                                              Neuro Re-Ed                                                                                                        Ther Ex             Table slides with incline flexion  NV :05x2' no incline :05x2' no incline bilateral facing table :10x3' bilateral facing table :10x3' bilateral facing table :10x3'       Table slides with incline scaption NV Efrain Sow 93R1' no incline :05x2' no incline :05x2' no incline :05x2' no incline :05x2' no incline       ER table stretch  NV :05x2' :05x2' :05x2' :05x2' :05x2'       Sleeper stretch              Finger ladder  NV            Wall slide with either towel or pball  NV            Low range pec stretch  NV            AAROM ER with cane supine  :05x2' :05x2'  :05x2' :05x2' :05x2'       AAROM cane flexion supine    :05x2' :05x2' :05x2'       Pendulums    2x2' 2x2'  2x2'                     WARM-UP: lucila WATKINS 2'/2' 5 min  5 min 5 min 5 min       Ther Activity                                       Gait Training                                       Modalities

## 2022-11-07 ENCOUNTER — TELEPHONE (OUTPATIENT)
Dept: FAMILY MEDICINE CLINIC | Facility: CLINIC | Age: 57
End: 2022-11-07

## 2022-11-07 DIAGNOSIS — M45.6 ANKYLOSING SPONDYLITIS OF LUMBAR REGION (HCC): Primary | ICD-10-CM

## 2022-11-07 NOTE — TELEPHONE ENCOUNTER
Call I will in a referral to [facility] Rheumatology but limited appts to due lack of Rheumatologists  Depending on his insurance other options [facility]    In [place] Dr Chioma Le or Dr Soumya Sawyer

## 2022-11-07 NOTE — TELEPHONE ENCOUNTER
Pt requesting order to rheumatology  Pain management weaned him off the pain meds so next step is rheumatology for arthritis and chronic pain

## 2022-11-15 ENCOUNTER — TELEMEDICINE (OUTPATIENT)
Dept: FAMILY MEDICINE CLINIC | Facility: CLINIC | Age: 57
End: 2022-11-15

## 2022-11-15 DIAGNOSIS — E29.1 TESTICULAR HYPOGONADISM: ICD-10-CM

## 2022-11-15 DIAGNOSIS — E11.9 DIABETES MELLITUS TYPE 2, NONINSULIN DEPENDENT (HCC): ICD-10-CM

## 2022-11-15 DIAGNOSIS — H92.01 RIGHT EAR PAIN: Primary | ICD-10-CM

## 2022-11-15 DIAGNOSIS — J06.9 ACUTE URI: ICD-10-CM

## 2022-11-15 RX ORDER — TESTOSTERONE CYPIONATE 200 MG/ML
INJECTION INTRAMUSCULAR
Qty: 4 ML | Refills: 2 | Status: SHIPPED | OUTPATIENT
Start: 2022-11-15

## 2022-11-15 RX ORDER — AZITHROMYCIN 250 MG/1
TABLET, FILM COATED ORAL
Qty: 6 TABLET | Refills: 0 | Status: SHIPPED | OUTPATIENT
Start: 2022-11-15 | End: 2022-11-19

## 2022-11-15 NOTE — TELEPHONE ENCOUNTER
Requested medication(s) are due for refill today: Yes  Patient has already received a courtesy refill: No  Other reason request has been forwarded to provider: Control substance medication

## 2022-11-15 NOTE — PROGRESS NOTES
Virtual Brief Visit    Patient is located in the following state in which I hold an active license [place]      Assessment/Plan:    Problem List Items Addressed This Visit        Endocrine    Diabetes mellitus type 2, noninsulin dependent ([facility] [address] )      Other Visit Diagnoses     Right ear pain    -  Primary    Relevant Medications    azithromycin (ZITHROMAX) 250 mg tablet    Acute URI        Relevant Medications    azithromycin (ZITHROMAX) 250 mg tablet        Symptom treatment for cough and sore throat  Repeat home COV 19 test if no better in 48 hours  Patient started with R ear over the weekend progressing to headaches, sore throat and cough  No T  No shortness of breath  + nausea  No vomiting or diarrhea  Home COV 19 test yesterday 11/14 negative  Current medications reviewed  He has had 3 COV 19 vaccines and a flu vaccine     Recent Visits  No visits were found meeting these conditions  Showing recent visits within past 7 days and meeting all other requirements  Today's Visits  Date Type Provider Dept   11/15/22 Telemedicine MD Joesph Sosa   Showing today's visits and meeting all other requirements  Future Appointments  No visits were found meeting these conditions    Showing future appointments within next 150 days and meeting all other requirements         Visit Time    Visit Start Time: 11:05  Visit Stop Time: 11:12  Total Visit Duration: 7 minutes

## 2022-11-30 ENCOUNTER — APPOINTMENT (OUTPATIENT)
Dept: LAB | Facility: HOSPITAL | Age: 57
End: 2022-11-30
Attending: INTERNAL MEDICINE

## 2022-11-30 ENCOUNTER — HOSPITAL ENCOUNTER (OUTPATIENT)
Dept: RADIOLOGY | Facility: HOSPITAL | Age: 57
Discharge: HOME | End: 2022-11-30
Attending: INTERNAL MEDICINE

## 2022-11-30 DIAGNOSIS — M45.6 ANKYLOSING SPONDYLITIS OF LUMBAR REGION (HCC): ICD-10-CM

## 2022-11-30 DIAGNOSIS — G89.29 CHRONIC IDIOPATHIC ANAL PAIN: ICD-10-CM

## 2022-11-30 DIAGNOSIS — M79.641 RIGHT HAND PAIN: ICD-10-CM

## 2022-11-30 DIAGNOSIS — M45.9 ANKYLOSING SPONDYLITIS, UNSPECIFIED SITE OF SPINE (HCC): ICD-10-CM

## 2022-11-30 DIAGNOSIS — M79.641 PAIN IN BOTH HANDS: ICD-10-CM

## 2022-11-30 DIAGNOSIS — M79.642 LEFT HAND PAIN: ICD-10-CM

## 2022-11-30 DIAGNOSIS — E29.1 TESTICULAR HYPOGONADISM: ICD-10-CM

## 2022-11-30 DIAGNOSIS — M79.642 PAIN IN BOTH HANDS: ICD-10-CM

## 2022-11-30 DIAGNOSIS — K62.89 CHRONIC IDIOPATHIC ANAL PAIN: ICD-10-CM

## 2022-11-30 LAB
CHOLEST SERPL-MCNC: 121 MG/DL
CK SERPL-CCNC: 83 U/L (ref 39–308)
CRP SERPL QL: 15.2 MG/L
ERYTHROCYTE [DISTWIDTH] IN BLOOD BY AUTOMATED COUNT: 11.9 % (ref 11.6–15.1)
ERYTHROCYTE [SEDIMENTATION RATE] IN BLOOD: 60 MM/HOUR (ref 0–20)
HBV CORE AB SER QL: NORMAL
HBV CORE IGM SER QL: NORMAL
HBV SURFACE AG SER QL: NORMAL
HCT VFR BLD AUTO: 34.8 % (ref 36.5–49.3)
HCV AB SER QL: NORMAL
HDLC SERPL-MCNC: 39 MG/DL
HGB BLD-MCNC: 12.4 G/DL (ref 12–17)
LDLC SERPL CALC-MCNC: 57 MG/DL (ref 0–100)
MCH RBC QN AUTO: 32.5 PG (ref 26.8–34.3)
MCHC RBC AUTO-ENTMCNC: 35.6 G/DL (ref 31.4–37.4)
MCV RBC AUTO: 91 FL (ref 82–98)
NONHDLC SERPL-MCNC: 82 MG/DL
PLATELET # BLD AUTO: 209 THOUSANDS/UL (ref 149–390)
PMV BLD AUTO: 10.2 FL (ref 8.9–12.7)
PSA SERPL-MCNC: 0.8 NG/ML (ref 0–4)
RBC # BLD AUTO: 3.82 MILLION/UL (ref 3.88–5.62)
RHEUMATOID FACT SER QL LA: NEGATIVE
TRIGL SERPL-MCNC: 125 MG/DL
WBC # BLD AUTO: 8.16 THOUSAND/UL (ref 4.31–10.16)

## 2022-12-01 LAB
GAMMA INTERFERON BACKGROUND BLD IA-ACNC: 0.04 IU/ML
HIV 1+2 AB+HIV1 P24 AG SERPL QL IA: NORMAL
M TB IFN-G BLD-IMP: NEGATIVE
M TB IFN-G CD4+ BCKGRND COR BLD-ACNC: 0 IU/ML
M TB IFN-G CD4+ BCKGRND COR BLD-ACNC: 0.02 IU/ML
MITOGEN IGNF BCKGRD COR BLD-ACNC: 3.6 IU/ML
TESTOST FREE SERPL-MCNC: 11.9 PG/ML (ref 7.2–24)
TESTOST SERPL-MCNC: 671 NG/DL (ref 264–916)

## 2022-12-02 LAB — CCP AB SER IA-ACNC: 0.6

## 2022-12-08 LAB — HLA-B27 QL NAA+PROBE: POSITIVE

## 2023-01-06 ENCOUNTER — VBI (OUTPATIENT)
Dept: ADMINISTRATIVE | Facility: OTHER | Age: 58
End: 2023-01-06

## 2023-01-19 ENCOUNTER — OFFICE VISIT (OUTPATIENT)
Dept: ENDOCRINOLOGY | Facility: CLINIC | Age: 58
End: 2023-01-19

## 2023-01-19 VITALS
WEIGHT: 201 LBS | HEIGHT: 69 IN | BODY MASS INDEX: 29.77 KG/M2 | DIASTOLIC BLOOD PRESSURE: 98 MMHG | SYSTOLIC BLOOD PRESSURE: 132 MMHG | OXYGEN SATURATION: 97 % | HEART RATE: 81 BPM

## 2023-01-19 DIAGNOSIS — E11.9 DIABETES MELLITUS TYPE 2, NONINSULIN DEPENDENT (HCC): ICD-10-CM

## 2023-01-19 DIAGNOSIS — E29.1 TESTICULAR HYPOGONADISM: ICD-10-CM

## 2023-01-19 DIAGNOSIS — E11.9 TYPE 2 DIABETES MELLITUS WITHOUT COMPLICATION, WITHOUT LONG-TERM CURRENT USE OF INSULIN (HCC): Primary | ICD-10-CM

## 2023-01-19 LAB — SL AMB POCT HEMOGLOBIN AIC: 7 (ref ?–6.5)

## 2023-01-19 RX ORDER — UPADACITINIB 15 MG/1
15 TABLET, EXTENDED RELEASE ORAL DAILY
COMMUNITY

## 2023-01-19 RX ORDER — MELOXICAM 15 MG/1
TABLET ORAL
COMMUNITY
Start: 2023-01-05

## 2023-01-19 RX ORDER — METFORMIN HYDROCHLORIDE 500 MG/1
500 TABLET, EXTENDED RELEASE ORAL 2 TIMES DAILY WITH MEALS
Qty: 180 TABLET | Refills: 3 | Status: SHIPPED | OUTPATIENT
Start: 2023-01-19 | End: 2023-07-18

## 2023-01-19 NOTE — ASSESSMENT & PLAN NOTE
Lab Results   Component Value Date    HGBA1C 7 0 (A) 01/19/2023     Hemoglobin A1c increased slightly likely secondary to decreased activity, dietary changes with family/friends providing meals, and multiple steroid courses  Patient is seeing improvement in pain management with increased mobility and tolerance since starting new medication course with Rheumatology  Would like to continue on current metformin dose for now  Instead of following in six months, will see back in three months and will consider adjusting regimen if A1c not improving  Will recommend checking blood sugars at least once per day  Regular dental and vision checks and foot exams recommended  Repeat A1c in 3 months with CMP  Follow up in 3 months

## 2023-01-19 NOTE — PROGRESS NOTES
Established Patient Progress Note      CC: Diabetes Mellitus, Type 2     History of Present Illness:   Rosa Amador is a 65 y o  male with a history of type 2 diabetes without longterm use of insulin since 2017  Reports complications of microalbuminuria  Last office visit was July 2022  Most recent hemoglobin A1c today 1/19/2023 was 7 0%  Denies recent illness or hospitalizations  Denies recent severe hypoglycemic or severe hyperglycemic episodes  Denies any issues with his current regimen  Home glucose monitoring: are not performed regularly  When weaning off pain medication, doing well until off 80 mg tablet  Lots of pain with wean  Went on anti-inflammatory, then seen by rheumatologist concluded secondary to arthritis with ankylosing spondylitis now on Rinvoq samples  Was receiving food from family/friends which changed normal dietary patterns potentially leading to some hyperglycemia  He was also on multiple courses of oral steroids in the past 3 months  Current regimen:   Metformin 500 mg BID with food     Last Eye Exam: UTD  Last Foot Exam: UTD    Has hypertension: Taking lisinopril  Has hyperlipidemia: Taking atorvastatin     Testosterone cypionate 200mg/ml-- 0 3ml (60mg) weekly  No concerns, asymptomatic  Still urinating without difficulty          Patient Active Problem List   Diagnosis   • Cervical radiculopathy   • Chronic pain disorder   • Essential hypertension   • Lumbar postlaminectomy syndrome   • Mixed hyperlipidemia   • Osteoporosis   • Other chronic nonalcoholic liver disease   • Parotid gland enlargement   • Testicular hypogonadism   • Diabetes mellitus type 2, noninsulin dependent (HCC)   • Tear of right supraspinatus tendon   • Osteoarthritis of right acromioclavicular joint   • Ankylosing spondylitis (HCC)   • Degeneration of intervertebral disc of lumbar region   • Degenerative lumbar spinal stenosis   • Diabetic polyneuropathy (HCC)   • Presence of intrathecal pump   • Osteoarthritis of both hands   • Hiatal hernia with GERD   • Eustachian tube dysfunction   • Mood disorder ([facility] [address] ), r/o Substance/medication induced mood disorder   • Continuous opioid dependence ([facility] )   • Adhesive capsulitis of left shoulder      Past Medical History:   Diagnosis Date   • Chronic pain    • Depression    • [facility]    • Fibromyalgia, primary    • GERD (gastroesophageal reflux disease)    • Hyperlipidemia    • Hypertension    • Low testosterone    • Neuropathy    • Pilonidal cyst     last assessed 3/17/2014   • Sleep apnea     no cpap      Past Surgical History:   Procedure Laterality Date   • BACK SURGERY      discectomy   • ELBOW SURGERY Right    • OTHER SURGICAL HISTORY  03/13/2015    Electr analysis of progr impl pump w/reprogram refill, requiring physician    Replacement of right abdomen intrathecal  pain pump filled with Dilaudid with electronic analysis and programming   managed by Melrin Sandra   • PILONIDAL CYST EXCISION     • SC SURGICAL ARTHROSCOPY SHOULDER W/ROTATOR CUFF RPR Right 2/28/2020    Procedure: SHOULDER ARTHROSCOPIC ROTATOR CUFF REPAIR AND DEBRIDEMENT;  Surgeon: Marcus Cummings MD;  Location: [facility];  Service: Orthopedics   • ROTATOR CUFF REPAIR Left    • WISDOM TOOTH EXTRACTION        Family History   Problem Relation Age of Onset   • Diabetes unspecified Sister    • Hypertension Sister    • Hyperlipidemia Sister         pure hypercholesterolemia   • Diabetes unspecified Brother         DM   • Hypertension Brother    • Hyperlipidemia Brother         pure hypercholesterolemia   • Coronary artery disease Father    • Heart disease Father    • Diabetes Brother         DM   • Hypertension Family    • Alcohol abuse Mother    • Liver disease Mother      Social History     Tobacco Use   • Smoking status: Former     Types: Cigarettes     Start date: 2/24/2018   • Smokeless tobacco: Never   Substance Use Topics   • Alcohol use: No     No Known Allergies      Current Outpatient Medications:   •  atorvastatin (LIPITOR) 20 mg tablet, Take 1 tablet (20 mg total) by mouth daily at bedtime, Disp: 90 tablet, Rfl: 3  •  Cholecalciferol (VITAMIN D) 2000 units CAPS, Take 1 capsule by mouth daily, Disp: , Rfl:   •  gabapentin (NEURONTIN) 300 mg capsule, Take 600 mg by mouth daily Taking two 600 mg at night, Disp: , Rfl:   •  HYDROmorphone (Dilaudid) 4 mg/mL injection, Inject 1 Device as directed daily  , Disp: , Rfl:   •  lisinopril (ZESTRIL) 20 mg tablet, Take 1 tablet (20 mg total) by mouth daily, Disp: 90 tablet, Rfl: 3  •  meloxicam (MOBIC) 15 mg tablet, , Disp: , Rfl:   •  metFORMIN (GLUCOPHAGE-XR) 500 mg 24 hr tablet, Take 1 tablet (500 mg total) by mouth 2 (two) times a day with meals, Disp: 180 tablet, Rfl: 3  •  methadone (DOLOPHINE) 10 mg tablet, Take 2 tablets every 6 hours for pain,, Disp: , Rfl:   •  multivitamin (THERAGRAN) TABS, Take 1 tablet by mouth daily, Disp: , Rfl:   •  omeprazole (PriLOSEC) 40 MG capsule, Take 1 capsule (40 mg total) by mouth daily, Disp: 90 capsule, Rfl: 3  •  SYRINGE-NEEDLE, DISP, 3 ML 21G X 1" 3 ML MISC, by Does not apply route once a week, Disp: , Rfl:   •  testosterone cypionate (DEPO-TESTOSTERONE) 200 mg/mL SOLN, INJECT 0 3MLS WEEKLY*USE 1 VIAL FOR 1 WEEKLY DOSE,DISCARD REMAINDER REMAINDER, Disp: 4 mL, Rfl: 2  •  Upadacitinib ER (Rinvoq) 15 MG TB24, Take 15 mg by mouth daily, Disp: , Rfl:   •  Blood Glucose Monitoring Suppl (Contour Next EZ) w/Device KIT, Use 1 each 4 (four) times a day (Patient not taking: Reported on 1/19/2023), Disp: 1 kit, Rfl: 0  •  glucose blood (Contour Next Test) test strip, Use 1 each 4 (four) times a day (Patient not taking: Reported on 1/19/2023), Disp: 400 each, Rfl: 1  •  Microlet Lancets MISC, Use 4 (four) times a day (Patient not taking: Reported on 1/19/2023), Disp: 400 each, Rfl: 1  •  oxyCODONE (OxyCONTIN) 80 mg 12 hr tablet, Take 1 tablet by mouth every 12 (twelve) hours (Patient not taking: Reported on 1/19/2023), Disp: , Rfl:     Review of Systems   Constitutional: Negative for activity change, appetite change, fever and unexpected weight change  HENT: Negative for sore throat, trouble swallowing and voice change  Eyes: Negative for visual disturbance  Respiratory: Negative for cough and shortness of breath  Cardiovascular: Negative for chest pain and palpitations  Gastrointestinal: Negative for abdominal distention, abdominal pain and vomiting  Endocrine: Negative for cold intolerance, heat intolerance, polydipsia and polyuria  Genitourinary: Negative for decreased urine volume  Musculoskeletal: Negative for arthralgias and back pain  Skin: Negative for color change, pallor and rash  Neurological: Negative for seizures and syncope  All other systems reviewed and are negative  Physical Exam:  Body mass index is 29 67 kg/m²  /98 (BP Location: Left arm, Patient Position: Sitting, Cuff Size: Standard)   Pulse 81   Ht 5' 9 02" (1 753 m)   Wt 91 2 kg (201 lb)   SpO2 97%   BMI 29 67 kg/m²    Wt Readings from Last 3 Encounters:   01/19/23 91 2 kg (201 lb)   08/22/22 86 2 kg (190 lb)   07/12/22 88 kg (194 lb)       Physical Exam  Vitals reviewed  Constitutional:       Appearance: Normal appearance  Cardiovascular:      Rate and Rhythm: Normal rate and regular rhythm  Pulses: Normal pulses  Heart sounds: Normal heart sounds  Pulmonary:      Effort: Pulmonary effort is normal       Breath sounds: Normal breath sounds  Abdominal:      General: Abdomen is flat  Palpations: Abdomen is soft  Musculoskeletal:         General: Normal range of motion  Cervical back: Normal range of motion and neck supple  Skin:     General: Skin is warm and dry  Capillary Refill: Capillary refill takes less than 2 seconds  Neurological:      General: No focal deficit present  Mental Status: He is alert and oriented to person, place, and time     Psychiatric: Mood and Affect: Mood normal          Behavior: Behavior normal          Labs:   Lab Results   Component Value Date    HGBA1C 7 0 (A) 01/19/2023    HGBA1C 6 3 07/12/2022    HGBA1C 5 8 (H) 01/12/2022     Lab Results   Component Value Date    CREATININE 0 77 07/12/2022    CREATININE 1 02 05/01/2022    CREATININE 0 86 01/12/2022    BUN 15 07/12/2022     11/30/2015    K 4 2 07/12/2022     07/12/2022    CO2 31 07/12/2022     eGFR   Date Value Ref Range Status   07/12/2022 102 ml/min/1 73sq m Final     Lab Results   Component Value Date    CHOL 227 11/10/2015    HDL 39 (L) 11/30/2022    TRIG 125 11/30/2022     Lab Results   Component Value Date    ALT 32 07/12/2022    AST 19 07/12/2022    ALKPHOS 65 07/12/2022    BILITOT 0 27 11/30/2015     Lab Results   Component Value Date    KHL0FJETLJAR 0 608 07/12/2022    HXN4XIATDJTJ 0 435 (L) 05/01/2022    [id number] 0 988 08/11/2020     Lab Results   Component Value Date    FREET4 0 90 07/12/2022       Impression & Plan:    Problem List Items Addressed This Visit        Endocrine    Testicular hypogonadism     Continue weekly injections  Will repeat lab work in six months  Diabetes mellitus type 2, noninsulin dependent ([facility] [address] )       Lab Results   Component Value Date    HGBA1C 7 0 (A) 01/19/2023     Hemoglobin A1c increased slightly likely secondary to decreased activity, dietary changes with family/friends providing meals, and multiple steroid courses  Patient is seeing improvement in pain management with increased mobility and tolerance since starting new medication course with Rheumatology  Would like to continue on current metformin dose for now  Instead of following in six months, will see back in three months and will consider adjusting regimen if A1c not improving  Will recommend checking blood sugars at least once per day  Regular dental and vision checks and foot exams recommended  Repeat A1c in 3 months with CMP  Follow up in 3 months  Relevant Medications    metFORMIN (GLUCOPHAGE-XR) 500 mg 24 hr tablet   Other Visit Diagnoses     Type 2 diabetes mellitus without complication, without long-term current use of insulin (HCC)    -  Primary    Relevant Medications    metFORMIN (GLUCOPHAGE-XR) 500 mg 24 hr tablet    Other Relevant Orders    POCT hemoglobin A1c (Completed)    Comprehensive metabolic panel    Hemoglobin A1C          Orders Placed This Encounter   Procedures   • Comprehensive metabolic panel     This is a patient instruction: Patient fasting for 8 hours or longer recommended  Standing Status:   Future     Standing Expiration Date:   7/19/2023   • Hemoglobin A1C     Standing Status:   Future     Standing Expiration Date:   1/19/2024   • POCT hemoglobin A1c       Patient Instructions   1) Continue metformin 500 mg twice daily for now  2) Check blood sugars at least once daily as discussed, call for blood sugars >180 mg/dL  3) Follow up in 3 months      Discussed with the patient and all questioned fully answered  He will call me if any problems arise  Follow-up appointment in 3 months       Counseled patient on diagnostic results, prognosis, risk and benefit of treatment options, instruction for management, importance of treatment compliance, Risk  factor reduction and impressions    ABELARDO Guzman

## 2023-01-19 NOTE — PATIENT INSTRUCTIONS
1) Continue metformin 500 mg twice daily for now  2) Check blood sugars at least once daily as discussed, call for blood sugars >180 mg/dL  3) Follow up in 3 months

## 2023-02-20 ENCOUNTER — OFFICE VISIT (OUTPATIENT)
Dept: OBGYN CLINIC | Facility: CLINIC | Age: 58
End: 2023-02-20

## 2023-02-20 VITALS
SYSTOLIC BLOOD PRESSURE: 127 MMHG | DIASTOLIC BLOOD PRESSURE: 75 MMHG | OXYGEN SATURATION: 98 % | WEIGHT: 196.7 LBS | HEIGHT: 69 IN | BODY MASS INDEX: 29.13 KG/M2 | HEART RATE: 77 BPM

## 2023-02-20 DIAGNOSIS — R20.2 NUMBNESS AND TINGLING IN BOTH HANDS: Primary | ICD-10-CM

## 2023-02-20 DIAGNOSIS — R20.0 NUMBNESS AND TINGLING IN BOTH HANDS: Primary | ICD-10-CM

## 2023-02-20 NOTE — PROGRESS NOTES
ASSESSMENT/PLAN:    Assessment:   Bilateral hand numbness - possible CTS    Plan:   EMG    Follow Up: If the EMG shows CTS, then f/u with Dr Jeanmarie Hernandes or Dr Alon Yao    To Do Next Visit:       General Discussions:     Carpal Tunnel Syndrome: The anatomy and physiology of carpal tunnel syndrome was discussed with the patient today  Increase pressure localized under the transverse carpal ligament can cause pain, numbness, tingling, or dysesthesias within the median nerve distribution as well as feelings of fatigue, clumsiness, or awkwardness  These symptoms typically occur at night and worse in the morning upon waking  Eventually, untreated carpal tunnel syndrome can result in weakness and permanent loss of muscle within the thenar compartment of the hand  Treatment options were discussed with the patient  Conservative treatment includes nocturnal resting splints to keep the nerve in a neutral position, ergonomic changes within the work or home environment, activity modification, and tendon gliding exercises  Steroid injections within the carpal canal can help a majority of patients, however this is often self-limited in a majority of patients  Surgical intervention to divide the transverse carpal ligament typically results in a long-lasting relief of the patient's complaints, with the recurrence rate of less than 1%          _____________________________________________________  CHIEF COMPLAINT:  Chief Complaint   Patient presents with   • Left Hand - Numbness, Tingling     Ongoing- worsened overtime  • Right Hand - Numbness, Tingling     Ongoing- worsened overtime  SUBJECTIVE:  Yair Rosario is a 65 y o  male who presents with pain, numbness, and tingling of the bilateral hand  This started  3 year(s) ago: Insidious and has been increasing recently  Additionally, he has a history of cervical radiculopathy, diabetic neuropathy, and chronic pain      Radiation: Yes to the  index finger, long finger, ring finger and thumb b/l  Previous Treatments: None   Associated symptoms: dropping things  Handedness: right  Work status: not working    PAST MEDICAL HISTORY:  Past Medical History:   Diagnosis Date   • Chronic pain    • Depression    • Diabetes ([address] )    • Fibromyalgia, primary    • GERD (gastroesophageal reflux disease)    • Hyperlipidemia    • Hypertension    • Low testosterone    • Neuropathy    • Pilonidal cyst     last assessed 3/17/2014   • Sleep apnea     no cpap       PAST SURGICAL HISTORY:  Past Surgical History:   Procedure Laterality Date   • BACK SURGERY      discectomy   • ELBOW SURGERY Right    • OTHER SURGICAL HISTORY  03/13/2015    Electr analysis of progr impl pump w/reprogram refill, requiring physician    Replacement of right abdomen intrathecal  pain pump filled with Dilaudid with electronic analysis and programming   managed by Ankush Feliciano   • PILONIDAL CYST EXCISION     • CO SURGICAL ARTHROSCOPY SHOULDER W/ROTATOR CUFF RPR Right 2/28/2020    Procedure: SHOULDER ARTHROSCOPIC ROTATOR CUFF REPAIR AND DEBRIDEMENT;  Surgeon: Angely Jack MD;  Location: [facility];  Service: Orthopedics   • ROTATOR CUFF REPAIR Left    • WISDOM TOOTH EXTRACTION         FAMILY HISTORY:  Family History   Problem Relation Age of Onset   • Diabetes unspecified Sister    • Hypertension Sister    • Hyperlipidemia Sister         pure hypercholesterolemia   • Diabetes unspecified Brother         DM   • Hypertension Brother    • Hyperlipidemia Brother         pure hypercholesterolemia   • Coronary artery disease Father    • Heart disease Father    • Diabetes Brother         DM   • Hypertension Family    • Alcohol abuse Mother    • Liver disease Mother        SOCIAL HISTORY:  Social History     Tobacco Use   • Smoking status: Former     Types: Cigarettes     Start date: 2/24/2018   • Smokeless tobacco: Never   Vaping Use   • Vaping Use: Never used   Substance Use Topics   • Alcohol use: No   • Drug use: Never       MEDICATIONS:    Current Outpatient Medications:   •  atorvastatin (LIPITOR) 20 mg tablet, Take 1 tablet (20 mg total) by mouth daily at bedtime, Disp: 90 tablet, Rfl: 3  •  Blood Glucose Monitoring Suppl (Contour Next EZ) w/Device KIT, Use 1 each 4 (four) times a day (Patient not taking: Reported on 1/19/2023), Disp: 1 kit, Rfl: 0  •  Cholecalciferol (VITAMIN D) 2000 units CAPS, Take 1 capsule by mouth daily, Disp: , Rfl:   •  gabapentin (NEURONTIN) 300 mg capsule, Take 600 mg by mouth daily Taking two 600 mg at night, Disp: , Rfl:   •  glucose blood (Contour Next Test) test strip, Use 1 each 4 (four) times a day (Patient not taking: Reported on 1/19/2023), Disp: 400 each, Rfl: 1  •  HYDROmorphone (Dilaudid) 4 mg/mL injection, Inject 1 Device as directed daily  , Disp: , Rfl:   •  lisinopril (ZESTRIL) 20 mg tablet, Take 1 tablet (20 mg total) by mouth daily, Disp: 90 tablet, Rfl: 3  •  meloxicam (MOBIC) 15 mg tablet, , Disp: , Rfl:   •  metFORMIN (GLUCOPHAGE-XR) 500 mg 24 hr tablet, Take 1 tablet (500 mg total) by mouth 2 (two) times a day with meals, Disp: 180 tablet, Rfl: 3  •  methadone (DOLOPHINE) 10 mg tablet, Take 2 tablets every 6 hours for pain,, Disp: , Rfl:   •  Microlet Lancets MISC, Use 4 (four) times a day (Patient not taking: Reported on 1/19/2023), Disp: 400 each, Rfl: 1  •  multivitamin (THERAGRAN) TABS, Take 1 tablet by mouth daily, Disp: , Rfl:   •  omeprazole (PriLOSEC) 40 MG capsule, Take 1 capsule (40 mg total) by mouth daily, Disp: 90 capsule, Rfl: 3  •  oxyCODONE (OxyCONTIN) 80 mg 12 hr tablet, Take 1 tablet by mouth every 12 (twelve) hours (Patient not taking: Reported on 1/19/2023), Disp: , Rfl:   •  SYRINGE-NEEDLE, DISP, 3 ML 21G X 1" 3 ML MISC, by Does not apply route once a week, Disp: , Rfl:   •  testosterone cypionate (DEPO-TESTOSTERONE) 200 mg/mL SOLN, INJECT 0 3MLS WEEKLY*USE 1 VIAL FOR 1 WEEKLY DOSE,DISCARD REMAINDER REMAINDER, Disp: 4 mL, Rfl: 2  •  Upadacitinib ER (Rinvoq) 15 MG TB24, Take 15 mg by mouth daily, Disp: , Rfl:     ALLERGIES:  No Known Allergies    REVIEW OF SYSTEMS:  Pertinent items are noted in HPI  A comprehensive review of systems was negative  LABS:  HgA1c:   Lab Results   Component Value Date    HGBA1C 7 0 (A) 01/19/2023     BMP:   Lab Results   Component Value Date    GLUCOSE 94 11/30/2015    CALCIUM 9 2 07/12/2022     11/30/2015    K 4 2 07/12/2022    CO2 31 07/12/2022     07/12/2022    BUN 15 07/12/2022    CREATININE 0 77 07/12/2022         _____________________________________________________  PHYSICAL EXAMINATION:  Vital signs: /75   Pulse 77   Ht 5' 9 02" (1 753 m)   Wt 89 2 kg (196 lb 11 2 oz)   SpO2 98%   BMI 29 03 kg/m²   General: well developed and well nourished, alert, oriented times 3 and appears comfortable  Psychiatric: Normal  HEENT: Trachea Midline, No torticollis  Cardiovascular: Regular rate and rhythm  Pulmonary: No audible wheezing   Abdomen: No guarding  Extremities: No lymphedema  Skin: No masses, erythema, lacerations, fluctation, ulcerations  Neurovascular:  Motor Intact to the Median, Ulnar, Radial Nerve and Pulses Intact    MUSCULOSKELETAL EXAMINATION:  LEFT SIDE:  Carpal tunnel:  No tinels, phalens, weakness, or atrophy  RIGHT SIDE:  Carpal tunnel:  No weakness APB, No weakness AIN, No thenar atrophy, Negative tinels and + Phalens        _____________________________________________________  STUDIES REVIEWED:  No Studies to review      PROCEDURES PERFORMED:  Procedures  No Procedures performed today

## 2023-02-24 ENCOUNTER — PROCEDURE VISIT (OUTPATIENT)
Dept: NEUROLOGY | Facility: CLINIC | Age: 58
End: 2023-02-24

## 2023-02-24 DIAGNOSIS — R20.0 NUMBNESS AND TINGLING IN BOTH HANDS: ICD-10-CM

## 2023-02-24 DIAGNOSIS — R20.2 NUMBNESS AND TINGLING IN BOTH HANDS: ICD-10-CM

## 2023-02-24 NOTE — PROGRESS NOTES
EMG 2 Limb Upper Extremity     Date/Time 2/24/2023 1:58 PM     Performed by  Shan Vieira MD     Authorized by Harley Tsai PA-C              EMG BILATERAL UPPER EXTREMITIES    Patient reports symptoms of numbness and tingling in the hands bilaterally, often keeping her awake at night    Motor and sensory conduction studies were performed on the median and ulnar nerves and radial sensory nerves bilaterally  The right distal motor latency was prolonged at 6 1 ms while the other distal motor latencies were normal  The motor action potential amplitudes were normal  Motor conduction velocities were normal including conduction of the ulnar nerve across the elbow  The median F waves were prolonged and ulnar F waves were normal bilaterally  The median distal sensory latencies were prolonged at 4 8 and 3 6 ms on the right and left respectively with delayed palmar latencies and reduced sensory action potential amplitudes  The radial and ulnar sensory latencies were normal bilaterally  with normal sensory action potential amplitudes  Concentric needle EMG was performed in various distal and proximal muscles of the upper extremities bilaterally including APB, FDI, EDC, brachioradialis, biceps, triceps in the low cervical paraspinal region  There 2+ positive waves and fibrillation potentials noted in the right APB region  The other compound motor unit potentials were of normal configuration and interference patterns were full or full for effort    IMPRESSION: This is an abnormal EMG of the bilateral upper extremities due to evidence of a localized neuropathic process involving the median nerve at the wrist bilaterally consistent with moderate to severe carpal tunnel syndrome with predominantly demyelinative changes on the left with demyelinating and acute axonal change on the right    ESTHER Pascal          [facility] AT [address]  [phone]    Electromyography & Nerve Conduction Studies Report          Full Name: Choco Ram Gender: Male  MRN: [id number] YOB: 1965      Visit Date: 2/24/2023 2:45 PM  Age: 65 Years  Examining Physician: Sherwin Bumpers, MD   Referring Physician: Syed Cary PA-C  Medical History: TEMP 34      Sensory Nerve Conduction Study       Nerve / Sites Rec  Site Onset Lat Peak Lat  Amp Segments Distance Peak Diff Velocity     ms ms µV  cm ms m/s   R Median - Dig II (Antidromic)  Wrist Index 4 8 6 4 3 4 Wrist - Index 14  29      Ref  ?3 4  ? 20 0 Ref  ?50   L Median - Dig II (Antidromic)  Wrist Index 3 6 5 2 12 4 Wrist - Index 14  38      Ref  ?3 4  ? 20 0 Ref  ?50   R Ulnar - Dig V (Antidromic)  Wrist Dig V 2 4 3 3 24 2 Wrist - Dig V 12  49      Ref  ?2 9  ? 17 0 Ref  ?50   L Ulnar - Dig V (Antidromic)  Wrist Dig V 2 4 3 5 20 7 Wrist - Dig V 12  50      Ref  ?2 9  ? 17 0 Ref  ?50   R Median, Ulnar - Palmar      Median Palm Wrist 2 8 3 5 24 0 Median Palm - Wrist 8  29      Ref  ?2 2 ? 50 0 Ref  ?50      Ulnar Palm Wrist 1 2 1 6 33 6 Ulnar Palm - Wrist 8  67      Ref  ?2 2 ? 12 0 Ref  ?50        Median Palm - Ulnar Palm  1 9         Ref  ?0 4    L Median, Ulnar - Palmar      Median Palm Wrist 1 9 2 5 41 1 Median Palm - Wrist 8  43      Ref  ?2 2 ? 50 0 Ref  ?50      Ulnar Palm Wrist 1 4 2 0 7 3 Ulnar Palm - Wrist 8  57      Ref  ?2 2 ? 12 0 Ref  ?50        Median Palm - Ulnar Palm  0 5         Ref  ?0 4    R Radial - Superficial (Antidromic)      Forearm Wrist 1 6 2 4 24 7 Forearm - Wrist 10  62      Ref  ?2 9 ? 15 0 Ref  ?50   L Radial - Superficial (Antidromic)      Forearm Wrist 1 6 2 3 21 4 Forearm - Wrist 10  64      Ref  ?2 9 ? 15 0 Ref  ?50       Motor Nerve Conduction Study       Nerve / Sites Muscle Latency Ref  Amplitude Ref  Segments Distance Lat Diff Velocity Ref       ms ms mV mV  cm ms m/s m/s   R Median - APB      Wrist APB 6 1 ?4 4 6 0 ?4 0 Wrist - APB 7         Elbow APB 11 0  4 6  Elbow - Wrist 24 4 88 49 ?49   L Median - APB      Wrist APB 4 2 ?4 4 6 9 ?4 0 Wrist - APB 7         Elbow APB 9 1  4 4  Elbow - Wrist 24 4 90 49 ?49   R Ulnar - ADM      Wrist ADM 2 6 ?3 3 8 1 ?6 0 Wrist - ADM 7         B  Elbow ADM 7 2  8 0  B  Elbow - Wrist 23 4 65 50 ?49      A  Elbow ADM 9 0  7 7  A  Elbow - B  Elbow 10 1 75 57 ?49   L Ulnar - ADM      Wrist ADM 2 7 ?3 3 9 0 ?6 0 Wrist - ADM 7         B  Elbow ADM 6 8  8 7  B  Elbow - Wrist 24 4 13 58 ?49      A  Elbow ADM 8 8  8 1  A  Elbow - B  Elbow 10 1 98 51 ?49       F Waves       Nerve F Latency Ref  ms ms   R Median - APB  33 9 ?31 0   R Ulnar - ADM  28 7 ?32 0   L Median - APB  32 3 ?31 0   L Ulnar - ADM  28 7 ?32 0       EMG Summary Table     Spontaneous MUAP Recruitment   Muscle Nerve Roots IA Fib PSW Fasc H F  Dur  Amp PPP Config Pattern   L  First dorsal interosseous Ulnar C8-T1 NL None None None None NL NL None NL NL   R  First dorsal interosseous Ulnar C8-T1 NL None None None None NL NL None NL NL   R  Abductor pollicis brevis Median D5-M2 NL 1+ 2+ None None NL NL None NL NL   L  Abductor pollicis brevis Median M6-T2 NL None None None None NL NL None NL NL   R  Extensor digitorum communis Radial C7-C8 NL None None None None NL NL None NL NL   L  Extensor digitorum communis Radial C7-C8 NL None None None None NL NL None NL NL   R  Brachioradialis Radial C5-C6 NL None None None None NL NL None NL NL   L  Brachioradialis Radial C5-C6 NL None None None None NL NL None NL NL   R  Biceps brachii Musculocut  C5-C6 NL None None None None NL NL None NL NL   L  Biceps brachii Musculocut  C5-C6 NL None None None None NL NL None NL NL   R  Triceps brachii Radial C6-C8 NL None None None None NL NL None NL NL   L  Triceps brachii Radial C6-C8 NL None None None None NL NL None NL NL   R  Cervical paraspinals Spinal C4-C8 NL None None None None NL NL None NL NL   L   Cervical paraspinals Spinal C4-C8 NL None None None None NL NL None NL NL

## 2023-03-02 ENCOUNTER — APPOINTMENT (OUTPATIENT)
Dept: LAB | Facility: CLINIC | Age: 58
End: 2023-03-02

## 2023-03-02 DIAGNOSIS — M45.6 ANKYLOSING SPONDYLITIS OF LUMBAR REGION (HCC): ICD-10-CM

## 2023-03-02 LAB
ALBUMIN SERPL BCP-MCNC: 4.1 G/DL (ref 3.5–5)
ALP SERPL-CCNC: 44 U/L (ref 46–116)
ALT SERPL W P-5'-P-CCNC: 45 U/L (ref 12–78)
ANION GAP SERPL CALCULATED.3IONS-SCNC: 1 MMOL/L (ref 4–13)
AST SERPL W P-5'-P-CCNC: 28 U/L (ref 5–45)
BASOPHILS # BLD AUTO: 0.03 THOUSANDS/ÂΜL (ref 0–0.1)
BASOPHILS NFR BLD AUTO: 0 % (ref 0–1)
BILIRUB SERPL-MCNC: 0.79 MG/DL (ref 0.2–1)
BUN SERPL-MCNC: 17 MG/DL (ref 5–25)
CALCIUM SERPL-MCNC: 9.5 MG/DL (ref 8.3–10.1)
CHLORIDE SERPL-SCNC: 105 MMOL/L (ref 96–108)
CO2 SERPL-SCNC: 29 MMOL/L (ref 21–32)
CREAT SERPL-MCNC: 0.84 MG/DL (ref 0.6–1.3)
CRP SERPL QL: <3 MG/L
EOSINOPHIL # BLD AUTO: 0.05 THOUSAND/ÂΜL (ref 0–0.61)
EOSINOPHIL NFR BLD AUTO: 1 % (ref 0–6)
ERYTHROCYTE [DISTWIDTH] IN BLOOD BY AUTOMATED COUNT: 13.6 % (ref 11.6–15.1)
ERYTHROCYTE [SEDIMENTATION RATE] IN BLOOD: 3 MM/HOUR (ref 0–19)
GFR SERPL CREATININE-BSD FRML MDRD: 97 ML/MIN/1.73SQ M
GLUCOSE P FAST SERPL-MCNC: 129 MG/DL (ref 65–99)
HCT VFR BLD AUTO: 33.8 % (ref 36.5–49.3)
HGB BLD-MCNC: 11.2 G/DL (ref 12–17)
IMM GRANULOCYTES # BLD AUTO: 0.03 THOUSAND/UL (ref 0–0.2)
IMM GRANULOCYTES NFR BLD AUTO: 0 % (ref 0–2)
LYMPHOCYTES # BLD AUTO: 3.05 THOUSANDS/ÂΜL (ref 0.6–4.47)
LYMPHOCYTES NFR BLD AUTO: 42 % (ref 14–44)
MCH RBC QN AUTO: 31 PG (ref 26.8–34.3)
MCHC RBC AUTO-ENTMCNC: 33.1 G/DL (ref 31.4–37.4)
MCV RBC AUTO: 94 FL (ref 82–98)
MONOCYTES # BLD AUTO: 0.61 THOUSAND/ÂΜL (ref 0.17–1.22)
MONOCYTES NFR BLD AUTO: 9 % (ref 4–12)
NEUTROPHILS # BLD AUTO: 3.42 THOUSANDS/ÂΜL (ref 1.85–7.62)
NEUTS SEG NFR BLD AUTO: 48 % (ref 43–75)
NRBC BLD AUTO-RTO: 0 /100 WBCS
PLATELET # BLD AUTO: 204 THOUSANDS/UL (ref 149–390)
PMV BLD AUTO: 10.7 FL (ref 8.9–12.7)
POTASSIUM SERPL-SCNC: 4.3 MMOL/L (ref 3.5–5.3)
PROT SERPL-MCNC: 7 G/DL (ref 6.4–8.4)
RBC # BLD AUTO: 3.61 MILLION/UL (ref 3.88–5.62)
SODIUM SERPL-SCNC: 135 MMOL/L (ref 135–147)
WBC # BLD AUTO: 7.19 THOUSAND/UL (ref 4.31–10.16)

## 2023-03-06 ENCOUNTER — OFFICE VISIT (OUTPATIENT)
Dept: OBGYN CLINIC | Facility: CLINIC | Age: 58
End: 2023-03-06

## 2023-03-06 ENCOUNTER — OFFICE VISIT (OUTPATIENT)
Dept: LAB | Facility: HOSPITAL | Age: 58
End: 2023-03-06
Attending: STUDENT IN AN ORGANIZED HEALTH CARE EDUCATION/TRAINING PROGRAM

## 2023-03-06 VITALS
WEIGHT: 194 LBS | HEART RATE: 83 BPM | HEIGHT: 69 IN | BODY MASS INDEX: 28.73 KG/M2 | SYSTOLIC BLOOD PRESSURE: 123 MMHG | DIASTOLIC BLOOD PRESSURE: 82 MMHG

## 2023-03-06 DIAGNOSIS — G56.02 CARPAL TUNNEL SYNDROME ON LEFT: ICD-10-CM

## 2023-03-06 DIAGNOSIS — M45.6 ANKYLOSING SPONDYLITIS OF LUMBAR REGION (HCC): ICD-10-CM

## 2023-03-06 DIAGNOSIS — G56.01 CARPAL TUNNEL SYNDROME ON RIGHT: ICD-10-CM

## 2023-03-06 DIAGNOSIS — G56.01 CARPAL TUNNEL SYNDROME ON RIGHT: Primary | ICD-10-CM

## 2023-03-06 RX ORDER — CHLORHEXIDINE GLUCONATE 0.12 MG/ML
15 RINSE ORAL ONCE
OUTPATIENT
Start: 2023-03-06 | End: 2023-03-06

## 2023-03-06 NOTE — PROGRESS NOTES
ORTHOPAEDIC HAND, WRIST, AND ELBOW OFFICE  VISIT       ASSESSMENT/PLAN:      Diagnoses and all orders for this visit:    Carpal tunnel syndrome on right  -     Case request operating room: RELEASE CARPAL TUNNEL ENDOSCOPIC VERSUS OPEN; Standing  -     EKG 12 lead; Future  -     Ambulatory referral to [facility]; Future  -     Case request operating room: RELEASE CARPAL TUNNEL ENDOSCOPIC VERSUS OPEN    Carpal tunnel syndrome on left  -     Case request operating room: RELEASE CARPAL TUNNEL ENDOSCOPIC VERSUS OPEN; Standing  -     Case request operating room: RELEASE CARPAL TUNNEL ENDOSCOPIC VERSUS OPEN    Ankylosing spondylitis of lumbar region ([facility] [address] )    Other orders  -     Diet NPO; Sips with meds; Standing  -     Nursing Communication [address] [place] [address] Interventions Implemented; Standing  -     Nursing Communication CHG bath, have staff wash entire body (neck down) per pre-op bathing protocol  Routine, evening prior to, and day of surgery ; Standing  -     Nursing Communication Swab both nares with Povidone-Iodine solution, EXCLUDE if patient has shellfish/Iodine allergy  Routine, day of surgery, on call to OR; Standing  -     chlorhexidine (PERIDEX) 0 12 % oral rinse 15 mL  -     Void on call to OR; Standing  -     Insert peripheral IV; Standing  -     ceFAZolin (ANCEF) 2,000 mg in dextrose 5 % 100 mL IVPB  -     Diet NPO; Sips with meds; Standing  -     Nursing Communication [address] [place] [address] Interventions Implemented; Standing  -     Nursing Communication CHG bath, have staff wash entire body (neck down) per pre-op bathing protocol  Routine, evening prior to, and day of surgery ; Standing  -     Nursing Communication Swab both nares with Povidone-Iodine solution, EXCLUDE if patient has shellfish/Iodine allergy  Routine, day of surgery, on call to OR; Standing  -     chlorhexidine (PERIDEX) 0 12 % oral rinse 15 mL  -     Void on call to OR; Standing  -     Insert peripheral IV;  Standing  -     ceFAZolin (ANCEF) 2,000 mg in dextrose 5 % 100 mL IVPB      50-year-old male with bilateral carpal tunnel syndrome  The EMG was reviewed in the office today which demonstrates moderate to severe carpal tunnel syndrome bilaterally  Non operative versus surgical intervention was discussed  The patient elected to proceed with surgical intervention in the form of endoscopic versus open right carpal tunnel release  We will perform left carpal tunnel release 3 weeks postoperatively  Risks and benefits were discussed at length  Risks of the surgery are inclusive of but not limited to bleeding, infection, nerve injury, blood clot, worsening of symptoms, not achieving the anticipated results, persistent stiffness, weakness and the need for additional surgery  We did discuss the biggest risk would be that his numbness and tingling does not improve  The patient's carpal tunnel is severe and he does have constant numbness and tingling  The patient verbally stated they understood those risks and would like to proceed with the surgery  Surgical consent for the right and left hand were signed in the office today  I will see him the day of surgery  We will perform left carpal tunnel release appx 3 weeks postoperatively  I will see him the day of surgery  The patient verbalized understanding of exam findings and treatment plan  We engaged in the shared decision-making process and treatment options were discussed at length with the patient  Surgical and conservative management discussed today along with risks and benefits  Follow Up: After surgery       To Do Next Visit:  Sutures out      Operative Discussions:  Endoscopic Carpal Tunnel Release: The anatomy and physiology of carpal tunnel syndrome was discussed with the patient today  Increase pressure localized under the transverse carpal ligament can cause pain, numbness, tingling, or dysesthesias within the median nerve distribution as well as feelings of fatigue, clumsiness, or awkwardness  These symptoms typically occur at night and worse in the morning upon waking  Eventually, untreated carpal tunnel syndrome can result in weakness and permanent loss of muscle within the thenar compartment of the hand  Treatment options were discussed with the patient  Conservative treatment includes nocturnal resting splints to keep the nerve in a neutral position, ergonomic changes within the work or home environment, activity modification, and tendon gliding exercises  Vitamin B6 one tablet daily over the counter may helpful to reduce symptoms  Steroid injections within the carpal canal can help a majority of patients, however this is often self-limited in a majority of patients  Surgical intervention to divide the transverse carpal ligament typically results in a long-lasting relief of the patient's complaints, with the recurrence rate of less than 1%  The patient has elected to undergo an endoscopic carpal tunnel release  The single incision technique was discussed with the patient, which results in approximately a two-week recovery time less wound complications  In the postoperative period, light activities are allowed immediately, driving is allowed when narcotic medication has stopped, and the bandages may be removed and incision may get wet after 2 days  Heavy activities (lifting more than approximately 10 pounds) will be allowed after follow up appointment in 1-2 weeks  While night symptoms (waking from sleep, pain, and discomfort in the hands) generally improves rapidly, the numbness and tingling as well as the strength will slowly improve over weeks to months depending on the chronicity and severity of the carpal tunnel syndrome  Pillar pain and scar discomfort were discussed with the patient which are self-limiting conditions  The risks of bleeding and infection from the surgery are less than 1%  Risk of recurrence is approximately 0 5%    The risks of nerve injury or nerve damage or damage to the blood vessels is approximately 1 in 1200  The patient has an understanding of the above mentioned discussion  The risks and benefits of the procedure were explained to the patient, which include, but are not limited to: Bleeding, infection, recurrence, pain, scar, damage to tendons, damage to nerves, and damage to blood vessels, failure to give desired results and complications related to anesthesia  These risks, along with alternative conservative treatment options, and postoperative protocols were voiced back and understood by the patient  All questions were answered to the patient's satisfaction  The patient agrees to comply with a standard postoperative protocol, and is willing to proceed  Education was provided via written and auditory forms  There were no barriers to learning  Written handouts regarding wound care, incision and scar care, and general preoperative information was provided to the patient  Prior to surgery, the patient may be requested to stop all anti-inflammatory medications  Prophylactic aspirin, Plavix, and Coumadin may be allowed to be continued  Medications including vitamin E , ginkgo, &fish oil are requested to be stopped about one week  Emelia Rey MD  Attending, Orthopaedic Surgery  Hand, Wrist, and Elbow Surgery  [facility]    ____________________________________________________________________________________________________________________________________________      CHIEF COMPLAINT:  Chief Complaint   Patient presents with   • Left Hand - Pain   • Right Hand - Pain       SUBJECTIVE:  Kael Maaz  is a 65y o  year old RHD male who presents today for evaluation and treatment of bilateral hand numbness and tingling  The patient is referred by Cintia Ayon  He states his right hand is worse than his left  He notes constant numbness and tingling to his index, middle, and ring fingers  He states at times, his entire hand will be numb   He states this does wake him from sleep at night  He has tried bracing OTC without much relief  The patient states he was doing work around his house when the numbness and tingling increased  He is a diabetic and his last A1 C was 7 0  The patient is currently on disability secondary to ankylosing spondylitis  I have personally reviewed all the relevant PMH, PSH, SH, FH, Medications and allergies      PAST MEDICAL HISTORY:  Past Medical History:   Diagnosis Date   • Chronic pain    • Depression    • Diabetes ([address] )    • Fibromyalgia, primary    • GERD (gastroesophageal reflux disease)    • Hyperlipidemia    • Hypertension    • Low testosterone    • Neuropathy    • Pilonidal cyst     last assessed 3/17/2014   • Sleep apnea     no cpap       PAST SURGICAL HISTORY:  Past Surgical History:   Procedure Laterality Date   • BACK SURGERY      discectomy   • ELBOW SURGERY Right    • OTHER SURGICAL HISTORY  03/13/2015    Electr analysis of progr impl pump w/reprogram refill, requiring physician    Replacement of right abdomen intrathecal  pain pump filled with Dilaudid with electronic analysis and programming   managed by Cheyenne Killian   • PILONIDAL CYST EXCISION     • WA SURGICAL ARTHROSCOPY SHOULDER W/ROTATOR CUFF RPR Right 2/28/2020    Procedure: SHOULDER ARTHROSCOPIC ROTATOR CUFF REPAIR AND DEBRIDEMENT;  Surgeon: France Eastman MD;  Location: [facility]  MAIN OR;  Service: Orthopedics   • ROTATOR CUFF REPAIR Left    • WISDOM TOOTH EXTRACTION         FAMILY HISTORY:  Family History   Problem Relation Age of Onset   • Diabetes unspecified Sister    • Hypertension Sister    • Hyperlipidemia Sister         pure hypercholesterolemia   • Diabetes unspecified Brother         DM   • Hypertension Brother    • Hyperlipidemia Brother         pure hypercholesterolemia   • Coronary artery disease Father    • Heart disease Father    • Diabetes Brother         DM   • Hypertension Family    • Alcohol abuse Mother    • Liver disease Mother        SOCIAL HISTORY:  Social History     Tobacco Use   • Smoking status: Former     Types: Cigarettes     Start date: 2/24/2018   • Smokeless tobacco: Never   Vaping Use   • Vaping Use: Never used   Substance Use Topics   • Alcohol use: No   • Drug use: Never       MEDICATIONS:    Current Outpatient Medications:   •  Cholecalciferol (VITAMIN D) 2000 units CAPS, Take 1 capsule by mouth daily, Disp: , Rfl:   •  gabapentin (NEURONTIN) 300 mg capsule, Take 600 mg by mouth daily Taking two 600 mg at night, Disp: , Rfl:   •  HYDROmorphone (Dilaudid) 4 mg/mL injection, Inject 1 Device as directed daily  , Disp: , Rfl:   •  lisinopril (ZESTRIL) 20 mg tablet, Take 1 tablet (20 mg total) by mouth daily, Disp: 90 tablet, Rfl: 3  •  meloxicam (MOBIC) 15 mg tablet, , Disp: , Rfl:   •  metFORMIN (GLUCOPHAGE-XR) 500 mg 24 hr tablet, Take 1 tablet (500 mg total) by mouth 2 (two) times a day with meals, Disp: 180 tablet, Rfl: 3  •  methadone (DOLOPHINE) 10 mg tablet, Take 2 tablets every 6 hours for pain,, Disp: , Rfl:   •  multivitamin (THERAGRAN) TABS, Take 1 tablet by mouth daily, Disp: , Rfl:   •  omeprazole (PriLOSEC) 40 MG capsule, Take 1 capsule (40 mg total) by mouth daily, Disp: 90 capsule, Rfl: 3  •  SYRINGE-NEEDLE, DISP, 3 ML 21G X 1" 3 ML MISC, by Does not apply route once a week, Disp: , Rfl:   •  testosterone cypionate (DEPO-TESTOSTERONE) 200 mg/mL SOLN, INJECT 0 3MLS WEEKLY*USE 1 VIAL FOR 1 WEEKLY DOSE,DISCARD REMAINDER REMAINDER, Disp: 4 mL, Rfl: 2  •  Upadacitinib ER (Rinvoq) 15 MG TB24, Take 15 mg by mouth daily, Disp: , Rfl:   •  atorvastatin (LIPITOR) 20 mg tablet, Take 1 tablet (20 mg total) by mouth daily at bedtime, Disp: 90 tablet, Rfl: 3  •  Blood Glucose Monitoring Suppl (Contour Next EZ) w/Device KIT, Use 1 each 4 (four) times a day (Patient not taking: Reported on 1/19/2023), Disp: 1 kit, Rfl: 0  •  glucose blood (Contour Next Test) test strip, Use 1 each 4 (four) times a day (Patient not taking: Reported on 1/19/2023), Disp: 400 each, Rfl: 1  •  Microlet Lancets MISC, Use 4 (four) times a day (Patient not taking: Reported on 1/19/2023), Disp: 400 each, Rfl: 1  •  oxyCODONE (OxyCONTIN) 80 mg 12 hr tablet, Take 1 tablet by mouth every 12 (twelve) hours (Patient not taking: Reported on 1/19/2023), Disp: , Rfl:     ALLERGIES:  No Known Allergies        REVIEW OF SYSTEMS:  Musculoskeletal:        As noted in HPI  All other systems reviewed and are negative  VITALS:  Vitals:    03/06/23 1052   BP: 123/82   Pulse: 83       LABS:  HgA1c:   Lab Results   Component Value Date    HGBA1C 7 0 (A) 01/19/2023     BMP:   Lab Results   Component Value Date    GLUCOSE 94 11/30/2015    CALCIUM 9 5 03/02/2023     11/30/2015    K 4 3 03/02/2023    CO2 29 03/02/2023     03/02/2023    BUN 17 03/02/2023    CREATININE 0 84 03/02/2023       _____________________________________________________  PHYSICAL EXAMINATION:  General: well developed and well nourished, alert, oriented times 3 and appears comfortable  Psychiatric: Normal  HEENT: Normocephalic, Atraumatic Trachea Midline, No torticollis  Pulmonary: No audible wheezing or respiratory distress   Abdomen/GI: Non tender, non distended   Cardiovascular: No pitting edema, 2+ radial pulse   Skin: No masses, erythema, lacerations, fluctation, ulcerations  Neurovascular: Sensation Intact to the Median, Ulnar, Radial Nerve, Motor Intact to the Median, Ulnar, Radial Nerve and Pulses Intact  Musculoskeletal: Normal, except as noted in detailed exam and in HPI        MUSCULOSKELETAL EXAMINATION:  Bilateral hand  + tinel's at the wrist  5/5 APB  - tinel's at the elbow       Right radial (mm) Right ulnar (mm) Left radial (mm) Left ulnar (mm)   Thumb 5 5 5 6   Index 5 5 5 5   Long 5 6 7 5   Ring 5 5 5 5   Small 5 5 5 5         ___________________________________________________  STUDIES REVIEWED:  EMG was personally reviewed by Dr Rogerio Bain and demonstrates moderate to severe carpal tunnel bilateraly   this is able to be reviewed under neurology notes from 2/24/23         PROCEDURES PERFORMED:  Procedures  No Procedures performed today    _____________________________________________________      Scribe Attestation    I,:  Sol Hough MA am acting as a scribe while in the presence of the attending physician :       I,:  Mary Jo Hollis MD personally performed the services described in this documentation    as scribed in my presence :

## 2023-03-07 LAB
ATRIAL RATE: 69 BPM
P AXIS: 32 DEGREES
PR INTERVAL: 168 MS
QRS AXIS: 25 DEGREES
QRSD INTERVAL: 84 MS
QT INTERVAL: 408 MS
QTC INTERVAL: 437 MS
T WAVE AXIS: 39 DEGREES
VENTRICULAR RATE: 69 BPM

## 2023-03-30 ENCOUNTER — ANESTHESIA EVENT (OUTPATIENT)
Dept: PERIOP | Facility: AMBULARY SURGERY CENTER | Age: 58
End: 2023-03-30
Payer: COMMERCIAL

## 2023-04-10 NOTE — PRE-PROCEDURE INSTRUCTIONS
Pre-Surgery Instructions:   Medication Instructions   • atorvastatin (LIPITOR) 20 mg tablet Instructed to take per normal schedule including DOS with sips water   • Cholecalciferol (VITAMIN D) 2000 units CAPS Instructed to stop all Vitamins and Supplements over the counter from now until after surgery   • clindamycin (CLEOCIN) 150 mg capsule Instructions provided by MD   • gabapentin (NEURONTIN) 300 mg capsule Take night before surgery   • HYDROmorphone (Dilaudid) 4 mg/mL injection Instructions provided by MD   • lisinopril (ZESTRIL) 20 mg tablet Hold day of surgery. • meloxicam (MOBIC) 15 mg tablet Stop taking 3 days prior to surgery. • metFORMIN (GLUCOPHAGE-XR) 500 mg 24 hr tablet Hold day of surgery. • methadone (DOLOPHINE) 10 mg tablet Take per normal schedule    • multivitamin (THERAGRAN) TABS Instructed to stop all Vitamins and Supplements over the counter from now until after surgery   • omeprazole (PriLOSEC) 40 MG capsule Instructed to take per normal schedule including DOS with sips water   • predniSONE 10 mg tablet Instructions provided by MD   • Upadacitinib ER (Rinvoq) 15 MG TB24 Instructions provided by MD    Medication instructions for day surgery reviewed. Please use only a sip of water to take your instructed medications. Avoid all over the counter vitamins, supplements and NSAIDS for one week prior to surgery per anesthesia guidelines. Tylenol is ok to take as needed. You will receive a call one business day prior to surgery with an arrival time and hospital directions. If your surgery is scheduled on a Monday, the hospital will be calling you on the Friday prior to your surgery. If you have not heard from anyone by 8pm, please call the hospital supervisor through the hospital  at [phone]. Efe Olivares [phone]). Do not eat or drink anything after midnight the night before your surgery, including candy, mints, lifesavers, or chewing gum.  Do not drink alcohol 24hrs before your surgery. Try not to smoke at least 24hrs before your surgery. Follow the pre surgery showering instructions as listed in the [facility] Experience Booklet” or otherwise provided by your surgeon's office. Do not shave the surgical area 24 hours before surgery. Do not apply any lotions, creams, including makeup, cologne, deodorant, or perfumes after showering on the day of your surgery. No contact lenses, eye make-up, or artificial eyelashes. Remove nail polish, including gel polish, and any artificial, gel, or acrylic nails if possible. Remove all jewelry including rings and body piercing jewelry. Wear causal clothing that is easy to take on and off. Consider your type of surgery. Keep any valuables, jewelry, piercings at home. Please bring any specially ordered equipment (sling, braces) if indicated. Arrange for a responsible person to drive you to and from the hospital on the day of your surgery. Visitor Guidelines discussed. Call the surgeon's office with any new illnesses, exposures, or additional questions prior to surgery. Please reference your [facility] Booklet” for additional information to prepare for your upcoming surgery.

## 2023-04-13 ENCOUNTER — ANESTHESIA (OUTPATIENT)
Dept: PERIOP | Facility: AMBULARY SURGERY CENTER | Age: 58
End: 2023-04-13
Payer: COMMERCIAL

## 2023-04-18 ENCOUNTER — TELEPHONE (OUTPATIENT)
Dept: ADMINISTRATIVE | Facility: OTHER | Age: 58
End: 2023-04-18

## 2023-04-18 PROBLEM — Z82.49 FAMILY HISTORY OF MI (MYOCARDIAL INFARCTION): Status: ACTIVE | Noted: 2023-04-18

## 2023-04-18 NOTE — LETTER
Diabetic Eye Exam Form    Date Requested: 23  Patient: Efrain Naranjo   Patient : 1965   Referring Provider: Lolita Regalado MD      DIABETIC Eye Exam Date _______________________________      Type of Exam MUST be documented for Diabetic Eye Exams  Please CHECK ONE  Retinal Exam       Dilated Retinal Exam       OCT       Optomap-Iris Exam      Fundus Photography       Left Eye - Please check Retinopathy or No Retinopathy        Exam did show retinopathy    Exam did not show retinopathy       Right Eye - Please check Retinopathy or No Retinopathy       Exam did show retinopathy    Exam did not show retinopathy       Comments __________________________________________________________    Practice Providing Exam ______________________________________________    Exam Performed By (print name) _______________________________________      Provider Signature ___________________________________________________      These reports are needed for  compliance  Please fax this completed form and a copy of the Diabetic Eye Exam report to our office located at [place] [address] as soon as possible via Fax [phone] alisa Lopez Located within [facility]: Phone [phone]  We thank you for your assistance in treating our mutual patient     [id number]- [phone] F [phone]

## 2023-04-18 NOTE — TELEPHONE ENCOUNTER
----- Message from Francy Pineda MA sent at 4/18/2023  9:28 AM EDT -----  Regarding: DM eye exam  04/18/23 9:29 AM    Hello, our patient Ej De Oliveira  has had Diabetic Eye Exam completed/performed  Please assist in updating the patient chart by making an External outreach to [facility] located in [address] [place] [address]  The date of service is fall 2022      Thank you,  Francy Pineda MA  [facility]

## 2023-04-18 NOTE — LETTER
Diabetic Eye Exam Form    Date Requested: 23  Patient: Edtya Chisholm   Patient : 1965   Referring Provider: Mariya Galindo MD      DIABETIC Eye Exam Date _______________________________      Type of Exam MUST be documented for Diabetic Eye Exams  Please CHECK ONE  Retinal Exam       Dilated Retinal Exam       OCT       Optomap-Iris Exam      Fundus Photography       Left Eye - Please check Retinopathy or No Retinopathy        Exam did show retinopathy    Exam did not show retinopathy       Right Eye - Please check Retinopathy or No Retinopathy       Exam did show retinopathy    Exam did not show retinopathy       Comments __________________________________________________________    Practice Providing Exam ______________________________________________    Exam Performed By (print name) _______________________________________      Provider Signature ___________________________________________________      These reports are needed for  compliance  Please fax this completed form and a copy of the Diabetic Eye Exam report to our office located at [place] [address] as soon as possible via Fax [phone] alisa Soliz: Phone [phone]  We thank you for your assistance in treating our mutual patient     [facility]- [phone] F [phone]

## 2023-04-18 NOTE — LETTER
Diabetic Eye Exam Form    Date Requested: 23  Patient: Martín Villa   Patient : 1965   Referring Provider: Geovany Jennings MD      DIABETIC Eye Exam Date _______________________________      Type of Exam MUST be documented for Diabetic Eye Exams  Please CHECK ONE  Retinal Exam       Dilated Retinal Exam       OCT       Optomap-Iris Exam      Fundus Photography       Left Eye - Please check Retinopathy or No Retinopathy        Exam did show retinopathy    Exam did not show retinopathy       Right Eye - Please check Retinopathy or No Retinopathy       Exam did show retinopathy    Exam did not show retinopathy       Comments __________________________________________________________    Practice Providing Exam ______________________________________________    Exam Performed By (print name) _______________________________________      Provider Signature ___________________________________________________      These reports are needed for  compliance  Please fax this completed form and a copy of the Diabetic Eye Exam report to our office located at [place] [address] as soon as possible via Fax [phone] alisa Diaznest: Phone [phone]  We thank you for your assistance in treating our mutual patient     [facility]- [phone] F [phone] Niacinamide Counseling: I recommended taking niacin or niacinamide, also know as vitamin B3, twice daily. Recent evidence suggests that taking vitamin B3 (500 mg twice daily) can reduce the risk of actinic keratoses and non-melanoma skin cancers. Side effects of vitamin B3 include flushing and headache.

## 2023-04-19 NOTE — TELEPHONE ENCOUNTER
Upon review of the In Basket request and the patient's chart, initial outreach has been made via fax to facility  Please see Contacts section for details       Thank you  Zaid Reyna

## 2023-04-25 NOTE — TELEPHONE ENCOUNTER
As a follow-up, a second attempt has been made for outreach via fax to facility  Please see Contacts section for details      Thank you  Samantha Hall

## 2023-04-26 ENCOUNTER — PREP FOR PROCEDURE (OUTPATIENT)
Dept: OBGYN CLINIC | Facility: CLINIC | Age: 58
End: 2023-04-26

## 2023-05-02 NOTE — TELEPHONE ENCOUNTER
As a final attempt, a third outreach has been made via telephone call to facility  The outcome of the telephone call was a fax request form to be sent to Office/Facility  Please see Contacts section for details  This encounter will be closed and completed by end of day  Should we receive the requested information because of previous outreach attempts, the requested patient's chart will be updated appropriately       Thank you  Ivan Cortes

## 2023-05-04 ENCOUNTER — TELEPHONE (OUTPATIENT)
Dept: OBGYN CLINIC | Facility: CLINIC | Age: 58
End: 2023-05-04

## 2023-05-05 ENCOUNTER — APPOINTMENT (OUTPATIENT)
Dept: LAB | Facility: CLINIC | Age: 58
End: 2023-05-05

## 2023-05-05 ENCOUNTER — TRANSCRIBE ORDERS (OUTPATIENT)
Dept: LAB | Facility: CLINIC | Age: 58
End: 2023-05-05

## 2023-05-05 DIAGNOSIS — E11.9 TYPE 2 DIABETES MELLITUS WITHOUT COMPLICATION, WITHOUT LONG-TERM CURRENT USE OF INSULIN (HCC): ICD-10-CM

## 2023-05-05 DIAGNOSIS — E29.1 TESTICULAR HYPOGONADISM: ICD-10-CM

## 2023-05-05 DIAGNOSIS — D64.9 ANEMIA, UNSPECIFIED TYPE: ICD-10-CM

## 2023-05-05 DIAGNOSIS — M45.9 ANKYLOSING SPONDYLITIS, UNSPECIFIED SITE OF SPINE (HCC): Primary | ICD-10-CM

## 2023-05-05 DIAGNOSIS — E78.2 MIXED HYPERLIPIDEMIA: ICD-10-CM

## 2023-05-05 DIAGNOSIS — M45.9 ANKYLOSING SPONDYLITIS, UNSPECIFIED SITE OF SPINE (HCC): ICD-10-CM

## 2023-05-05 LAB
ALBUMIN SERPL BCP-MCNC: 3.8 G/DL (ref 3.5–5)
ALP SERPL-CCNC: 43 U/L (ref 46–116)
ALT SERPL W P-5'-P-CCNC: 58 U/L (ref 12–78)
ANION GAP SERPL CALCULATED.3IONS-SCNC: 3 MMOL/L (ref 4–13)
AST SERPL W P-5'-P-CCNC: 33 U/L (ref 5–45)
BASOPHILS # BLD AUTO: 0.04 THOUSANDS/ÂΜL (ref 0–0.1)
BASOPHILS NFR BLD AUTO: 1 % (ref 0–1)
BILIRUB SERPL-MCNC: 0.62 MG/DL (ref 0.2–1)
BUN SERPL-MCNC: 16 MG/DL (ref 5–25)
CALCIUM SERPL-MCNC: 9.4 MG/DL (ref 8.3–10.1)
CHLORIDE SERPL-SCNC: 105 MMOL/L (ref 96–108)
CHOLEST SERPL-MCNC: 115 MG/DL
CO2 SERPL-SCNC: 30 MMOL/L (ref 21–32)
CREAT SERPL-MCNC: 0.73 MG/DL (ref 0.6–1.3)
CREAT UR-MCNC: 45.1 MG/DL
CRP SERPL QL: <3 MG/L
EOSINOPHIL # BLD AUTO: 0.11 THOUSAND/ÂΜL (ref 0–0.61)
EOSINOPHIL NFR BLD AUTO: 2 % (ref 0–6)
ERYTHROCYTE [DISTWIDTH] IN BLOOD BY AUTOMATED COUNT: 13.2 % (ref 11.6–15.1)
GFR SERPL CREATININE-BSD FRML MDRD: 103 ML/MIN/1.73SQ M
GLUCOSE P FAST SERPL-MCNC: 124 MG/DL (ref 65–99)
HCT VFR BLD AUTO: 33 % (ref 36.5–49.3)
HDLC SERPL-MCNC: 52 MG/DL
HGB BLD-MCNC: 11 G/DL (ref 12–17)
IMM GRANULOCYTES # BLD AUTO: 0.01 THOUSAND/UL (ref 0–0.2)
IMM GRANULOCYTES NFR BLD AUTO: 0 % (ref 0–2)
IRON SERPL-MCNC: 114 UG/DL (ref 65–175)
LDLC SERPL CALC-MCNC: 55 MG/DL (ref 0–100)
LYMPHOCYTES # BLD AUTO: 2.18 THOUSANDS/ÂΜL (ref 0.6–4.47)
LYMPHOCYTES NFR BLD AUTO: 43 % (ref 14–44)
MCH RBC QN AUTO: 32.2 PG (ref 26.8–34.3)
MCHC RBC AUTO-ENTMCNC: 33.3 G/DL (ref 31.4–37.4)
MCV RBC AUTO: 97 FL (ref 82–98)
MICROALBUMIN UR-MCNC: <5 MG/L (ref 0–20)
MICROALBUMIN/CREAT 24H UR: <11 MG/G CREATININE (ref 0–30)
MONOCYTES # BLD AUTO: 0.47 THOUSAND/ÂΜL (ref 0.17–1.22)
MONOCYTES NFR BLD AUTO: 9 % (ref 4–12)
NEUTROPHILS # BLD AUTO: 2.26 THOUSANDS/ÂΜL (ref 1.85–7.62)
NEUTS SEG NFR BLD AUTO: 45 % (ref 43–75)
NONHDLC SERPL-MCNC: 63 MG/DL
NRBC BLD AUTO-RTO: 0 /100 WBCS
PLATELET # BLD AUTO: 159 THOUSANDS/UL (ref 149–390)
PMV BLD AUTO: 10.9 FL (ref 8.9–12.7)
POTASSIUM SERPL-SCNC: 4.2 MMOL/L (ref 3.5–5.3)
PROT SERPL-MCNC: 6.9 G/DL (ref 6.4–8.4)
RBC # BLD AUTO: 3.42 MILLION/UL (ref 3.88–5.62)
SODIUM SERPL-SCNC: 138 MMOL/L (ref 135–147)
TIBC SERPL-MCNC: 313 UG/DL (ref 250–450)
TRIGL SERPL-MCNC: 41 MG/DL
WBC # BLD AUTO: 5.07 THOUSAND/UL (ref 4.31–10.16)

## 2023-05-06 LAB
EST. AVERAGE GLUCOSE BLD GHB EST-MCNC: 137 MG/DL
HBA1C MFR BLD: 6.4 %

## 2023-05-07 LAB
TESTOST FREE SERPL-MCNC: 1.7 PG/ML (ref 7.2–24)
TESTOST SERPL-MCNC: 205 NG/DL (ref 264–916)

## 2023-05-25 ENCOUNTER — VBI (OUTPATIENT)
Dept: ADMINISTRATIVE | Facility: OTHER | Age: 58
End: 2023-05-25

## 2023-06-05 DIAGNOSIS — E29.1 TESTICULAR HYPOGONADISM: ICD-10-CM

## 2023-06-05 RX ORDER — TESTOSTERONE CYPIONATE 200 MG/ML
INJECTION, SOLUTION INTRAMUSCULAR
Qty: 4 ML | Refills: 0 | Status: SHIPPED | OUTPATIENT
Start: 2023-06-05

## 2023-06-05 NOTE — TELEPHONE ENCOUNTER
Requested medication(s) are due for refill today: Yes  Patient has already received a courtesy refill: No  Other reason request has been forwarded to provider:    Off-Protocol Failed 06/05/2023 12:41 PM   Protocol Details  Medication not assigned to a protocol, review manually      Valid encounter within last 12 months

## 2023-06-14 DIAGNOSIS — K44.9 HIATAL HERNIA WITH GERD: ICD-10-CM

## 2023-06-14 DIAGNOSIS — K21.9 HIATAL HERNIA WITH GERD: ICD-10-CM

## 2023-06-14 RX ORDER — OMEPRAZOLE 40 MG/1
40 CAPSULE, DELAYED RELEASE ORAL DAILY
Qty: 90 CAPSULE | Refills: 3 | Status: SHIPPED | OUTPATIENT
Start: 2023-06-14 | End: 2023-06-14 | Stop reason: SDUPTHER

## 2023-06-14 RX ORDER — OMEPRAZOLE 40 MG/1
40 CAPSULE, DELAYED RELEASE ORAL DAILY
Qty: 90 CAPSULE | Refills: 3 | Status: SHIPPED | OUTPATIENT
Start: 2023-06-14

## 2023-07-20 ENCOUNTER — OFFICE VISIT (OUTPATIENT)
Dept: ENDOCRINOLOGY | Facility: CLINIC | Age: 58
End: 2023-07-20
Payer: COMMERCIAL

## 2023-07-20 VITALS
BODY MASS INDEX: 29.24 KG/M2 | WEIGHT: 197.4 LBS | HEART RATE: 75 BPM | SYSTOLIC BLOOD PRESSURE: 140 MMHG | DIASTOLIC BLOOD PRESSURE: 94 MMHG | HEIGHT: 69 IN

## 2023-07-20 DIAGNOSIS — E78.2 MIXED HYPERLIPIDEMIA: ICD-10-CM

## 2023-07-20 DIAGNOSIS — M81.0 OSTEOPOROSIS, UNSPECIFIED OSTEOPOROSIS TYPE, UNSPECIFIED PATHOLOGICAL FRACTURE PRESENCE: ICD-10-CM

## 2023-07-20 DIAGNOSIS — E11.42 DIABETIC POLYNEUROPATHY ASSOCIATED WITH TYPE 2 DIABETES MELLITUS (HCC): ICD-10-CM

## 2023-07-20 DIAGNOSIS — E29.1 TESTICULAR HYPOGONADISM: Primary | ICD-10-CM

## 2023-07-20 DIAGNOSIS — I10 ESSENTIAL HYPERTENSION: ICD-10-CM

## 2023-07-20 DIAGNOSIS — E11.9 DIABETES MELLITUS TYPE 2, NONINSULIN DEPENDENT (HCC): ICD-10-CM

## 2023-07-20 PROCEDURE — 99214 OFFICE O/P EST MOD 30 MIN: CPT

## 2023-07-20 RX ORDER — B-COMPLEX WITH VITAMIN C
1 TABLET ORAL 2 TIMES DAILY WITH MEALS
COMMUNITY

## 2023-07-20 NOTE — ASSESSMENT & PLAN NOTE
The patient missed his injection the week he had his prior labs drawn. He has been having difficulty remembering to take his injections given stress at home. I have asked him to take his injection consistently for 4 weeks prior to having labs drawn. He will repeat these when he has the time. No changes made to regimen today. Will await results of lab work once available.

## 2023-07-20 NOTE — ASSESSMENT & PLAN NOTE
Slightly elevated in office today. Goal BP < 130/80. Patient reports he is in pain today from the neuroma in his feet.  Advised patient to monitor BP at home and notify PCP if remains above goal.

## 2023-07-20 NOTE — PROGRESS NOTES
Established Patient Progress Note    CC: Follow up for type 2 diabetes mellitus    Impression & Plan:    Problem List Items Addressed This Visit        Endocrine    Testicular hypogonadism - Primary     The patient missed his injection the week he had his prior labs drawn. He has been having difficulty remembering to take his injections given stress at home. I have asked him to take his injection consistently for 4 weeks prior to having labs drawn. He will repeat these when he has the time. No changes made to regimen today. Will await results of lab work once available. Relevant Medications    NEEDLE, DISP, 23 G 23G X 1-1/4" MISC    Diabetes mellitus type 2, noninsulin dependent ([address])     Well controlled. Continue with current regimen. Lab Results   Component Value Date    HGBA1C 6.4 (H) 05/05/2023            Diabetic polyneuropathy ([address])     Patient to continue monitoring feet daily for wounds and not walking around barefoot. Lab Results   Component Value Date    HGBA1C 6.4 (H) 05/05/2023               Cardiovascular and Mediastinum    Essential hypertension     Slightly elevated in office today. Goal BP < 130/80. Patient reports he is in pain today from the neuroma in his feet. Advised patient to monitor BP at home and notify PCP if remains above goal.             Musculoskeletal and Integument    Osteoporosis     This has been stable for a number of years. Advised patient to continue with vitamin D supplementation and adequate dietary intake of calcium ~1,000 mg/day. I have ordered a vitamin D level today. Relevant Orders    Vitamin D 25 hydroxy Lab Collect       Other    Mixed hyperlipidemia     Well controlled. Continue with current regimen.             Orders Placed This Encounter   Procedures   • Vitamin D 25 hydroxy Lab Collect     Standing Status:   Future     Standing Expiration Date:   7/20/2024     History of Present Illness:   Sharyle Hutching. is a 65 y.o. male with a history of type 2 diabetes, hypertension, hyperlipidemia, testicular hypogonadism, osteoporosis, chronic pain- treated with opioids in the past. Reports complications of microalbuminuria and neuropathy. Last A1C was 6.4. Denies recent illness or hospitalizations. Denies recent severe hypoglycemic or severe hyperglycemic episodes. Denies any issues with current regimen. Patient is a former smoker. Hypoglycemia: no  Recent hospitalizations or illnesses: no  Problems with current regimen: no     For the testicular hypogonadism, he is on 60 mg of IM testosterone once/week. He reports missing injections more frequently recently due to stress at home. He has plans to repeat his lab work after being more consistent with administration. Osteoporosis: DEXA from 2020 showed normal bone density. He was previously treated with IV reclast. He received 2-3 treatments before stopping. He maintains on 2,000 IU daily of vitamin D. Denies any recent fractures.     Hyperlipidemia: atorvastatin    Hypertension: lisinopril     Current regimen:   Metformin 500 mg BID      Last Eye Exam: UTD  Last Foot Exam: done in office today     Has hypertension: Taking lisinopril  Has hyperlipidemia: Taking atorvastatin      Patient Active Problem List   Diagnosis   • Cervical radiculopathy   • Chronic pain disorder   • Essential hypertension   • Lumbar postlaminectomy syndrome   • Mixed hyperlipidemia   • Osteoporosis   • Other chronic nonalcoholic liver disease   • Parotid gland enlargement   • Testicular hypogonadism   • Diabetes mellitus type 2, noninsulin dependent (HCC)   • Tear of right supraspinatus tendon   • Osteoarthritis of right acromioclavicular joint   • Ankylosing spondylitis (HCC)   • Degeneration of intervertebral disc of lumbar region   • Degenerative lumbar spinal stenosis   • Diabetic polyneuropathy (HCC)   • Presence of intrathecal pump   • Osteoarthritis of both hands   • Hiatal hernia with GERD   • Eustachian tube dysfunction   • Mood disorder ([address]), r/o Substance/medication induced mood disorder   • Continuous opioid dependence ([address])   • Adhesive capsulitis of left shoulder   • Family history of MI (myocardial infarction)      Past Medical History:   Diagnosis Date   • Anaphylactic reaction    • Chronic pain    • Depression    • Diabetes ([address])    • Fibromyalgia, primary    • GERD (gastroesophageal reflux disease)    • Hyperlipidemia    • Hypertension    • Low testosterone    • Neuropathy    • Pilonidal cyst     last assessed 3/17/2014   • Sleep apnea     no cpap      Past Surgical History:   Procedure Laterality Date   • BACK SURGERY      discectomy   • ELBOW SURGERY Right    • OTHER SURGICAL HISTORY  2015    Electr analysis of progr impl pump w/reprogram refill, requiring physician.   Replacement of right abdomen intrathecal  pain pump filled with Dilaudid with electronic analysis and programming.  managed by Suad Holland   • PILONIDAL CYST EXCISION     • CO SURGICAL ARTHROSCOPY SHOULDER W/ROTATOR CUFF RPR Right 2020    Procedure: SHOULDER ARTHROSCOPIC ROTATOR CUFF REPAIR AND DEBRIDEMENT;  Surgeon: Scott Schwartz MD;  Location: [facility];  Service: Orthopedics   • ROTATOR CUFF REPAIR Left    • WISDOM TOOTH EXTRACTION        Family History   Problem Relation Age of Onset   • Diabetes unspecified Sister    • Hypertension Sister    • Hyperlipidemia Sister         pure hypercholesterolemia   • Diabetes unspecified Brother         DM   • Hypertension Brother    • Hyperlipidemia Brother         pure hypercholesterolemia   • Coronary artery disease Father    • Heart disease Father    • Diabetes Brother         DM   • Hypertension Family    • Alcohol abuse Mother    • Liver disease Mother      Social History     Tobacco Use   • Smoking status: Former     Types: Cigarettes     Quit date:      Years since quittin.5   • Smokeless tobacco: Never   Substance Use Topics   • Alcohol use: No     Allergies   Allergen Reactions • Amoxicillin Anaphylaxis     BP dropped         Current Outpatient Medications:   •  atorvastatin (LIPITOR) 20 mg tablet, Take 1 tablet (20 mg total) by mouth daily at bedtime, Disp: 90 tablet, Rfl: 3  •  calcium carbonate-vitamin D 500 mg-5 mcg per tablet, Take 1 tablet by mouth 2 (two) times a day with meals, Disp: , Rfl:   •  Cholecalciferol (VITAMIN D) 2000 units CAPS, Take 1 capsule by mouth daily, Disp: , Rfl:   •  EPINEPHrine (EPIPEN) 0.3 mg/0.3 mL SOAJ, Inject 0.3 mL (0.3 mg total) into a muscle once for 1 dose, Disp: 0.6 mL, Rfl: 0  •  gabapentin (NEURONTIN) 300 mg capsule, Take 600 mg by mouth daily Taking two 600 mg at night, Disp: , Rfl:   •  HYDROmorphone (Dilaudid) 4 mg/mL injection, Inject 1 Device as directed daily  , Disp: , Rfl:   •  lisinopril (ZESTRIL) 20 mg tablet, Take 1 tablet (20 mg total) by mouth daily, Disp: 90 tablet, Rfl: 3  •  meloxicam (MOBIC) 15 mg tablet, , Disp: , Rfl:   •  metFORMIN (GLUCOPHAGE-XR) 500 mg 24 hr tablet, Take 1 tablet (500 mg total) by mouth 2 (two) times a day with meals, Disp: 180 tablet, Rfl: 3  •  methadone (DOLOPHINE) 10 mg tablet, 20 mg daily,, Disp: , Rfl:   •  multivitamin (THERAGRAN) TABS, Take 1 tablet by mouth daily, Disp: , Rfl:   •  NEEDLE, DISP, 23 G 23G X 1-1/4" MISC, Use once a week, Disp: 100 each, Rfl: 2  •  omeprazole (PriLOSEC) 40 MG capsule, Take 1 capsule (40 mg total) by mouth daily, Disp: 90 capsule, Rfl: 3  •  testosterone cypionate (DEPO-TESTOSTERONE) 200 mg/mL SOLN, INJECT 0.3MLS WEEKLY*USE 1 VIAL FOR 1 WEEKLY DOSE,DISCARD REMAINDER REMAINDER, Disp: 4 mL, Rfl: 0  •  Blood Glucose Monitoring Suppl (Contour Next EZ) w/Device KIT, Use 1 each 4 (four) times a day (Patient not taking: Reported on 1/19/2023), Disp: 1 kit, Rfl: 0  •  glucose blood (Contour Next Test) test strip, Use 1 each 4 (four) times a day (Patient not taking: Reported on 1/19/2023), Disp: 400 each, Rfl: 1  •  Microlet Lancets MISC, Use 4 (four) times a day (Patient not taking: Reported on 4/18/2023), Disp: 400 each, Rfl: 1  •  Upadacitinib ER (Rinvoq) 15 MG TB24, Take 15 mg by mouth daily (Patient not taking: Reported on 4/18/2023), Disp: , Rfl:     Review of Systems   Constitutional: Negative for chills and fever. HENT: Negative for ear pain and sore throat. Eyes: Negative for pain and visual disturbance. Respiratory: Negative for cough and shortness of breath. Cardiovascular: Negative for chest pain and palpitations. Gastrointestinal: Negative for abdominal pain and vomiting. Genitourinary: Negative for dysuria and hematuria. Musculoskeletal: Negative for arthralgias and back pain. Uses cane   Skin: Negative for color change and rash. Neurological: Negative for seizures and syncope. All other systems reviewed and are negative. Physical Exam:  Body mass index is 29.15 kg/m². /94   Pulse 75   Ht 5' 9" (1.753 m)   Wt 89.5 kg (197 lb 6.4 oz)   BMI 29.15 kg/m²    Wt Readings from Last 3 Encounters:   07/20/23 89.5 kg (197 lb 6.4 oz)   04/18/23 86.8 kg (191 lb 6.4 oz)   03/06/23 88 kg (194 lb)       Physical Exam  Vitals reviewed. Constitutional:       Appearance: Normal appearance. HENT:      Head: Normocephalic and atraumatic. Cardiovascular:      Rate and Rhythm: Normal rate. Pulses: no weak pulses          Dorsalis pedis pulses are 2+ on the right side and 2+ on the left side. Heart sounds: Murmur heard. Comments: Advised patient to follow up with PCP about this if he would like to have an ECHO done to further evaluate. He denies any shortness of breath or chest pain today. No lower extremity edema noted on exam.  Pulmonary:      Effort: Pulmonary effort is normal.      Breath sounds: Normal breath sounds. Neurological:      Mental Status: He is alert and oriented to person, place, and time.    Psychiatric:         Mood and Affect: Mood normal.         Behavior: Behavior normal.       Patient's shoes and socks removed. Right Foot/Ankle   Right Foot Inspection  Skin Exam: skin intact. Sensory   Vibration: diminished  Monofilament testing: intact    Vascular  The right DP pulse is 2+. Left Foot/Ankle  Left Foot Inspection  Skin Exam: skin intact. Sensory   Vibration: diminished  Monofilament testing: intact    Vascular  The left DP pulse is 2+. Assign Risk Category  No deformity present  Loss of protective sensation  No weak pulses  Risk: 1      Labs:   Lab Results   Component Value Date    HGBA1C 6.4 (H) 05/05/2023    HGBA1C 6.2 04/18/2023    HGBA1C 7.0 (A) 01/19/2023     Lab Results   Component Value Date    CREATININE 0.73 05/05/2023    CREATININE 1.03 04/09/2023    CREATININE 0.84 03/02/2023    BUN 16 05/05/2023     11/30/2015    K 4.2 05/05/2023     05/05/2023    CO2 30 05/05/2023     eGFR   Date Value Ref Range Status   05/05/2023 103 ml/min/1.73sq m Final     Lab Results   Component Value Date    CHOL 227 11/10/2015    HDL 52 05/05/2023    TRIG 41 05/05/2023     Lab Results   Component Value Date    ALT 58 05/05/2023    AST 33 05/05/2023    ALKPHOS 43 (L) 05/05/2023    BILITOT 0.27 11/30/2015     Lab Results   Component Value Date    ITX3QFQWJFZB 0.608 07/12/2022    ZSU2VRMRZVVA 0.435 (L) 05/01/2022    NVJ1VUYSALII 0.988 08/11/2020     Lab Results   Component Value Date    FREET4 0.90 07/12/2022         There are no Patient Instructions on file for this visit. Discussed with the patient and all questioned fully answered. He will call me if any problems arise.

## 2023-07-20 NOTE — ASSESSMENT & PLAN NOTE
This has been stable for a number of years. Advised patient to continue with vitamin D supplementation and adequate dietary intake of calcium ~1,000 mg/day. I have ordered a vitamin D level today.

## 2023-07-20 NOTE — ASSESSMENT & PLAN NOTE
Patient to continue monitoring feet daily for wounds and not walking around barefoot.     Lab Results   Component Value Date    HGBA1C 6.4 (H) 05/05/2023

## 2023-07-20 NOTE — ASSESSMENT & PLAN NOTE
Well controlled. Continue with current regimen.      Lab Results   Component Value Date    HGBA1C 6.4 (H) 05/05/2023

## 2023-07-30 DIAGNOSIS — E78.5 HYPERLIPIDEMIA, UNSPECIFIED HYPERLIPIDEMIA TYPE: ICD-10-CM

## 2023-07-30 DIAGNOSIS — I10 ESSENTIAL HYPERTENSION: ICD-10-CM

## 2023-07-31 RX ORDER — LISINOPRIL 20 MG/1
20 TABLET ORAL DAILY
Qty: 90 TABLET | Refills: 0 | Status: SHIPPED | OUTPATIENT
Start: 2023-07-31

## 2023-07-31 RX ORDER — ATORVASTATIN CALCIUM 20 MG/1
20 TABLET, FILM COATED ORAL
Qty: 90 TABLET | Refills: 0 | Status: SHIPPED | OUTPATIENT
Start: 2023-07-31 | End: 2024-01-27

## 2023-08-07 ENCOUNTER — VBI (OUTPATIENT)
Dept: ADMINISTRATIVE | Facility: OTHER | Age: 58
End: 2023-08-07

## 2023-08-14 DIAGNOSIS — E29.1 TESTICULAR HYPOGONADISM: ICD-10-CM

## 2023-08-17 ENCOUNTER — VBI (OUTPATIENT)
Dept: ADMINISTRATIVE | Facility: OTHER | Age: 58
End: 2023-08-17

## 2023-08-17 RX ORDER — TESTOSTERONE CYPIONATE 200 MG/ML
INJECTION, SOLUTION INTRAMUSCULAR
Qty: 4 ML | Refills: 0 | Status: SHIPPED | OUTPATIENT
Start: 2023-08-17

## 2023-08-30 NOTE — PRE-PROCEDURE INSTRUCTIONS
Pre-Surgery Instructions:   Medication Instructions   • atorvastatin (LIPITOR) 20 mg tablet Take night before surgery   • Cholecalciferol (VITAMIN D) 2000 units CAPS Stop taking 7 days prior to surgery. • [] clindamycin (CLEOCIN) 150 mg capsule no longer takes   • gabapentin (NEURONTIN) 300 mg capsule Take night before surgery   • HYDROmorphone (Dilaudid) 4 mg/mL injection administered through pump/automatically set   • lisinopril (ZESTRIL) 20 mg tablet Take night before surgery   • meloxicam (MOBIC) 15 mg tablet Stop taking 7 days prior to surgery. • metFORMIN (GLUCOPHAGE-XR) 500 mg 24 hr tablet Hold day of surgery. • methadone (DOLOPHINE) 10 mg tablet Take day of surgery. • multivitamin (THERAGRAN) TABS Stop taking 7 days prior to surgery. • omeprazole (PriLOSEC) 40 MG capsule Take night before surgery   • [] predniSONE 10 mg tablet no longer takes   • [DISCONTINUED] atorvastatin (LIPITOR) 20 mg tablet duplicate   • [DISCONTINUED] lisinopril (ZESTRIL) 20 mg tablet duplicate   • [DISCONTINUED] omeprazole (PriLOSEC) 40 MG capsule duplicate      Medication instructions for day surgery reviewed. Please use only a sip of water to take your instructed medications. Avoid all over the counter vitamins, supplements and NSAIDS for one week prior to surgery per anesthesia guidelines. Tylenol is ok to take as needed. You will receive a call one business day prior to surgery with an arrival time and hospital directions. If your surgery is scheduled on a Monday, the hospital will be calling you on the Friday prior to your surgery. If you have not heard from anyone by 8pm, please call the hospital supervisor through the hospital  at [phone]. Kyleigh Every [phone]). Do not eat or drink anything after midnight the night before your surgery, including candy, mints, lifesavers, or chewing gum. Do not drink alcohol 24hrs before your surgery.  Try not to smoke at least 24hrs before your surgery. Follow the pre surgery showering instructions as listed in the [facility] Surgical Experience Booklet” or otherwise provided by your surgeon's office. Do not shave the surgical area 24 hours before surgery. Do not apply any lotions, creams, including makeup, cologne, deodorant, or perfumes after showering on the day of your surgery. No contact lenses, eye make-up, or artificial eyelashes. Remove nail polish, including gel polish, and any artificial, gel, or acrylic nails if possible. Remove all jewelry including rings and body piercing jewelry. Wear causal clothing that is easy to take on and off. Consider your type of surgery. Keep any valuables, jewelry, piercings at home. Please bring any specially ordered equipment (sling, braces) if indicated. Arrange for a responsible person to drive you to and from the hospital on the day of your surgery. Visitor Guidelines discussed. Call the surgeon's office with any new illnesses, exposures, or additional questions prior to surgery. Please reference your [facility] Surgical Experience Booklet” for additional information to prepare for your upcoming surgery.

## 2023-09-05 NOTE — ANESTHESIA PREPROCEDURE EVALUATION
Procedure:  RELEASE CARPAL TUNNEL ENDOSCOPIC (Right: Wrist)  VERSUS OPEN CARPAL TUNNEL RELEASE (Right: Wrist)    Relevant Problems   ANESTHESIA (within normal limits)      CARDIO   (+) Essential hypertension   (+) Mixed hyperlipidemia      ENDO   (+) Diabetes mellitus type 2, noninsulin dependent (HCC)      GI/HEPATIC   (+) Hiatal hernia with GERD   (+) Other chronic nonalcoholic liver disease      /RENAL (within normal limits)      HEMATOLOGY (within normal limits)      MUSCULOSKELETAL   (+) Adhesive capsulitis of left shoulder   (+) Ankylosing spondylitis (HCC)   (+) Degeneration of intervertebral disc of lumbar region   (+) Hiatal hernia with GERD   (+) Osteoarthritis of both hands   (+) Osteoarthritis of right acromioclavicular joint      NEURO/PSYCH   (+) Chronic pain disorder   (+) Continuous opioid dependence (HCC)   (+) Diabetic polyneuropathy (HCC)      PULMONARY (within normal limits)      Nervous and Auditory   (+) Cervical radiculopathy      Other   (+) Lumbar postlaminectomy syndrome   (+) Mood disorder (HCC), r/o Substance/medication induced mood disorder      Methadone last yesterday AM  Lisinopril last yesterday    EKG 4/9/2023:  Normal sinus rhythm  Normal ECG  When compared with ECG of 06-MAR-2023 12:45,  No significant change was found    Lab Results   Component Value Date    WBC 5.07 05/05/2023    HGB 11.0 (L) 05/05/2023    HCT 33.0 (L) 05/05/2023    MCV 97 05/05/2023     05/05/2023     Lab Results   Component Value Date    SODIUM 138 05/05/2023    K 4.2 05/05/2023     05/05/2023    CO2 30 05/05/2023    BUN 16 05/05/2023    CREATININE 0.73 05/05/2023    GLUC 159 (H) 04/09/2023    CALCIUM 9.4 05/05/2023     Lab Results   Component Value Date    INR 1.00 03/04/2015    PROTIME 13.0 03/04/2015     Lab Results   Component Value Date    HGBA1C 6.4 (H) 05/05/2023          Physical Exam    Airway    Mallampati score: II  TM Distance: >3 FB  Neck ROM: full     Dental   No notable dental hx Cardiovascular  Cardiovascular exam normal    Pulmonary  Pulmonary exam normal     Other Findings        Anesthesia Plan  ASA Score- 2     Anesthesia Type- IV sedation with anesthesia with ASA Monitors. Additional Monitors:   Airway Plan:           Plan Factors-Exercise tolerance (METS): >4 METS. Chart reviewed. Existing labs reviewed. Patient summary reviewed. Induction- intravenous. Postoperative Plan-     Informed Consent- Anesthetic plan and risks discussed with patient. I personally reviewed this patient with the CRNA. Discussed and agreed on the Anesthesia Plan with the CRNA. Rachana Haley

## 2023-09-06 ENCOUNTER — HOSPITAL ENCOUNTER (OUTPATIENT)
Facility: AMBULARY SURGERY CENTER | Age: 58
Setting detail: OUTPATIENT SURGERY
Discharge: HOME | End: 2023-09-06
Attending: STUDENT IN AN ORGANIZED HEALTH CARE EDUCATION/TRAINING PROGRAM | Admitting: STUDENT IN AN ORGANIZED HEALTH CARE EDUCATION/TRAINING PROGRAM
Payer: COMMERCIAL

## 2023-09-06 VITALS
DIASTOLIC BLOOD PRESSURE: 64 MMHG | HEART RATE: 63 BPM | TEMPERATURE: 97.1 F | BODY MASS INDEX: 27.73 KG/M2 | SYSTOLIC BLOOD PRESSURE: 104 MMHG | WEIGHT: 187.2 LBS | RESPIRATION RATE: 18 BRPM | OXYGEN SATURATION: 98 % | HEIGHT: 69 IN

## 2023-09-06 LAB
GLUCOSE SERPL-MCNC: 118 MG/DL (ref 65–140)
GLUCOSE SERPL-MCNC: 126 MG/DL (ref 65–140)

## 2023-09-06 PROCEDURE — 29848 WRIST ENDOSCOPY/SURGERY: CPT | Performed by: PHYSICIAN ASSISTANT

## 2023-09-06 PROCEDURE — 99024 POSTOP FOLLOW-UP VISIT: CPT | Performed by: STUDENT IN AN ORGANIZED HEALTH CARE EDUCATION/TRAINING PROGRAM

## 2023-09-06 PROCEDURE — 82948 REAGENT STRIP/BLOOD GLUCOSE: CPT

## 2023-09-06 PROCEDURE — 29848 WRIST ENDOSCOPY/SURGERY: CPT | Performed by: STUDENT IN AN ORGANIZED HEALTH CARE EDUCATION/TRAINING PROGRAM

## 2023-09-06 RX ORDER — CHLORHEXIDINE GLUCONATE 0.12 MG/ML
15 RINSE ORAL ONCE
Status: DISCONTINUED | OUTPATIENT
Start: 2023-09-06 | End: 2023-09-06 | Stop reason: HOSPADM

## 2023-09-06 RX ORDER — CEFAZOLIN SODIUM 2 G/50ML
2000 SOLUTION INTRAVENOUS ONCE
Status: DISCONTINUED | OUTPATIENT
Start: 2023-09-06 | End: 2023-09-06 | Stop reason: HOSPADM

## 2023-09-06 RX ORDER — SODIUM CHLORIDE, SODIUM LACTATE, POTASSIUM CHLORIDE, CALCIUM CHLORIDE 600; 310; 30; 20 MG/100ML; MG/100ML; MG/100ML; MG/100ML
INJECTION, SOLUTION INTRAVENOUS CONTINUOUS PRN
Status: DISCONTINUED | OUTPATIENT
Start: 2023-09-06 | End: 2023-09-06

## 2023-09-06 RX ORDER — ONDANSETRON 2 MG/ML
INJECTION INTRAMUSCULAR; INTRAVENOUS AS NEEDED
Status: DISCONTINUED | OUTPATIENT
Start: 2023-09-06 | End: 2023-09-06

## 2023-09-06 RX ORDER — ACETAMINOPHEN 325 MG/1
975 TABLET ORAL EVERY 8 HOURS SCHEDULED
Status: DISCONTINUED | OUTPATIENT
Start: 2023-09-06 | End: 2023-09-06 | Stop reason: HOSPADM

## 2023-09-06 RX ORDER — MAGNESIUM HYDROXIDE 1200 MG/15ML
LIQUID ORAL AS NEEDED
Status: DISCONTINUED | OUTPATIENT
Start: 2023-09-06 | End: 2023-09-06 | Stop reason: HOSPADM

## 2023-09-06 RX ORDER — MIDAZOLAM HYDROCHLORIDE 2 MG/2ML
INJECTION, SOLUTION INTRAMUSCULAR; INTRAVENOUS AS NEEDED
Status: DISCONTINUED | OUTPATIENT
Start: 2023-09-06 | End: 2023-09-06

## 2023-09-06 RX ORDER — FENTANYL CITRATE/PF 50 MCG/ML
25 SYRINGE (ML) INJECTION
Status: DISCONTINUED | OUTPATIENT
Start: 2023-09-06 | End: 2023-09-06 | Stop reason: HOSPADM

## 2023-09-06 RX ORDER — OXYCODONE HYDROCHLORIDE 5 MG/1
5 TABLET ORAL EVERY 6 HOURS PRN
Status: DISCONTINUED | OUTPATIENT
Start: 2023-09-06 | End: 2023-09-06

## 2023-09-06 RX ORDER — IBUPROFEN 600 MG/1
600 TABLET ORAL EVERY 6 HOURS PRN
Status: DISCONTINUED | OUTPATIENT
Start: 2023-09-06 | End: 2023-09-06 | Stop reason: HOSPADM

## 2023-09-06 RX ORDER — PROPOFOL 10 MG/ML
INJECTION, EMULSION INTRAVENOUS AS NEEDED
Status: DISCONTINUED | OUTPATIENT
Start: 2023-09-06 | End: 2023-09-06

## 2023-09-06 RX ORDER — FENTANYL CITRATE 50 UG/ML
INJECTION, SOLUTION INTRAMUSCULAR; INTRAVENOUS AS NEEDED
Status: DISCONTINUED | OUTPATIENT
Start: 2023-09-06 | End: 2023-09-06

## 2023-09-06 RX ORDER — PROMETHAZINE HYDROCHLORIDE 25 MG/ML
25 INJECTION, SOLUTION INTRAMUSCULAR; INTRAVENOUS ONCE AS NEEDED
Status: DISCONTINUED | OUTPATIENT
Start: 2023-09-06 | End: 2023-09-06 | Stop reason: HOSPADM

## 2023-09-06 RX ADMIN — ONDANSETRON 4 MG: 2 INJECTION INTRAMUSCULAR; INTRAVENOUS at 07:31

## 2023-09-06 RX ADMIN — SODIUM CHLORIDE, SODIUM LACTATE, POTASSIUM CHLORIDE, AND CALCIUM CHLORIDE: .6; .31; .03; .02 INJECTION, SOLUTION INTRAVENOUS at 07:15

## 2023-09-06 RX ADMIN — MIDAZOLAM 1 MG: 1 INJECTION INTRAMUSCULAR; INTRAVENOUS at 07:25

## 2023-09-06 RX ADMIN — FENTANYL CITRATE 50 MCG: 50 INJECTION INTRAMUSCULAR; INTRAVENOUS at 07:25

## 2023-09-06 RX ADMIN — PROPOFOL 50 MCG/KG/MIN: 10 INJECTION, EMULSION INTRAVENOUS at 07:27

## 2023-09-06 RX ADMIN — MIDAZOLAM 1 MG: 1 INJECTION INTRAMUSCULAR; INTRAVENOUS at 07:23

## 2023-09-06 RX ADMIN — PROPOFOL 40 MG: 10 INJECTION, EMULSION INTRAVENOUS at 07:26

## 2023-09-06 NOTE — DISCHARGE INSTR - AVS FIRST PAGE
Post Operative Instructions    You have had surgery on your arm today, please read and follow the information below:  Elevate your hand above your elbow during the next 24-48 hours to help with swelling. Place your hand and arm over your head with motion at your shoulder three times a day. Do not apply any cream/ointment/oil to your incisions including antibiotics (I.e.Neosporin)  Do not use peroxide to clean your wound   Do not soak your hands in standing water (dishwater, tubs, Jacuzzi's, pools, etc.) until given permission (typically 2-3 weeks after injury)    Call the office if you notice any:  Increased numbness or tingling of your hand or fingers that is not relieved with elevation. Increasing pain that is not controlled with medication. Difficulty chewing, breathing, swallowing. Chest pains or shortness of breath. Fever over 101.4 degrees. Bandage: Do NOT remove bandage until follow-up appointment. Motion: Move fingers into a fist 5 times a day, DO NOT move any splinted fingers. Weight bearing status: Avoid heavy lifting (>2 pounds) with the extremity that was operated on until follow up appointment. Normal activities of daily living are OK. Ice: Ice for 10 minutes every hour as needed for swelling x 24 hours. Sling: No sling necessary. Pain medication:   Tylenol Extended Release 650 mg every 8 hours  Naproxen 220 mg two times a day OR ibuprofen 400-600 mg three times a day as needed     Therapy: No therapy needed at this time. Follow-up Appointment: 7-10 days. Please call the office if you have any questions or concerns regarding your post-operative care.

## 2023-09-06 NOTE — OP NOTE
OPERATIVE REPORT  PATIENT NAME: Marino Slade Sr.    :  1965  MRN: [id number]  Pt Location: [facility] OR ROOM 04    SURGERY DATE: 2023     Surgeon(s) and Role:     * Kristyn Leigh MD - Primary     * Gosia Darwin, [address]    Physician Assistant need: A physician assistant was required during the procedure for retraction, tissue handling, dissection, and suturing. No qualified resident was available. Pre-Op Diagnosis Codes:     * Carpal tunnel syndrome on right [G56.01]    Post-Op Diagnosis Codes:     * Carpal tunnel syndrome on right [G56.01]    Procedure(s) (LRB):  RELEASE CARPAL TUNNEL ENDOSCOPIC (Right)    Specimen(s):  * No specimens in log *    Estimated Blood Loss:   Minimal    Drains:  None    Implants:  * No implants in log *    Anesthesia Type:   IV Sedation with Anesthesia    Operative Indications:  Patient is a 63 y.o. male evaluated in clinic with symptoms and clinical exam consistent with Right carpal tunnel syndrome. EMG was performed and consistent with moderate to severe Right carpal tunnel syndrome. We discussed treatment options with patient including conservative management and surgical decompression. Discussed nonoperative management with splinting, injections, and observation. We discussed with surgery the possibility of continued numbness and tingling after surgery and possible recurrence of symptoms many years down the line. Patient elected for surgical management. Operative Findings: Thick transverse carpal ligament that was able to be released endoscopically. Complications:   None     Procedure and Technique:  Patient was identified in the preoperative holding area. Surgical sites were marked with patient participation. Patient was taken back to the operating theater. Anesthesia was induced. Extremity was prepped and draped typical fashion. Formal time-out was performed confirming site, patient, and procedure. All present were in agreement.  A 50-50 mixture of 1% lidocaine without epinephrine and 0.25% bupivacaine without epinephrine was used for local field block. Incision made over proximal transverse wrist crease. Palmaris longus tendon was retracted radially. Blunt dissection was carried to the antebrachial fascia, which was entered bluntly. The dilator was inserted below the antebrachial fascia and above the median nerve. The dilator was taken distally. The endoscope with [employer] endoscopic knife was introduced with care to be below antebrachial fascia and above the median nerve. Good visualization was noted. The transverse carpal ligament fibers were directly visualized. The transverse carpal ligament was incised using endoscopic knife. The wound was reinspected. There was complete release of the transverse carpal ligament. Attention was turned proximally through the wound. The distal 1.5 cm aspect of antebrachial fracture was incised under direct visualization. Wound was inspected and palpated with the hemostat. There was no further sites of compression. Wound was irrigated. Skin was closed with 4-0 chromic in horizontal mattress fashion. Wounds were dressed with Xeroform, 4x4s, cast padding, Ace bandage. Tourniquet was deflated. Patient was taken to PACU in stable condition. I was present for the entire procedure     Postoperative Plan:  Patient may range digits as tolerated. We will limit weightbearing to a couple of pounds for the first 2 weeks. We will see patient back at the 2-week postoperative christiano.         Patient Disposition:  PACU         SIGNATURE: Andrzej Merida MD  DATE: September 6, 2023  TIME: 7:58 AM

## 2023-09-06 NOTE — ANESTHESIA POSTPROCEDURE EVALUATION
Post-Op Assessment Note    CV Status:  Stable  Pain Score: 1    Pain management: adequate     Mental Status:  Alert and awake   Hydration Status:  Euvolemic   PONV Controlled:  Controlled   Airway Patency:  Patent      Post Op Vitals Reviewed: Yes      Staff: CRNA         No notable events documented.     BP   99/59   Temp 97.1   Pulse 62   Resp 18   SpO2 95% RA

## 2023-09-06 NOTE — H&P
H&P reviewed. After examining the patient I find no changes in the patients condition since the H&P had been written. Surgical site marked with patient participation. All questions were answered. Vitals:    09/06/23 0652   BP: 116/67   Pulse: 66   Resp: 18   Temp: 97.7 °F (36.5 °C)   SpO2: 97%     ORTHOPAEDIC HAND, WRIST, AND ELBOW OFFICE  VISIT         ASSESSMENT/PLAN:       Diagnoses and all orders for this visit:     Carpal tunnel syndrome on right  -     Case request operating room: RELEASE CARPAL TUNNEL ENDOSCOPIC VERSUS OPEN; Standing  -     EKG 12 lead; Future  -     Ambulatory referral to [facility]; Future  -     Case request operating room: RELEASE CARPAL TUNNEL ENDOSCOPIC VERSUS OPEN     Carpal tunnel syndrome on left  -     Case request operating room: RELEASE CARPAL TUNNEL ENDOSCOPIC VERSUS OPEN; Standing  -     Case request operating room: RELEASE CARPAL TUNNEL ENDOSCOPIC VERSUS OPEN     Ankylosing spondylitis of lumbar region ([address])     Other orders  -     Diet NPO; Sips with meds; Standing  -     Nursing Communication [address] [place] [address] Interventions Implemented; Standing  -     Nursing Communication CHG bath, have staff wash entire body (neck down) per pre-op bathing protocol. Routine, evening prior to, and day of surgery.; Standing  -     Nursing Communication Swab both nares with Povidone-Iodine solution, EXCLUDE if patient has shellfish/Iodine allergy. Routine, day of surgery, on call to OR; Standing  -     chlorhexidine (PERIDEX) 0.12 % oral rinse 15 mL  -     Void on call to OR; Standing  -     Insert peripheral IV; Standing  -     ceFAZolin (ANCEF) 2,000 mg in dextrose 5 % 100 mL IVPB  -     Diet NPO; Sips with meds; Standing  -     Nursing Communication [address] [place] [address] Interventions Implemented; Standing  -     Nursing Communication CHG bath, have staff wash entire body (neck down) per pre-op bathing protocol.  Routine, evening prior to, and day of surgery.; Standing  -     Nursing Communication Swab both nares with Povidone-Iodine solution, EXCLUDE if patient has shellfish/Iodine allergy. Routine, day of surgery, on call to OR; Standing  -     chlorhexidine (PERIDEX) 0.12 % oral rinse 15 mL  -     Void on call to OR; Standing  -     Insert peripheral IV; Standing  -     ceFAZolin (ANCEF) 2,000 mg in dextrose 5 % 100 mL IVPB        71-year-old male with bilateral carpal tunnel syndrome. The EMG was reviewed in the office today which demonstrates moderate to severe carpal tunnel syndrome bilaterally. Non operative versus surgical intervention was discussed. The patient elected to proceed with surgical intervention in the form of endoscopic versus open right carpal tunnel release. We will perform left carpal tunnel release 3 weeks postoperatively. Risks and benefits were discussed at length. Risks of the surgery are inclusive of but not limited to bleeding, infection, nerve injury, blood clot, worsening of symptoms, not achieving the anticipated results, persistent stiffness, weakness and the need for additional surgery. We did discuss the biggest risk would be that his numbness and tingling does not improve. The patient's carpal tunnel is severe and he does have constant numbness and tingling. The patient verbally stated they understood those risks and would like to proceed with the surgery. Surgical consent for the right and left hand were signed in the office today. I will see him the day of surgery. We will perform left carpal tunnel release appx 3 weeks postoperatively. I will see him the day of surgery.         The patient verbalized understanding of exam findings and treatment plan. We engaged in the shared decision-making process and treatment options were discussed at length with the patient. Surgical and conservative management discussed today along with risks and benefits.        Follow Up:   After surgery         To Do Next Visit:  Sutures out        Operative Discussions:  Endoscopic Carpal Tunnel Release: The anatomy and physiology of carpal tunnel syndrome was discussed with the patient today. Increase pressure localized under the transverse carpal ligament can cause pain, numbness, tingling, or dysesthesias within the median nerve distribution as well as feelings of fatigue, clumsiness, or awkwardness. These symptoms typically occur at night and worse in the morning upon waking. Eventually, untreated carpal tunnel syndrome can result in weakness and permanent loss of muscle within the thenar compartment of the hand. Treatment options were discussed with the patient. Conservative treatment includes nocturnal resting splints to keep the nerve in a neutral position, ergonomic changes within the work or home environment, activity modification, and tendon gliding exercises. Vitamin B6 one tablet daily over the counter may helpful to reduce symptoms. Steroid injections within the carpal canal can help a majority of patients, however this is often self-limited in a majority of patients. Surgical intervention to divide the transverse carpal ligament typically results in a long-lasting relief of the patient's complaints, with the recurrence rate of less than 1%. The patient has elected to undergo an endoscopic carpal tunnel release. The single incision technique was discussed with the patient, which results in approximately a two-week recovery time less wound complications. In the postoperative period, light activities are allowed immediately, driving is allowed when narcotic medication has stopped, and the bandages may be removed and incision may get wet after 2 days. Heavy activities (lifting more than approximately 10 pounds) will be allowed after follow up appointment in 1-2 weeks.   While night symptoms (waking from sleep, pain, and discomfort in the hands) generally improves rapidly, the numbness and tingling as well as the strength will slowly improve over weeks to months depending on the chronicity and severity of the carpal tunnel syndrome. Pillar pain and scar discomfort were discussed with the patient which are self-limiting conditions. The risks of bleeding and infection from the surgery are less than 1%. Risk of recurrence is approximately 0.5%. The risks of nerve injury or nerve damage or damage to the blood vessels is approximately 1 in 1200. The patient has an understanding of the above mentioned discussion. The risks and benefits of the procedure were explained to the patient, which include, but are not limited to: Bleeding, infection, recurrence, pain, scar, damage to tendons, damage to nerves, and damage to blood vessels, failure to give desired results and complications related to anesthesia. These risks, along with alternative conservative treatment options, and postoperative protocols were voiced back and understood by the patient. All questions were answered to the patient's satisfaction. The patient agrees to comply with a standard postoperative protocol, and is willing to proceed. Education was provided via written and auditory forms. There were no barriers to learning. Written handouts regarding wound care, incision and scar care, and general preoperative information was provided to the patient. Prior to surgery, the patient may be requested to stop all anti-inflammatory medications. Prophylactic aspirin, Plavix, and Coumadin may be allowed to be continued.   Medications including vitamin E., ginkgo, &fish oil are requested to be stopped about one week.     Hyun Hu MD  Attending, Orthopaedic Surgery  Hand, Wrist, and Elbow Surgery  [facility]     ____________________________________________________________________________________________________________________________________________        CHIEF COMPLAINT:      Chief Complaint   Patient presents with   • Left Hand - Pain   • Right Hand - Pain         SUBJECTIVE:  Bill Verde. is a 65y.o. year old RHD male who presents today for evaluation and treatment of bilateral hand numbness and tingling. The patient is referred by Haylee Carroll. He states his right hand is worse than his left. He notes constant numbness and tingling to his index, middle, and ring fingers. He states at times, his entire hand will be numb. He states this does wake him from sleep at night. He has tried bracing OTC without much relief. The patient states he was doing work around his house when the numbness and tingling increased. He is a diabetic and his last A1 C was 7.0. The patient is currently on disability secondary to ankylosing spondylitis.           I have personally reviewed all the relevant PMH, PSH, SH, FH, Medications and allergies        PAST MEDICAL HISTORY:  Medical History        Past Medical History:   Diagnosis Date   • Chronic pain     • Depression     • Diabetes ([address])     • Fibromyalgia, primary     • GERD (gastroesophageal reflux disease)     • Hyperlipidemia     • Hypertension     • Low testosterone     • Neuropathy     • Pilonidal cyst       last assessed 3/17/2014   • Sleep apnea       no cpap            PAST SURGICAL HISTORY:  Surgical History         Past Surgical History:   Procedure Laterality Date   • BACK SURGERY         discectomy   • ELBOW SURGERY Right     • OTHER SURGICAL HISTORY   03/13/2015     Electr analysis of progr impl pump w/reprogram refill, requiring physician.   Replacement of right abdomen intrathecal  pain pump filled with Dilaudid with electronic analysis and programming.  managed by Charli Isaac   • PILONIDAL CYST EXCISION       • MN SURGICAL ARTHROSCOPY SHOULDER W/ROTATOR CUFF RPR Right 2/28/2020     Procedure: SHOULDER ARTHROSCOPIC ROTATOR CUFF REPAIR AND DEBRIDEMENT;  Surgeon: Evelyn Zazueta MD;  Location: [facility];  Service: Orthopedics   • ROTATOR CUFF REPAIR Left     • WISDOM TOOTH EXTRACTION                FAMILY HISTORY:  Family History         Family History   Problem Relation Age of Onset   • Diabetes unspecified Sister     • Hypertension Sister     • Hyperlipidemia Sister           pure hypercholesterolemia   • Diabetes unspecified Brother           DM   • Hypertension Brother     • Hyperlipidemia Brother           pure hypercholesterolemia   • Coronary artery disease Father     • Heart disease Father     • Diabetes Brother           DM   • Hypertension Family     • Alcohol abuse Mother     • Liver disease Mother              SOCIAL HISTORY:  Social History            Tobacco Use   • Smoking status: Former       Types: Cigarettes       Start date: 2/24/2018   • Smokeless tobacco: Never   Vaping Use   • Vaping Use: Never used   Substance Use Topics   • Alcohol use: No   • Drug use: Never         MEDICATIONS:     Current Outpatient Medications:   •  Cholecalciferol (VITAMIN D) 2000 units CAPS, Take 1 capsule by mouth daily, Disp: , Rfl:   •  gabapentin (NEURONTIN) 300 mg capsule, Take 600 mg by mouth daily Taking two 600 mg at night, Disp: , Rfl:   •  HYDROmorphone (Dilaudid) 4 mg/mL injection, Inject 1 Device as directed daily  , Disp: , Rfl:   •  lisinopril (ZESTRIL) 20 mg tablet, Take 1 tablet (20 mg total) by mouth daily, Disp: 90 tablet, Rfl: 3  •  meloxicam (MOBIC) 15 mg tablet, , Disp: , Rfl:   •  metFORMIN (GLUCOPHAGE-XR) 500 mg 24 hr tablet, Take 1 tablet (500 mg total) by mouth 2 (two) times a day with meals, Disp: 180 tablet, Rfl: 3  •  methadone (DOLOPHINE) 10 mg tablet, Take 2 tablets every 6 hours for pain,, Disp: , Rfl:   •  multivitamin (THERAGRAN) TABS, Take 1 tablet by mouth daily, Disp: , Rfl:   •  omeprazole (PriLOSEC) 40 MG capsule, Take 1 capsule (40 mg total) by mouth daily, Disp: 90 capsule, Rfl: 3  •  SYRINGE-NEEDLE, DISP, 3 ML 21G X 1" 3 ML MISC, by Does not apply route once a week, Disp: , Rfl:   •  testosterone cypionate (DEPO-TESTOSTERONE) 200 mg/mL SOLN, INJECT 0.3MLS WEEKLY*USE 1 VIAL FOR 1 WEEKLY DOSE,DISCARD REMAINDER REMAINDER, Disp: 4 mL, Rfl: 2  • Upadacitinib ER (Rinvoq) 15 MG TB24, Take 15 mg by mouth daily, Disp: , Rfl:   •  atorvastatin (LIPITOR) 20 mg tablet, Take 1 tablet (20 mg total) by mouth daily at bedtime, Disp: 90 tablet, Rfl: 3  •  Blood Glucose Monitoring Suppl (Contour Next EZ) w/Device KIT, Use 1 each 4 (four) times a day (Patient not taking: Reported on 1/19/2023), Disp: 1 kit, Rfl: 0  •  glucose blood (Contour Next Test) test strip, Use 1 each 4 (four) times a day (Patient not taking: Reported on 1/19/2023), Disp: 400 each, Rfl: 1  •  Microlet Lancets MISC, Use 4 (four) times a day (Patient not taking: Reported on 1/19/2023), Disp: 400 each, Rfl: 1  •  oxyCODONE (OxyCONTIN) 80 mg 12 hr tablet, Take 1 tablet by mouth every 12 (twelve) hours (Patient not taking: Reported on 1/19/2023), Disp: , Rfl:      ALLERGIES:  No Known Allergies           REVIEW OF SYSTEMS:  Musculoskeletal:        As noted in HPI.    All other systems reviewed and are negative.     VITALS:      Vitals:     03/06/23 1052   BP: 123/82   Pulse: 83         LABS:  HgA1c:         Lab Results   Component Value Date     HGBA1C 7.0 (A) 01/19/2023      BMP:         Lab Results   Component Value Date     GLUCOSE 94 11/30/2015     CALCIUM 9.5 03/02/2023      11/30/2015     K 4.3 03/02/2023     CO2 29 03/02/2023      03/02/2023     BUN 17 03/02/2023     CREATININE 0.84 03/02/2023         _____________________________________________________  PHYSICAL EXAMINATION:  General: well developed and well nourished, alert, oriented times 3 and appears comfortable  Psychiatric: Normal  HEENT: Normocephalic, Atraumatic Trachea Midline, No torticollis  Pulmonary: No audible wheezing or respiratory distress   Abdomen/GI: Non tender, non distended   Cardiovascular: No pitting edema, 2+ radial pulse   Skin: No masses, erythema, lacerations, fluctation, ulcerations  Neurovascular: Sensation Intact to the Median, Ulnar, Radial Nerve, Motor Intact to the Median, Ulnar, Radial Nerve and Pulses Intact  Musculoskeletal: Normal, except as noted in detailed exam and in HPI.        MUSCULOSKELETAL EXAMINATION:  Bilateral hand  + tinel's at the wrist  5/5 APB  - tinel's at the elbow          Right radial (mm) Right ulnar (mm) Left radial (mm) Left ulnar (mm)   Thumb 5 5 5 6   Index 5 5 5 5   Long 5 6 7 5   Ring 5 5 5 5   Small 5 5 5 5            ___________________________________________________  STUDIES REVIEWED:  EMG was personally reviewed by Dr. Gisselle Celaya and demonstrates moderate to severe carpal tunnel bilateraly. this is able to be reviewed under neurology notes from 2/24/23            PROCEDURES PERFORMED:  Procedures  No Procedures performed today     _____________________________________________________             Scribe Attestation    I,:  Sol Hough MA am acting as a scribe while in the presence of the attending physician.:       I,:  Eleazar Bruce MD personally performed the services described in this documentation    as scribed in my presence. :

## 2023-09-13 ENCOUNTER — ANESTHESIA EVENT (OUTPATIENT)
Dept: PERIOP | Facility: AMBULARY SURGERY CENTER | Age: 58
End: 2023-09-13
Payer: COMMERCIAL

## 2023-09-15 ENCOUNTER — OFFICE VISIT (OUTPATIENT)
Dept: OBGYN CLINIC | Facility: CLINIC | Age: 58
End: 2023-09-15

## 2023-09-15 VITALS
DIASTOLIC BLOOD PRESSURE: 87 MMHG | WEIGHT: 187 LBS | HEART RATE: 79 BPM | HEIGHT: 69 IN | SYSTOLIC BLOOD PRESSURE: 139 MMHG | BODY MASS INDEX: 27.7 KG/M2

## 2023-09-15 DIAGNOSIS — G56.02 CARPAL TUNNEL SYNDROME ON LEFT: ICD-10-CM

## 2023-09-15 DIAGNOSIS — Z98.890 S/P ENDOSCOPIC CARPAL TUNNEL RELEASE: Primary | ICD-10-CM

## 2023-09-15 PROCEDURE — 99024 POSTOP FOLLOW-UP VISIT: CPT | Performed by: STUDENT IN AN ORGANIZED HEALTH CARE EDUCATION/TRAINING PROGRAM

## 2023-09-15 NOTE — PROGRESS NOTES
Assessment/Plan:  Patient with severe left carpal tunnel syndrome, planning for surgery. Postoperative  Patient ID: 63 y.o. male   Surgery: Release Carpal Tunnel Endoscopic - Right  Date of Surgery: 9/6/2023    The patient is doing well postoperatively. His surgical incision is healing well. Sutures were removed in the office today. Discussed with the patient that his carpal tunnel was severe on EMG and can take time to improve. I am hopeful this will continue to improve as his night time symptoms have improved. In regards to his left wrist, patient would like to continue with surgical intervention as planned on 9/21/23 in the form of left endoscopic verus open carpal tunnel release. Surgical consent was signed previously and uploaded into the patient's chart. I will follow up with the patient postoperatively. Follow Up: After surgery    To Do Next Visit:  Sutures out      CHIEF COMPLAINT:  Chief Complaint   Patient presents with   • Right Wrist - Post-op         SUBJECTIVE:  Patient is a 63y.o. year old male who presents for follow up after Release Carpal Tunnel Endoscopic - Right. Today patient states he is doing well. He denies any issues. The patient states the numbness and tingling is unchanged at this point. He states the night time pain has improved. In regards to his left hand, the patient notes constant numbness and tingling to the median nerve distrubution. The patient is already scheduled for surgery for the left on 9/21/23.       PHYSICAL EXAMINATION:  General: Well developed and well nourished, alert and oriented x 3, appears comfortable  Psychiatric: Normal    MUSCULOSKELETAL EXAMINATION:  Incision: Clean, dry, intact  Surgery Site: normal, no evidence of infection   Range of Motion: As expected  Neurovascular status: Neuro intact, good cap refill  Done today: Sutures out      STUDIES REVIEWED:  EMG was personally reviewed and independently interpreted by Dr. Alessandro Chris and demonstrates severe bilateral carpal tunnel syndrome.        PROCEDURES PERFORMED:  Procedures  No Procedures performed today    Scribe Attestation    I,:  Sol Hough MA am acting as a scribe while in the presence of the attending physician.:       I,:  Yo Chen MD personally performed the services described in this documentation    as scribed in my presence.:

## 2023-09-15 NOTE — H&P (VIEW-ONLY)
Assessment/Plan:  Patient with severe left carpal tunnel syndrome, planning for surgery. Postoperative  Patient ID: 63 y.o. male   Surgery: Release Carpal Tunnel Endoscopic - Right  Date of Surgery: 9/6/2023    The patient is doing well postoperatively. His surgical incision is healing well. Sutures were removed in the office today. Discussed with the patient that his carpal tunnel was severe on EMG and can take time to improve. I am hopeful this will continue to improve as his night time symptoms have improved. In regards to his left wrist, patient would like to continue with surgical intervention as planned on 9/21/23 in the form of left endoscopic verus open carpal tunnel release. Surgical consent was signed previously and uploaded into the patient's chart. I will follow up with the patient postoperatively. Follow Up: After surgery    To Do Next Visit:  Sutures out      CHIEF COMPLAINT:  Chief Complaint   Patient presents with   • Right Wrist - Post-op         SUBJECTIVE:  Patient is a 63y.o. year old male who presents for follow up after Release Carpal Tunnel Endoscopic - Right. Today patient states he is doing well. He denies any issues. The patient states the numbness and tingling is unchanged at this point. He states the night time pain has improved. In regards to his left hand, the patient notes constant numbness and tingling to the median nerve distrubution. The patient is already scheduled for surgery for the left on 9/21/23.       PHYSICAL EXAMINATION:  General: Well developed and well nourished, alert and oriented x 3, appears comfortable  Psychiatric: Normal    MUSCULOSKELETAL EXAMINATION:  Incision: Clean, dry, intact  Surgery Site: normal, no evidence of infection   Range of Motion: As expected  Neurovascular status: Neuro intact, good cap refill  Done today: Sutures out      STUDIES REVIEWED:  EMG was personally reviewed and independently interpreted by Dr. Turcios Fearing and demonstrates severe bilateral carpal tunnel syndrome.        PROCEDURES PERFORMED:  Procedures  No Procedures performed today    Scribe Attestation    I,:  Sol Hough MA am acting as a scribe while in the presence of the attending physician.:       I,:  Lucio Villagran MD personally performed the services described in this documentation    as scribed in my presence.:

## 2023-09-21 ENCOUNTER — ANESTHESIA (OUTPATIENT)
Dept: PERIOP | Facility: AMBULARY SURGERY CENTER | Age: 58
End: 2023-09-21
Payer: COMMERCIAL

## 2023-09-21 ENCOUNTER — HOSPITAL ENCOUNTER (OUTPATIENT)
Facility: AMBULARY SURGERY CENTER | Age: 58
Setting detail: OUTPATIENT SURGERY
Discharge: HOME | End: 2023-09-21
Attending: STUDENT IN AN ORGANIZED HEALTH CARE EDUCATION/TRAINING PROGRAM | Admitting: STUDENT IN AN ORGANIZED HEALTH CARE EDUCATION/TRAINING PROGRAM
Payer: COMMERCIAL

## 2023-09-21 VITALS
DIASTOLIC BLOOD PRESSURE: 60 MMHG | HEART RATE: 64 BPM | BODY MASS INDEX: 27.62 KG/M2 | TEMPERATURE: 97.2 F | WEIGHT: 187 LBS | SYSTOLIC BLOOD PRESSURE: 122 MMHG | OXYGEN SATURATION: 99 % | RESPIRATION RATE: 16 BRPM

## 2023-09-21 PROBLEM — Z79.891 CHRONICALLY ON OPIATE THERAPY: Status: ACTIVE | Noted: 2022-12-06

## 2023-09-21 PROBLEM — Z46.2 END OF BATTERY LIFE OF INTRATHECAL INFUSION PUMP: Status: ACTIVE | Noted: 2021-10-04

## 2023-09-21 LAB
GLUCOSE SERPL-MCNC: 128 MG/DL (ref 65–140)
GLUCOSE SERPL-MCNC: 135 MG/DL (ref 65–140)

## 2023-09-21 PROCEDURE — 29848 WRIST ENDOSCOPY/SURGERY: CPT | Performed by: STUDENT IN AN ORGANIZED HEALTH CARE EDUCATION/TRAINING PROGRAM

## 2023-09-21 PROCEDURE — 82948 REAGENT STRIP/BLOOD GLUCOSE: CPT

## 2023-09-21 PROCEDURE — 29848 WRIST ENDOSCOPY/SURGERY: CPT | Performed by: PHYSICIAN ASSISTANT

## 2023-09-21 RX ORDER — FENTANYL CITRATE/PF 50 MCG/ML
50 SYRINGE (ML) INJECTION
Status: DISCONTINUED | OUTPATIENT
Start: 2023-09-21 | End: 2023-09-21 | Stop reason: HOSPADM

## 2023-09-21 RX ORDER — SODIUM CHLORIDE, SODIUM LACTATE, POTASSIUM CHLORIDE, CALCIUM CHLORIDE 600; 310; 30; 20 MG/100ML; MG/100ML; MG/100ML; MG/100ML
INJECTION, SOLUTION INTRAVENOUS CONTINUOUS PRN
Status: DISCONTINUED | OUTPATIENT
Start: 2023-09-21 | End: 2023-09-21

## 2023-09-21 RX ORDER — ALBUTEROL SULFATE 2.5 MG/3ML
2.5 SOLUTION RESPIRATORY (INHALATION) ONCE AS NEEDED
Status: DISCONTINUED | OUTPATIENT
Start: 2023-09-21 | End: 2023-09-21 | Stop reason: HOSPADM

## 2023-09-21 RX ORDER — SODIUM CHLORIDE, SODIUM LACTATE, POTASSIUM CHLORIDE, CALCIUM CHLORIDE 600; 310; 30; 20 MG/100ML; MG/100ML; MG/100ML; MG/100ML
50 INJECTION, SOLUTION INTRAVENOUS CONTINUOUS
Status: CANCELLED | OUTPATIENT
Start: 2023-09-21

## 2023-09-21 RX ORDER — FENTANYL CITRATE 50 UG/ML
INJECTION, SOLUTION INTRAMUSCULAR; INTRAVENOUS AS NEEDED
Status: DISCONTINUED | OUTPATIENT
Start: 2023-09-21 | End: 2023-09-21

## 2023-09-21 RX ORDER — LABETALOL HYDROCHLORIDE 5 MG/ML
10 INJECTION, SOLUTION INTRAVENOUS
Status: DISCONTINUED | OUTPATIENT
Start: 2023-09-21 | End: 2023-09-21 | Stop reason: HOSPADM

## 2023-09-21 RX ORDER — CHLORHEXIDINE GLUCONATE ORAL RINSE 1.2 MG/ML
15 SOLUTION DENTAL ONCE
Status: DISCONTINUED | OUTPATIENT
Start: 2023-09-21 | End: 2023-09-21 | Stop reason: HOSPADM

## 2023-09-21 RX ORDER — HYDRALAZINE HYDROCHLORIDE 20 MG/ML
5 INJECTION INTRAMUSCULAR; INTRAVENOUS ONCE AS NEEDED
Status: DISCONTINUED | OUTPATIENT
Start: 2023-09-21 | End: 2023-09-21 | Stop reason: HOSPADM

## 2023-09-21 RX ORDER — HYDROMORPHONE HCL/PF 1 MG/ML
0.5 SYRINGE (ML) INJECTION
Status: DISCONTINUED | OUTPATIENT
Start: 2023-09-21 | End: 2023-09-21 | Stop reason: HOSPADM

## 2023-09-21 RX ORDER — PROPOFOL 10 MG/ML
INJECTION, EMULSION INTRAVENOUS CONTINUOUS PRN
Status: DISCONTINUED | OUTPATIENT
Start: 2023-09-21 | End: 2023-09-21

## 2023-09-21 RX ORDER — CLINDAMYCIN PHOSPHATE 900 MG/50ML
900 INJECTION INTRAVENOUS ONCE
Status: COMPLETED | OUTPATIENT
Start: 2023-09-21 | End: 2023-09-21

## 2023-09-21 RX ORDER — ONDANSETRON 2 MG/ML
4 INJECTION INTRAMUSCULAR; INTRAVENOUS ONCE AS NEEDED
Status: DISCONTINUED | OUTPATIENT
Start: 2023-09-21 | End: 2023-09-21 | Stop reason: HOSPADM

## 2023-09-21 RX ORDER — LIDOCAINE HYDROCHLORIDE 20 MG/ML
INJECTION, SOLUTION EPIDURAL; INFILTRATION; INTRACAUDAL; PERINEURAL AS NEEDED
Status: DISCONTINUED | OUTPATIENT
Start: 2023-09-21 | End: 2023-09-21

## 2023-09-21 RX ORDER — METOCLOPRAMIDE HYDROCHLORIDE 5 MG/ML
10 INJECTION INTRAMUSCULAR; INTRAVENOUS ONCE AS NEEDED
Status: DISCONTINUED | OUTPATIENT
Start: 2023-09-21 | End: 2023-09-21 | Stop reason: HOSPADM

## 2023-09-21 RX ORDER — MAGNESIUM HYDROXIDE 1200 MG/15ML
LIQUID ORAL AS NEEDED
Status: DISCONTINUED | OUTPATIENT
Start: 2023-09-21 | End: 2023-09-21 | Stop reason: HOSPADM

## 2023-09-21 RX ORDER — PROPOFOL 10 MG/ML
INJECTION, EMULSION INTRAVENOUS AS NEEDED
Status: DISCONTINUED | OUTPATIENT
Start: 2023-09-21 | End: 2023-09-21

## 2023-09-21 RX ORDER — CEFAZOLIN SODIUM 2 G/50ML
2000 SOLUTION INTRAVENOUS ONCE
Status: DISCONTINUED | OUTPATIENT
Start: 2023-09-21 | End: 2023-09-21

## 2023-09-21 RX ADMIN — PROPOFOL 50 MG: 10 INJECTION, EMULSION INTRAVENOUS at 07:32

## 2023-09-21 RX ADMIN — CLINDAMYCIN PHOSPHATE 900 MG: 900 INJECTION, SOLUTION INTRAVENOUS at 07:29

## 2023-09-21 RX ADMIN — SODIUM CHLORIDE, SODIUM LACTATE, POTASSIUM CHLORIDE, AND CALCIUM CHLORIDE: .6; .31; .03; .02 INJECTION, SOLUTION INTRAVENOUS at 07:15

## 2023-09-21 RX ADMIN — FENTANYL CITRATE 50 MCG: 50 INJECTION INTRAMUSCULAR; INTRAVENOUS at 07:32

## 2023-09-21 RX ADMIN — LIDOCAINE HYDROCHLORIDE 100 MG: 20 INJECTION, SOLUTION EPIDURAL; INFILTRATION; INTRACAUDAL at 07:32

## 2023-09-21 RX ADMIN — PROPOFOL 100 MCG/KG/MIN: 10 INJECTION, EMULSION INTRAVENOUS at 07:32

## 2023-09-21 RX ADMIN — FENTANYL CITRATE 25 MCG: 50 INJECTION INTRAMUSCULAR; INTRAVENOUS at 07:57

## 2023-09-21 RX ADMIN — FENTANYL CITRATE 25 MCG: 50 INJECTION INTRAMUSCULAR; INTRAVENOUS at 07:50

## 2023-09-21 NOTE — INTERVAL H&P NOTE
H&P reviewed. After examining the patient I find no changes in the patients condition since the H&P had been written.     Vitals:    09/21/23 0656   BP: 139/72   Pulse: 66   Resp: 20   Temp: (!) 97.3 °F (36.3 °C)   SpO2: 98%

## 2023-09-21 NOTE — ANESTHESIA POSTPROCEDURE EVALUATION
Post-Op Assessment Note    CV Status:  Stable  Pain Score: 0    Pain management: adequate     Mental Status:  Sleepy and arousable   Hydration Status:  Stable   PONV Controlled:  Controlled   Airway Patency:  Patent      Post Op Vitals Reviewed: Yes      Staff: CRNA         No notable events documented.     BP 97/54   Temp  97.8   Pulse  60   Resp   12   SpO2   99

## 2023-09-21 NOTE — ANESTHESIA PREPROCEDURE EVALUATION
Procedure:  RELEASE CARPAL TUNNEL ENDOSCOPIC (Left: Wrist)  VERSUS OPEN CARPAL TUNNEL RELEASE (Left: Wrist)    Relevant Problems   CARDIO   (+) Essential hypertension   (+) Mixed hyperlipidemia      ENDO   (+) Diabetes mellitus type 2, noninsulin dependent (HCC)      GI/HEPATIC   (+) Hiatal hernia with GERD   (+) Other chronic nonalcoholic liver disease      MUSCULOSKELETAL   (+) Adhesive capsulitis of left shoulder   (+) Ankylosing spondylitis (HCC)   (+) Degeneration of intervertebral disc of lumbar region   (+) Hiatal hernia with GERD   (+) Osteoarthritis of both hands   (+) Osteoarthritis of right acromioclavicular joint      NEURO/PSYCH   (+) Chronic pain disorder   (+) Continuous opioid dependence (HCC)   (+) Diabetic polyneuropathy (HCC)      Digestive   (+) Parotid gland enlargement      Endocrine   (+) Testicular hypogonadism      Nervous and Auditory   (+) Cervical radiculopathy   (+) Eustachian tube dysfunction      Musculoskeletal and Integument   (+) Osteoporosis   (+) Tear of right supraspinatus tendon      Other   (+) Degenerative lumbar spinal stenosis   (+) Family history of MI (myocardial infarction)   (+) Lumbar postlaminectomy syndrome   (+) Mood disorder (HCC), r/o Substance/medication induced mood disorder   (+) Presence of intrathecal pump      Lab Results   Component Value Date     11/30/2015    SODIUM 138 05/05/2023    K 4.2 05/05/2023     05/05/2023    CO2 30 05/05/2023    ANIONGAP 6 11/30/2015    AGAP 3 (L) 05/05/2023    BUN 16 05/05/2023    CREATININE 0.73 05/05/2023    GLUC 159 (H) 04/09/2023    GLUF 124 (H) 05/05/2023    CALCIUM 9.4 05/05/2023    AST 33 05/05/2023    ALT 58 05/05/2023    ALKPHOS 43 (L) 05/05/2023    PROT 7.5 11/30/2015    TP 6.9 05/05/2023    BILITOT 0.27 11/30/2015    TBILI 0.62 05/05/2023    EGFR 103 05/05/2023     Lab Results   Component Value Date    WBC 5.07 05/05/2023    HGB 11.0 (L) 05/05/2023    HCT 33.0 (L) 05/05/2023    MCV 97 05/05/2023     05/05/2023       Physical Exam    Airway    Mallampati score: III  TM Distance: >3 FB  Neck ROM: full     Dental   No notable dental hx     Cardiovascular      Pulmonary      Other Findings        Anesthesia Plan  ASA Score- 2     Anesthesia Type- IV sedation with anesthesia with ASA Monitors. Additional Monitors:   Airway Plan:           Plan Factors-Exercise tolerance (METS): >4 METS. Chart reviewed. EKG reviewed. Imaging results reviewed. Existing labs reviewed. Patient summary reviewed. Induction- intravenous. Postoperative Plan-     Informed Consent- Anesthetic plan and risks discussed with patient. I personally reviewed this patient with the CRNA. Discussed and agreed on the Anesthesia Plan with the CRNA. Arsh Odell

## 2023-09-21 NOTE — OP NOTE
OPERATIVE REPORT  PATIENT NAME: Marino Slade Sr.    :  1965  MRN: [id number]  Pt Location: [facility] OR [facility]OM 06    SURGERY DATE: 2023     Surgeon(s) and Role:     * Kristyn Leigh MD - Primary     * Gosia Darwin, [id number]    Physician Assistant need: A physician assistant was required during the procedure for retraction, tissue handling, dissection, and suturing. No qualified resident was available. Pre-Op Diagnosis Codes:  Carpal tunnel syndrome on left    Post-Op Diagnosis Codes:  Carpal tunnel syndrome on left    Procedure(s) (LRB):  RELEASE CARPAL TUNNEL ENDOSCOPIC (Left)    Specimen(s):  * No specimens in log *    Estimated Blood Loss:   Minimal    Drains:  None    Implants:  * No implants in log *    Anesthesia Type:   IV Sedation with Anesthesia    Operative Indications:  Patient is a 63 y.o. male evaluated in clinic with symptoms and clinical exam consistent with Left carpal tunnel syndrome. EMG was performed and consistent with moderate to severe Left carpal tunnel syndrome. We discussed treatment options with patient including conservative management and surgical decompression. Discussed nonoperative management with splinting, injections, and observation. We discussed with surgery the possibility of continued numbness and tingling after surgery and possible recurrence of symptoms many years down the line. Patient elected for surgical management. Operative Findings: Thick transverse carpal ligament that was able to be released endoscopically. Complications:   None     Procedure and Technique:  Patient was identified in the preoperative holding area. Surgical sites were marked with patient participation. Patient was taken back to the operating theater. Anesthesia was induced. Extremity was prepped and draped typical fashion. Formal time-out was performed confirming site, patient, and procedure. All present were in agreement.  A 50-50 mixture of 1% lidocaine without epinephrine and 0.25% bupivacaine without epinephrine was used for local field block. Incision made over proximal transverse wrist crease. Palmaris longus tendon was retracted radially. Blunt dissection was carried to the antebrachial fascia, which was entered bluntly. The dilator was inserted below the antebrachial fascia and above the median nerve. The dilator was taken distally. The endoscope with Ar[employer]ision endoscopic knife was introduced with care to be below antebrachial fascia and above the median nerve. Good visualization was noted. The transverse carpal ligament fibers were directly visualized. The transverse carpal ligament was incised using endoscopic knife. The wound was reinspected. There was complete release of the transverse carpal ligament. Attention was turned proximally through the wound. The distal 1.5 cm aspect of antebrachial fracture was incised under direct visualization. Wound was inspected and palpated with the hemostat. There was no further sites of compression. Wound was irrigated. Skin was closed with 4-0 chromic in horizontal mattress fashion. Wounds were dressed with Xeroform, 4x4s, cast padding, Ace bandage. Tourniquet was deflated. Patient was taken to PACU in stable condition. I was present for the entire procedure     Postoperative Plan:  Patient may range digits as tolerated. We will limit weightbearing to a couple of pounds for the first 2 weeks. We will see patient back at the 2-week postoperative christiano.         Patient Disposition:  PACU         SIGNATURE: Kike Hannah MD  DATE: September 21, 2023  TIME: 7:58 AM

## 2023-09-29 ENCOUNTER — OFFICE VISIT (OUTPATIENT)
Dept: OBGYN CLINIC | Facility: CLINIC | Age: 58
End: 2023-09-29

## 2023-09-29 VITALS — WEIGHT: 187 LBS | BODY MASS INDEX: 27.7 KG/M2 | HEIGHT: 69 IN

## 2023-09-29 DIAGNOSIS — Z98.890 POSTOPERATIVE STATE: Primary | ICD-10-CM

## 2023-09-29 PROCEDURE — 99024 POSTOP FOLLOW-UP VISIT: CPT | Performed by: STUDENT IN AN ORGANIZED HEALTH CARE EDUCATION/TRAINING PROGRAM

## 2023-09-29 NOTE — PROGRESS NOTES
Assessment/Plan:  Patient ID: 63 y.o. male   Surgery: Release Carpal Tunnel Endoscopic - Left  Date of Surgery: 9/21/2023    The patient is doing well postoperatively. His surgical incision is healing well. There is no erythema or signs of infection. At this time, patient has no restrictions. We will continue to monitor the right side for improvement. The patient will follow up in 2 months for repeat evaluation. Follow Up:  2 months    To Do Next Visit:  Re-evaluation of current issue      CHIEF COMPLAINT:  Chief Complaint   Patient presents with   • Left Wrist - Post-op         SUBJECTIVE:  Patient is a 63y.o. year old male who presents for follow up after Release Carpal Tunnel Endoscopic - Left. Today patient states he is doing well. He states the numbness and tingling has fully resolved. He does note pillar pain. Patient states when he removed the bandage on Saturday the sutures got stuck and he cut them. He denies any issues with the incision. In regards to the right hand, he states the numbness and tingling has not yet improved.        PHYSICAL EXAMINATION:  General: Well developed and well nourished, alert and oriented x 3, appears comfortable  Psychiatric: Normal    MUSCULOSKELETAL EXAMINATION:  Incision: Clean, dry, intact  Surgery Site: normal, no evidence of infection   Range of Motion: As expected  Neurovascular status: Neuro intact, good cap refill   APB firing bilaterally          STUDIES REVIEWED:  No studies to review       PROCEDURES PERFORMED:  Procedures  No Procedures performed today    Scribe Attestation    I,:  Sol Hough MA am acting as a scribe while in the presence of the attending physician.:       I,:  Eleazar Bruce MD personally performed the services described in this documentation    as scribed in my presence.:

## 2023-10-10 DIAGNOSIS — E29.1 TESTICULAR HYPOGONADISM: ICD-10-CM

## 2023-10-12 DIAGNOSIS — E29.1 TESTICULAR HYPOGONADISM: Primary | ICD-10-CM

## 2023-10-18 ENCOUNTER — APPOINTMENT (OUTPATIENT)
Dept: LAB | Facility: CLINIC | Age: 58
End: 2023-10-18
Payer: COMMERCIAL

## 2023-10-18 DIAGNOSIS — M81.0 OSTEOPOROSIS, UNSPECIFIED OSTEOPOROSIS TYPE, UNSPECIFIED PATHOLOGICAL FRACTURE PRESENCE: ICD-10-CM

## 2023-10-18 DIAGNOSIS — E29.1 TESTICULAR HYPOGONADISM: ICD-10-CM

## 2023-10-18 LAB — 25(OH)D3 SERPL-MCNC: 81.1 NG/ML (ref 30–100)

## 2023-10-18 PROCEDURE — 36415 COLL VENOUS BLD VENIPUNCTURE: CPT

## 2023-10-18 PROCEDURE — 82306 VITAMIN D 25 HYDROXY: CPT

## 2023-10-18 PROCEDURE — 84403 ASSAY OF TOTAL TESTOSTERONE: CPT

## 2023-10-18 PROCEDURE — 84402 ASSAY OF FREE TESTOSTERONE: CPT

## 2023-10-19 LAB
TESTOST FREE SERPL-MCNC: 15.3 PG/ML (ref 7.2–24)
TESTOST SERPL-MCNC: 943 NG/DL (ref 264–916)

## 2023-10-20 DIAGNOSIS — E29.1 TESTICULAR HYPOGONADISM: Primary | ICD-10-CM

## 2023-10-20 RX ORDER — TESTOSTERONE CYPIONATE 200 MG/ML
INJECTION, SOLUTION INTRAMUSCULAR
Qty: 4 ML | Refills: 0 | Status: SHIPPED | OUTPATIENT
Start: 2023-10-20

## 2023-10-23 ENCOUNTER — VBI (OUTPATIENT)
Dept: ADMINISTRATIVE | Facility: OTHER | Age: 58
End: 2023-10-23

## 2023-10-26 ENCOUNTER — CLINICAL SUPPORT (OUTPATIENT)
Dept: FAMILY MEDICINE CLINIC | Facility: CLINIC | Age: 58
End: 2023-10-26
Payer: COMMERCIAL

## 2023-10-26 DIAGNOSIS — Z23 ENCOUNTER FOR IMMUNIZATION: Primary | ICD-10-CM

## 2023-10-26 PROCEDURE — 90750 HZV VACC RECOMBINANT IM: CPT | Performed by: FAMILY MEDICINE

## 2023-10-26 PROCEDURE — 90471 IMMUNIZATION ADMIN: CPT | Performed by: FAMILY MEDICINE

## 2023-10-30 DIAGNOSIS — E78.5 HYPERLIPIDEMIA, UNSPECIFIED HYPERLIPIDEMIA TYPE: ICD-10-CM

## 2023-10-30 DIAGNOSIS — I10 ESSENTIAL HYPERTENSION: ICD-10-CM

## 2023-10-30 RX ORDER — ATORVASTATIN CALCIUM 20 MG/1
20 TABLET, FILM COATED ORAL
Qty: 90 TABLET | Refills: 0 | Status: SHIPPED | OUTPATIENT
Start: 2023-10-30 | End: 2024-04-27

## 2023-10-30 RX ORDER — LISINOPRIL 20 MG/1
20 TABLET ORAL DAILY
Qty: 90 TABLET | Refills: 0 | Status: SHIPPED | OUTPATIENT
Start: 2023-10-30

## 2023-10-31 ENCOUNTER — TELEPHONE (OUTPATIENT)
Dept: ENDOCRINOLOGY | Facility: CLINIC | Age: 58
End: 2023-10-31

## 2023-10-31 NOTE — TELEPHONE ENCOUNTER
Reviewed lab work with patient. Total testosterone level high. He reports he took his injection a day earlier than he normally would have. Advised patient to return to his prior injection schedule to obtain more consistent levels on lab work. Order for repeat given. If remains high, he will need his dose to be lowered.

## 2023-11-01 ENCOUNTER — TRANSCRIBE ORDERS (OUTPATIENT)
Dept: LAB | Facility: CLINIC | Age: 58
End: 2023-11-01

## 2023-11-01 ENCOUNTER — APPOINTMENT (OUTPATIENT)
Dept: LAB | Facility: CLINIC | Age: 58
End: 2023-11-01
Payer: COMMERCIAL

## 2023-11-01 DIAGNOSIS — M45.9 ANKYLOSING SPONDYLITIS, UNSPECIFIED SITE OF SPINE (HCC): ICD-10-CM

## 2023-11-01 DIAGNOSIS — M45.9 ANKYLOSING SPONDYLITIS, UNSPECIFIED SITE OF SPINE (HCC): Primary | ICD-10-CM

## 2023-11-01 DIAGNOSIS — E29.1 TESTICULAR HYPOGONADISM: ICD-10-CM

## 2023-11-01 LAB
ALBUMIN SERPL BCP-MCNC: 4.6 G/DL (ref 3.5–5)
ALP SERPL-CCNC: 39 U/L (ref 34–104)
ALT SERPL W P-5'-P-CCNC: 21 U/L (ref 7–52)
ANION GAP SERPL CALCULATED.3IONS-SCNC: 8 MMOL/L
AST SERPL W P-5'-P-CCNC: 21 U/L (ref 13–39)
BASOPHILS # BLD AUTO: 0.04 THOUSANDS/ÂΜL (ref 0–0.1)
BASOPHILS NFR BLD AUTO: 1 % (ref 0–1)
BILIRUB SERPL-MCNC: 0.54 MG/DL (ref 0.2–1)
BUN SERPL-MCNC: 20 MG/DL (ref 5–25)
CALCIUM SERPL-MCNC: 9.8 MG/DL (ref 8.4–10.2)
CHLORIDE SERPL-SCNC: 100 MMOL/L (ref 96–108)
CO2 SERPL-SCNC: 30 MMOL/L (ref 21–32)
CREAT SERPL-MCNC: 0.77 MG/DL (ref 0.6–1.3)
CRP SERPL QL: 2.9 MG/L
EOSINOPHIL # BLD AUTO: 0.09 THOUSAND/ÂΜL (ref 0–0.61)
EOSINOPHIL NFR BLD AUTO: 1 % (ref 0–6)
ERYTHROCYTE [DISTWIDTH] IN BLOOD BY AUTOMATED COUNT: 12.1 % (ref 11.6–15.1)
ERYTHROCYTE [SEDIMENTATION RATE] IN BLOOD: 11 MM/HOUR (ref 0–19)
GFR SERPL CREATININE-BSD FRML MDRD: 100 ML/MIN/1.73SQ M
GLUCOSE P FAST SERPL-MCNC: 141 MG/DL (ref 65–99)
HCT VFR BLD AUTO: 37.3 % (ref 36.5–49.3)
HGB BLD-MCNC: 13.3 G/DL (ref 12–17)
IMM GRANULOCYTES # BLD AUTO: 0.02 THOUSAND/UL (ref 0–0.2)
IMM GRANULOCYTES NFR BLD AUTO: 0 % (ref 0–2)
LYMPHOCYTES # BLD AUTO: 2.76 THOUSANDS/ÂΜL (ref 0.6–4.47)
LYMPHOCYTES NFR BLD AUTO: 42 % (ref 14–44)
MCH RBC QN AUTO: 32.1 PG (ref 26.8–34.3)
MCHC RBC AUTO-ENTMCNC: 35.7 G/DL (ref 31.4–37.4)
MCV RBC AUTO: 90 FL (ref 82–98)
MONOCYTES # BLD AUTO: 0.45 THOUSAND/ÂΜL (ref 0.17–1.22)
MONOCYTES NFR BLD AUTO: 7 % (ref 4–12)
NEUTROPHILS # BLD AUTO: 3.26 THOUSANDS/ÂΜL (ref 1.85–7.62)
NEUTS SEG NFR BLD AUTO: 49 % (ref 43–75)
NRBC BLD AUTO-RTO: 0 /100 WBCS
PLATELET # BLD AUTO: 185 THOUSANDS/UL (ref 149–390)
PMV BLD AUTO: 10.8 FL (ref 8.9–12.7)
POTASSIUM SERPL-SCNC: 4.3 MMOL/L (ref 3.5–5.3)
PROT SERPL-MCNC: 7.5 G/DL (ref 6.4–8.4)
RBC # BLD AUTO: 4.14 MILLION/UL (ref 3.88–5.62)
SODIUM SERPL-SCNC: 138 MMOL/L (ref 135–147)
WBC # BLD AUTO: 6.62 THOUSAND/UL (ref 4.31–10.16)

## 2023-11-01 PROCEDURE — 85652 RBC SED RATE AUTOMATED: CPT

## 2023-11-01 PROCEDURE — 84402 ASSAY OF FREE TESTOSTERONE: CPT

## 2023-11-01 PROCEDURE — 84403 ASSAY OF TOTAL TESTOSTERONE: CPT

## 2023-11-01 PROCEDURE — 86140 C-REACTIVE PROTEIN: CPT

## 2023-11-01 PROCEDURE — 85025 COMPLETE CBC W/AUTO DIFF WBC: CPT

## 2023-11-01 PROCEDURE — 36415 COLL VENOUS BLD VENIPUNCTURE: CPT

## 2023-11-01 PROCEDURE — 80053 COMPREHEN METABOLIC PANEL: CPT

## 2023-11-02 LAB
TESTOST FREE SERPL-MCNC: 7.1 PG/ML (ref 7.2–24)
TESTOST SERPL-MCNC: 439 NG/DL (ref 264–916)

## 2023-11-07 ENCOUNTER — TELEPHONE (OUTPATIENT)
Dept: ENDOCRINOLOGY | Facility: CLINIC | Age: 58
End: 2023-11-07

## 2023-11-07 DIAGNOSIS — R23.1 CLAMMY SKIN: ICD-10-CM

## 2023-11-07 DIAGNOSIS — E29.1 TESTICULAR HYPOGONADISM: ICD-10-CM

## 2023-11-07 DIAGNOSIS — R19.5 LOOSE STOOLS: Primary | ICD-10-CM

## 2023-11-07 RX ORDER — TESTOSTERONE CYPIONATE 200 MG/ML
INJECTION, SOLUTION INTRAMUSCULAR
Qty: 4 ML | Refills: 0 | Status: SHIPPED | OUTPATIENT
Start: 2023-11-07 | End: 2023-11-07 | Stop reason: SDUPTHER

## 2023-11-07 RX ORDER — TESTOSTERONE CYPIONATE 200 MG/ML
INJECTION, SOLUTION INTRAMUSCULAR
Qty: 4 ML | Refills: 0 | Status: SHIPPED | OUTPATIENT
Start: 2023-11-07

## 2023-11-07 NOTE — TELEPHONE ENCOUNTER
Patient reports he has been more emotional than usual, he has been crying, feeling down, hot, clammy and cold. He is irritable and having nightmares. He also reports loose stools. Free testosterone is on the low side on more recent lab work. Patient reports he is taking his medication correctly and had his lab work drawn midway between injections. Recommend increasing testosterone injection to 75 mg/week and repeat testosterone lab work in 8 weeks. I will also check his thyroid levels to ensure this is not contributing. He should also reports issues related to Rinvoq which he was previously taking until he had carpal tunnel surgery. He has an appointment with rheumatology coming up to discuss.

## 2023-11-09 ENCOUNTER — APPOINTMENT (OUTPATIENT)
Dept: LAB | Facility: CLINIC | Age: 58
End: 2023-11-09
Payer: COMMERCIAL

## 2023-11-09 DIAGNOSIS — R19.5 LOOSE STOOLS: ICD-10-CM

## 2023-11-09 DIAGNOSIS — R23.1 CLAMMY SKIN: ICD-10-CM

## 2023-11-09 LAB
T4 FREE SERPL-MCNC: 0.74 NG/DL (ref 0.61–1.12)
TSH SERPL DL<=0.05 MIU/L-ACNC: 0.76 UIU/ML (ref 0.45–4.5)

## 2023-11-09 PROCEDURE — 36415 COLL VENOUS BLD VENIPUNCTURE: CPT

## 2023-11-09 PROCEDURE — 84443 ASSAY THYROID STIM HORMONE: CPT

## 2023-11-09 PROCEDURE — 84439 ASSAY OF FREE THYROXINE: CPT

## 2023-11-16 ENCOUNTER — IMMUNIZATIONS (OUTPATIENT)
Dept: FAMILY MEDICINE CLINIC | Facility: CLINIC | Age: 58
End: 2023-11-16
Payer: COMMERCIAL

## 2023-11-16 DIAGNOSIS — Z23 ENCOUNTER FOR IMMUNIZATION: Primary | ICD-10-CM

## 2023-11-16 PROCEDURE — G0008 ADMIN INFLUENZA VIRUS VAC: HCPCS | Performed by: FAMILY MEDICINE

## 2023-11-16 PROCEDURE — 90686 IIV4 VACC NO PRSV 0.5 ML IM: CPT | Performed by: FAMILY MEDICINE

## 2023-11-29 ENCOUNTER — VBI (OUTPATIENT)
Dept: ADMINISTRATIVE | Facility: OTHER | Age: 58
End: 2023-11-29

## 2023-11-29 NOTE — TELEPHONE ENCOUNTER
11/29/23 11:28 AM     [employer] SmartForm used to document caregap status.     William Escalante MA

## 2023-12-01 ENCOUNTER — OFFICE VISIT (OUTPATIENT)
Dept: OBGYN CLINIC | Facility: CLINIC | Age: 58
End: 2023-12-01
Payer: COMMERCIAL

## 2023-12-01 VITALS — HEIGHT: 69 IN | WEIGHT: 187 LBS | BODY MASS INDEX: 27.7 KG/M2

## 2023-12-01 DIAGNOSIS — M18.12 ARTHRITIS OF CARPOMETACARPAL (CMC) JOINT OF LEFT THUMB: Primary | ICD-10-CM

## 2023-12-01 PROCEDURE — 99024 POSTOP FOLLOW-UP VISIT: CPT | Performed by: STUDENT IN AN ORGANIZED HEALTH CARE EDUCATION/TRAINING PROGRAM

## 2023-12-01 PROCEDURE — 20600 DRAIN/INJ JOINT/BURSA W/O US: CPT | Performed by: STUDENT IN AN ORGANIZED HEALTH CARE EDUCATION/TRAINING PROGRAM

## 2023-12-01 RX ORDER — BUPIVACAINE HYDROCHLORIDE 2.5 MG/ML
1 INJECTION, SOLUTION EPIDURAL; INFILTRATION; INTRACAUDAL
Status: COMPLETED | OUTPATIENT
Start: 2023-12-01 | End: 2023-12-01

## 2023-12-01 RX ORDER — BETAMETHASONE SODIUM PHOSPHATE AND BETAMETHASONE ACETATE 3; 3 MG/ML; MG/ML
6 INJECTION, SUSPENSION INTRA-ARTICULAR; INTRALESIONAL; INTRAMUSCULAR; SOFT TISSUE
Status: COMPLETED | OUTPATIENT
Start: 2023-12-01 | End: 2023-12-01

## 2023-12-01 RX ADMIN — BETAMETHASONE SODIUM PHOSPHATE AND BETAMETHASONE ACETATE 6 MG: 3; 3 INJECTION, SUSPENSION INTRA-ARTICULAR; INTRALESIONAL; INTRAMUSCULAR; SOFT TISSUE at 08:30

## 2023-12-01 RX ADMIN — BUPIVACAINE HYDROCHLORIDE 1 ML: 2.5 INJECTION, SOLUTION EPIDURAL; INFILTRATION; INTRACAUDAL at 08:30

## 2023-12-01 NOTE — PROGRESS NOTES
Assessment/Plan:  Patient ID: 63 y.o. male   Surgery: Release Carpal Tunnel Endoscopic - Left  Date of Surgery: 9/21/2023    59-year-old male with left thumb CMC arthritis and bilateral volar wrist tendonitis     X-rays were reviewed which demonstrate left thumb CMC arthritis. Treatment options were discussed in the form of bracing and a steroid injection. The patient was fitted and provided with a left comfort cool brace he can use as needed. He consented and underwent a left thumb CMC injection in the office today without any complications. I discussed these injections can be performed every 3 months if needed. We discussed formal therapy for his volar wrist tendonitis however, the patient declined at this time. He will contact the office if he wishes to proceed and a referral can be placed. In regards to his carpal tunnel, I discussed since his symptoms are improving they should continue to improve. He will follow up in 3 months for repeat evaluation. Follow Up:  3 months    To Do Next Visit:  Re-evaluation of current issue      CHIEF COMPLAINT:  Chief Complaint   Patient presents with   • Left Wrist - Post-op   • Right Wrist - Post-op         SUBJECTIVE:  Patient is a 63y.o. year old male who presents for follow up 10 weeks s/p Release Carpal Tunnel Endoscopic - Left and 12 weeks s/p endoscopic right carpal tunnel release. Today patient states the numbness and tingling has resolved on the left. He states on the right, the numbness and tingling has been improving. Today, he notes pain to the base of his left thumb. He notes increased pain with thumb motion. The patient also notes volar sided wrist pain when he's carrying something heavy on the tips of his fingers.        PHYSICAL EXAMINATION:  General: Well developed and well nourished, alert and oriented x 3, appears comfortable  Psychiatric: Normal    MUSCULOSKELETAL EXAMINATION:  Incision: healed  Surgery Site: normal, no evidence of infection Range of Motion: As expected  TTP left thumb CMC  + left thumb CMC grind  Neurovascular status: Neuro intact, good cap refill         STUDIES REVIEWED:  Xrays of the left hand were reviewed and independently interpreted in PACS by Dr. Dede Hall and demonstrate left thumb CMC arthritis. PROCEDURES PERFORMED:  Small joint arthrocentesis: L thumb CMC  [place] Protocol:  Consent: Verbal consent obtained. Risks and benefits: risks, benefits and alternatives were discussed  Consent given by: patient  Time out: Immediately prior to procedure a "time out" was called to verify the correct patient, procedure, equipment, support staff and site/side marked as required.   Patient understanding: patient states understanding of the procedure being performed  Patient identity confirmed: verbally with patient  Supporting Documentation  Indications: pain   Procedure Details  Location: thumb - L thumb CMC  Needle size: 25 G  Ultrasound guidance: no  Approach: volar  Medications administered: 6 mg betamethasone acetate-betamethasone sodium phosphate 6 (3-3) mg/mL; 1 mL bupivacaine (PF) 0.25 %    Patient tolerance: patient tolerated the procedure well with no immediate complications  Dressing:  Sterile dressing applied             Scribe Attestation    I,:  Sol Hough MA am acting as a scribe while in the presence of the attending physician.:       I,:  [facility] MD Jarett personally performed the services described in this documentation    as scribed in my presence.:

## 2023-12-29 ENCOUNTER — OFFICE VISIT (OUTPATIENT)
Dept: URGENT CARE | Facility: MEDICAL CENTER | Age: 58
End: 2023-12-29
Payer: COMMERCIAL

## 2023-12-29 ENCOUNTER — HOSPITAL ENCOUNTER (EMERGENCY)
Facility: HOSPITAL | Age: 58
Discharge: HOME | End: 2023-12-30
Attending: EMERGENCY MEDICINE
Payer: COMMERCIAL

## 2023-12-29 VITALS
HEART RATE: 97 BPM | SYSTOLIC BLOOD PRESSURE: 108 MMHG | TEMPERATURE: 101 F | DIASTOLIC BLOOD PRESSURE: 76 MMHG | RESPIRATION RATE: 18 BRPM | OXYGEN SATURATION: 94 %

## 2023-12-29 VITALS
DIASTOLIC BLOOD PRESSURE: 77 MMHG | HEART RATE: 106 BPM | RESPIRATION RATE: 18 BRPM | HEIGHT: 69 IN | OXYGEN SATURATION: 95 % | WEIGHT: 181 LBS | BODY MASS INDEX: 26.81 KG/M2 | SYSTOLIC BLOOD PRESSURE: 129 MMHG | TEMPERATURE: 99.1 F

## 2023-12-29 DIAGNOSIS — R68.89 FLU-LIKE SYMPTOMS: Primary | ICD-10-CM

## 2023-12-29 DIAGNOSIS — R39.15 URINARY URGENCY: ICD-10-CM

## 2023-12-29 DIAGNOSIS — J06.9 VIRAL URI WITH COUGH: ICD-10-CM

## 2023-12-29 DIAGNOSIS — J20.9 ACUTE BRONCHITIS: ICD-10-CM

## 2023-12-29 DIAGNOSIS — R00.2 AWARENESS OF HEARTBEATS: ICD-10-CM

## 2023-12-29 DIAGNOSIS — J10.1 INFLUENZA A: Primary | ICD-10-CM

## 2023-12-29 DIAGNOSIS — M79.10 MYALGIA: ICD-10-CM

## 2023-12-29 DIAGNOSIS — R42 DIZZINESS ON STANDING: ICD-10-CM

## 2023-12-29 DIAGNOSIS — Z20.828 EXPOSURE TO THE FLU: ICD-10-CM

## 2023-12-29 DIAGNOSIS — R50.9 FEVER: ICD-10-CM

## 2023-12-29 DIAGNOSIS — E87.1 HYPONATREMIA: ICD-10-CM

## 2023-12-29 LAB
ALBUMIN SERPL BCP-MCNC: 4.5 G/DL (ref 3.5–5)
ALP SERPL-CCNC: 33 U/L (ref 34–104)
ALT SERPL W P-5'-P-CCNC: 20 U/L (ref 7–52)
ANION GAP SERPL CALCULATED.3IONS-SCNC: 8 MMOL/L
AST SERPL W P-5'-P-CCNC: 22 U/L (ref 13–39)
BACTERIA UR QL AUTO: ABNORMAL /HPF
BASOPHILS # BLD AUTO: 0.01 THOUSANDS/ÂΜL (ref 0–0.1)
BASOPHILS NFR BLD AUTO: 0 % (ref 0–1)
BILIRUB SERPL-MCNC: 0.54 MG/DL (ref 0.2–1)
BILIRUB UR QL STRIP: ABNORMAL
BUN SERPL-MCNC: 20 MG/DL (ref 5–25)
CALCIUM SERPL-MCNC: 9 MG/DL (ref 8.4–10.2)
CARDIAC TROPONIN I PNL SERPL HS: 6 NG/L
CHLORIDE SERPL-SCNC: 94 MMOL/L (ref 96–108)
CLARITY UR: CLEAR
CO2 SERPL-SCNC: 29 MMOL/L (ref 21–32)
COLOR UR: YELLOW
CREAT SERPL-MCNC: 0.88 MG/DL (ref 0.6–1.3)
D DIMER PPP FEU-MCNC: 0.82 UG/ML FEU
EOSINOPHIL # BLD AUTO: 0.01 THOUSAND/ÂΜL (ref 0–0.61)
EOSINOPHIL NFR BLD AUTO: 0 % (ref 0–6)
ERYTHROCYTE [DISTWIDTH] IN BLOOD BY AUTOMATED COUNT: 13.1 % (ref 11.6–15.1)
FLUAV RNA RESP QL NAA+PROBE: POSITIVE
FLUBV RNA RESP QL NAA+PROBE: NEGATIVE
GFR SERPL CREATININE-BSD FRML MDRD: 95 ML/MIN/1.73SQ M
GLUCOSE SERPL-MCNC: 112 MG/DL (ref 65–140)
GLUCOSE SERPL-MCNC: 170 MG/DL (ref 65–140)
GLUCOSE UR STRIP-MCNC: NEGATIVE MG/DL
HCT VFR BLD AUTO: 34.9 % (ref 36.5–49.3)
HGB BLD-MCNC: 12.4 G/DL (ref 12–17)
HGB UR QL STRIP.AUTO: ABNORMAL
IMM GRANULOCYTES # BLD AUTO: 0.01 THOUSAND/UL (ref 0–0.2)
IMM GRANULOCYTES NFR BLD AUTO: 0 % (ref 0–2)
KETONES UR STRIP-MCNC: ABNORMAL MG/DL
LEUKOCYTE ESTERASE UR QL STRIP: NEGATIVE
LYMPHOCYTES # BLD AUTO: 0.67 THOUSANDS/ÂΜL (ref 0.6–4.47)
LYMPHOCYTES NFR BLD AUTO: 10 % (ref 14–44)
MCH RBC QN AUTO: 31.6 PG (ref 26.8–34.3)
MCHC RBC AUTO-ENTMCNC: 35.5 G/DL (ref 31.4–37.4)
MCV RBC AUTO: 89 FL (ref 82–98)
MONOCYTES # BLD AUTO: 0.85 THOUSAND/ÂΜL (ref 0.17–1.22)
MONOCYTES NFR BLD AUTO: 13 % (ref 4–12)
NEUTROPHILS # BLD AUTO: 5.18 THOUSANDS/ÂΜL (ref 1.85–7.62)
NEUTS SEG NFR BLD AUTO: 77 % (ref 43–75)
NITRITE UR QL STRIP: NEGATIVE
NON-SQ EPI CELLS URNS QL MICRO: ABNORMAL /HPF
NRBC BLD AUTO-RTO: 0 /100 WBCS
PH UR STRIP.AUTO: 5.5 [PH]
PLATELET # BLD AUTO: 137 THOUSANDS/UL (ref 149–390)
PMV BLD AUTO: 10.2 FL (ref 8.9–12.7)
POTASSIUM SERPL-SCNC: 3.7 MMOL/L (ref 3.5–5.3)
PROT SERPL-MCNC: 7.3 G/DL (ref 6.4–8.4)
PROT UR STRIP-MCNC: ABNORMAL MG/DL
RBC # BLD AUTO: 3.92 MILLION/UL (ref 3.88–5.62)
RBC #/AREA URNS AUTO: ABNORMAL /HPF
RSV RNA RESP QL NAA+PROBE: NEGATIVE
S PYO DNA THROAT QL NAA+PROBE: NOT DETECTED
SARS-COV-2 RNA RESP QL NAA+PROBE: NEGATIVE
SL AMB  POCT GLUCOSE, UA: NEGATIVE
SL AMB LEUKOCYTE ESTERASE,UA: NEGATIVE
SL AMB POCT BILIRUBIN,UA: NEGATIVE
SL AMB POCT BLOOD,UA: ABNORMAL
SL AMB POCT CLARITY,UA: CLEAR
SL AMB POCT COLOR,UA: ABNORMAL
SL AMB POCT KETONES,UA: 80
SL AMB POCT NITRITE,UA: NEGATIVE
SL AMB POCT PH,UA: 5
SL AMB POCT SPECIFIC GRAVITY,UA: 1.02
SL AMB POCT URINE PROTEIN: 300
SL AMB POCT UROBILINOGEN: 0.2
SODIUM SERPL-SCNC: 131 MMOL/L (ref 135–147)
SP GR UR STRIP.AUTO: 1.02 (ref 1–1.03)
UROBILINOGEN UR QL STRIP.AUTO: 0.2 E.U./DL
WBC # BLD AUTO: 6.73 THOUSAND/UL (ref 4.31–10.16)
WBC #/AREA URNS AUTO: ABNORMAL /HPF

## 2023-12-29 PROCEDURE — 81003 URINALYSIS AUTO W/O SCOPE: CPT | Performed by: EMERGENCY MEDICINE

## 2023-12-29 PROCEDURE — 99285 EMERGENCY DEPT VISIT HI MDM: CPT

## 2023-12-29 PROCEDURE — 96374 THER/PROPH/DIAG INJ IV PUSH: CPT

## 2023-12-29 PROCEDURE — 96361 HYDRATE IV INFUSION ADD-ON: CPT

## 2023-12-29 PROCEDURE — 99213 OFFICE O/P EST LOW 20 MIN: CPT

## 2023-12-29 PROCEDURE — 81001 URINALYSIS AUTO W/SCOPE: CPT | Performed by: EMERGENCY MEDICINE

## 2023-12-29 PROCEDURE — 93005 ELECTROCARDIOGRAM TRACING: CPT

## 2023-12-29 PROCEDURE — 82948 REAGENT STRIP/BLOOD GLUCOSE: CPT

## 2023-12-29 PROCEDURE — 84484 ASSAY OF TROPONIN QUANT: CPT | Performed by: EMERGENCY MEDICINE

## 2023-12-29 PROCEDURE — 87651 STREP A DNA AMP PROBE: CPT | Performed by: EMERGENCY MEDICINE

## 2023-12-29 PROCEDURE — 85379 FIBRIN DEGRADATION QUANT: CPT | Performed by: EMERGENCY MEDICINE

## 2023-12-29 PROCEDURE — 85025 COMPLETE CBC W/AUTO DIFF WBC: CPT | Performed by: EMERGENCY MEDICINE

## 2023-12-29 PROCEDURE — 87636 SARSCOV2 & INF A&B AMP PRB: CPT

## 2023-12-29 PROCEDURE — 96375 TX/PRO/DX INJ NEW DRUG ADDON: CPT

## 2023-12-29 PROCEDURE — 80053 COMPREHEN METABOLIC PANEL: CPT | Performed by: EMERGENCY MEDICINE

## 2023-12-29 PROCEDURE — 0241U HB NFCT DS VIR RESP RNA 4 TRGT: CPT | Performed by: EMERGENCY MEDICINE

## 2023-12-29 PROCEDURE — 99285 EMERGENCY DEPT VISIT HI MDM: CPT | Performed by: EMERGENCY MEDICINE

## 2023-12-29 PROCEDURE — 87086 URINE CULTURE/COLONY COUNT: CPT | Performed by: EMERGENCY MEDICINE

## 2023-12-29 PROCEDURE — 36415 COLL VENOUS BLD VENIPUNCTURE: CPT | Performed by: EMERGENCY MEDICINE

## 2023-12-29 PROCEDURE — 81002 URINALYSIS NONAUTO W/O SCOPE: CPT

## 2023-12-29 RX ORDER — ONDANSETRON 2 MG/ML
4 INJECTION INTRAMUSCULAR; INTRAVENOUS ONCE
Status: COMPLETED | OUTPATIENT
Start: 2023-12-29 | End: 2023-12-29

## 2023-12-29 RX ORDER — KETOROLAC TROMETHAMINE 30 MG/ML
15 INJECTION, SOLUTION INTRAMUSCULAR; INTRAVENOUS ONCE
Status: COMPLETED | OUTPATIENT
Start: 2023-12-29 | End: 2023-12-29

## 2023-12-29 RX ORDER — ACETAMINOPHEN 325 MG/1
975 TABLET ORAL ONCE
Status: COMPLETED | OUTPATIENT
Start: 2023-12-29 | End: 2023-12-29

## 2023-12-29 RX ADMIN — SODIUM CHLORIDE 1000 ML: 0.9 INJECTION, SOLUTION INTRAVENOUS at 23:55

## 2023-12-29 RX ADMIN — SODIUM CHLORIDE 1000 ML: 0.9 INJECTION, SOLUTION INTRAVENOUS at 22:34

## 2023-12-29 RX ADMIN — ONDANSETRON 4 MG: 2 INJECTION INTRAMUSCULAR; INTRAVENOUS at 22:35

## 2023-12-29 RX ADMIN — ACETAMINOPHEN 975 MG: 325 TABLET, FILM COATED ORAL at 22:26

## 2023-12-29 RX ADMIN — KETOROLAC TROMETHAMINE 15 MG: 30 INJECTION, SOLUTION INTRAMUSCULAR at 22:35

## 2023-12-30 ENCOUNTER — APPOINTMENT (EMERGENCY)
Dept: CT IMAGING | Facility: HOSPITAL | Age: 58
End: 2023-12-30
Payer: COMMERCIAL

## 2023-12-30 LAB
2HR DELTA HS TROPONIN: 1 NG/L
ATRIAL RATE: 115 BPM
ATRIAL RATE: 125 BPM
CARDIAC TROPONIN I PNL SERPL HS: 7 NG/L
FLUAV RNA RESP QL NAA+PROBE: POSITIVE
FLUBV RNA RESP QL NAA+PROBE: NEGATIVE
P AXIS: 60 DEGREES
P AXIS: 60 DEGREES
PR INTERVAL: 146 MS
PR INTERVAL: 150 MS
QRS AXIS: 63 DEGREES
QRS AXIS: 64 DEGREES
QRSD INTERVAL: 78 MS
QRSD INTERVAL: 78 MS
QT INTERVAL: 300 MS
QT INTERVAL: 312 MS
QTC INTERVAL: 431 MS
QTC INTERVAL: 433 MS
SARS-COV-2 RNA RESP QL NAA+PROBE: NEGATIVE
T WAVE AXIS: 65 DEGREES
T WAVE AXIS: 69 DEGREES
VENTRICULAR RATE: 115 BPM
VENTRICULAR RATE: 125 BPM

## 2023-12-30 PROCEDURE — G1004 CDSM NDSC: HCPCS

## 2023-12-30 PROCEDURE — 36415 COLL VENOUS BLD VENIPUNCTURE: CPT | Performed by: EMERGENCY MEDICINE

## 2023-12-30 PROCEDURE — 84484 ASSAY OF TROPONIN QUANT: CPT | Performed by: EMERGENCY MEDICINE

## 2023-12-30 PROCEDURE — 71275 CT ANGIOGRAPHY CHEST: CPT

## 2023-12-30 RX ORDER — ALBUTEROL SULFATE 90 UG/1
2 AEROSOL, METERED RESPIRATORY (INHALATION) EVERY 4 HOURS PRN
Qty: 18 G | Refills: 0 | Status: SHIPPED | OUTPATIENT
Start: 2023-12-30 | End: 2024-01-29

## 2023-12-30 RX ORDER — ALBUTEROL SULFATE 90 UG/1
2 AEROSOL, METERED RESPIRATORY (INHALATION) ONCE
Status: COMPLETED | OUTPATIENT
Start: 2023-12-30 | End: 2023-12-30

## 2023-12-30 RX ORDER — PREDNISONE 20 MG/1
40 TABLET ORAL DAILY
Qty: 8 TABLET | Refills: 0 | Status: SHIPPED | OUTPATIENT
Start: 2023-12-31 | End: 2024-01-04

## 2023-12-30 RX ADMIN — IOHEXOL 100 ML: 350 INJECTION, SOLUTION INTRAVENOUS at 01:06

## 2023-12-30 RX ADMIN — ALBUTEROL SULFATE 2 PUFF: 90 AEROSOL, METERED RESPIRATORY (INHALATION) at 02:56

## 2023-12-30 RX ADMIN — PREDNISONE 50 MG: 20 TABLET ORAL at 02:56

## 2023-12-30 NOTE — ED CARE HANDOFF
Emergency Department Sign Out Note        Signout and transfer of care from my colleague, Dr. Galvez. See Separate Emergency Department note.     The patient, Kuldeep Foss Sr., was evaluated by the previous provider for apparent influenza, chest pain.    Labs Reviewed   COVID19, INFLUENZA A/B, RSV PCR, [facility] - Abnormal       Result Value Ref Range Status    SARS-CoV-2 Negative  Negative Final    INFLUENZA A PCR Positive (*) Negative Final    INFLUENZA B PCR Negative  Negative Final    RSV PCR Negative  Negative Final    Narrative:     FOR PEDIATRIC PATIENTS - copy/paste COVID Guidelines URL to browser: [email or web address]    SARS-CoV-2 assay is a Nucleic Acid Amplification assay intended for the  qualitative detection of nucleic acid from SARS-CoV-2 in nasopharyngeal  swabs. Results are for the presumptive identification of SARS-CoV-2 RNA.    Positive results are indicative of infection with SARS-CoV-2, the virus  causing COVID-19, but do not rule out bacterial infection or co-infection  with other viruses. Laboratories within the [place] and its  territories are required to report all positive results to the appropriate  public health authorities. Negative results do not preclude SARS-CoV-2  infection and should not be used as the sole basis for treatment or other  patient management decisions. Negative results must be combined with  clinical observations, patient history, and epidemiological information.  This test has not been FDA cleared or approved.    This test has been authorized by FDA under an Emergency Use Authorization  (EUA). This test is only authorized for the duration of time the  declaration that circumstances exist justifying the authorization of the  emergency use of an in vitro diagnostic tests for detection of SARS-CoV-2  virus and/or diagnosis of COVID-19 infection under section 564(b)(1) of  the Act, 21 U.S.C. 360bbb-3(b)(1), unless  the authorization is terminated  or revoked sooner. The test has been validated but independent review by FDA  and CLIA is pending.    Test performed using [employer] GeneXpert: This RT-PCR assay targets N2,  a region unique to SARS-CoV-2. A conserved region in the E-gene was chosen  for pan-Sarbecovirus detection which includes SARS-CoV-2.    According to CMS-2020-01-R, this platform meets the definition of high-throughput technology.   CBC AND DIFFERENTIAL - Abnormal    WBC 6.73  4.31 - 10.16 Thousand/uL Final    RBC 3.92  3.88 - 5.62 Million/uL Final    Hemoglobin 12.4  12.0 - 17.0 g/dL Final    Hematocrit 34.9 (*) 36.5 - 49.3 % Final    MCV 89  82 - 98 fL Final    MCH 31.6  26.8 - 34.3 pg Final    MCHC 35.5  31.4 - 37.4 g/dL Final    RDW 13.1  11.6 - 15.1 % Final    MPV 10.2  8.9 - 12.7 fL Final    Platelets 137 (*) 149 - 390 Thousands/uL Final    nRBC 0  /100 WBCs Final    Neutrophils Relative 77 (*) 43 - 75 % Final    Immat GRANS % 0  0 - 2 % Final    Lymphocytes Relative 10 (*) 14 - 44 % Final    Monocytes Relative 13 (*) 4 - 12 % Final    Eosinophils Relative 0  0 - 6 % Final    Basophils Relative 0  0 - 1 % Final    Neutrophils Absolute 5.18  1.85 - 7.62 Thousands/µL Final    Immature Grans Absolute 0.01  0.00 - 0.20 Thousand/uL Final    Lymphocytes Absolute 0.67  0.60 - 4.47 Thousands/µL Final    Monocytes Absolute 0.85  0.17 - 1.22 Thousand/µL Final    Eosinophils Absolute 0.01  0.00 - 0.61 Thousand/µL Final    Basophils Absolute 0.01  0.00 - 0.10 Thousands/µL Final   COMPREHENSIVE METABOLIC PANEL - Abnormal    Sodium 131 (*) 135 - 147 mmol/L Final    Potassium 3.7  3.5 - 5.3 mmol/L Final    Chloride 94 (*) 96 - 108 mmol/L Final    CO2 29  21 - 32 mmol/L Final    ANION GAP 8  mmol/L Final    BUN 20  5 - 25 mg/dL Final    Creatinine 0.88  0.60 - 1.30 mg/dL Final    Comment: Standardized to IDMS reference method    Glucose 170 (*) 65 - 140 mg/dL Final    Comment: If the patient is fasting, the ADA then defines  impaired fasting glucose as > 100 mg/dL and diabetes as > or equal to 123 mg/dL.    Calcium 9.0  8.4 - 10.2 mg/dL Final    AST 22  13 - 39 U/L Final    ALT 20  7 - 52 U/L Final    Comment: Specimen collection should occur prior to Sulfasalazine administration due to the potential for falsely depressed results.     Alkaline Phosphatase 33 (*) 34 - 104 U/L Final    Total Protein 7.3  6.4 - 8.4 g/dL Final    Albumin 4.5  3.5 - 5.0 g/dL Final    Total Bilirubin 0.54  0.20 - 1.00 mg/dL Final    Comment: Use of this assay is not recommended for patients undergoing treatment with eltrombopag due to the potential for falsely elevated results.  N-acetyl-p-benzoquinone imine (metabolite of Acetaminophen) will generate erroneously low results in samples for patients that have taken an overdose of Acetaminophen.    eGFR 95  ml/min/1.73sq m Final    Narrative:     National Kidney Disease Foundation guidelines for Chronic Kidney Disease (CKD):     Stage 1 with normal or high GFR (GFR > 90 mL/min/1.73 square meters)    Stage 2 Mild CKD (GFR = 60-89 mL/min/1.73 square meters)    Stage 3A Moderate CKD (GFR = 45-59 mL/min/1.73 square meters)    Stage 3B Moderate CKD (GFR = 30-44 mL/min/1.73 square meters)    Stage 4 Severe CKD (GFR = 15-29 mL/min/1.73 square meters)    Stage 5 End Stage CKD (GFR <15 mL/min/1.73 square meters)  Note: GFR calculation is accurate only with a steady state creatinine   D-DIMER, QUANTITATIVE - Abnormal    D-Dimer, Quant 0.82 (*) <0.50 ug/ml FEU Final    Comment: Reference and upper limits to exclude DVT and PE are the same.  Do not use to exclude if clinical symptoms are present.    Narrative:     In the evaluation for possible pulmonary embolism, in the appropriate (Well's Score of 4 or less) patient, the age adjusted d-dimer cutoff for this patient can be calculated as:    Age x 0.01 (in ug/mL) for Age-adjusted D-dimer exclusion threshold for a patient over 50 years.   UA W REFLEX TO MICROSCOPIC WITH  "REFLEX TO CULTURE - Abnormal    Color, UA Yellow  Yellow Final    Clarity, UA Clear  Clear Final    Specific Gravity, UA 1.025  1.001 - 1.030 Final    pH, UA 5.5  5.0, 5.5, 6.0, 6.5, 7.0, 7.5, 8.0 Final    Leukocytes, UA Negative  Negative Final    Nitrite, UA Negative  Negative Final    Protein, UA 2+ (*) Negative, Interference- unable to analyze mg/dl Final    Glucose, UA Negative  Negative mg/dl Final    Ketones, UA 80 (3+) (*) Negative mg/dl Final    Urobilinogen, UA 0.2  0.2, 1.0 E.U./dl E.U./dl Final    Bilirubin, UA 1+ (*) Negative Final    Occult Blood, UA 2+ (*) Negative Final   URINE MICROSCOPIC - Abnormal    RBC, UA 2-4  None Seen, 0-1, 1-2, 2-4, 0-5 /hpf Final    WBC, UA 2-4  None Seen, 0-1, 1-2, 0-5, 2-4 /hpf Final    Epithelial Cells Occasional  None Seen, Occasional /hpf Final    Bacteria, UA Moderate (*) None Seen, Occasional /hpf Final   STREP A PCR - Normal    STREP A PCR Not Detected  Not Detected Final   HS TROPONIN I 0HR - Normal    hs TnI 0hr 6  \"Refer to ACS Flowchart\"- see link ng/L Final    Comment:                                              Initial (time 0) result  If >=50 ng/L, Myocardial injury suggested ;  Type of myocardial injury and treatment strategy  to be determined.  If 5-49 ng/L, a delta result at 2 hours and or 4 hours will be needed to further evaluate.  If <4 ng/L, and chest pain has been >3 hours since onset, patient may qualify for discharge based on the HEART score in the ED.  If <5 ng/L and <3hours since onset of chest pain, a delta result at 2 hours will be needed to further evaluate.    HS Troponin 99th Percentile URL of a Health Population=12 ng/L with a 95% Confidence Interval of 8-18 ng/L.    Second Troponin (time 2 hours)  If calculated delta >= 20 ng/L,  Myocardial injury suggested ; Type of myocardial injury and treatment strategy to be determined.  If 5-49 ng/L and the calculated delta is 5-19 ng/L, consult medical service for evaluation.  Continue evaluation " "for ischemia on ecg and other possible etiology and repeat hs troponin at 4 hours.  If delta is <5 ng/L at 2 hours, consider discharge based on risk stratification via the HEART score (if in ED), or LINETTE risk score in IP/Observation.    HS Troponin 99th Percentile URL of a Health Population=12 ng/L with a 95% Confidence Interval of 8-18 ng/L.   HS TROPONIN I 2HR - Normal    hs TnI 2hr 7  \"Refer to ACS Flowchart\"- see link ng/L Final    Comment:                                              Initial (time 0) result  If >=50 ng/L, Myocardial injury suggested ;  Type of myocardial injury and treatment strategy  to be determined.  If 5-49 ng/L, a delta result at 2 hours and or 4 hours will be needed to further evaluate.  If <4 ng/L, and chest pain has been >3 hours since onset, patient may qualify for discharge based on the HEART score in the ED.  If <5 ng/L and <3hours since onset of chest pain, a delta result at 2 hours will be needed to further evaluate.    HS Troponin 99th Percentile URL of a Health Population=12 ng/L with a 95% Confidence Interval of 8-18 ng/L.    Second Troponin (time 2 hours)  If calculated delta >= 20 ng/L,  Myocardial injury suggested ; Type of myocardial injury and treatment strategy to be determined.  If 5-49 ng/L and the calculated delta is 5-19 ng/L, consult medical service for evaluation.  Continue evaluation for ischemia on ecg and other possible etiology and repeat hs troponin at 4 hours.  If delta is <5 ng/L at 2 hours, consider discharge based on risk stratification via the HEART score (if in ED), or LINETTE risk score in IP/Observation.    HS Troponin 99th Percentile URL of a Health Population=12 ng/L with a 95% Confidence Interval of 8-18 ng/L.    Delta 2hr hsTnI 1  <20 ng/L Final   URINE CULTURE   HS TROPONIN I 4HR       CTA chest and repeat troponin are pending.  Repeat troponin is 7.  CTA chest showed no evidence of pulmonary embolism, but showed evidence of bronchitis/bronchiolitis.  " Patient had scattered expiratory wheezing on evaluation.  Plan to treat patient with course of albuterol and oral steroids, and have patient follow up with PCP/outpatient providers.  Patient stable for discharge home. Discharge instructions were reviewed with patient.    HEART Risk Score      Flowsheet Row Most Recent Value   Heart Score Risk Calculator    History 0 Filed at: 12/29/2023 2336   ECG 0 Filed at: 12/29/2023 2336   Age 1 Filed at: 12/29/2023 2336   Risk Factors 2 Filed at: 12/29/2023 2336   Troponin 0 Filed at: 12/29/2023 2336   HEART Score 3 Filed at: 12/29/2023 2336                  PERC Rule for PE      Flowsheet Row Most Recent Value   PERC Rule for PE    Age >=50 1 Filed at: 12/29/2023 2336   HR >=100 1 Filed at: 12/29/2023 2336   O2 Sat on room air < 95% 0 Filed at: 12/29/2023 2336   History of PE or DVT 0 Filed at: 12/29/2023 2336   Recent trauma or surgery 0 Filed at: 12/29/2023 2336   Hemoptysis 0 Filed at: 12/29/2023 2336   Exogenous estrogen 0 Filed at: 12/29/2023 2336   Unilateral leg swelling 0 Filed at: 12/29/2023 2336   PERC Rule for PE Results 2 Filed at: 12/29/2023 2336                Wells' Criteria for PE      Flowsheet Row Most Recent Value   Wells' Criteria for PE    Clinical signs and symptoms of DVT 0 Filed at: 12/29/2023 2336   PE is primary diagnosis or equally likely 0 Filed at: 12/29/2023 2336   HR >100 1.5 Filed at: 12/29/2023 2336   Immobilization at least 3 days or Surgery in the previous 4 weeks 0 Filed at: 12/29/2023 2336   Previous, objectively diagnosed PE or DVT 0 Filed at: 12/29/2023 2336   Hemoptysis 0 Filed at: 12/29/2023 2336   Malignancy with treatment within 6 months or palliative 0 Filed at: 12/29/2023 2336   Wells' Criteria Total 1.5 Filed at: 12/29/2023 2336                  ED Course as of 12/30/23 0242   Sat Dec 30, 2023   0234 CTA chest-       IMPRESSION:     No acute pulmonary embolus.     Bronchial wall thickening and mucous plugging with bilateral lower  lobe nodularity in the bilateral lower lobes suggesting bronchitis and bronchiolitis.       Procedures  Medical Decision Making  Amount and/or Complexity of Data Reviewed  Labs: ordered.  Radiology: ordered.    Risk  OTC drugs.  Prescription drug management.            Disposition  Final diagnoses:   Influenza A   Fever   Viral URI with cough   Myalgia   Hyponatremia   Acute bronchitis     Time reflects when diagnosis was documented in both MDM as applicable and the Disposition within this note       Time User Action Codes Description Comment    12/30/2023 12:37 AM LennyArabella butts Add [J10.1] Influenza A     12/30/2023 12:37 AM LennyArabella butts Add [R00.2] Palpitations     12/30/2023 12:37 AM LennyArabella butts Remove [R00.2] Palpitations     12/30/2023 12:37 AM LennyArabella butts Add [R50.9] Fever     12/30/2023 12:37 AM LennyArabella butts Add [J06.9] Viral URI with cough     12/30/2023 12:37 AM LennyArabella butts Add [M79.10] Myalgia     12/30/2023 12:37 AM LennyArabella butts Add [N39.0] UTI (urinary tract infection)     12/30/2023 12:57 AM LennyArabella butts Add [E87.1] Hyponatremia     12/30/2023  1:08 AM Ector Sanchez Remove [N39.0] UTI (urinary tract infection)     12/30/2023  2:39 AM Ector Sanchez Add [J20.9] Acute bronchitis           ED Disposition       ED Disposition   Discharge    Condition   Stable    Date/Time   Sat Dec 30, 2023 0232    Comment   Kuldeep Foss Sr. discharge to home/self care.                   Follow-up Information       Follow up With Specialties Details Why Contact Info    Kuldeep Pozo MD Family Medicine Call in 1 day  [address] [facility] [address] [phone]            Patient's Medications   Discharge Prescriptions    ALBUTEROL (PROAIR HFA) 90 MCG/ACT INHALER    Inhale 2 puffs every 4 (four) hours as needed for wheezing or shortness of breath Dispense with spacer.  Use with spacer.       Start Date: 12/30/2023End Date: 1/29/2024       Order Dose: 2 puffs       Quantity: 18 g    Refills: 0     PREDNISONE 20 MG TABLET    Take 2 tablets (40 mg total) by mouth daily for 4 days Do not start before December 31, 2023.       Start Date: 12/31/2023End Date: 1/4/2024       Order Dose: 40 mg       Quantity: 8 tablet    Refills: 0     No discharge procedures on file.       ED Provider  Electronically Signed by     Ector Sanchez MD  12/30/23 0106       Ector Sanchez MD  12/30/23 0232       Ector Sanchez MD  12/30/23 0242

## 2023-12-30 NOTE — ED PROVIDER NOTES
"History  Chief Complaint   Patient presents with    Palpitations     Reports he went to [facility] now [facility] complaining of palpitations. Pt complaining of flu s/s (cough, congestion and HA) symptoms began 12/26     58-year-old male with history of hypertension, hyperlipidemia, diabetes, chronic pain, fibromyalgia, GERD who presents for evaluation of various symptoms.  Patient reports 5 days of symptoms.  He has had headache, congestion, cough, sore throat, myalgias, subjective fevers.  He states that he has been minimally able to ambulate and function with his ADLs because of his symptoms.  He feels short of breath at times and at times has chest pain.  He has been eating and drinking minimally as he has significant nausea.  He denies any abdominal pain.  He was seen at urgent care prior to arrival and referred to the ED for further evaluation.  He also endorses urinary frequency but no dysuria.  Denies flank pain.  He has been taking dayquil and coriciden with last doses this morning.         Prior to Admission Medications   Prescriptions Last Dose Informant Patient Reported? Taking?   Blood Glucose Monitoring Suppl (Contour Next EZ) w/Device KIT  Self No No   Sig: Use 1 each 4 (four) times a day   Patient not taking: Reported on 1/19/2023   Cholecalciferol (VITAMIN D) 2000 units CAPS  Self Yes No   Sig: Take 1 capsule by mouth daily   EPINEPHrine (EPIPEN) 0.3 mg/0.3 mL SOAJ  Self No No   Sig: Inject 0.3 mL (0.3 mg total) into a muscle once for 1 dose   HYDROmorphone (Dilaudid) 4 mg/mL injection  Self Yes No   Sig: Inject 1 Device as directed daily     Microlet Lancets MISC  Self No No   Sig: Use 4 (four) times a day   Patient not taking: Reported on 4/18/2023   NEEDLE, DISP, 23 G 23G X 1-1/4\" MISC   No No   Sig: Use once a week   Upadacitinib ER (Rinvoq) 15 MG TB24  Self Yes No   Sig: Take 15 mg by mouth daily   Patient not taking: Reported on 4/18/2023   atorvastatin (LIPITOR) 20 mg tablet   No No   Sig: " Take 1 tablet (20 mg total) by mouth daily at bedtime   calcium carbonate-vitamin D 500 mg-5 mcg per tablet  Self Yes No   Sig: Take 1 tablet by mouth 2 (two) times a day with meals   Patient not taking: Reported on 2023   gabapentin (NEURONTIN) 300 mg capsule  Self Yes No   Sig: Take 600 mg by mouth daily Taking two 600 mg at night   glucose blood (Contour Next Test) test strip  Self No No   Sig: Use 1 each 4 (four) times a day   Patient not taking: Reported on 2023   lisinopril (ZESTRIL) 20 mg tablet   No No   Sig: Take 1 tablet (20 mg total) by mouth daily   meloxicam (MOBIC) 15 mg tablet  Self Yes No   metFORMIN (GLUCOPHAGE-XR) 500 mg 24 hr tablet  Self No No   Sig: Take 1 tablet (500 mg total) by mouth 2 (two) times a day with meals   methadone (DOLOPHINE) 10 mg tablet  Self Yes No   Si mg daily,   Patient not taking: Reported on 2023,   multivitamin (THERAGRAN) TABS  Self Yes No   Sig: Take 1 tablet by mouth daily   omeprazole (PriLOSEC) 40 MG capsule  Self No No   Sig: Take 1 capsule (40 mg total) by mouth daily   Patient taking differently: Take 40 mg by mouth daily at bedtime   testosterone cypionate (DEPO-TESTOSTERONE) 200 mg/mL SOLN   No No   Sig: INJECT 0.375 MLS WEEKLY*USE 1 VIAL FOR 1 WEEKLY DOSE,DISCARD REMAINDER (weekly dose of 75 mg/week)      Facility-Administered Medications: None       Past Medical History:   Diagnosis Date    Anaphylactic reaction     Chronic pain     Depression     Diabetes (HCC)     Fibromyalgia, primary     GERD (gastroesophageal reflux disease)     Hyperlipidemia     Hypertension     Low testosterone     Neuropathy     Pilonidal cyst     last assessed 3/17/2014    Pneumonia     Sleep apnea     no cpap       Past Surgical History:   Procedure Laterality Date    BACK SURGERY      discectomy    ELBOW SURGERY Right     MOUTH SURGERY      perm top teeth removed and dental implants    OTHER SURGICAL HISTORY  2015    Electr analysis of progr impl pump  w/reprogram refill, requiring physician.  Replacement of right abdomen intrathecal  pain pump filled with Dilaudid with electronic analysis and programming.  managed by Bobby Coy    PILJENNYDAL CYST EXCISION      [place] NDSC WRST SURG W/RLS TRANSVRS CARPL LIGM Right 2023    Procedure: RELEASE CARPAL TUNNEL ENDOSCOPIC;  Surgeon: Bret Salgado MD;  Location: [facility];  Service: Orthopedics    NV NDSC WRST SURG W/RLS TRANSVRS CARPL LIGM Left 2023    Procedure: RELEASE CARPAL TUNNEL ENDOSCOPIC;  Surgeon: Bret Salgado MD;  Location: [facility];  Service: Orthopedics    NV SURGICAL ARTHROSCOPY SHOULDER W/ROTATOR CUFF RPR Right 2020    Procedure: SHOULDER ARTHROSCOPIC ROTATOR CUFF REPAIR AND DEBRIDEMENT;  Surgeon: Wero De La Cruz MD;  Location: [facility];  Service: Orthopedics    ROTATOR CUFF REPAIR Left     WISDOM TOOTH EXTRACTION         Family History   Problem Relation Age of Onset    Diabetes unspecified Sister     Hypertension Sister     Hyperlipidemia Sister         pure hypercholesterolemia    Diabetes unspecified Brother         DM    Hypertension Brother     Hyperlipidemia Brother         pure hypercholesterolemia    Coronary artery disease Father     Heart disease Father     Diabetes Brother         DM    Hypertension Family     Alcohol abuse Mother     Liver disease Mother      I have reviewed and agree with the history as documented.    E-Cigarette/Vaping    E-Cigarette Use Never User      E-Cigarette/Vaping Substances    Nicotine No     THC No     CBD No     Flavoring No     Other No     Unknown No      Social History     Tobacco Use    Smoking status: Former     Current packs/day: 0.00     Types: Cigarettes     Quit date:      Years since quittin.0    Smokeless tobacco: Never   Vaping Use    Vaping status: Never Used   Substance Use Topics    Alcohol use: No    Drug use: Never       Review of Systems   Constitutional:  Positive for chills and fever.   HENT:   Positive for congestion and sore throat.    Respiratory:  Positive for cough and shortness of breath.    Cardiovascular:  Positive for chest pain.   Gastrointestinal:  Positive for nausea. Negative for abdominal pain, constipation, diarrhea and vomiting.   Genitourinary:  Positive for frequency. Negative for dysuria, flank pain and hematuria.   Musculoskeletal:  Negative for back pain and gait problem.   Skin:  Negative for rash.   Neurological:  Positive for weakness (generalized) and light-headedness.   All other systems reviewed and are negative.      Physical Exam  Physical Exam  Vitals and nursing note reviewed.   Constitutional:       General: He is not in acute distress.     Appearance: He is not ill-appearing.   HENT:      Head: Normocephalic and atraumatic.      Nose: Congestion present.      Mouth/Throat:      Mouth: Mucous membranes are moist.      Pharynx: Posterior oropharyngeal erythema present. No oropharyngeal exudate.      Comments: No peritonsillar abscess or tonsillar swelling.  Uvula midline.  Tolerating secretions.  No trismus.  Eyes:      Conjunctiva/sclera: Conjunctivae normal.   Cardiovascular:      Rate and Rhythm: Regular rhythm. Tachycardia present.      Heart sounds: No murmur heard.     No friction rub. No gallop.   Pulmonary:      Effort: Pulmonary effort is normal.      Breath sounds: Normal breath sounds. No wheezing, rhonchi or rales.   Abdominal:      General: There is no distension.      Palpations: Abdomen is soft.      Tenderness: There is no abdominal tenderness. There is no right CVA tenderness or left CVA tenderness.   Musculoskeletal:         General: No swelling or tenderness. Normal range of motion.      Cervical back: Normal range of motion and neck supple.   Skin:     General: Skin is warm and dry.      Coloration: Skin is not pale.      Findings: No rash.   Neurological:      General: No focal deficit present.      Mental Status: He is alert and oriented to person,  place, and time.   Psychiatric:         Behavior: Behavior normal.         Vital Signs  ED Triage Vitals   Temperature Pulse Respirations Blood Pressure SpO2   12/29/23 2130 12/29/23 2130 12/29/23 2130 12/29/23 2130 12/29/23 2130   (!) 101 °F (38.3 °C) (!) 119 18 108/76 94 %      Temp Source Heart Rate Source Patient Position - Orthostatic VS BP Location FiO2 (%)   12/29/23 2130 12/29/23 2330 12/29/23 2130 12/29/23 2130 --   Oral Monitor Lying Right arm       Pain Score       12/29/23 2130       8           Vitals:    12/29/23 2130 12/29/23 2330   BP: 108/76    Pulse: (!) 119 97   Patient Position - Orthostatic VS: Lying          Visual Acuity      ED Medications  Medications   iohexol (OMNIPAQUE) 350 MG/ML injection (SINGLE-DOSE) 100 mL (has no administration in time range)   sodium chloride 0.9 % bolus 1,000 mL (0 mL Intravenous Stopped 12/29/23 2245)   ondansetron (ZOFRAN) injection 4 mg (4 mg Intravenous Given 12/29/23 2235)   ketorolac (TORADOL) injection 15 mg (15 mg Intravenous Given 12/29/23 2235)   acetaminophen (TYLENOL) tablet 975 mg (975 mg Oral Given 12/29/23 2226)   sodium chloride 0.9 % bolus 1,000 mL (1,000 mL Intravenous New Bag 12/29/23 2355)       Diagnostic Studies  Results Reviewed       Procedure Component Value Units Date/Time    HS Troponin I 4hr [[id number]]     Lab Status: No result Specimen: Blood     Urine Microscopic [[id number]]  (Abnormal) Collected: 12/29/23 2319    Lab Status: Final result Specimen: Urine, Clean Catch Updated: 12/29/23 2335     RBC, UA 2-4 /hpf      WBC, UA 2-4 /hpf      Epithelial Cells Occasional /hpf      Bacteria, UA Moderate /hpf     UA w Reflex to Microscopic w Reflex to Culture [[id number]]  (Abnormal) Collected: 12/29/23 2319    Lab Status: Final result Specimen: Urine, Clean Catch Updated: 12/29/23 2326     Color, UA Yellow     Clarity, UA Clear     Specific Gravity, UA 1.025     pH, UA 5.5     Leukocytes, UA Negative     Nitrite, UA Negative     Protein, UA 2+  mg/dl      Glucose, UA Negative mg/dl      Ketones, UA 80 (3+) mg/dl      Urobilinogen, UA 0.2 E.U./dl      Bilirubin, UA 1+     Occult Blood, UA 2+    FLU/RSV/COVID - if FLU/RSV clinically relevant [[id number]]  (Abnormal) Collected: 12/29/23 2235    Lab Status: Final result Specimen: Nares from Nose Updated: 12/29/23 2322     SARS-CoV-2 Negative     INFLUENZA A PCR Positive     INFLUENZA B PCR Negative     RSV PCR Negative    Narrative:      FOR PEDIATRIC PATIENTS - copy/paste COVID Guidelines URL to browser: [email or web address]    SARS-CoV-2 assay is a Nucleic Acid Amplification assay intended for the  qualitative detection of nucleic acid from SARS-CoV-2 in nasopharyngeal  swabs. Results are for the presumptive identification of SARS-CoV-2 RNA.    Positive results are indicative of infection with SARS-CoV-2, the virus  causing COVID-19, but do not rule out bacterial infection or co-infection  with other viruses. Laboratories within the [place] and its  territories are required to report all positive results to the appropriate  public health authorities. Negative results do not preclude SARS-CoV-2  infection and should not be used as the sole basis for treatment or other  patient management decisions. Negative results must be combined with  clinical observations, patient history, and epidemiological information.  This test has not been FDA cleared or approved.    This test has been authorized by FDA under an Emergency Use Authorization  (EUA). This test is only authorized for the duration of time the  declaration that circumstances exist justifying the authorization of the  emergency use of an in vitro diagnostic tests for detection of SARS-CoV-2  virus and/or diagnosis of COVID-19 infection under section 564(b)(1) of  the Act, 21 U.S.C. 360bbb-3(b)(1), unless the authorization is terminated  or revoked sooner. The test has been validated but independent  review by FDA  and CLIA is pending.    Test performed using [employer] GeneXpert: This RT-PCR assay targets N2,  a region unique to SARS-CoV-2. A conserved region in the E-gene was chosen  for pan-Sarbecovirus detection which includes SARS-CoV-2.    According to CMS-2020-01-R, this platform meets the definition of high-throughput technology.    Strep A PCR [[id number]]  (Normal) Collected: 12/29/23 2235    Lab Status: Final result Specimen: Throat Updated: 12/29/23 2310     STREP A PCR Not Detected    HS Troponin 0hr (reflex protocol) [[id number]]  (Normal) Collected: 12/29/23 2235    Lab Status: Final result Specimen: Blood from Arm, Right Updated: 12/29/23 2306     hs TnI 0hr 6 ng/L     HS Troponin I 2hr [[id number]]     Lab Status: No result Specimen: Blood     D-Dimer [[id number]]  (Abnormal) Collected: 12/29/23 2235    Lab Status: Final result Specimen: Blood from Arm, Right Updated: 12/29/23 2300     D-Dimer, Quant 0.82 ug/ml FEU     Narrative:      In the evaluation for possible pulmonary embolism, in the appropriate (Well's Score of 4 or less) patient, the age adjusted d-dimer cutoff for this patient can be calculated as:    Age x 0.01 (in ug/mL) for Age-adjusted D-dimer exclusion threshold for a patient over 50 years.    Comprehensive metabolic panel [[id number]]  (Abnormal) Collected: 12/29/23 2235    Lab Status: Final result Specimen: Blood from Arm, Right Updated: 12/29/23 2258     Sodium 131 mmol/L      Potassium 3.7 mmol/L      Chloride 94 mmol/L      CO2 29 mmol/L      ANION GAP 8 mmol/L      BUN 20 mg/dL      Creatinine 0.88 mg/dL      Glucose 170 mg/dL      Calcium 9.0 mg/dL      AST 22 U/L      ALT 20 U/L      Alkaline Phosphatase 33 U/L      Total Protein 7.3 g/dL      Albumin 4.5 g/dL      Total Bilirubin 0.54 mg/dL      eGFR 95 ml/min/1.73sq m     Narrative:      National Kidney Disease Foundation guidelines for Chronic Kidney Disease (CKD):     Stage 1 with normal or high GFR (GFR > 90 mL/min/1.73  square meters)    Stage 2 Mild CKD (GFR = 60-89 mL/min/1.73 square meters)    Stage 3A Moderate CKD (GFR = 45-59 mL/min/1.73 square meters)    Stage 3B Moderate CKD (GFR = 30-44 mL/min/1.73 square meters)    Stage 4 Severe CKD (GFR = 15-29 mL/min/1.73 square meters)    Stage 5 End Stage CKD (GFR <15 mL/min/1.73 square meters)  Note: GFR calculation is accurate only with a steady state creatinine    CBC and differential [[id number]]  (Abnormal) Collected: 12/29/23 2235    Lab Status: Final result Specimen: Blood from Arm, Right Updated: 12/29/23 2249     WBC 6.73 Thousand/uL      RBC 3.92 Million/uL      Hemoglobin 12.4 g/dL      Hematocrit 34.9 %      MCV 89 fL      MCH 31.6 pg      MCHC 35.5 g/dL      RDW 13.1 %      MPV 10.2 fL      Platelets 137 Thousands/uL      nRBC 0 /100 WBCs      Neutrophils Relative 77 %      Immat GRANS % 0 %      Lymphocytes Relative 10 %      Monocytes Relative 13 %      Eosinophils Relative 0 %      Basophils Relative 0 %      Neutrophils Absolute 5.18 Thousands/µL      Immature Grans Absolute 0.01 Thousand/uL      Lymphocytes Absolute 0.67 Thousands/µL      Monocytes Absolute 0.85 Thousand/µL      Eosinophils Absolute 0.01 Thousand/µL      Basophils Absolute 0.01 Thousands/µL                    CTA ED chest PE study    (Results Pending)              Procedures  Procedures         ED Course  ED Course as of 12/30/23 0057   Fri Dec 29, 2023   2221 Procedure Note: EKG  Date/Time: 12/29/23 10:06 PM   Interpreted by: Arabella Galvez  Indications / Diagnosis: CP  ECG reviewed by me, the ED Provider: yes   The EKG demonstrates:  Rhythm: rate 115, sinus tachycardia  Intervals: normal intervals  Axis: normal axis  QRS/Blocks: normal QRS  ST Changes: No acute ST Changes, no STD/ASHER.    2250 CBC and differential(!)   2259 Comprehensive metabolic panel(!)  Mild hyponatremia, patient received fluids.   2301 D-Dimer, Quant(!): 0.82   2306 hs TnI 0hr: 6   2324 FLU/RSV/COVID - if FLU/RSV clinically  relevant(!)   2324 STREP A PCR: Not Detected             HEART Risk Score      Flowsheet Row Most Recent Value   Heart Score Risk Calculator    History 0 Filed at: 12/29/2023 2336   ECG 0 Filed at: 12/29/2023 2336   Age 1 Filed at: 12/29/2023 2336   Risk Factors 2 Filed at: 12/29/2023 2336   Troponin 0 Filed at: 12/29/2023 2336   HEART Score 3 Filed at: 12/29/2023 2336                  PERC Rule for PE      Flowsheet Row Most Recent Value   PERC Rule for PE    Age >=50 1 Filed at: 12/29/2023 2336   HR >=100 1 Filed at: 12/29/2023 2336   O2 Sat on room air < 95% 0 Filed at: 12/29/2023 2336   History of PE or DVT 0 Filed at: 12/29/2023 2336   Recent trauma or surgery 0 Filed at: 12/29/2023 2336   Hemoptysis 0 Filed at: 12/29/2023 2336   Exogenous estrogen 0 Filed at: 12/29/2023 2336   Unilateral leg swelling 0 Filed at: 12/29/2023 2336   PERC Rule for PE Results 2 Filed at: 12/29/2023 2336                SBIRT 22yo+      Flowsheet Row Most Recent Value   Initial Alcohol Screen: US AUDIT-C     1. How often do you have a drink containing alcohol? 0 Filed at: 12/29/2023 2130   2. How many drinks containing alcohol do you have on a typical day you are drinking?  0 Filed at: 12/29/2023 2130   3a. Male UNDER 65: How often do you have five or more drinks on one occasion? 0 Filed at: 12/29/2023 2130   Audit-C Score 0 Filed at: 12/29/2023 2130   SHELLIE: How many times in the past year have you...    Used an illegal drug or used a prescription medication for non-medical reasons? Never Filed at: 12/29/2023 2130            Wells' Criteria for PE      Flowsheet Row Most Recent Value   Wells' Criteria for PE    Clinical signs and symptoms of DVT 0 Filed at: 12/29/2023 2336   PE is primary diagnosis or equally likely 0 Filed at: 12/29/2023 2336   HR >100 1.5 Filed at: 12/29/2023 2336   Immobilization at least 3 days or Surgery in the previous 4 weeks 0 Filed at: 12/29/2023 2336   Previous, objectively diagnosed PE or DVT 0 Filed at:  12/29/2023 2336   Hemoptysis 0 Filed at: 12/29/2023 2336   Malignancy with treatment within 6 months or palliative 0 Filed at: 12/29/2023 2336   Wells' Criteria Total 1.5 Filed at: 12/29/2023 2336                  Medical Decision Making  58-year-old male presenting for evaluation of multiple symptoms.  Patient febrile and tachycardic on arrival.  Suspect viral URI.  Differential diagnoses otherwise include but not limited to ACS, PE, pneumonia, pneumothorax, strep pharyngitis, UTI.  Labs significant for mild hyponatremia.  Mildly elevated D-dimer.  EKG without any ischemic EKG changes.  Initial troponin of 6.  Viral testing positive for influenza.  Urine sample with moderate bacteria.  Given patient's urinary frequency, will treat for UTI. Vital signs improved after IVF and antipyretics. Signed out to Dr. Sanchez pending CTPE study and delta troponin.    Problems Addressed:  Fever: acute illness or injury  Hyponatremia: acute illness or injury  Influenza A: acute illness or injury  Myalgia: acute illness or injury  UTI (urinary tract infection): acute illness or injury  Viral URI with cough: acute illness or injury    Amount and/or Complexity of Data Reviewed  Labs: ordered. Decision-making details documented in ED Course.  Radiology: ordered.  ECG/medicine tests: ordered and independent interpretation performed. Decision-making details documented in ED Course.    Risk  OTC drugs.  Prescription drug management.             Disposition  Final diagnoses:   Influenza A   Fever   Viral URI with cough   Myalgia   UTI (urinary tract infection)   Hyponatremia     Time reflects when diagnosis was documented in both MDM as applicable and the Disposition within this note       Time User Action Codes Description Comment    12/30/2023 12:37 AM Arabella Galvez Add [J10.1] Influenza A     12/30/2023 12:37 AM Arabella Galvez Add [R00.2] Palpitations     12/30/2023 12:37 AM Arabella Galvez Remove [R00.2] Palpitations     12/30/2023  12:37 AM Arabella Galvez [R50.9] Fever     12/30/2023 12:37 AM Arabella Galvez [J06.9] Viral URI with cough     12/30/2023 12:37 AM Arabella Galvez [M79.10] Myalgia     12/30/2023 12:37 AM Arabella Galvez [N39.0] UTI (urinary tract infection)     12/30/2023 12:57 AM Arabella Galvez [E87.1] Hyponatremia           ED Disposition       None          Follow-up Information       Follow up With Specialties Details Why Contact Info    Kuldeep Pozo MD Family Medicine In 2 days  [address] [place] [id number] [phone]              Patient's Medications   Discharge Prescriptions    No medications on file       No discharge procedures on file.    PDMP Review         Value Time User    PDMP Reviewed  Yes 9/21/2023  7:28 AM Marie Contreras PA-C            ED Provider  Electronically Signed by             Arabella Galvez MD  12/30/23 0101

## 2023-12-30 NOTE — PROGRESS NOTES
[facility]        NAME: Kuldeep Foss Sr. is a 58 y.o. male  : 1965    MRN: [id number]  DATE: 2023  TIME: 11:12 PM    Assessment and Plan   Flu-like symptoms [R68.89]  1. Flu-like symptoms  Covid/Flu-Office Collect    Transfer to other facility      2. Awareness of heartbeats  Transfer to other facility      3. Dizziness on standing  Transfer to other facility      4. Urinary urgency  POCT urine dip    Transfer to other facility      5. Exposure to the flu  Transfer to other facility        PCR for COVID/Flu collected in the office, will call patient if positive.     Pt was asked to provide a urine sample, pt walked out of the examination room, became unsteady on his feet, stating he felt dizzy, provider had to steady pt, prior to urine collection.     POCT urine dip:  Color: Salena  UA, Clarity: Clear  Glucose: Negative  Bilirubin: Negative  Ketones: 80  Specific gravity: 1.020  Blood: Large  pH: 5.0  Protein: 300  Urobilinogen: 0.2  Nitrates: Negative  Leukocytes: Negative    POCT fingerstick glucose: 112    EKG: , ST, per my read, pending Cardiology final read. Copy provided to pt to give to the ER.     Differential diagnoses discussed with patient and patient's s/o including but not limited to dehydration and electrolyte abnormalities.     Patient Instructions     Please proceed to the ER for further evaluation and treatment.     Chief Complaint     Chief Complaint   Patient presents with    Cold Like Symptoms     Pt. With flu-like symptoms for the past 3 days. Home Covid test today was negative.      History of Present Illness       URI   This is a new problem. The current episode started in the past 7 days (x3 days). The problem has been gradually worsening. The maximum temperature recorded prior to his arrival was 100.4 - 100.9 F. Associated symptoms include congestion, coughing, ear pain, headaches, nausea, rhinorrhea, sinus pain and a sore throat. Pertinent negatives  include no abdominal pain, chest pain, diarrhea, dysuria, vomiting or wheezing. Associated symptoms comments: Exposure to sick contacts: Grandson has influenza, pt's s/o is also sick with similar symptoms .       Review of Systems   Review of Systems   Constitutional:  Positive for activity change, appetite change (Minimally eating and drinking), chills, diaphoresis, fatigue and fever.   HENT:  Positive for congestion, ear pain, rhinorrhea, sinus pressure, sinus pain and sore throat. Negative for ear discharge.    Respiratory:  Positive for cough, chest tightness and shortness of breath. Negative for wheezing.    Cardiovascular:  Positive for palpitations. Negative for chest pain.   Gastrointestinal:  Positive for nausea. Negative for abdominal pain, constipation, diarrhea and vomiting.   Genitourinary:  Positive for urgency. Negative for dysuria, flank pain, frequency, hematuria and penile discharge.   Musculoskeletal:  Positive for gait problem and myalgias.   Skin: Negative.  Negative for color change and wound.   Neurological:  Positive for dizziness, weakness (generalized) and headaches. Negative for light-headedness.     Current Medications       Current Outpatient Medications:     atorvastatin (LIPITOR) 20 mg tablet, Take 1 tablet (20 mg total) by mouth daily at bedtime, Disp: 90 tablet, Rfl: 0    Cholecalciferol (VITAMIN D) 2000 units CAPS, Take 1 capsule by mouth daily, Disp: , Rfl:     gabapentin (NEURONTIN) 300 mg capsule, Take 600 mg by mouth daily Taking two 600 mg at night, Disp: , Rfl:     HYDROmorphone (Dilaudid) 4 mg/mL injection, Inject 1 Device as directed daily  , Disp: , Rfl:     lisinopril (ZESTRIL) 20 mg tablet, Take 1 tablet (20 mg total) by mouth daily, Disp: 90 tablet, Rfl: 0    meloxicam (MOBIC) 15 mg tablet, , Disp: , Rfl:     metFORMIN (GLUCOPHAGE-XR) 500 mg 24 hr tablet, Take 1 tablet (500 mg total) by mouth 2 (two) times a day with meals, Disp: 180 tablet, Rfl: 3    multivitamin  "(THERAGRAN) TABS, Take 1 tablet by mouth daily, Disp: , Rfl:     omeprazole (PriLOSEC) 40 MG capsule, Take 1 capsule (40 mg total) by mouth daily (Patient taking differently: Take 40 mg by mouth daily at bedtime), Disp: 90 capsule, Rfl: 3    testosterone cypionate (DEPO-TESTOSTERONE) 200 mg/mL SOLN, INJECT 0.375 MLS WEEKLY*USE 1 VIAL FOR 1 WEEKLY DOSE,DISCARD REMAINDER (weekly dose of 75 mg/week), Disp: 4 mL, Rfl: 0    albuterol (ProAir HFA) 90 mcg/act inhaler, Inhale 2 puffs every 4 (four) hours as needed for wheezing or shortness of breath Dispense with spacer.  Use with spacer., Disp: 18 g, Rfl: 0    Blood Glucose Monitoring Suppl (Contour Next EZ) w/Device KIT, Use 1 each 4 (four) times a day (Patient not taking: Reported on 1/19/2023), Disp: 1 kit, Rfl: 0    calcium carbonate-vitamin D 500 mg-5 mcg per tablet, Take 1 tablet by mouth 2 (two) times a day with meals (Patient not taking: Reported on 12/29/2023), Disp: , Rfl:     EPINEPHrine (EPIPEN) 0.3 mg/0.3 mL SOAJ, Inject 0.3 mL (0.3 mg total) into a muscle once for 1 dose, Disp: 0.6 mL, Rfl: 0    glucose blood (Contour Next Test) test strip, Use 1 each 4 (four) times a day (Patient not taking: Reported on 1/19/2023), Disp: 400 each, Rfl: 1    methadone (DOLOPHINE) 10 mg tablet, 20 mg daily, (Patient not taking: Reported on 12/29/2023,), Disp: , Rfl:     Microlet Lancets MISC, Use 4 (four) times a day (Patient not taking: Reported on 4/18/2023), Disp: 400 each, Rfl: 1    NEEDLE, DISP, 23 G 23G X 1-1/4\" MISC, Use once a week, Disp: 100 each, Rfl: 2    predniSONE 20 mg tablet, Take 2 tablets (40 mg total) by mouth daily for 4 days Do not start before December 31, 2023., Disp: 8 tablet, Rfl: 0    Upadacitinib ER (Rinvoq) 15 MG TB24, Take 15 mg by mouth daily (Patient not taking: Reported on 4/18/2023), Disp: , Rfl:     Current Allergies     Allergies as of 12/29/2023 - Reviewed 12/29/2023   Allergen Reaction Noted    Amoxicillin Anaphylaxis 04/10/2023        "     The following portions of the patient's history were reviewed and updated as appropriate: allergies, current medications, past family history, past medical history, past social history, past surgical history and problem list.     Past Medical History:   Diagnosis Date    Anaphylactic reaction     Chronic pain     Depression     Diabetes (HCC)     Fibromyalgia, primary     GERD (gastroesophageal reflux disease)     Hyperlipidemia     Hypertension     Low testosterone     Neuropathy     Pilonidal cyst     last assessed 3/17/2014    Pneumonia     Sleep apnea     no cpap       Past Surgical History:   Procedure Laterality Date    BACK SURGERY      discectomy    ELBOW SURGERY Right     MOUTH SURGERY      perm top teeth removed and dental implants    OTHER SURGICAL HISTORY  03/13/2015    Electr analysis of progr impl pump w/reprogram refill, requiring physician.  Replacement of right abdomen intrathecal  pain pump filled with Dilaudid with electronic analysis and programming.  managed by Bobby Coy    PILONIDAL CYST EXCISION      TN NDSC WRST SURG W/RLS TRANSVRS CARPL LIGM Right 9/6/2023    Procedure: RELEASE CARPAL TUNNEL ENDOSCOPIC;  Surgeon: Bret Salgado MD;  Location: [facility];  Service: Orthopedics    TN NDSC WRST SURG W/RLS TRANSVRS CARPL LIGM Left 9/21/2023    Procedure: RELEASE CARPAL TUNNEL ENDOSCOPIC;  Surgeon: Bret Salgado MD;  Location: [facility];  Service: Orthopedics    TN SURGICAL ARTHROSCOPY SHOULDER W/ROTATOR CUFF RPR Right 02/28/2020    Procedure: SHOULDER ARTHROSCOPIC ROTATOR CUFF REPAIR AND DEBRIDEMENT;  Surgeon: Wero De La Cruz MD;  Location: [facility];  Service: Orthopedics    ROTATOR CUFF REPAIR Left     WISDOM TOOTH EXTRACTION         Family History   Problem Relation Age of Onset    Diabetes unspecified Sister     Hypertension Sister     Hyperlipidemia Sister         pure hypercholesterolemia    Diabetes unspecified Brother         DM    Hypertension Brother   "   Hyperlipidemia Brother         pure hypercholesterolemia    Coronary artery disease Father     Heart disease Father     Diabetes Brother         DM    Hypertension Family     Alcohol abuse Mother     Liver disease Mother          Medications have been verified.        Objective   /77   Pulse (!) 106   Temp 99.1 °F (37.3 °C)   Resp 18   Ht 5' 9\" (1.753 m)   Wt 82.1 kg (181 lb)   SpO2 95%   BMI 26.73 kg/m²        Physical Exam     Physical Exam  Vitals and nursing note reviewed.   Constitutional:       General: He is not in acute distress.     Appearance: He is ill-appearing. He is not diaphoretic.   HENT:      Head: Normocephalic.      Right Ear: Hearing and external ear normal. No drainage or tenderness. A middle ear effusion (serous) is present. There is no impacted cerumen. Tympanic membrane is bulging. Tympanic membrane is not injected, perforated, erythematous or retracted.      Left Ear: Hearing, ear canal and external ear normal. No drainage or tenderness. A middle ear effusion (serous) is present. There is no impacted cerumen. Tympanic membrane is bulging. Tympanic membrane is not injected, perforated, erythematous or retracted.      Nose: Congestion present. No rhinorrhea.      Mouth/Throat:      Mouth: Mucous membranes are dry.      Pharynx: Posterior oropharyngeal erythema present.   Cardiovascular:      Rate and Rhythm: Regular rhythm. Tachycardia present.      Pulses: Normal pulses.      Heart sounds: Normal heart sounds. No murmur heard.  Pulmonary:      Effort: Pulmonary effort is normal. No respiratory distress.      Breath sounds: Normal breath sounds. No stridor. No wheezing, rhonchi or rales.   Chest:      Chest wall: No tenderness.   Musculoskeletal:         General: Normal range of motion.   Lymphadenopathy:      Head:      Right side of head: Tonsillar adenopathy present.      Left side of head: Tonsillar adenopathy present.      Cervical: No cervical adenopathy.   Skin:     " General: Skin is warm.

## 2023-12-30 NOTE — DISCHARGE INSTRUCTIONS
Follow-up with your primary care physician.  Take the prescribed medications as directed.  Please return to the emergency department if you develop worsening symptoms, difficulty breathing, or any else concerning to you.      CTA - CHEST WITH IV CONTRAST - PULMONARY ANGIOGRAM     INDICATION:   Chest pain, SOB, tachycardia, elevated D dimer.     COMPARISON: Multiple priors most recently 3/4/2015     TECHNIQUE: CTA examination of the chest was performed using angiographic technique according to a protocol specifically tailored to evaluate for pulmonary embolism.  Multiplanar 2D reformatted images were created from the source data. In addition,   coronal 3D MIP postprocessing was performed on the acquisition scanner.     Radiation dose length product (DLP) for this visit:  403.3 mGy-cm .  This examination, like all CT scans performed in the [facility], was performed utilizing techniques to minimize radiation dose exposure, including the use of iterative   reconstruction and automated exposure control.     IV Contrast:  100 mL of iohexol (OMNIPAQUE)     FINDINGS:     PULMONARY ARTERIAL TREE:  No pulmonary embolus is seen.     LUNGS: Patchy nodular opacities in the bilateral lower lobes medially bronchial wall thickening and mucous plugging in the bilateral lower lobes.     PLEURA:  Unremarkable.     HEART/GREAT VESSELS: Coronary artery atherosclerosis. No thoracic aortic aneurysm.     MEDIASTINUM AND ANEL: Mild mediastinal lymphadenopathy likely reactive.     CHEST WALL AND LOWER NECK:   Unremarkable.     VISUALIZED STRUCTURES IN THE UPPER ABDOMEN:  Unremarkable.     OSSEOUS STRUCTURES:  Spinal degenerative changes are noted.  No acute fracture or destructive osseous lesion. There appears to be abandoned spinal stimulator lead with surrounding calcification near the tip at the level of T12.     IMPRESSION:     No acute pulmonary embolus.     Bronchial wall thickening and mucous plugging with bilateral  lower lobe nodularity in the bilateral lower lobes suggesting bronchitis and bronchiolitis.

## 2023-12-31 LAB — BACTERIA UR CULT: NORMAL

## 2024-01-30 ENCOUNTER — CLINICAL SUPPORT (OUTPATIENT)
Dept: FAMILY MEDICINE CLINIC | Facility: CLINIC | Age: 59
End: 2024-01-30
Payer: COMMERCIAL

## 2024-01-30 DIAGNOSIS — Z23 ENCOUNTER FOR IMMUNIZATION: Primary | ICD-10-CM

## 2024-01-30 DIAGNOSIS — E29.1 TESTICULAR HYPOGONADISM: ICD-10-CM

## 2024-01-30 DIAGNOSIS — E78.5 HYPERLIPIDEMIA, UNSPECIFIED HYPERLIPIDEMIA TYPE: ICD-10-CM

## 2024-01-30 DIAGNOSIS — E11.9 TYPE 2 DIABETES MELLITUS WITHOUT COMPLICATION, WITHOUT LONG-TERM CURRENT USE OF INSULIN (HCC): ICD-10-CM

## 2024-01-30 PROCEDURE — 90750 HZV VACC RECOMBINANT IM: CPT | Performed by: FAMILY MEDICINE

## 2024-01-30 PROCEDURE — 90471 IMMUNIZATION ADMIN: CPT | Performed by: FAMILY MEDICINE

## 2024-01-30 RX ORDER — TESTOSTERONE CYPIONATE 200 MG/ML
INJECTION, SOLUTION INTRAMUSCULAR
Qty: 4 ML | Refills: 0 | Status: SHIPPED | OUTPATIENT
Start: 2024-01-30

## 2024-01-30 RX ORDER — METFORMIN HYDROCHLORIDE 500 MG/1
500 TABLET, EXTENDED RELEASE ORAL 2 TIMES DAILY WITH MEALS
Qty: 180 TABLET | Refills: 0 | Status: SHIPPED | OUTPATIENT
Start: 2024-01-30 | End: 2024-07-28

## 2024-01-30 RX ORDER — ATORVASTATIN CALCIUM 20 MG/1
20 TABLET, FILM COATED ORAL
Qty: 90 TABLET | Refills: 0 | Status: SHIPPED | OUTPATIENT
Start: 2024-01-30 | End: 2024-07-28

## 2024-02-01 DIAGNOSIS — I10 ESSENTIAL HYPERTENSION: ICD-10-CM

## 2024-02-02 RX ORDER — LISINOPRIL 20 MG/1
20 TABLET ORAL DAILY
Qty: 90 TABLET | Refills: 0 | Status: SHIPPED | OUTPATIENT
Start: 2024-02-02

## 2024-02-22 ENCOUNTER — CONSULT (OUTPATIENT)
Age: 59
End: 2024-02-22
Payer: COMMERCIAL

## 2024-02-22 ENCOUNTER — APPOINTMENT (OUTPATIENT)
Dept: LAB | Facility: CLINIC | Age: 59
End: 2024-02-22
Payer: COMMERCIAL

## 2024-02-22 VITALS
HEART RATE: 83 BPM | DIASTOLIC BLOOD PRESSURE: 68 MMHG | WEIGHT: 177.4 LBS | HEIGHT: 69 IN | OXYGEN SATURATION: 97 % | TEMPERATURE: 97.9 F | SYSTOLIC BLOOD PRESSURE: 118 MMHG | BODY MASS INDEX: 26.28 KG/M2

## 2024-02-22 DIAGNOSIS — M08.80 ENTHESITIS RELATED ARTHRITIS (HCC): ICD-10-CM

## 2024-02-22 DIAGNOSIS — E29.1 TESTICULAR HYPOGONADISM: ICD-10-CM

## 2024-02-22 DIAGNOSIS — M72.2 PLANTAR FASCIITIS, BILATERAL: ICD-10-CM

## 2024-02-22 DIAGNOSIS — E11.42 DIABETIC POLYNEUROPATHY ASSOCIATED WITH TYPE 2 DIABETES MELLITUS (HCC): ICD-10-CM

## 2024-02-22 DIAGNOSIS — M96.1 LUMBAR POSTLAMINECTOMY SYNDROME: ICD-10-CM

## 2024-02-22 DIAGNOSIS — M47.812 CERVICAL SPONDYLOSIS WITHOUT MYELOPATHY: ICD-10-CM

## 2024-02-22 DIAGNOSIS — M15.0 PRIMARY GENERALIZED (OSTEO)ARTHRITIS: ICD-10-CM

## 2024-02-22 DIAGNOSIS — G89.4 CHRONIC PAIN SYNDROME: ICD-10-CM

## 2024-02-22 DIAGNOSIS — M45.0 ANKYLOSING SPONDYLITIS OF MULTIPLE SITES IN SPINE (HCC): Primary | ICD-10-CM

## 2024-02-22 DIAGNOSIS — M16.0 PRIMARY OSTEOARTHRITIS OF BOTH HIPS: ICD-10-CM

## 2024-02-22 PROCEDURE — 84403 ASSAY OF TOTAL TESTOSTERONE: CPT

## 2024-02-22 PROCEDURE — 36415 COLL VENOUS BLD VENIPUNCTURE: CPT

## 2024-02-22 PROCEDURE — 99205 OFFICE O/P NEW HI 60 MIN: CPT | Performed by: INTERNAL MEDICINE

## 2024-02-22 PROCEDURE — 84402 ASSAY OF FREE TESTOSTERONE: CPT

## 2024-02-22 NOTE — PATIENT INSTRUCTIONS
Schedule a formal physical therapy with [facility].  When they call you or you call central scheduling for the physical therapy make sure it is one of the facilities that has a therapeutic pool.  Continue your medicines as before.  I did put in for lab work.  I also ordered x-rays and an MRI of your pelvis to look at the ankylosing spondylitis.  [facility] to [facility] so they can evaluate your shoe gear in view of your plantar fasciitis.

## 2024-02-22 NOTE — PROGRESS NOTES
Assessment and Plan:   Patient with complicated medical history with remote diagnosis ankylosing spondylitis with incomplete response with Humira and methotrexate in the past, with recent start of Rinvoq by prior rheumatologist.  HLA B27 positive with plantar fasciitis.  Rule out enthesitis related arthritis.  Chronic complex pain syndrome with postlaminectomy syndrome lumbar spine treated with intrathecal Dilaudid per pain management.  Cervical spondylosis.  Primary generalized osteoarthritis.  History carpal tunnel syndrome status post bilateral carpal tunnel release.  Interphalangeal osteoarthritis with chronic hand arthralgias.  Probable rotator cuff arthropathy, status post right rotator cuff repair.  Chronic bilateral shoulder arthralgias.  Probable DJD bilateral hips with limitation of motion on exam.  Extensor surface knee eczema versus psoriasiform dermatitis.  Rule out Psoriasis.  Type 2 diabetes with questionable history of neuropathy.  History of hypertension and hyperlipidemia.  History dental problems, status post implants and gum infection.    Plan:  Diagnoses and all orders for this visit:    Ankylosing spondylitis of multiple sites in spine (HCC)  -     MRI pelvis without contrast; Future  -     C-reactive protein; Future  -     Sedimentation rate, automated; Future  -     CBC and differential; Future  -     Comprehensive metabolic panel; Future  -     Urinalysis with microscopic  -     XR sacroiliac joints 3+ views; Future  -     Ambulatory Referral to Physical Therapy; Future  -     Quantiferon TB Gold Plus Assay; Future    Chronic pain syndrome    Lumbar postlaminectomy syndrome  -     Ambulatory Referral to Physical Therapy; Future    Primary generalized (osteo)arthritis  -     Ambulatory Referral to Physical Therapy; Future    Cervical spondylosis without myelopathy  -     Ambulatory Referral to Physical Therapy; Future    Diabetic polyneuropathy associated with type 2 diabetes mellitus  (HCC)    Primary osteoarthritis of both hips  -     XR hip/pelv 2-3 vws right if performed; Future  -     XR hip/pelv 2-3 vws left if performed; Future  -     Ambulatory Referral to Physical Therapy; Future    Plantar fasciitis, bilateral    Enthesitis related arthritis (HCC)  -     Quantiferon TB Gold Plus Assay; Future        Follow-up plan: Long discussion with patient regarding diagnostic and therapeutic options.  Will refer for laboratory evaluation to include CBC with differential, CRP, CMP, and QuantiFERON gold in view of history of Rinvoq Hermilo inhibitor therapy.  Refer for MRI pelvis to evaluate for sacroiliitis.  Refer for plain films SI joints and bilateral hips.  Refer for physical therapy to include water therapy.  I suggested that he go to [facility] for better shoe gear in view of plantar fasciitis.  He will continue medicines as before.  Will plan follow-up in 3 months or sooner if needed.  Further diagnostic and or therapeutic options will be entertained at that time.  Discussed in detail with patient.  Patient reassured.      58-year-old male, patient of Dr. Pozo, with a history of ankylosing spondylitis diagnosed in [id number].  He was evaluated and treated by rheumatologist Dr. Paris, initially with methotrexate to which she had incomplete response, and subsequently Humira with no significant benefit, and bothered by painful injections with burning.  He continued Humira for 6 to 8 months.  Last year he was started on Rinvoq by rheumatologist Dr. Telles.with overall benefit.  He does have history of postlaminectomy syndrome status post back surgery in the remote past and has persistence of chronic low back pain radiating into bilateral lower extremities right greater than left to his feet with associated numbness and tingling and painful ambulation.  He does have chronic neck pain nonradicular in nature.  Patient does have intrathecal pump for Dilaudid instillation per pain management.   Patient has morning stiffness of 2 hours duration with chronic pain in multiple joints including ankles, knees, hips, shoulders, right elbow, in addition to neck and back.  He has history of right rotator cuff arthropathy and states he has a lump on the top of his shoulder.  He is status post right rotator cuff repair and remote history of left shoulder surgery.  He had recent steroid injection left shoulder earlier this month by primary care.  He has been on disability since 2008.  He has no history of Psoriasis.  He is status post bilateral carpal tunnel release but does note persistence of osteoarthritic pain in his right hand.  His left hand improved post carpal tunnel release.  Review of systems is positive for questionable Raynaud's phenomenon with no digital ulcerations.  He has sleep disturbance with early awakening more so than insomnia.  He notes chronic fatigue.  He denies chest pain, sicca symptoms, or history of headache.  He had recent influenza approximately 12/29/2023.  He does have hypogonadism and is treated with testosterone.  He has history of dental implants and gum infection.  Past medical history significant for hypertension, hyperlipidemia, hypergonadism, type 2 diabetes with questionable neuropathy, cervical and lumbar spondylosis, postlaminectomy syndrome, anemia, sleep apnea not on CPAP, and carpal tunnel syndrome.  Patient quit tobacco in 2016.      Kuldeep Foss  is a 58 y.o.  male who presents for evaluation and management of history of ankylosing spondylitis, HLA-B27 positive, on ADA inhibitor, Rinvoq, with chronic complex pain syndrome with associated post lumbar laminectomy syndrome, cervical spondylosis, on chronic intrathecal Dilaudid therapy managed by pain management.    Review of Systems  Review of Systems   Constitutional:  Positive for fatigue. Negative for chills and fever.   HENT:  Positive for dental problem. Negative for ear pain and sore throat.    Eyes:  Negative for  pain and visual disturbance.   Respiratory:  Negative for cough and shortness of breath.    Cardiovascular:  Negative for chest pain and palpitations.   Gastrointestinal:  Negative for abdominal pain and vomiting.   Genitourinary:  Negative for dysuria and hematuria.   Musculoskeletal:  Positive for arthralgias, back pain, gait problem and neck pain.   Skin:         Questionable Raynaud's phenomenon with no digital ulcerations.   Neurological:  Positive for numbness. Negative for seizures and syncope.   Psychiatric/Behavioral:  Positive for sleep disturbance.    All other systems reviewed and are negative.      Allergies  Allergies   Allergen Reactions   • Amoxicillin Anaphylaxis     BP dropped       Home Medications    Current Outpatient Medications:   •  atorvastatin (LIPITOR) 20 mg tablet, Take 1 tablet (20 mg total) by mouth daily at bedtime, Disp: 90 tablet, Rfl: 0  •  Blood Glucose Monitoring Suppl (Contour Next EZ) w/Device KIT, Use 1 each 4 (four) times a day, Disp: 1 kit, Rfl: 0  •  calcium carbonate-vitamin D 500 mg-5 mcg per tablet, Take 1 tablet by mouth 2 (two) times a day with meals (Patient not taking: Reported on 3/8/2024), Disp: , Rfl:   •  Cholecalciferol (VITAMIN D) 2000 units CAPS, Take 1 capsule by mouth daily, Disp: , Rfl:   •  EPINEPHrine (EPIPEN) 0.3 mg/0.3 mL SOAJ, Inject 0.3 mL (0.3 mg total) into a muscle once for 1 dose, Disp: 0.6 mL, Rfl: 0  •  gabapentin (NEURONTIN) 300 mg capsule, Take 600 mg by mouth daily Taking two 600 mg at night, Disp: , Rfl:   •  glucose blood (Contour Next Test) test strip, Use 1 each 4 (four) times a day, Disp: 400 each, Rfl: 1  •  HYDROmorphone (Dilaudid) 4 mg/mL injection, Inject 1 Device as directed daily  , Disp: , Rfl:   •  lisinopril (ZESTRIL) 20 mg tablet, Take 1 tablet (20 mg total) by mouth daily, Disp: 90 tablet, Rfl: 0  •  meloxicam (MOBIC) 15 mg tablet, daily as needed, Disp: , Rfl:   •  metFORMIN (GLUCOPHAGE-XR) 500 mg 24 hr tablet, Take 1 tablet  "(500 mg total) by mouth 2 (two) times a day with meals, Disp: 180 tablet, Rfl: 0  •  Microlet Lancets MISC, Use 4 (four) times a day, Disp: 400 each, Rfl: 1  •  multivitamin (THERAGRAN) TABS, Take 1 tablet by mouth daily, Disp: , Rfl:   •  NEEDLE, DISP, 23 G 23G X 1-1/4\" MISC, Use once a week, Disp: 100 each, Rfl: 2  •  omeprazole (PriLOSEC) 40 MG capsule, Take 1 capsule (40 mg total) by mouth daily (Patient taking differently: Take 40 mg by mouth daily at bedtime), Disp: 90 capsule, Rfl: 3  •  testosterone cypionate (DEPO-TESTOSTERONE) 200 mg/mL SOLN, INJECT 0.375 MLS WEEKLY*USE 1 VIAL FOR 1 WEEKLY DOSE,DISCARD REMAINDER (weekly dose of 75 mg/week), Disp: 4 mL, Rfl: 0  •  Upadacitinib ER (Rinvoq) 15 MG TB24, Take 15 mg by mouth daily, Disp: , Rfl:   •  methadone (DOLOPHINE) 10 mg tablet, 20 mg daily, (Patient not taking: Reported on 12/29/2023,), Disp: , Rfl:     Past Medical History  Past Medical History:   Diagnosis Date   • Anaphylactic reaction    • Chronic pain    • Depression    • Diabetes (HCC)    • Fibromyalgia, primary    • GERD (gastroesophageal reflux disease)    • Hyperlipidemia    • Hypertension    • Low testosterone    • Neuropathy    • Pilonidal cyst     last assessed 3/17/2014   • Pneumonia    • Sleep apnea     no cpap       Past Surgical History   Past Surgical History:   Procedure Laterality Date   • BACK SURGERY      discectomy   • ELBOW SURGERY Right    • MOUTH SURGERY      perm top teeth removed and dental implants   • OTHER SURGICAL HISTORY  03/13/2015    Electr analysis of progr impl pump w/reprogram refill, requiring physician.  Replacement of right abdomen intrathecal  pain pump filled with Dilaudid with electronic analysis and programming.  managed by Bobby Coy   • PILONIDAL CYST EXCISION     • [place] NDSC WRST SURG W/RLS TRANSVRS CARPL LIGM Right 9/6/2023    Procedure: RELEASE CARPAL TUNNEL ENDOSCOPIC;  Surgeon: Bret Salgado MD;  Location: [facility];  Service: Orthopedics " "  • [place] NDSC WRST SURG W/RLS TRANSVRS CARPL LIGM Left 2023    Procedure: RELEASE CARPAL TUNNEL ENDOSCOPIC;  Surgeon: Bret Salgado MD;  Location: [facility];  Service: Orthopedics   • WI SURGICAL ARTHROSCOPY SHOULDER W/ROTATOR CUFF RPR Right 2020    Procedure: SHOULDER ARTHROSCOPIC ROTATOR CUFF REPAIR AND DEBRIDEMENT;  Surgeon: Wero De La Cruz MD;  [facility];  Service: Orthopedics   • ROTATOR CUFF REPAIR Left    • WISDOM TOOTH EXTRACTION         Family History  Family History   Problem Relation Age of Onset   • Alcohol abuse Mother    • Liver disease Mother    • Arthritis Mother    • Uterine cancer Mother    • Lupus Mother    • Coronary artery disease Father    • Heart disease Father    • Prostate cancer Father    • Diabetes Sister    • Diabetes unspecified Sister    • Hypertension Sister    • Hyperlipidemia Sister         pure hypercholesterolemia   • Diabetes unspecified Brother         DM   • Hypertension Brother    • Hyperlipidemia Brother         pure hypercholesterolemia   • Diabetes Brother         DM   • Hypertension Family        Social History  Occupation: On disability since   Social History     Substance and Sexual Activity   Alcohol Use No     Social History     Substance and Sexual Activity   Drug Use Never     Social History     Tobacco Use   Smoking Status Former   • Current packs/day: 0.00   • Types: Cigarettes   • Quit date:    • Years since quittin.2   Smokeless Tobacco Never       Objective:    Vitals:    24 0949   BP: 118/68   BP Location: Left arm   Patient Position: Sitting   Cuff Size: Adult   Pulse: 83   Temp: 97.9 °F (36.6 °C)   TempSrc: Tympanic   SpO2: 97%   Weight: 80.5 kg (177 lb 6.4 oz)   Height: 5' 8.5\" (1.74 m)       Physical Exam  Vitals reviewed.   Constitutional:       Appearance: Normal appearance.   HENT:      Head: Normocephalic.      Nose:      Comments: Nose and throat unremarkable  Cardiovascular:      Rate and Rhythm: " Regular rhythm.   Pulmonary:      Breath sounds: Normal breath sounds.   Abdominal:      Palpations: Abdomen is soft.   Musculoskeletal:      Comments: Neck decreased lateral flexion.  Spurling's negative.  Shoulders slightly decreased external rotation bilaterally right greater than left.  Bilateral shoulder crepitus.  Elbow right lacks full extension. Left full range of motion. Ankles full range of motion.  Tinel's negative bilaterally.  Hands squaring first carpometacarpal joints bilaterally with Heberden's and questionable early Helga's nodes scattered.  Thickening flexor tendon sheath bilateral middle fingers left greater than right.  No triggering appreciated.  Straight leg raising negative bilaterally.  Marked spasm dorsal and lumbosacral paraspinals with decreased forward flexion.  Schober's negative.  No SI joint tenderness appreciated.  Hips decreased internal rotation greater than external rotation right greater than left.  Knees full range of motion with patellofemoral crepitus.  Ankles full range of motion.  Feet rearfoot hyperpronation with midfoot cavus deformities, early hallux valgus and scattered hammertoe deformities.  Thickening plantar fascia right greater than left with tenderness right plantar fascia insertion.  No tenderness Achilles tendons.  No synovitis appreciated.   Skin:     Comments: Eczema versus psoriasiform dermatitis extensor surfaces knees.  No onycholysis.  No nail pitting.         Imaging:   X-ray AP pelvis dated 11/30/2022 significant for right-sided electronic pump device.  Mild degenerative disc disease in the lower lumbar spine partially visualized.  No osseous abnormality.  Cervical spine significant for partial fusion at C2-3 likely congenital.  Moderate disc space narrowing at C6-7 with prominent anterior and posterior osteophytes.  Small osteophytes also seen at C5-6.  Moderate to severe neuroforaminal narrowing bilaterally at C6-7 with moderate narrowing on the left  at C5-6.  Bilateral hands significant for OA of the DIP and PIP joints bilaterally with degenerative changes of the first carpometacarpal joint bilaterally.  Degenerative changes noted bilateral distal radioulnar joints.  No erosive changes seen.  Sacroiliac joints with no evidence for sacroiliitis.  Lumbar spine with mild disc space narrowing at L3-4 and moderate to space narrowing seen at L5-S1.  Degenerative endplate osteophytes seen at all levels most prominent at L5-S1.  No syndesmophytes visualized.  Mild multilevel facet arthropathy.  Scattered aortic calcifications.  Thoracic spine significant for degenerative endplate osteophytes seen in nearly all levels but more advanced in the mid to lower thoracic spine.  No syndesmophytes visualized.  Left shoulder dated 8/22/2022 with mild acromioclavicular osteoarthritis.  Right elbow dated 4/7/2022 significant for mild degenerative changes.  Lumbar spine dated 8/17/2021 significant for multilevel degenerative changes.  MRI right shoulder dated 10/7/2020 significant for status post rotator cuff repair.  Mild subscapularis and infraspinatus tendinosis without tear.  Moderate acromioclavicular osteoarthritis with small synovial cyst.  DEXA dated 6/24/2020 significant for lumbar spine T-score -0.7.  Left total hip T-score -0.3.  Left femoral neck T-score -0.1.  Compared to the prior study there has been a 3% decrease in the lumbar spine BMD.  Left hip stable.                Labs:   Lab work dated 11/9/2023 significant for normal thyroid function studies.  Lab work dated 11/1/2023 reveals normal CBC.  Sed rate 11.  C-reactive protein 2.9.  Free testosterone borderline low at 7.1 with total testosterone 439.  Fasting glucose 141.  Estimated .  Calcium 9.8.  Lab work dated 11/30/2022 significant for sed rate 60.  Rheumatoid factor negative.  CCP negative.  HLA-B27 positive.  HIV 1/2 antigen/antibody nonreactive.  C-reactive protein high at 15.2.  CK 83.  Hepatitis B  surface antigen, hepatitis B core antibody, and hepatitis C antibody nonreactive.  QuantiFERON gold negative.  CBC with borderline low hematocrit at 34.8.  Hemoglobin 12.4.  Platelets 209.              Reviewed records, imaging, labs in detail with patient.  Discussion and counseling completed.  Office visit with documentation 65 minutes.  This note was written in part using the assistance of the [id number]-[id number] microphone system. Those portions using this system have been dictated and not read.

## 2024-02-23 ENCOUNTER — TELEPHONE (OUTPATIENT)
Dept: RADIOLOGY | Facility: HOSPITAL | Age: 59
End: 2024-02-23

## 2024-02-23 LAB
TESTOST FREE SERPL-MCNC: 8.1 PG/ML (ref 7.2–24)
TESTOST SERPL-MCNC: 630 NG/DL (ref 264–916)

## 2024-02-23 NOTE — TELEPHONE ENCOUNTER
Contacted Dr. Lambert @ [facility]. Spoke to nurse Rey.  Made aware that pump will stall during MRI and that it must be checked within 24 hrs. Post MRI. Patient is also aware that pump must be checked within 24 hours of MRI.

## 2024-03-07 ENCOUNTER — HOSPITAL ENCOUNTER (OUTPATIENT)
Dept: RADIOLOGY | Facility: HOSPITAL | Age: 59
Discharge: HOME | End: 2024-03-07
Payer: COMMERCIAL

## 2024-03-07 DIAGNOSIS — M45.0 ANKYLOSING SPONDYLITIS OF MULTIPLE SITES IN SPINE (HCC): ICD-10-CM

## 2024-03-07 PROCEDURE — 72195 MRI PELVIS W/O DYE: CPT

## 2024-03-08 ENCOUNTER — OFFICE VISIT (OUTPATIENT)
Dept: ENDOCRINOLOGY | Facility: CLINIC | Age: 59
End: 2024-03-08
Payer: COMMERCIAL

## 2024-03-08 VITALS
OXYGEN SATURATION: 98 % | HEIGHT: 68 IN | BODY MASS INDEX: 25.58 KG/M2 | WEIGHT: 168.8 LBS | DIASTOLIC BLOOD PRESSURE: 90 MMHG | HEART RATE: 68 BPM | SYSTOLIC BLOOD PRESSURE: 130 MMHG

## 2024-03-08 DIAGNOSIS — E78.2 MIXED HYPERLIPIDEMIA: ICD-10-CM

## 2024-03-08 DIAGNOSIS — E29.1 TESTICULAR HYPOGONADISM: Primary | ICD-10-CM

## 2024-03-08 DIAGNOSIS — I10 ESSENTIAL HYPERTENSION: ICD-10-CM

## 2024-03-08 DIAGNOSIS — M81.0 AGE-RELATED OSTEOPOROSIS WITHOUT CURRENT PATHOLOGICAL FRACTURE: ICD-10-CM

## 2024-03-08 DIAGNOSIS — E11.9 DIABETES MELLITUS TYPE 2, NONINSULIN DEPENDENT (HCC): ICD-10-CM

## 2024-03-08 LAB — SL AMB POCT HEMOGLOBIN AIC: 5.9 (ref ?–6.5)

## 2024-03-08 PROCEDURE — 99214 OFFICE O/P EST MOD 30 MIN: CPT

## 2024-03-08 PROCEDURE — 83036 HEMOGLOBIN GLYCOSYLATED A1C: CPT

## 2024-03-08 NOTE — ASSESSMENT & PLAN NOTE
Well controlled. Continue current regimen     Lab Results   Component Value Date    HGBA1C 5.9 03/08/2024

## 2024-03-08 NOTE — PROGRESS NOTES
Established Patient Progress Note    CC: Follow up for hypogonadism, type 2 diabetes mellitus, osteoporosis    Impression & Plan:    Problem List Items Addressed This Visit       Essential hypertension     Diastolic slightly elevated above goal. Patient has been having issues chewing and reports he has been too busy to cook lately so he has been consuming more salt than usual. Advised patient to stick to <2g/day and will continue to monitor BP. Goal BP < 130/80         Mixed hyperlipidemia     Due for updated level which has been ordered. Continue with statin          Osteoporosis     Remains stable. Continue with vitamin D and calcium          Testicular hypogonadism - Primary     Well controlled. Continue current regimen and repeat lab work in 6 months prior to next visit.         Relevant Orders    Testosterone, free, total    Hemoglobin and hematocrit, blood    Diabetes mellitus type 2, noninsulin dependent (HCC)     Well controlled. Continue current regimen     Lab Results   Component Value Date    HGBA1C 5.9 03/08/2024            Relevant Orders    POCT hemoglobin A1c (Completed)    Hemoglobin A1C    Comprehensive metabolic panel    Lipid panel    Albumin / creatinine urine ratio       Orders Placed This Encounter   Procedures    Testosterone, free, total     This is a patient instruction: Fasting preferred. Collections for men not undergoing treatment must be completed between 7am-9am ONLY. Collection time restrictions are not applicable to women or men already undergoing treatment.       Standing Status:   Future     Standing Expiration Date:   9/8/2024    Hemoglobin A1C     Standing Status:   Future     Standing Expiration Date:   3/8/2025    Comprehensive metabolic panel     This is a patient instruction: Patient fasting for 8 hours or longer recommended.     Standing Status:   Future     Standing Expiration Date:   3/8/2025    Hemoglobin and hematocrit, blood     Standing Status:   Future     Standing  Expiration Date:   3/8/2025    Lipid panel     This is a patient instruction: This test requires patient fasting for 10-12 hours or longer. Drinking of black coffee or black tea is acceptable.     Standing Status:   Future     Standing Expiration Date:   3/8/2025    Albumin / creatinine urine ratio     Standing Status:   Future     Standing Expiration Date:   3/8/2025    POCT hemoglobin A1c       History of Present Illness:   Kuldeep Foss Sr. is a 58 y.o. male with a history of type 2 diabetes, hypertension, hyperlipidemia, testicular hypogonadism, osteoporosis, chronic pain- treated with opioids in the past. Reports complications of microalbuminuria and neuropathy. Last A1C was 5.9. Patient is a former smoker.      Hypoglycemia: no  Recent hospitalizations or illnesses: no  Problems with current regimen: Having work to his teeth done. Makes it hard to chew. He has been eating soft foods.    For the testicular hypogonadism, he is on 75 mg of IM testosterone once/week.      Osteoporosis: DEXA from 2020 showed normal bone density. He was previously treated with IV reclast over 20 years ago. He received 2-3 treatments before stopping. He takes calcium and vitamin D supplements. Denies any recent fractures since.  He fell twice last year on black ice, did not break anything    Hyperlipidemia: atorvastatin     Hypertension: lisinopril     Current regimen:   Metformin 500 mg BID      Last Eye Exam: overdue, needs to schedule   Last Foot Exam: UTD, 7/2023     Has hypertension: Taking lisinopril  Has hyperlipidemia: Taking atorvastatin      Overall, feels well. No complaints.      Patient Active Problem List   Diagnosis    Cervical radiculopathy    Chronic pain disorder    Essential hypertension    Lumbar postlaminectomy syndrome    Mixed hyperlipidemia    Osteoporosis    Other chronic nonalcoholic liver disease    Parotid gland enlargement    Testicular hypogonadism    Diabetes mellitus type 2, noninsulin dependent (HCC)     Tear of right supraspinatus tendon    Osteoarthritis of right acromioclavicular joint    Ankylosing spondylitis (HCC)    Degeneration of intervertebral disc of lumbar region    Degenerative lumbar spinal stenosis    Diabetic polyneuropathy (HCC)    Presence of intrathecal pump    Osteoarthritis of both hands    Hiatal hernia with GERD    Eustachian tube dysfunction    Mood disorder (HCC), r/o Substance/medication induced mood disorder    Continuous opioid dependence (HCC)    Adhesive capsulitis of left shoulder    Family history of MI (myocardial infarction)    Chronically on opiate therapy    End of battery life of intrathecal infusion pump    Arthritis of carpometacarpal (CMC) joint of left thumb      Past Medical History:   Diagnosis Date    Anaphylactic reaction     Chronic pain     Depression     Diabetes (HCC)     Fibromyalgia, primary     GERD (gastroesophageal reflux disease)     Hyperlipidemia     Hypertension     Low testosterone     Neuropathy     Pilonidal cyst     last assessed 3/17/2014    Pneumonia     Sleep apnea     no cpap      Past Surgical History:   Procedure Laterality Date    BACK SURGERY      discectomy    ELBOW SURGERY Right     MOUTH SURGERY      perm top teeth removed and dental implants    OTHER SURGICAL HISTORY  03/13/2015    Electr analysis of progr impl pump w/reprogram refill, requiring physician.  Replacement of right abdomen intrathecal  pain pump filled with Dilaudid with electronic analysis and programming.  managed by Bobby Coy    PILONIDAL CYST EXCISION      MI NDSC WRST SURG W/RLS TRANSVRS CARPL LIGM Right 9/6/2023    Procedure: RELEASE CARPAL TUNNEL ENDOSCOPIC;  Surgeon: Bret Salgado MD;  Location: [facility];  Service: Orthopedics    MI NDSC WRST SURG W/RLS TRANSVRS CARPL LIGM Left 9/21/2023    Procedure: RELEASE CARPAL TUNNEL ENDOSCOPIC;  Surgeon: Bret Salgado MD;  Location: [facility];  Service: Orthopedics    MI SURGICAL ARTHROSCOPY SHOULDER  W/ROTATOR CUFF RPR Right 2020    Procedure: SHOULDER ARTHROSCOPIC ROTATOR CUFF REPAIR AND DEBRIDEMENT;  Surgeon: Wero De La Cruz MD;  [facility];  Service: Orthopedics    ROTATOR CUFF REPAIR Left     WISDOM TOOTH EXTRACTION        Family History   Problem Relation Age of Onset    Alcohol abuse Mother     Liver disease Mother     Arthritis Mother     Uterine cancer Mother     Lupus Mother     Coronary artery disease Father     Heart disease Father     Prostate cancer Father     Diabetes Sister     Diabetes unspecified Sister     Hypertension Sister     Hyperlipidemia Sister         pure hypercholesterolemia    Diabetes unspecified Brother         DM    Hypertension Brother     Hyperlipidemia Brother         pure hypercholesterolemia    Diabetes Brother         DM    Hypertension Family      Social History     Tobacco Use    Smoking status: Former     Current packs/day: 0.00     Types: Cigarettes     Quit date:      Years since quittin.1    Smokeless tobacco: Never   Substance Use Topics    Alcohol use: No     Allergies   Allergen Reactions    Amoxicillin Anaphylaxis     BP dropped         Current Outpatient Medications:     atorvastatin (LIPITOR) 20 mg tablet, Take 1 tablet (20 mg total) by mouth daily at bedtime, Disp: 90 tablet, Rfl: 0    Blood Glucose Monitoring Suppl (Contour Next EZ) w/Device KIT, Use 1 each 4 (four) times a day, Disp: 1 kit, Rfl: 0    Cholecalciferol (VITAMIN D) 2000 units CAPS, Take 1 capsule by mouth daily, Disp: , Rfl:     EPINEPHrine (EPIPEN) 0.3 mg/0.3 mL SOAJ, Inject 0.3 mL (0.3 mg total) into a muscle once for 1 dose, Disp: 0.6 mL, Rfl: 0    gabapentin (NEURONTIN) 300 mg capsule, Take 600 mg by mouth daily Taking two 600 mg at night, Disp: , Rfl:     glucose blood (Contour Next Test) test strip, Use 1 each 4 (four) times a day, Disp: 400 each, Rfl: 1    HYDROmorphone (Dilaudid) 4 mg/mL injection, Inject 1 Device as directed daily  , Disp: , Rfl:     lisinopril  "(ZESTRIL) 20 mg tablet, Take 1 tablet (20 mg total) by mouth daily, Disp: 90 tablet, Rfl: 0    meloxicam (MOBIC) 15 mg tablet, daily as needed, Disp: , Rfl:     metFORMIN (GLUCOPHAGE-XR) 500 mg 24 hr tablet, Take 1 tablet (500 mg total) by mouth 2 (two) times a day with meals, Disp: 180 tablet, Rfl: 0    Microlet Lancets MISC, Use 4 (four) times a day, Disp: 400 each, Rfl: 1    multivitamin (THERAGRAN) TABS, Take 1 tablet by mouth daily, Disp: , Rfl:     NEEDLE, DISP, 23 G 23G X 1-1/4\" MISC, Use once a week, Disp: 100 each, Rfl: 2    omeprazole (PriLOSEC) 40 MG capsule, Take 1 capsule (40 mg total) by mouth daily (Patient taking differently: Take 40 mg by mouth daily at bedtime), Disp: 90 capsule, Rfl: 3    testosterone cypionate (DEPO-TESTOSTERONE) 200 mg/mL SOLN, INJECT 0.375 MLS WEEKLY*USE 1 VIAL FOR 1 WEEKLY DOSE,DISCARD REMAINDER (weekly dose of 75 mg/week), Disp: 4 mL, Rfl: 0    Upadacitinib ER (Rinvoq) 15 MG TB24, Take 15 mg by mouth daily, Disp: , Rfl:     calcium carbonate-vitamin D 500 mg-5 mcg per tablet, Take 1 tablet by mouth 2 (two) times a day with meals (Patient not taking: Reported on 3/8/2024), Disp: , Rfl:     methadone (DOLOPHINE) 10 mg tablet, 20 mg daily, (Patient not taking: Reported on 12/29/2023,), Disp: , Rfl:     Review of Systems   Constitutional:  Negative for chills and fever.   HENT:  Negative for ear pain and sore throat.    Eyes:  Negative for pain and visual disturbance.   Respiratory:  Negative for cough and shortness of breath.    Cardiovascular:  Negative for chest pain and palpitations.   Gastrointestinal:  Negative for abdominal pain and vomiting.   Genitourinary:  Negative for dysuria and hematuria.   Musculoskeletal:  Negative for arthralgias and back pain.   Skin:  Negative for color change and rash.   Neurological:  Negative for seizures and syncope.   All other systems reviewed and are negative.      Physical Exam:  Body mass index is 25.67 kg/m².  /90   Pulse 68   " "Ht 5' 8\" (1.727 m)   Wt 76.6 kg (168 lb 12.8 oz)   SpO2 98%   BMI 25.67 kg/m²    Wt Readings from Last 3 Encounters:   03/08/24 76.6 kg (168 lb 12.8 oz)   03/07/24 78.5 kg (173 lb)   02/22/24 80.5 kg (177 lb 6.4 oz)       Physical Exam  Vitals reviewed.   Constitutional:       Appearance: Normal appearance.   HENT:      Head: Normocephalic and atraumatic.   Cardiovascular:      Rate and Rhythm: Normal rate.   Pulmonary:      Effort: Pulmonary effort is normal.   Neurological:      Mental Status: He is alert and oriented to person, place, and time.   Psychiatric:         Mood and Affect: Mood normal.         Behavior: Behavior normal.         Labs:   Lab Results   Component Value Date    HGBA1C 5.9 03/08/2024    HGBA1C 6.4 (H) 05/05/2023    HGBA1C 6.2 04/18/2023     Lab Results   Component Value Date    CREATININE 0.88 12/29/2023    CREATININE 0.77 11/01/2023    CREATININE 0.73 05/05/2023    BUN 20 12/29/2023     11/30/2015    K 3.7 12/29/2023    CL 94 (L) 12/29/2023    CO2 29 12/29/2023     eGFR   Date Value Ref Range Status   12/29/2023 95 ml/min/1.73sq m Final     Lab Results   Component Value Date    CHOL 227 11/10/2015    HDL 52 05/05/2023    TRIG 41 05/05/2023     Lab Results   Component Value Date    ALT 20 12/29/2023    AST 22 12/29/2023    ALKPHOS 33 (L) 12/29/2023    BILITOT 0.27 11/30/2015     Lab Results   Component Value Date    JHF1TIUYJMLQ 0.759 11/09/2023    DNA3KALGBTUY 0.608 07/12/2022    RKK3TYHBNKLK 0.435 (L) 05/01/2022     Lab Results   Component Value Date    FREET4 0.74 11/09/2023         There are no Patient Instructions on file for this visit.      Discussed with the patient and all questioned fully answered. He will call me if any problems arise.    "

## 2024-03-08 NOTE — ASSESSMENT & PLAN NOTE
Diastolic slightly elevated above goal. Patient has been having issues chewing and reports he has been too busy to cook lately so he has been consuming more salt than usual. Advised patient to stick to <2g/day and will continue to monitor BP. Goal BP < 130/80

## 2024-03-15 PROBLEM — M47.812 CERVICAL SPONDYLOSIS WITHOUT MYELOPATHY: Status: ACTIVE | Noted: 2024-03-15

## 2024-03-15 PROBLEM — M72.2 PLANTAR FASCIITIS, BILATERAL: Status: ACTIVE | Noted: 2024-03-15

## 2024-03-15 PROBLEM — M15.0 PRIMARY GENERALIZED (OSTEO)ARTHRITIS: Status: ACTIVE | Noted: 2024-03-15

## 2024-03-15 PROBLEM — M16.0 PRIMARY OSTEOARTHRITIS OF BOTH HIPS: Status: ACTIVE | Noted: 2024-03-15

## 2024-03-15 PROBLEM — M08.80 ENTHESITIS RELATED ARTHRITIS (HCC): Status: ACTIVE | Noted: 2024-03-15

## 2024-03-17 DIAGNOSIS — M96.1 LUMBAR POSTLAMINECTOMY SYNDROME: Primary | ICD-10-CM

## 2024-03-17 DIAGNOSIS — K44.9 HIATAL HERNIA WITH GERD: ICD-10-CM

## 2024-03-17 DIAGNOSIS — K21.9 HIATAL HERNIA WITH GERD: ICD-10-CM

## 2024-03-18 RX ORDER — GABAPENTIN 300 MG/1
1200 CAPSULE ORAL DAILY
Qty: 360 CAPSULE | Refills: 3 | Status: SHIPPED | OUTPATIENT
Start: 2024-03-18 | End: 2024-06-16

## 2024-03-18 RX ORDER — OMEPRAZOLE 40 MG/1
40 CAPSULE, DELAYED RELEASE ORAL DAILY
Qty: 90 CAPSULE | Refills: 3 | Status: SHIPPED | OUTPATIENT
Start: 2024-03-18

## 2024-04-01 DIAGNOSIS — E29.1 TESTICULAR HYPOGONADISM: ICD-10-CM

## 2024-04-05 RX ORDER — TESTOSTERONE CYPIONATE 200 MG/ML
INJECTION, SOLUTION INTRAMUSCULAR
Qty: 12 ML | Refills: 1 | Status: SHIPPED | OUTPATIENT
Start: 2024-04-05

## 2024-04-08 ENCOUNTER — TELEPHONE (OUTPATIENT)
Dept: ENDOCRINOLOGY | Facility: CLINIC | Age: 59
End: 2024-04-08

## 2024-04-11 NOTE — TELEPHONE ENCOUNTER
PA for testosterone cypionate     Submitted via    [x]CMM-KEY  [id number]  [][employer]-Case ID #   []Faxed to plan   []Other website   []Phone call Case ID #     Office notes sent, clinical questions answered. Awaiting determination    Turnaround time for your insurance to make a decision on your Prior Authorization can take 7-21 business days.

## 2024-04-12 NOTE — TELEPHONE ENCOUNTER
PA for Testosterone Approved   Date(s) approved 04/11/2024  Case #    Patient advised by [x] [employer] Message                      [] Phone call       Pharmacy advised by [x]Fax                                     []Phone call    Approval letter scanned into Media Yes

## 2024-04-29 DIAGNOSIS — I10 ESSENTIAL HYPERTENSION: ICD-10-CM

## 2024-04-29 DIAGNOSIS — E78.5 HYPERLIPIDEMIA, UNSPECIFIED HYPERLIPIDEMIA TYPE: ICD-10-CM

## 2024-04-29 DIAGNOSIS — E11.9 TYPE 2 DIABETES MELLITUS WITHOUT COMPLICATION, WITHOUT LONG-TERM CURRENT USE OF INSULIN (HCC): ICD-10-CM

## 2024-04-29 RX ORDER — LISINOPRIL 20 MG/1
20 TABLET ORAL DAILY
Qty: 90 TABLET | Refills: 1 | Status: SHIPPED | OUTPATIENT
Start: 2024-04-29

## 2024-04-29 RX ORDER — METFORMIN HYDROCHLORIDE 500 MG/1
500 TABLET, EXTENDED RELEASE ORAL 2 TIMES DAILY WITH MEALS
Qty: 180 TABLET | Refills: 1 | Status: SHIPPED | OUTPATIENT
Start: 2024-04-29 | End: 2024-10-26

## 2024-04-29 RX ORDER — ATORVASTATIN CALCIUM 20 MG/1
20 TABLET, FILM COATED ORAL
Qty: 90 TABLET | Refills: 1 | Status: SHIPPED | OUTPATIENT
Start: 2024-04-29 | End: 2024-10-26

## 2024-05-29 ENCOUNTER — OFFICE VISIT (OUTPATIENT)
Dept: FAMILY MEDICINE CLINIC | Facility: CLINIC | Age: 59
End: 2024-05-29
Payer: COMMERCIAL

## 2024-05-29 VITALS
DIASTOLIC BLOOD PRESSURE: 64 MMHG | OXYGEN SATURATION: 97 % | BODY MASS INDEX: 27.89 KG/M2 | RESPIRATION RATE: 16 BRPM | HEIGHT: 68 IN | HEART RATE: 85 BPM | SYSTOLIC BLOOD PRESSURE: 116 MMHG | WEIGHT: 184 LBS | TEMPERATURE: 98.2 F

## 2024-05-29 DIAGNOSIS — D69.6 PLATELETS DECREASED (HCC): ICD-10-CM

## 2024-05-29 DIAGNOSIS — J02.0 STREP PHARYNGITIS: Primary | ICD-10-CM

## 2024-05-29 DIAGNOSIS — F11.20 CONTINUOUS OPIOID DEPENDENCE (HCC): ICD-10-CM

## 2024-05-29 PROBLEM — F39 MOOD DISORDER (HCC): Status: RESOLVED | Noted: 2022-05-02 | Resolved: 2024-05-29

## 2024-05-29 PROCEDURE — G2211 COMPLEX E/M VISIT ADD ON: HCPCS | Performed by: FAMILY MEDICINE

## 2024-05-29 PROCEDURE — 99213 OFFICE O/P EST LOW 20 MIN: CPT | Performed by: FAMILY MEDICINE

## 2024-05-29 RX ORDER — AZITHROMYCIN 250 MG/1
TABLET, FILM COATED ORAL
Qty: 6 TABLET | Refills: 0 | Status: SHIPPED | OUTPATIENT
Start: 2024-05-29 | End: 2024-06-02

## 2024-05-31 ENCOUNTER — APPOINTMENT (EMERGENCY)
Dept: RADIOLOGY | Facility: HOSPITAL | Age: 59
End: 2024-05-31
Payer: COMMERCIAL

## 2024-05-31 ENCOUNTER — HOSPITAL ENCOUNTER (EMERGENCY)
Facility: HOSPITAL | Age: 59
Discharge: HOME | End: 2024-05-31
Attending: INTERNAL MEDICINE
Payer: COMMERCIAL

## 2024-05-31 VITALS
SYSTOLIC BLOOD PRESSURE: 107 MMHG | TEMPERATURE: 99.5 F | DIASTOLIC BLOOD PRESSURE: 56 MMHG | OXYGEN SATURATION: 96 % | RESPIRATION RATE: 17 BRPM | HEART RATE: 102 BPM

## 2024-05-31 DIAGNOSIS — E83.42 HYPOMAGNESEMIA: ICD-10-CM

## 2024-05-31 DIAGNOSIS — R68.89 FLU-LIKE SYMPTOMS: ICD-10-CM

## 2024-05-31 DIAGNOSIS — J18.9 PNEUMONIA OF RIGHT LOWER LOBE DUE TO INFECTIOUS ORGANISM: Primary | ICD-10-CM

## 2024-05-31 LAB
ALBUMIN SERPL BCP-MCNC: 4.3 G/DL (ref 3.5–5)
ALP SERPL-CCNC: 29 U/L (ref 34–104)
ALT SERPL W P-5'-P-CCNC: 14 U/L (ref 7–52)
ANION GAP SERPL CALCULATED.3IONS-SCNC: 13 MMOL/L (ref 4–13)
APTT PPP: 28 SECONDS (ref 23–37)
AST SERPL W P-5'-P-CCNC: 17 U/L (ref 13–39)
ATRIAL RATE: 116 BPM
BASOPHILS # BLD AUTO: 0.01 THOUSANDS/ÂΜL (ref 0–0.1)
BASOPHILS NFR BLD AUTO: 0 % (ref 0–1)
BILIRUB SERPL-MCNC: 1.17 MG/DL (ref 0.2–1)
BUN SERPL-MCNC: 16 MG/DL (ref 5–25)
CALCIUM SERPL-MCNC: 8.8 MG/DL (ref 8.4–10.2)
CARDIAC TROPONIN I PNL SERPL HS: 6 NG/L
CHLORIDE SERPL-SCNC: 97 MMOL/L (ref 96–108)
CO2 SERPL-SCNC: 23 MMOL/L (ref 21–32)
CREAT SERPL-MCNC: 0.86 MG/DL (ref 0.6–1.3)
EOSINOPHIL # BLD AUTO: 0.16 THOUSAND/ÂΜL (ref 0–0.61)
EOSINOPHIL NFR BLD AUTO: 2 % (ref 0–6)
ERYTHROCYTE [DISTWIDTH] IN BLOOD BY AUTOMATED COUNT: 13.3 % (ref 11.6–15.1)
FLUAV RNA RESP QL NAA+PROBE: NEGATIVE
FLUBV RNA RESP QL NAA+PROBE: NEGATIVE
GFR SERPL CREATININE-BSD FRML MDRD: 96 ML/MIN/1.73SQ M
GLUCOSE SERPL-MCNC: 153 MG/DL (ref 65–140)
HCT VFR BLD AUTO: 33.9 % (ref 36.5–49.3)
HGB BLD-MCNC: 12 G/DL (ref 12–17)
IMM GRANULOCYTES # BLD AUTO: 0.02 THOUSAND/UL (ref 0–0.2)
IMM GRANULOCYTES NFR BLD AUTO: 0 % (ref 0–2)
INR PPP: 1.14 (ref 0.84–1.19)
LACTATE SERPL-SCNC: 0.9 MMOL/L (ref 0.5–2)
LYMPHOCYTES # BLD AUTO: 0.52 THOUSANDS/ÂΜL (ref 0.6–4.47)
LYMPHOCYTES NFR BLD AUTO: 7 % (ref 14–44)
MAGNESIUM SERPL-MCNC: 1.4 MG/DL (ref 1.9–2.7)
MCH RBC QN AUTO: 31.6 PG (ref 26.8–34.3)
MCHC RBC AUTO-ENTMCNC: 35.4 G/DL (ref 31.4–37.4)
MCV RBC AUTO: 89 FL (ref 82–98)
MONOCYTES # BLD AUTO: 0.98 THOUSAND/ÂΜL (ref 0.17–1.22)
MONOCYTES NFR BLD AUTO: 12 % (ref 4–12)
NEUTROPHILS # BLD AUTO: 6.36 THOUSANDS/ÂΜL (ref 1.85–7.62)
NEUTS SEG NFR BLD AUTO: 79 % (ref 43–75)
NRBC BLD AUTO-RTO: 0 /100 WBCS
P AXIS: 66 DEGREES
PLATELET # BLD AUTO: 147 THOUSANDS/UL (ref 149–390)
PMV BLD AUTO: 9.5 FL (ref 8.9–12.7)
POTASSIUM SERPL-SCNC: 3.5 MMOL/L (ref 3.5–5.3)
PR INTERVAL: 164 MS
PROCALCITONIN SERPL-MCNC: 2.99 NG/ML
PROT SERPL-MCNC: 7.1 G/DL (ref 6.4–8.4)
PROTHROMBIN TIME: 14.5 SECONDS (ref 11.6–14.5)
QRS AXIS: 66 DEGREES
QRSD INTERVAL: 82 MS
QT INTERVAL: 316 MS
QTC INTERVAL: 439 MS
RBC # BLD AUTO: 3.8 MILLION/UL (ref 3.88–5.62)
RSV RNA RESP QL NAA+PROBE: NEGATIVE
S PYO DNA THROAT QL NAA+PROBE: NOT DETECTED
SARS-COV-2 RNA RESP QL NAA+PROBE: NEGATIVE
SODIUM SERPL-SCNC: 133 MMOL/L (ref 135–147)
T WAVE AXIS: 67 DEGREES
VENTRICULAR RATE: 116 BPM
WBC # BLD AUTO: 8.05 THOUSAND/UL (ref 4.31–10.16)

## 2024-05-31 PROCEDURE — 36415 COLL VENOUS BLD VENIPUNCTURE: CPT | Performed by: INTERNAL MEDICINE

## 2024-05-31 PROCEDURE — 0241U HB NFCT DS VIR RESP RNA 4 TRGT: CPT | Performed by: INTERNAL MEDICINE

## 2024-05-31 PROCEDURE — 99285 EMERGENCY DEPT VISIT HI MDM: CPT | Performed by: INTERNAL MEDICINE

## 2024-05-31 PROCEDURE — 85025 COMPLETE CBC W/AUTO DIFF WBC: CPT | Performed by: INTERNAL MEDICINE

## 2024-05-31 PROCEDURE — 96361 HYDRATE IV INFUSION ADD-ON: CPT

## 2024-05-31 PROCEDURE — 84484 ASSAY OF TROPONIN QUANT: CPT | Performed by: INTERNAL MEDICINE

## 2024-05-31 PROCEDURE — 83605 ASSAY OF LACTIC ACID: CPT | Performed by: INTERNAL MEDICINE

## 2024-05-31 PROCEDURE — 84145 PROCALCITONIN (PCT): CPT | Performed by: INTERNAL MEDICINE

## 2024-05-31 PROCEDURE — 80053 COMPREHEN METABOLIC PANEL: CPT | Performed by: INTERNAL MEDICINE

## 2024-05-31 PROCEDURE — 87651 STREP A DNA AMP PROBE: CPT | Performed by: INTERNAL MEDICINE

## 2024-05-31 PROCEDURE — 83735 ASSAY OF MAGNESIUM: CPT | Performed by: INTERNAL MEDICINE

## 2024-05-31 PROCEDURE — 96365 THER/PROPH/DIAG IV INF INIT: CPT

## 2024-05-31 PROCEDURE — 85730 THROMBOPLASTIN TIME PARTIAL: CPT | Performed by: INTERNAL MEDICINE

## 2024-05-31 PROCEDURE — 99284 EMERGENCY DEPT VISIT MOD MDM: CPT

## 2024-05-31 PROCEDURE — 71045 X-RAY EXAM CHEST 1 VIEW: CPT

## 2024-05-31 PROCEDURE — 93010 ELECTROCARDIOGRAM REPORT: CPT | Performed by: INTERNAL MEDICINE

## 2024-05-31 PROCEDURE — 93005 ELECTROCARDIOGRAM TRACING: CPT

## 2024-05-31 PROCEDURE — 87040 BLOOD CULTURE FOR BACTERIA: CPT | Performed by: INTERNAL MEDICINE

## 2024-05-31 PROCEDURE — 96375 TX/PRO/DX INJ NEW DRUG ADDON: CPT

## 2024-05-31 PROCEDURE — 85610 PROTHROMBIN TIME: CPT | Performed by: INTERNAL MEDICINE

## 2024-05-31 RX ORDER — KETOROLAC TROMETHAMINE 30 MG/ML
15 INJECTION, SOLUTION INTRAMUSCULAR; INTRAVENOUS ONCE
Status: COMPLETED | OUTPATIENT
Start: 2024-05-31 | End: 2024-05-31

## 2024-05-31 RX ORDER — MAGNESIUM SULFATE HEPTAHYDRATE 40 MG/ML
2 INJECTION, SOLUTION INTRAVENOUS ONCE
Status: COMPLETED | OUTPATIENT
Start: 2024-05-31 | End: 2024-05-31

## 2024-05-31 RX ORDER — ONDANSETRON 2 MG/ML
4 INJECTION INTRAMUSCULAR; INTRAVENOUS ONCE
Status: COMPLETED | OUTPATIENT
Start: 2024-05-31 | End: 2024-05-31

## 2024-05-31 RX ORDER — LEVOFLOXACIN 5 MG/ML
750 INJECTION, SOLUTION INTRAVENOUS ONCE
Status: CANCELLED | OUTPATIENT
Start: 2024-05-31 | End: 2024-05-31

## 2024-05-31 RX ORDER — CEFPODOXIME PROXETIL 200 MG/1
200 TABLET, FILM COATED ORAL 2 TIMES DAILY
Qty: 14 TABLET | Refills: 0 | Status: SHIPPED | OUTPATIENT
Start: 2024-05-31 | End: 2024-06-07

## 2024-05-31 RX ORDER — CEFPODOXIME PROXETIL 200 MG/1
200 TABLET, FILM COATED ORAL ONCE
Status: COMPLETED | OUTPATIENT
Start: 2024-05-31 | End: 2024-05-31

## 2024-05-31 RX ADMIN — CEFPODOXIME PROXETIL 200 MG: 200 TABLET, FILM COATED ORAL at 16:53

## 2024-05-31 RX ADMIN — KETOROLAC TROMETHAMINE 15 MG: 30 INJECTION, SOLUTION INTRAMUSCULAR at 15:49

## 2024-05-31 RX ADMIN — ONDANSETRON 4 MG: 2 INJECTION INTRAMUSCULAR; INTRAVENOUS at 16:06

## 2024-05-31 RX ADMIN — SODIUM CHLORIDE 1000 ML: 0.9 INJECTION, SOLUTION INTRAVENOUS at 15:53

## 2024-05-31 RX ADMIN — MAGNESIUM SULFATE HEPTAHYDRATE 2 G: 40 INJECTION, SOLUTION INTRAVENOUS at 16:41

## 2024-05-31 NOTE — DISCHARGE INSTRUCTIONS
Follow up with your PMD  TAKE medications as instructed  Drink plenty of fluids.  Labs Reviewed   CBC AND DIFFERENTIAL - Abnormal       Result Value Ref Range Status    WBC 8.05  4.31 - 10.16 Thousand/uL Final    RBC 3.80 (*) 3.88 - 5.62 Million/uL Final    Hemoglobin 12.0  12.0 - 17.0 g/dL Final    Hematocrit 33.9 (*) 36.5 - 49.3 % Final    MCV 89  82 - 98 fL Final    MCH 31.6  26.8 - 34.3 pg Final    MCHC 35.4  31.4 - 37.4 g/dL Final    RDW 13.3  11.6 - 15.1 % Final    MPV 9.5  8.9 - 12.7 fL Final    Platelets 147 (*) 149 - 390 Thousands/uL Final    nRBC 0  /100 WBCs Final    Segmented % 79 (*) 43 - 75 % Final    Immature Grans % 0  0 - 2 % Final    Lymphocytes % 7 (*) 14 - 44 % Final    Monocytes % 12  4 - 12 % Final    Eosinophils Relative 2  0 - 6 % Final    Basophils Relative 0  0 - 1 % Final    Absolute Neutrophils 6.36  1.85 - 7.62 Thousands/µL Final    Absolute Immature Grans 0.02  0.00 - 0.20 Thousand/uL Final    Absolute Lymphocytes 0.52 (*) 0.60 - 4.47 Thousands/µL Final    Absolute Monocytes 0.98  0.17 - 1.22 Thousand/µL Final    Eosinophils Absolute 0.16  0.00 - 0.61 Thousand/µL Final    Basophils Absolute 0.01  0.00 - 0.10 Thousands/µL Final   COMPREHENSIVE METABOLIC PANEL - Abnormal    Sodium 133 (*) 135 - 147 mmol/L Final    Potassium 3.5  3.5 - 5.3 mmol/L Final    Chloride 97  96 - 108 mmol/L Final    CO2 23  21 - 32 mmol/L Final    ANION GAP 13  4 - 13 mmol/L Final    BUN 16  5 - 25 mg/dL Final    Creatinine 0.86  0.60 - 1.30 mg/dL Final    Comment: Standardized to IDMS reference method    Glucose 153 (*) 65 - 140 mg/dL Final    Comment: If the patient is fasting, the ADA then defines impaired fasting glucose as > 100 mg/dL and diabetes as > or equal to 123 mg/dL.    Calcium 8.8  8.4 - 10.2 mg/dL Final    AST 17  13 - 39 U/L Final    ALT 14  7 - 52 U/L Final    Comment: Specimen collection should occur prior to Sulfasalazine administration due to the potential for falsely depressed results.      Alkaline Phosphatase 29 (*) 34 - 104 U/L Final    Total Protein 7.1  6.4 - 8.4 g/dL Final    Albumin 4.3  3.5 - 5.0 g/dL Final    Total Bilirubin 1.17 (*) 0.20 - 1.00 mg/dL Final    Comment: Use of this assay is not recommended for patients undergoing treatment with eltrombopag due to the potential for falsely elevated results.  N-acetyl-p-benzoquinone imine (metabolite of Acetaminophen) will generate erroneously low results in samples for patients that have taken an overdose of Acetaminophen.    eGFR 96  ml/min/1.73sq m Final    Narrative:     National Kidney Disease Foundation guidelines for Chronic Kidney Disease (CKD):     Stage 1 with normal or high GFR (GFR > 90 mL/min/1.73 square meters)    Stage 2 Mild CKD (GFR = 60-89 mL/min/1.73 square meters)    Stage 3A Moderate CKD (GFR = 45-59 mL/min/1.73 square meters)    Stage 3B Moderate CKD (GFR = 30-44 mL/min/1.73 square meters)    Stage 4 Severe CKD (GFR = 15-29 mL/min/1.73 square meters)    Stage 5 End Stage CKD (GFR <15 mL/min/1.73 square meters)  Note: GFR calculation is accurate only with a steady state creatinine   PROCALCITONIN TEST - Abnormal    Procalcitonin 2.99 (*) <=0.25 ng/ml Final    Comment: Suspected Lower Respiratory Tract Infection (LRTI):  - LESS than or EQUAL to 0.25 ng/mL:   low likelihood for bacterial LRTI; antibiotics DISCOURAGED.  - GREATER than 0.25 ng/mL:   increased likelihood for bacterial LRTI; antibiotics ENCOURAGED.    Suspected Sepsis:  - Strongly consider initiating antibiotics in ALL UNSTABLE patients.  - LESS than or EQUAL to 0.5 ng/mL:   low likelihood for bacterial sepsis; antibiotics DISCOURAGED.  - GREATER than 0.5 ng/mL:   increased likelihood for bacterial sepsis; antibiotics ENCOURAGED.  - GREATER than 2 ng/mL:   high risk for severe sepsis / septic shock; antibiotics strongly ENCOURAGED.    Decisions on antibiotic use should not be based solely on Procalcitonin (PCT) levels. If PCT is low but uncertainty exists with  stopping antibiotics, repeat PCT in 6-24 hours to confirm the low level. If antibiotics are administered (regardless if initial PCT was high or low), repeat PCT every 1-2 days to consider early antibiotic cessation (when GREATER than 80% decrease from the peak OR when PCT drops below designated cutoffs, whichever comes first), so long as the infection is NOT one that typically requires prolonged treatment durations (e.g., bone/joint infections, endocarditis, Staph. aureus bacteremia).    Situations of FALSE-POSITIVE Procalcitonin values:  1) Newborns < 72 hours old  2) Massive stress from severe trauma / burns, major surgery, acute pancreatitis, cardiogenic / hemorrhagic shock, sickle cell crisis, or other organ perfusion abnormalities  3) Malaria and some Candidal infections  4) Treatment with agents that stimulate cytokines (e.g., OKT3, anti-lymphocyte globulins, alemtuzumab, IL-2, granulocyte transfusion [NOT GCSFs])  5) Chronic renal disease causes elevated baseline levels (consider GREATER than 0.75 ng/mL as an abnormal cut-off); initiating HD/CRRT may cause transient decreases  6) Paraneoplastic syndromes from medullary thyroid or SCLC, some forms of vasculitis, and acute dmprw-se-nioq disease    Situations of FALSE-NEGATIVE Procalcitonin values:  1) Too early in clinical course for PCT to have reached its peak (may repeat in 6-24 hours to confirm low level)  2) Localized infection WITHOUT systemic (SIRS / sepsis) response (e.g., an abscess, osteomyelitis, cystitis)  3) Mycobacteria (e.g., Tuberculosis, MAC)  4) Cystic fibrosis exacerbations     MAGNESIUM - Abnormal    Magnesium 1.4 (*) 1.9 - 2.7 mg/dL Final   COVID19, INFLUENZA A/B, RSV PCR, [facility] - Normal    SARS-CoV-2 Negative  Negative Final    INFLUENZA A PCR Negative  Negative Final    INFLUENZA B PCR Negative  Negative Final    RSV PCR Negative  Negative Final    Narrative:     FOR PEDIATRIC PATIENTS - copy/paste COVID Guidelines URL to browser:  [email or web address]    SARS-CoV-2 assay is a Nucleic Acid Amplification assay intended for the  qualitative detection of nucleic acid from SARS-CoV-2 in nasopharyngeal  swabs. Results are for the presumptive identification of SARS-CoV-2 RNA.    Positive results are indicative of infection with SARS-CoV-2, the virus  causing COVID-19, but do not rule out bacterial infection or co-infection  with other viruses. Laboratories within the [place] and its  territories are required to report all positive results to the appropriate  public health authorities. Negative results do not preclude SARS-CoV-2  infection and should not be used as the sole basis for treatment or other  patient management decisions. Negative results must be combined with  clinical observations, patient history, and epidemiological information.  This test has not been FDA cleared or approved.    This test has been authorized by FDA under an Emergency Use Authorization  (EUA). This test is only authorized for the duration of time the  declaration that circumstances exist justifying the authorization of the  emergency use of an in vitro diagnostic tests for detection of SARS-CoV-2  virus and/or diagnosis of COVID-19 infection under section 564(b)(1) of  the Act, 21 U.S.C. 360bbb-3(b)(1), unless the authorization is terminated  or revoked sooner. The test has been validated but independent review by FDA  and CLIA is pending.    Test performed using [employer] Gene[employer]pert: This RT-PCR assay targets N2,  a region unique to SARS-CoV-2. A conserved region in the E-gene was chosen  for pan-Sarbecovirus detection which includes SARS-CoV-2.    According to CMS-2020-01-R, this platform meets the definition of high-throughput technology.   STREP A PCR - Normal    STREP A PCR Not Detected  Not Detected Final   LACTIC ACID, PLASMA (W/REFLEX IF RESULT > 2.0) - Normal    LACTIC ACID 0.9  0.5 - 2.0 mmol/L Final     "Narrative:     Result may be elevated if tourniquet was used during collection.   PROTIME-INR - Normal    Protime 14.5  11.6 - 14.5 seconds Final    INR 1.14  0.84 - 1.19 Final   APTT - Normal    PTT 28  23 - 37 seconds Final    Comment: Therapeutic Heparin Range =  60-90 seconds   HS TROPONIN I 0HR - Normal    hs TnI 0hr 6  \"Refer to ACS Flowchart\"- see link ng/L Final    Comment:                                              Initial (time 0) result  If >=50 ng/L, Myocardial injury suggested ;  Type of myocardial injury and treatment strategy  to be determined.  If 5-49 ng/L, a delta result at 2 hours and or 4 hours will be needed to further evaluate.  If <4 ng/L, and chest pain has been >3 hours since onset, patient may qualify for discharge based on the HEART score in the ED.  If <5 ng/L and <3hours since onset of chest pain, a delta result at 2 hours will be needed to further evaluate.    HS Troponin 99th Percentile URL of a Health Population=12 ng/L with a 95% Confidence Interval of 8-18 ng/L.    Second Troponin (time 2 hours)  If calculated delta >= 20 ng/L,  Myocardial injury suggested ; Type of myocardial injury and treatment strategy to be determined.  If 5-49 ng/L and the calculated delta is 5-19 ng/L, consult medical service for evaluation.  Continue evaluation for ischemia on ecg and other possible etiology and repeat hs troponin at 4 hours.  If delta is <5 ng/L at 2 hours, consider discharge based on risk stratification via the HEART score (if in ED), or LINETTE risk score in IP/Observation.    HS Troponin 99th Percentile URL of a Health Population=12 ng/L with a 95% Confidence Interval of 8-18 ng/L.   BLOOD CULTURE   BLOOD CULTURE   UA W REFLEX TO MICROSCOPIC WITH REFLEX TO CULTURE   HS TROPONIN I 2HR   HS TROPONIN I 4HR     XR chest portable   ED Interpretation   Cxr; possible RLL infiltrate , no cardiomegely          "

## 2024-05-31 NOTE — ED PROVIDER NOTES
History  Chief Complaint   Patient presents with    Flu Symptoms     Patient presents with fever, nausea, generalized body aches, and chills for 3 days. According to pt's wife, their grandkids have been sick.     This is a 58 years old came for having generalized body ache and having fever for 3 days.  Patient also has nausea and then vomited x 1 today.  Patient has no constipation.  Patient denies abdominal pain.  Patient has on and off productive cough of yellow sputum.  Patient denies SOB and he has pulse ox 95% on room air.  On the arrival to the ER patient has temp 100.6 F.  Patient went to his PMD 2 days ago and they told him that he has strep pharyngitis although no test was done and patient started on Z-Derrick, today is day #3.  Patient has back pain and they stated that the old his back is painful.  Patient denies CP and denies urinary symptoms.  Patient wife is stated that his grandkids were sick and they have been exposed to them.  Patient has below the pump on the right side of the abdomen which give him 4 mg/cc.  Patient has extensive medical history which including ankylosing spondylitis, hypertension, diabetes mellitus, fibromyalgia, osteoarthritis. patient has problem with ambulation, and is on a wheelchair.  Patient has history of neuropathy and sleep apnea.  Last meal was yesterday night.  Patient was tachycardic on arrival.        Prior to Admission Medications   Prescriptions Last Dose Informant Patient Reported? Taking?   Blood Glucose Monitoring Suppl (Contour Next EZ) w/Device KIT  Self No No   Sig: Use 1 each 4 (four) times a day   Cholecalciferol (VITAMIN D) 2000 units CAPS  Self Yes No   Sig: Take 1 capsule by mouth daily   EPINEPHrine (EPIPEN) 0.3 mg/0.3 mL SOAJ  Self No No   Sig: Inject 0.3 mL (0.3 mg total) into a muscle once for 1 dose   HYDROmorphone (Dilaudid) 4 mg/mL injection  Self Yes No   Sig: Inject 1 Device as directed daily     Microlet Lancets MISC  Self No No   Sig: Use 4 (four)  "times a day   NEEDLE, DISP, 23 G 23G X 1-1/4\" MISC  Self No No   Sig: Use once a week   Upadacitinib ER (Rinvoq) 15 MG TB24  Self Yes No   Sig: Take 15 mg by mouth daily   atorvastatin (LIPITOR) 20 mg tablet  Self No No   Sig: Take 1 tablet (20 mg total) by mouth daily at bedtime   azithromycin (ZITHROMAX) 250 mg tablet   No No   Sig: Take 2 tablets today then 1 tablet daily x 4 days   gabapentin (NEURONTIN) 300 mg capsule  Self No No   Sig: Take 4 capsules (1,200 mg total) by mouth daily   glucose blood (Contour Next Test) test strip  Self No No   Sig: Use 1 each 4 (four) times a day   lisinopril (ZESTRIL) 20 mg tablet  Self No No   Sig: Take 1 tablet (20 mg total) by mouth daily   meloxicam (MOBIC) 15 mg tablet  Self Yes No   Sig: daily as needed   metFORMIN (GLUCOPHAGE-XR) 500 mg 24 hr tablet  Self No No   Sig: Take 1 tablet (500 mg total) by mouth 2 (two) times a day with meals   multivitamin (THERAGRAN) TABS  Self Yes No   Sig: Take 1 tablet by mouth daily   omeprazole (PriLOSEC) 40 MG capsule  Self No No   Sig: Take 1 capsule (40 mg total) by mouth daily   testosterone cypionate (DEPO-TESTOSTERONE) 200 mg/mL SOLN  Self No No   Sig: INJECT 0.375 MLS WEEKLY*USE 1 VIAL FOR 1 WEEKLY DOSE,DISCARD REMAINDER (weekly dose of 75 mg/week)      Facility-Administered Medications: None       Past Medical History:   Diagnosis Date    Anaphylactic reaction     Chronic pain     Depression     Diabetes (HCC)     Diabetes mellitus (HCC)     Fibromyalgia, primary     GERD (gastroesophageal reflux disease)     Hyperlipidemia     Hypertension     Low testosterone     Neuropathy     Pilonidal cyst     last assessed 3/17/2014    Pneumonia     Sleep apnea     no cpap       Past Surgical History:   Procedure Laterality Date    BACK SURGERY      discectomy    ELBOW SURGERY Right     MOUTH SURGERY      perm top teeth removed and dental implants    OTHER SURGICAL HISTORY  03/13/2015    Electr analysis of progr impl pump w/reprogram " refill, requiring physician.  Replacement of right abdomen intrathecal  pain pump filled with Dilaudid with electronic analysis and programming.  managed by Bobby Coy    PILONIDAL CYST EXCISION      ND NDSC WRST SURG W/RLS TRANSVRS CARPL LIGM Right 2023    Procedure: RELEASE CARPAL TUNNEL ENDOSCOPIC;  Surgeon: Bret Salgado MD;  Location: [facility];  Service: Orthopedics    ND NDSC WRST SURG W/RLS TRANSVRS CARPL LIGM Left 2023    Procedure: RELEASE CARPAL TUNNEL ENDOSCOPIC;  Surgeon: Bret Salgado MD;  Location: [facility];  Service: Orthopedics    ND SURGICAL ARTHROSCOPY SHOULDER W/ROTATOR CUFF RPR Right 2020    Procedure: SHOULDER ARTHROSCOPIC ROTATOR CUFF REPAIR AND DEBRIDEMENT;  Surgeon: Wero De La Cruz MD;  Location: [facility];  Service: Orthopedics    ROTATOR CUFF REPAIR Left     WISDOM TOOTH EXTRACTION         Family History   Problem Relation Age of Onset    Alcohol abuse Mother             Liver disease Mother     Arthritis Mother             Uterine cancer Mother     Lupus Mother     Coronary artery disease Father             Heart disease Father     Prostate cancer Father             Diabetes Sister     Diabetes unspecified Sister     Hypertension Sister     Hyperlipidemia Sister         pure hypercholesterolemia    Diabetes unspecified Brother         DM    Hypertension Brother     Hyperlipidemia Brother         pure hypercholesterolemia    Diabetes Brother     Diabetes Brother         DM    Hypertension Family      I have reviewed and agree with the history as documented.    E-Cigarette/Vaping    E-Cigarette Use Never User      E-Cigarette/Vaping Substances    Nicotine No     THC No     CBD No     Flavoring No     Other No     Unknown No      Social History     Tobacco Use    Smoking status: Former     Current packs/day: 0.00     Types: Cigarettes     Quit date: 2018     Years since quittin.2    Smokeless tobacco:  Never   Vaping Use    Vaping status: Never Used   Substance Use Topics    Alcohol use: No    Drug use: Never       Review of Systems   Constitutional:  Positive for fatigue and fever. Negative for diaphoresis.   HENT:  Positive for congestion. Negative for ear pain, hearing loss, postnasal drip, rhinorrhea, sinus pressure, sinus pain, sneezing, sore throat, tinnitus and trouble swallowing.    Respiratory:  Positive for cough. Negative for chest tightness and shortness of breath.    Cardiovascular:  Negative for chest pain, palpitations and leg swelling.   Gastrointestinal:  Negative for abdominal distention, abdominal pain, diarrhea, nausea and vomiting.   Endocrine: Negative for polydipsia, polyphagia and polyuria.   Genitourinary:  Negative for decreased urine volume, difficulty urinating, dysuria, flank pain, hematuria and urgency.   Musculoskeletal:  Negative for arthralgias, back pain, gait problem, neck pain and neck stiffness.   Skin:  Negative for color change, pallor and rash.   Neurological:  Negative for dizziness, weakness, light-headedness and headaches.   Hematological:  Negative for adenopathy. Does not bruise/bleed easily.   Psychiatric/Behavioral:  Negative for agitation and behavioral problems.        Physical Exam  Physical Exam  Vitals and nursing note reviewed.   Constitutional:       General: He is not in acute distress.     Appearance: Normal appearance. He is well-developed. He is not ill-appearing, toxic-appearing or diaphoretic.   HENT:      Head: Normocephalic and atraumatic.      Right Ear: Tympanic membrane and ear canal normal.      Left Ear: Tympanic membrane and ear canal normal.      Nose: Nose normal. No congestion or rhinorrhea.      Mouth/Throat:      Mouth: Mucous membranes are dry.      Pharynx: No oropharyngeal exudate or posterior oropharyngeal erythema.   Eyes:      General: No scleral icterus.        Right eye: No discharge.         Left eye: No discharge.       Conjunctiva/sclera: Conjunctivae normal.   Neck:      Vascular: No carotid bruit.   Cardiovascular:      Rate and Rhythm: Regular rhythm. Tachycardia present.      Heart sounds: No murmur heard.     No friction rub. No gallop.   Pulmonary:      Effort: Pulmonary effort is normal. No respiratory distress.      Breath sounds: Normal breath sounds. No stridor. No wheezing, rhonchi or rales.   Chest:      Chest wall: No tenderness.   Abdominal:      General: There is no distension.      Palpations: Abdomen is soft. There is no mass.      Tenderness: There is no abdominal tenderness. There is no right CVA tenderness, left CVA tenderness, guarding or rebound.      Hernia: No hernia is present.      Comments: Has dilaudid pump at RLQ area.    Musculoskeletal:         General: No swelling, tenderness, deformity or signs of injury.      Cervical back: Normal range of motion and neck supple. No rigidity or tenderness.      Right lower leg: No edema.      Left lower leg: No edema.   Lymphadenopathy:      Cervical: No cervical adenopathy.   Skin:     General: Skin is warm and dry.      Capillary Refill: Capillary refill takes less than 2 seconds.      Coloration: Skin is not jaundiced or pale.      Findings: No bruising, erythema, lesion or rash.   Neurological:      General: No focal deficit present.      Mental Status: He is alert and oriented to person, place, and time.   Psychiatric:         Mood and Affect: Mood normal.         Vital Signs  ED Triage Vitals [05/31/24 1525]   Temperature Pulse Respirations Blood Pressure SpO2   (!) 100.6 °F (38.1 °C) (!) 120 18 98/65 95 %      Temp Source Heart Rate Source Patient Position - Orthostatic VS BP Location FiO2 (%)   Oral Monitor Lying Right arm --      Pain Score       --           Vitals:    05/31/24 1525 05/31/24 1620 05/31/24 1630   BP: 98/65 128/69 107/56   Pulse: (!) 120 (!) 106 102   Patient Position - Orthostatic VS: Lying           Visual Acuity      ED  Medications  Medications   sodium chloride 0.9 % bolus 1,000 mL (1,000 mL Intravenous New Bag 5/31/24 1553)   magnesium sulfate 2 g/50 mL IVPB (premix) 2 g (2 g Intravenous New Bag 5/31/24 1641)   ketorolac (TORADOL) injection 15 mg (15 mg Intravenous Given 5/31/24 1549)   ondansetron (ZOFRAN) injection 4 mg (4 mg Intravenous Given 5/31/24 1606)   cefpodoxime (VANTIN) tablet 200 mg (200 mg Oral Given 5/31/24 1653)       Diagnostic Studies  Results Reviewed       Procedure Component Value Units Date/Time    Strep A PCR [[id number]]  (Normal) Collected: 05/31/24 1548    Lab Status: Final result Specimen: Throat Updated: 05/31/24 1701     STREP A PCR Not Detected    FLU/RSV/COVID - if FLU/RSV clinically relevant [[id number]]  (Normal) Collected: 05/31/24 1548    Lab Status: Final result Specimen: Nares from Nose Updated: 05/31/24 1637     SARS-CoV-2 Negative     INFLUENZA A PCR Negative     INFLUENZA B PCR Negative     RSV PCR Negative    Narrative:      FOR PEDIATRIC PATIENTS - copy/paste COVID Guidelines URL to browser: [email or web address]    SARS-CoV-2 assay is a Nucleic Acid Amplification assay intended for the  qualitative detection of nucleic acid from SARS-CoV-2 in nasopharyngeal  swabs. Results are for the presumptive identification of SARS-CoV-2 RNA.    Positive results are indicative of infection with SARS-CoV-2, the virus  causing COVID-19, but do not rule out bacterial infection or co-infection  with other viruses. Laboratories within the [place] and its  territories are required to report all positive results to the appropriate  public health authorities. Negative results do not preclude SARS-CoV-2  infection and should not be used as the sole basis for treatment or other  patient management decisions. Negative results must be combined with  clinical observations, patient history, and epidemiological information.  This test has not been FDA cleared  or approved.    This test has been authorized by FDA under an Emergency Use Authorization  (EUA). This test is only authorized for the duration of time the  declaration that circumstances exist justifying the authorization of the  emergency use of an in vitro diagnostic tests for detection of SARS-CoV-2  virus and/or diagnosis of COVID-19 infection under section 564(b)(1) of  the Act, 21 U.S.C. 360bbb-3(b)(1), unless the authorization is terminated  or revoked sooner. The test has been validated but independent review by FDA  and CLIA is pending.    Test performed using [facility] Gene[facility]: This RT-PCR assay targets N2,  a region unique to SARS-CoV-2. A conserved region in the E-gene was chosen  for pan-Sarbecovirus detection which includes SARS-CoV-2.    According to CMS-2020-01-R, this platform meets the definition of high-throughput technology.    Procalcitonin [[id number]]  (Abnormal) Collected: 05/31/24 1548    Lab Status: Final result Specimen: Blood from Arm, Right Updated: 05/31/24 1627     Procalcitonin 2.99 ng/ml     HS Troponin I 2hr [[id number]]     Lab Status: No result Specimen: Blood     HS Troponin I 4hr [[id number]]     Lab Status: No result Specimen: Blood     HS Troponin 0hr (reflex protocol) [[id number]]  (Normal) Collected: 05/31/24 1548    Lab Status: Final result Specimen: Blood from Arm, Right Updated: 05/31/24 1624     hs TnI 0hr 6 ng/L     Lactic acid [[id number]]  (Normal) Collected: 05/31/24 1548    Lab Status: Final result Specimen: Blood from Arm, Right Updated: 05/31/24 1616     LACTIC ACID 0.9 mmol/L     Narrative:      Result may be elevated if tourniquet was used during collection.    Comprehensive metabolic panel [[id number]]  (Abnormal) Collected: 05/31/24 1548    Lab Status: Final result Specimen: Blood from Arm, Right Updated: 05/31/24 1615     Sodium 133 mmol/L      Potassium 3.5 mmol/L      Chloride 97 mmol/L      CO2 23 mmol/L      ANION GAP 13 mmol/L      BUN 16 mg/dL       Creatinine 0.86 mg/dL      Glucose 153 mg/dL      Calcium 8.8 mg/dL      AST 17 U/L      ALT 14 U/L      Alkaline Phosphatase 29 U/L      Total Protein 7.1 g/dL      Albumin 4.3 g/dL      Total Bilirubin 1.17 mg/dL      eGFR 96 ml/min/1.73sq m     Narrative:      National Kidney Disease Foundation guidelines for Chronic Kidney Disease (CKD):     Stage 1 with normal or high GFR (GFR > 90 mL/min/1.73 square meters)    Stage 2 Mild CKD (GFR = 60-89 mL/min/1.73 square meters)    Stage 3A Moderate CKD (GFR = 45-59 mL/min/1.73 square meters)    Stage 3B Moderate CKD (GFR = 30-44 mL/min/1.73 square meters)    Stage 4 Severe CKD (GFR = 15-29 mL/min/1.73 square meters)    Stage 5 End Stage CKD (GFR <15 mL/min/1.73 square meters)  Note: GFR calculation is accurate only with a steady state creatinine    Magnesium [[id number]]  (Abnormal) Collected: 05/31/24 1548    Lab Status: Final result Specimen: Blood from Arm, Right Updated: 05/31/24 1615     Magnesium 1.4 mg/dL     Protime-INR [[id number]]  (Normal) Collected: 05/31/24 1548    Lab Status: Final result Specimen: Blood from Arm, Right Updated: 05/31/24 1607     Protime 14.5 seconds      INR 1.14    APTT [[id number]]  (Normal) Collected: 05/31/24 1548    Lab Status: Final result Specimen: Blood from Arm, Right Updated: 05/31/24 1607     PTT 28 seconds     CBC and differential [[id number]]  (Abnormal) Collected: 05/31/24 1548    Lab Status: Final result Specimen: Blood from Arm, Right Updated: 05/31/24 1557     WBC 8.05 Thousand/uL      RBC 3.80 Million/uL      Hemoglobin 12.0 g/dL      Hematocrit 33.9 %      MCV 89 fL      MCH 31.6 pg      MCHC 35.4 g/dL      RDW 13.3 %      MPV 9.5 fL      Platelets 147 Thousands/uL      nRBC 0 /100 WBCs      Segmented % 79 %      Immature Grans % 0 %      Lymphocytes % 7 %      Monocytes % 12 %      Eosinophils Relative 2 %      Basophils Relative 0 %      Absolute Neutrophils 6.36 Thousands/µL      Absolute Immature Grans 0.02 Thousand/uL       Absolute Lymphocytes 0.52 Thousands/µL      Absolute Monocytes 0.98 Thousand/µL      Eosinophils Absolute 0.16 Thousand/µL      Basophils Absolute 0.01 Thousands/µL     Blood culture #2 [[id number]] Collected: 05/31/24 1548    Lab Status: In process Specimen: Blood from Arm, Right Updated: 05/31/24 1556    Blood culture #1 [[id number]] Collected: 05/31/24 1547    Lab Status: In process Specimen: Blood from Arm, Right Updated: 05/31/24 1556    UA w Reflex to Microscopic w Reflex to Culture [[id number]]     Lab Status: No result Specimen: Urine                    XR chest portable   ED Interpretation by Noe Francis MD (05/31 1630)   Cxr; possible RLL infiltrate , no cardiomegely                 Procedures  ECG 12 Lead Documentation Only    Date/Time: 5/31/2024 3:59 PM    Performed by: Noe Francis MD  Authorized by: Noe Francis MD    Indications / Diagnosis:  Weakness  ECG reviewed by me, the ED Provider: yes    Patient location:  ED  Previous ECG:     Previous ECG:  Compared to current    Similarity:  No change  Interpretation:     Interpretation: abnormal    Rate:     ECG rate:  116    ECG rate assessment: tachycardic    Rhythm:     Rhythm: sinus tachycardia    Ectopy:     Ectopy: none    QRS:     QRS axis:  Normal    QRS intervals:  Normal  Conduction:     Conduction: normal    ST segments:     ST segments:  Normal  T waves:     T waves: normal             ED Course                               SBIRT 20yo+      Flowsheet Row Most Recent Value   Initial Alcohol Screen: US AUDIT-C     1. How often do you have a drink containing alcohol? 0 Filed at: 05/31/2024 1526   2. How many drinks containing alcohol do you have on a typical day you are drinking?  0 Filed at: 05/31/2024 1526   3a. Male UNDER 65: How often do you have five or more drinks on one occasion? 0 Filed at: 05/31/2024 1526   Audit-C Score 0 Filed at: 05/31/2024 1526   SHELLIE: How many times in the past year have you...    Used an  illegal drug or used a prescription medication for non-medical reasons? Never Filed at: 05/31/2024 1526                      Medical Decision Making  This is a 58 years old who is extensive medical history including hypertension, diabetes, fibromyalgia, ankylosing spondylitis, sleep apnea, osteoarthritis, and he is on Dilaudid pump.  Patient came for having fever for 3 days and body ache.  Patient has nausea and vomited x 1 today.  Patient has no abdominal pain.  Patient has productive cough of yellow sputum, for 3 days.  Patient has been exposed to his grandkids at home with sick and he went to his PMD.  PMD gave him Z-Derrick 2 days ago.  Physical exam shows no pertinent positive findings.  EKG shows sinus tach rate 116 no ischemic changes.  CXR; possible right lower lobe infiltrate consistent with pneumonia.  Labs is significant for WBCs 8.05, procalcitonin 2.99, lactic acid is 0.9, magnesium 1.4.  Strep A PCR undetected, COVID/flu/RSV undetected.  Patient has history of allergy to amoxicillin received Vantin at the ER where she received injections for this problem.  Patient tolerated it well.  Patient has history of supraspinous muscle tear not advisable to give him fluoroquinolone.  Patient discharged in stable condition instructed to follow-up with his PMD.  Patient given magnesium.    Amount and/or Complexity of Data Reviewed  Labs: ordered.     Details: abs is significant for WBCs 8.05, procalcitonin 2.99, lactic acid is 0.9, magnesium 1.4. strep A  PCR undetected, COVID/flu/RSV undetected    Radiology: ordered and independent interpretation performed.     Details: CXR; possible right lower lobe infiltrate consistent with pneumonia.    ECG/medicine tests: ordered and independent interpretation performed.     Details: EKG; Sinus tachy rate 116/min, no ischemic changes.    Risk  Prescription drug management.             Disposition  Final diagnoses:   None     ED Disposition       None          Follow-up  Information    None         Patient's Medications   Discharge Prescriptions    No medications on file       No discharge procedures on file.    PDMP Review         Value Time User    PDMP Reviewed  Yes 9/21/2023  7:28 AM Marie Contreras PA-C            ED Provider  Electronically Signed by           "dose, Starting Sun 4/9/2023, Normal      gabapentin (NEURONTIN) 300 mg capsule Take 4 capsules (1,200 mg total) by mouth daily, Starting Mon 3/18/2024, Until Sun 6/16/2024, Normal      glucose blood (Contour Next Test) test strip Use 1 each 4 (four) times a day, Starting Mon 6/28/2021, Normal      HYDROmorphone (Dilaudid) 4 mg/mL injection Inject 1 Device as directed daily  , Historical Med      lisinopril (ZESTRIL) 20 mg tablet Take 1 tablet (20 mg total) by mouth daily, Starting Mon 4/29/2024, Normal      meloxicam (MOBIC) 15 mg tablet daily as needed, Starting u 1/5/2023, Historical Med      metFORMIN (GLUCOPHAGE-XR) 500 mg 24 hr tablet Take 1 tablet (500 mg total) by mouth 2 (two) times a day with meals, Starting Mon 4/29/2024, Until Sat 10/26/2024, Normal      Microlet Lancets MISC Use 4 (four) times a day, Starting Mon 6/28/2021, Normal      multivitamin (THERAGRAN) TABS Take 1 tablet by mouth daily, Historical Med      NEEDLE, DISP, 23 G 23G X 1-1/4\" MISC Use once a week, Starting u 7/20/2023, Normal      omeprazole (PriLOSEC) 40 MG capsule Take 1 capsule (40 mg total) by mouth daily, Starting Mon 3/18/2024, Normal      testosterone cypionate (DEPO-TESTOSTERONE) 200 mg/mL SOLN INJECT 0.375 MLS WEEKLY*USE 1 VIAL FOR 1 WEEKLY DOSE,DISCARD REMAINDER (weekly dose of 75 mg/week), Normal      Upadacitinib ER (Rinvoq) 15 MG TB24 Take 15 mg by mouth daily, Historical Med             No discharge procedures on file.    PDMP Review         Value Time User    PDMP Reviewed  Yes 9/21/2023  7:28 AM Marie Contreras PA-C            ED Provider  Electronically Signed by             Noe Francis MD  06/03/24 0707    "

## 2024-06-02 LAB
BACTERIA BLD CULT: NORMAL
BACTERIA BLD CULT: NORMAL

## 2024-06-03 ENCOUNTER — TELEPHONE (OUTPATIENT)
Dept: FAMILY MEDICINE CLINIC | Facility: CLINIC | Age: 59
End: 2024-06-03

## 2024-06-03 NOTE — LETTER
[facility]  [address]  [place] [address]  [phone]    Date: 06/05/24    Kuldeep Foss Sr.  [address]  [facility] [address]    Dear Kuldeep:                                                                                                                                Thank you for choosing [facility] emergency department for care.  Your primary care provider wants to make sure that your ongoing medical care is being addressed. If you require follow up care as a result of your emergency department visit, there are a few things the practice would like you to know.                As part of the network's continuing commitment to caring for our patients, we have added more same day appointments and have extended office hours to meet your medical needs. After hours, on-call physicians are available via your primary care provider's main office line.               We encourage you to contact our office prior to seeking treatment to discuss your symptoms with the medical staff.  Together, we can determine the correct course of action.  A majority of non-emergent conditions such as: common cold, flu-like symptoms, fevers, strains/sprains, dislocations, minor burns, cuts and animal bites can be treated at [facility] facilities. Diagnostic testing is available at some sites.               Of course, if you are experiencing a life threatening medical emergency call 911 or proceed directly to the nearest emergency room.    Your nearest [facility] facility is conveniently located at:    [address] [id number] [phone]  SKIP THE WAIT  Conveniently offered at most [facility] locations  [place] your spot online at [email or web address] or on the [facility] Kash    Sincerely,    [facility]  Dept: [phone]

## 2024-06-03 NOTE — TELEPHONE ENCOUNTER
06/03/24 2:02 PM    Patient contacted post ED visit, first outreach attempt made. Message was left for patient to return a call to the [facility] Department at [facility]: Phone [phone].    Thank you.  Giovana Oquendo  [facility]

## 2024-06-05 LAB
BACTERIA BLD CULT: NORMAL
BACTERIA BLD CULT: NORMAL

## 2024-06-05 NOTE — TELEPHONE ENCOUNTER
06/05/24 2:27 PM    Patient contacted post ED visit, phone outreaches were unsuccessful and a MyChart letter has been sent to the patient as follow-up.    Thank you.  Giovana Oquendo [facility]

## 2024-06-06 ENCOUNTER — OFFICE VISIT (OUTPATIENT)
Dept: FAMILY MEDICINE CLINIC | Facility: CLINIC | Age: 59
End: 2024-06-06
Payer: COMMERCIAL

## 2024-06-06 VITALS
OXYGEN SATURATION: 96 % | SYSTOLIC BLOOD PRESSURE: 110 MMHG | WEIGHT: 176 LBS | HEIGHT: 68 IN | TEMPERATURE: 99 F | RESPIRATION RATE: 18 BRPM | HEART RATE: 78 BPM | DIASTOLIC BLOOD PRESSURE: 68 MMHG | BODY MASS INDEX: 26.67 KG/M2

## 2024-06-06 DIAGNOSIS — E83.42 HYPOMAGNESEMIA: ICD-10-CM

## 2024-06-06 DIAGNOSIS — J18.9 PNEUMONIA OF RIGHT LOWER LOBE DUE TO INFECTIOUS ORGANISM: Primary | ICD-10-CM

## 2024-06-06 PROCEDURE — 99214 OFFICE O/P EST MOD 30 MIN: CPT | Performed by: FAMILY MEDICINE

## 2024-06-06 PROCEDURE — G2211 COMPLEX E/M VISIT ADD ON: HCPCS | Performed by: FAMILY MEDICINE

## 2024-06-06 NOTE — PROGRESS NOTES
Ambulatory Visit  Name: Kuldeep Foss .      : 1965      MRN: [id number]  Encounter Provider: Kuldeep Pozo MD  Encounter Date: 2024   Encounter department: [facility]    Assessment & Plan   1. Pneumonia of right lower lobe due to infectious organism  -     XR chest pa & lateral; Future; Expected date: 2024  2. Hypomagnesemia    Finish antibiotics.  Rest.  Stay hydrated-push fluids.  Symptom treatment for cough.    Sample MDI AirSupra 2 puffs 4x/day PRN.     Advised to call if any changes.     Repeat CXR in 2-3 weeks          History of Present Illness     Follow-up from ER visit.  Patient seen   with suspected strep pharyngitis he was treated with a Z-Derrick.  He presented to the ER on  with persistent cough associated with fevers chills and general malaise.  Chest x-ray showed right lower lobe pneumonia.  Flu/COVID-19/RSV negative.  Blood cultures negative.  He was given IV magnesium in the ER for a low Mg++ level. He was discharged on generic Vantin 200 mg twice a day.  Persistent cough no shortness of breath or wheezing.  Decreased appetite with 8 pound weight loss from last month.      Procedure: XR chest portable    Result Date: 2024  Narrative: XR CHEST PORTABLE INDICATION: Cough. COMPARISON: CTA chest 2023 and chest radiograph 3/4/2015 FINDINGS: Patchy opacity in the medial right lower lung. The left lung is clear. No pneumothorax or pleural effusion. Normal cardiomediastinal silhouette. Bones are unremarkable for age. Normal upper abdomen.     Impression: Right lower lung pneumonia. Findings concur with the preliminary report by the referring clinician already in PACS and/or our electronic record EPIC. Resident: MICHAEL MCKEON I, the attending radiologist, have reviewed the images and agree with the final report above. Workstation performed: [id number]       Recent Results (from the past 336 hour(s))   ECG 12 lead    Collection Time: 24  3:40 PM    Result Value Ref Range    Ventricular Rate 116 BPM    Atrial Rate 116 BPM    OK Interval 164 ms    QRSD Interval 82 ms    QT Interval 316 ms    QTC Interval 439 ms    P Axis 66 degrees    QRS Axis 66 degrees    T Wave Axis 67 degrees   Blood culture #1    Collection Time: 05/31/24  3:47 PM    Specimen: Arm, Right; Blood   Result Value Ref Range    Blood Culture No Growth After 5 Days.    CBC and differential    Collection Time: 05/31/24  3:48 PM   Result Value Ref Range    WBC 8.05 4.31 - 10.16 Thousand/uL    RBC 3.80 (L) 3.88 - 5.62 Million/uL    Hemoglobin 12.0 12.0 - 17.0 g/dL    Hematocrit 33.9 (L) 36.5 - 49.3 %    MCV 89 82 - 98 fL    MCH 31.6 26.8 - 34.3 pg    MCHC 35.4 31.4 - 37.4 g/dL    RDW 13.3 11.6 - 15.1 %    MPV 9.5 8.9 - 12.7 fL    Platelets 147 (L) 149 - 390 Thousands/uL    nRBC 0 /100 WBCs    Segmented % 79 (H) 43 - 75 %    Immature Grans % 0 0 - 2 %    Lymphocytes % 7 (L) 14 - 44 %    Monocytes % 12 4 - 12 %    Eosinophils Relative 2 0 - 6 %    Basophils Relative 0 0 - 1 %    Absolute Neutrophils 6.36 1.85 - 7.62 Thousands/µL    Absolute Immature Grans 0.02 0.00 - 0.20 Thousand/uL    Absolute Lymphocytes 0.52 (L) 0.60 - 4.47 Thousands/µL    Absolute Monocytes 0.98 0.17 - 1.22 Thousand/µL    Eosinophils Absolute 0.16 0.00 - 0.61 Thousand/µL    Basophils Absolute 0.01 0.00 - 0.10 Thousands/µL   Comprehensive metabolic panel    Collection Time: 05/31/24  3:48 PM   Result Value Ref Range    Sodium 133 (L) 135 - 147 mmol/L    Potassium 3.5 3.5 - 5.3 mmol/L    Chloride 97 96 - 108 mmol/L    CO2 23 21 - 32 mmol/L    ANION GAP 13 4 - 13 mmol/L    BUN 16 5 - 25 mg/dL    Creatinine 0.86 0.60 - 1.30 mg/dL    Glucose 153 (H) 65 - 140 mg/dL    Calcium 8.8 8.4 - 10.2 mg/dL    AST 17 13 - 39 U/L    ALT 14 7 - 52 U/L    Alkaline Phosphatase 29 (L) 34 - 104 U/L    Total Protein 7.1 6.4 - 8.4 g/dL    Albumin 4.3 3.5 - 5.0 g/dL    Total Bilirubin 1.17 (H) 0.20 - 1.00 mg/dL    eGFR 96 ml/min/1.73sq m   Lactic acid     "Collection Time: 05/31/24  3:48 PM   Result Value Ref Range    LACTIC ACID 0.9 0.5 - 2.0 mmol/L   Procalcitonin    Collection Time: 05/31/24  3:48 PM   Result Value Ref Range    Procalcitonin 2.99 (H) <=0.25 ng/ml   Protime-INR    Collection Time: 05/31/24  3:48 PM   Result Value Ref Range    Protime 14.5 11.6 - 14.5 seconds    INR 1.14 0.84 - 1.19   APTT    Collection Time: 05/31/24  3:48 PM   Result Value Ref Range    PTT 28 23 - 37 seconds   Blood culture #2    Collection Time: 05/31/24  3:48 PM    Specimen: Arm, Right; Blood   Result Value Ref Range    Blood Culture No Growth After 5 Days.    FLU/RSV/COVID - if FLU/RSV clinically relevant    Collection Time: 05/31/24  3:48 PM    Specimen: Nose; Nares   Result Value Ref Range    SARS-CoV-2 Negative Negative    INFLUENZA A PCR Negative Negative    INFLUENZA B PCR Negative Negative    RSV PCR Negative Negative   Magnesium    Collection Time: 05/31/24  3:48 PM   Result Value Ref Range    Magnesium 1.4 (L) 1.9 - 2.7 mg/dL   HS Troponin 0hr (reflex protocol)    Collection Time: 05/31/24  3:48 PM   Result Value Ref Range    hs TnI 0hr 6 \"Refer to ACS Flowchart\"- see link ng/L   Strep A PCR    Collection Time: 05/31/24  3:48 PM    Specimen: Throat   Result Value Ref Range    STREP A PCR Not Detected Not Detected           Review of Systems   Constitutional:  Positive for fatigue and fever. Negative for appetite change and chills.   HENT:  Positive for congestion. Negative for ear pain, rhinorrhea, sinus pain and sore throat.    Respiratory:  Positive for cough. Negative for shortness of breath and wheezing.    Cardiovascular:  Negative for chest pain and palpitations.   Gastrointestinal:  Positive for nausea. Negative for diarrhea and vomiting.   Musculoskeletal:  Negative for myalgias.   Skin:  Negative for rash.   Neurological:  Positive for headaches.   Hematological:  Negative for adenopathy.     Past Medical History:   Diagnosis Date    Anaphylactic reaction     " Chronic pain     Depression     Diabetes (HCC)     Diabetes mellitus (HCC)     Fibromyalgia, primary     GERD (gastroesophageal reflux disease)     Hyperlipidemia     Hypertension     Low testosterone     Neuropathy     Pilonidal cyst     last assessed 3/17/2014    Pneumonia     Sleep apnea     no cpap     Past Surgical History:   Procedure Laterality Date    BACK SURGERY      discectomy    ELBOW SURGERY Right     MOUTH SURGERY      perm top teeth removed and dental implants    OTHER SURGICAL HISTORY  2015    Electr analysis of progr impl pump w/reprogram refill, requiring physician.  Replacement of right abdomen intrathecal  pain pump filled with Dilaudid with electronic analysis and programming.  managed by Bobby Coy    PILONIDAL CYST EXCISION      PA NDSC WRST SURG W/RLS TRANSVRS CARPL LIGM Right 2023    Procedure: RELEASE CARPAL TUNNEL ENDOSCOPIC;  Surgeon: Bret Salgado MD;  Location: [facility];  Service: Orthopedics    PA NDSC WRST SURG W/RLS TRANSVRS CARPL LIGM Left 2023    Procedure: RELEASE CARPAL TUNNEL ENDOSCOPIC;  Surgeon: Bret Salgado MD;  Location: [facility];  Service: Orthopedics    PA SURGICAL ARTHROSCOPY SHOULDER W/ROTATOR CUFF RPR Right 2020    Procedure: SHOULDER ARTHROSCOPIC ROTATOR CUFF REPAIR AND DEBRIDEMENT;  Surgeon: Wero De La Cruz MD;  Location: [facility];  Service: Orthopedics    ROTATOR CUFF REPAIR Left     WISDOM TOOTH EXTRACTION       Family History   Problem Relation Age of Onset    Alcohol abuse Mother             Liver disease Mother     Arthritis Mother             Uterine cancer Mother     Lupus Mother     Coronary artery disease Father             Heart disease Father     Prostate cancer Father             Diabetes Sister     Diabetes unspecified Sister     Hypertension Sister     Hyperlipidemia Sister         pure hypercholesterolemia    Diabetes unspecified Brother         DM    Hypertension  "Brother     Hyperlipidemia Brother         pure hypercholesterolemia    Diabetes Brother     Diabetes Brother         DM    Hypertension Family      Social History     Tobacco Use    Smoking status: Former     Current packs/day: 0.00     Types: Cigarettes     Quit date: 2018     Years since quittin.2    Smokeless tobacco: Never   Vaping Use    Vaping status: Never Used   Substance and Sexual Activity    Alcohol use: No    Drug use: Never    Sexual activity: Yes     Partners: Female     Current Outpatient Medications on File Prior to Visit   Medication Sig    atorvastatin (LIPITOR) 20 mg tablet Take 1 tablet (20 mg total) by mouth daily at bedtime    Blood Glucose Monitoring Suppl (Contour Next EZ) w/Device KIT Use 1 each 4 (four) times a day    cefpodoxime (VANTIN) 200 mg tablet Take 1 tablet (200 mg total) by mouth 2 (two) times a day for 14 doses    Cholecalciferol (VITAMIN D) 2000 units CAPS Take 1 capsule by mouth daily    EPINEPHrine (EPIPEN) 0.3 mg/0.3 mL SOAJ Inject 0.3 mL (0.3 mg total) into a muscle once for 1 dose    gabapentin (NEURONTIN) 300 mg capsule Take 4 capsules (1,200 mg total) by mouth daily    glucose blood (Contour Next Test) test strip Use 1 each 4 (four) times a day    HYDROmorphone (Dilaudid) 4 mg/mL injection Inject 1 Device as directed daily      lisinopril (ZESTRIL) 20 mg tablet Take 1 tablet (20 mg total) by mouth daily    meloxicam (MOBIC) 15 mg tablet daily as needed    metFORMIN (GLUCOPHAGE-XR) 500 mg 24 hr tablet Take 1 tablet (500 mg total) by mouth 2 (two) times a day with meals    Microlet Lancets MISC Use 4 (four) times a day    multivitamin (THERAGRAN) TABS Take 1 tablet by mouth daily    NEEDLE, DISP, 23 G 23G X 1-1/4\" MISC Use once a week    omeprazole (PriLOSEC) 40 MG capsule Take 1 capsule (40 mg total) by mouth daily    testosterone cypionate (DEPO-TESTOSTERONE) 200 mg/mL SOLN INJECT 0.375 MLS WEEKLY*USE 1 VIAL FOR 1 WEEKLY DOSE,DISCARD REMAINDER (weekly dose of 75 " "mg/week)    Upadacitinib ER (Rinvoq) 15 MG TB24 Take 15 mg by mouth daily     Allergies   Allergen Reactions    Amoxicillin Anaphylaxis     BP dropped     Immunization History   Administered Date(s) Administered    COVID-19 MODERNA VACC 0.25 ML IM BOOSTER 02/23/2022    COVID-19 MODERNA VACC 0.5 ML IM 02/09/2021, 03/08/2021    Influenza Quadrivalent Recombinant Preservative Free IM 10/16/2022    Influenza Quadrivalent, 6-35 Months IM 10/01/2015, 10/11/2016, 10/18/2017    Influenza, injectable, quadrivalent, preservative free 0.5 mL 11/16/2023    Influenza, recombinant, quadrivalent,injectable, preservative free 10/03/2018, 09/23/2019, 10/22/2020, 10/20/2021    Influenza, seasonal, injectable 10/17/2013, 10/02/2014    Pneumococcal Polysaccharide PPV23 09/23/2019    Tdap 09/23/2019    Zoster Vaccine Recombinant 10/26/2023, 01/30/2024     Objective     /68 (BP Location: Left arm, Patient Position: Sitting, Cuff Size: Large)   Pulse 78   Temp 99 °F (37.2 °C)   Resp 18   Ht 5' 8\" (1.727 m)   Wt 79.8 kg (176 lb)   SpO2 96%   BMI 26.76 kg/m²     Wt Readings from Last 3 Encounters:   06/06/24 79.8 kg (176 lb)   05/29/24 83.5 kg (184 lb)   03/08/24 76.6 kg (168 lb 12.8 oz)           Physical Exam  Constitutional:       General: He is not in acute distress.  HENT:      Right Ear: Tympanic membrane and ear canal normal.      Left Ear: Tympanic membrane and ear canal normal.      Nose:      Comments: No sinus tenderness      Mouth/Throat:      Pharynx: No posterior oropharyngeal erythema.   Eyes:      Conjunctiva/sclera: Conjunctivae normal.   Cardiovascular:      Rate and Rhythm: Normal rate and regular rhythm.      Heart sounds: No murmur heard.     No gallop.   Pulmonary:      Effort: No respiratory distress.      Breath sounds: No wheezing or rales.   Lymphadenopathy:      Cervical: No cervical adenopathy.      Upper Body:      Right upper body: No supraclavicular adenopathy.      Left upper body: No " supraclavicular adenopathy.   Skin:     Coloration: Skin is not cyanotic.      Findings: No rash.   Neurological:      Mental Status: He is alert and oriented to person, place, and time.   Psychiatric:         Mood and Affect: Mood normal.       Administrative Statements

## 2024-07-12 ENCOUNTER — OFFICE VISIT (OUTPATIENT)
Age: 59
End: 2024-07-12
Payer: COMMERCIAL

## 2024-07-12 VITALS
OXYGEN SATURATION: 97 % | WEIGHT: 180.8 LBS | SYSTOLIC BLOOD PRESSURE: 122 MMHG | DIASTOLIC BLOOD PRESSURE: 70 MMHG | BODY MASS INDEX: 26.78 KG/M2 | TEMPERATURE: 97.4 F | HEIGHT: 69 IN | HEART RATE: 67 BPM

## 2024-07-12 DIAGNOSIS — M72.2 PLANTAR FASCIITIS, BILATERAL: ICD-10-CM

## 2024-07-12 DIAGNOSIS — G89.4 CHRONIC PAIN SYNDROME: ICD-10-CM

## 2024-07-12 DIAGNOSIS — M47.812 CERVICAL SPONDYLOSIS WITHOUT MYELOPATHY: ICD-10-CM

## 2024-07-12 DIAGNOSIS — E11.9 DIABETES MELLITUS TYPE 2, NONINSULIN DEPENDENT (HCC): ICD-10-CM

## 2024-07-12 DIAGNOSIS — M45.0 ANKYLOSING SPONDYLITIS OF MULTIPLE SITES IN SPINE (HCC): Primary | ICD-10-CM

## 2024-07-12 DIAGNOSIS — M96.1 LUMBAR POSTLAMINECTOMY SYNDROME: ICD-10-CM

## 2024-07-12 DIAGNOSIS — M45.AB: ICD-10-CM

## 2024-07-12 PROCEDURE — 99214 OFFICE O/P EST MOD 30 MIN: CPT | Performed by: INTERNAL MEDICINE

## 2024-07-12 NOTE — PROGRESS NOTES
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  Also noted painful injections with burning.  He was started on Rinvoq last year by his prior rheumatologist with benefit.  He has been off of Rinvoq since Memorial Day due to pneumonia right lower lobe and has not restarted it due to concerns about potential side effects.  He had also been off of Rinvoq at the end of last year for infection.  Long discussion with patient regarding risk-benefit profile of Rinvoq, as well as other biologic medications used for ankylosing spondylitis.  I told him that it was reasonable for him to restart Rinvoq as he had been treated appropriately for the pneumonia.  I told him that standard of care is to restart medication once the infection has been treated and essentially resolved not including any post infection airway irritation with coughing.  As long as he had no fever or chills or other signs of infection, generally the recommendation is to restart it.  He did have recent MRI of the pelvis and there was no evidence for sacroiliitis.  There was some mild chondral thickening in bilateral hips and mild disc bulging L5-S1.  He does not meet criteria for the diagnosis of ankylosing spondylitis, however, he does meet criteria for diagnosis of nonradiographic axial spondylarthritis, with positive HLA-B27, and enthesopathy.  Monitor disease activity and medication toxicity with lab work as ordered.  He is due for repeat QuantiFERON gold which we repeat yearly.  I have given him prescription for lab work.  I did request that he get a copy of his old records from his prior rheumatologist for my review.  Plan follow-up in 6 months or sooner if needed.    Plantar fasciitis, bilateral  History of recurrent plantar  fasciitis with current symptoms right foot.  He does know how to do heel stretches, which I have encouraged him to do.  If symptoms persist, we will send him for formal physical therapy.  He does need to wear proper shoe gear and avoid barefoot walking.  Hopefully when he is back on Rinvoq, his enthesopathy will improve as well.  Continue to monitor.    Cervical spondylosis without myelopathy  History of chronic neck pain nonradicular in nature.  Most recent cervical spine films from November 2022 was significant for degenerative disc disease C5-7 with moderate to severe bilateral neuroforaminal narrowing at C6-7 and and moderate neuroforaminal narrowing on the left at C5-6.  He does follow with pain management.  He does get intrathecal Dilaudid instillation.  Continue to monitor.    Ankylosing spondylitis (HCC)  I believe the more appropriate diagnosis for this patient would be nonradiographic ankylosing spondylitis in view of HLA-B27 positive enthesopathy, history of elevated inflammatory markers, with MRI of the pelvis with no evidence for sacroiliitis.  Patient is currently on Rinvoq.  Monitor disease activity and medication toxicity with lab work as ordered.  Monitor QuantiFERON gold yearly.    Lumbar postlaminectomy syndrome  Patient with postlaminectomy syndrome status post back surgery in the remote past, with persistence of chronic low back pain radiating into bilateral lower extremities right greater than left to his feet with associated numbness and tingling and painful ambulation.  He also has chronic neck pain which is nonradicular.  He has intrathecal pump for Dilaudid instillation per pain management.  Follow-up pain management.    Chronic pain syndrome  Chronic complex pain syndrome with known cervical spondylosis with history of radiculopathy, nonradiographic ankylosing spondylitis, and post lumbar laminectomy syndrome, who continues on gabapentin, and intrathecal Dilaudid.  Follow-up pain  management.    Diabetes mellitus type 2, noninsulin dependent (HCC)    Lab Results   Component Value Date    HGBA1C 5.9 03/08/2024   Continue management of diabetes per primary care.  Currently he is well-controlled.  Continue to monitor.         Problem List Items Addressed This Visit       Chronic pain syndrome     Chronic complex pain syndrome with known cervical spondylosis with history of radiculopathy, nonradiographic ankylosing spondylitis, and post lumbar laminectomy syndrome, who continues on gabapentin, and intrathecal Dilaudid.  Follow-up pain management.         Lumbar postlaminectomy syndrome     Patient with postlaminectomy syndrome status post back surgery in the remote past, with persistence of chronic low back pain radiating into bilateral lower extremities right greater than left to his feet with associated numbness and tingling and painful ambulation.  He also has chronic neck pain which is nonradicular.  He has intrathecal pump for Dilaudid instillation per pain management.  Follow-up pain management.         Diabetes mellitus type 2, noninsulin dependent (HCC)       Lab Results   Component Value Date    HGBA1C 5.9 03/08/2024   Continue management of diabetes per primary care.  Currently he is well-controlled.  Continue to monitor.         Ankylosing spondylitis (HCC) - Primary     I believe the more appropriate diagnosis for this patient would be nonradiographic ankylosing spondylitis in view of HLA-B27 positive enthesopathy, history of elevated inflammatory markers, with MRI of the pelvis with no evidence for sacroiliitis.  Patient is currently on Rinvoq.  Monitor disease activity and medication toxicity with lab work as ordered.  Monitor QuantiFERON gold yearly.         Relevant Orders    CBC and differential    Comprehensive metabolic panel    Sedimentation rate, automated    C-reactive protein    Urinalysis with microscopic    Quantiferon TB Gold Plus Assay    Cervical spondylosis without  myelopathy     History of chronic neck pain nonradicular in nature.  Most recent cervical spine films from November 2022 was significant for degenerative disc disease C5-7 with moderate to severe bilateral neuroforaminal narrowing at C6-7 and and moderate neuroforaminal narrowing on the left at C5-6.  He does follow with pain management.  He does get intrathecal Dilaudid instillation.  Continue to monitor.         Plantar fasciitis, bilateral     History of recurrent plantar fasciitis with current symptoms right foot.  He does know how to do heel stretches, which I have encouraged him to do.  If symptoms persist, we will send him for formal physical therapy.  He does need to wear proper shoe gear and avoid barefoot walking.  Hopefully when he is back on Rinvoq, his enthesopathy will improve as well.  Continue to monitor.         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  Also noted painful injections with burning.  He was started on Rinvoq last year by his prior rheumatologist with benefit.  He has been off of Rinvoq since Memorial Day due to pneumonia right lower lobe and has not restarted it due to concerns about potential side effects.  He had also been off of Rinvoq at the end of last year for infection.  Long discussion with patient regarding risk-benefit profile of Rinvoq, as well as other biologic medications used for ankylosing spondylitis.  I told him that it was reasonable for him to restart Rinvoq as he had been treated appropriately for the pneumonia.  I told him that standard of care is to restart medication once the infection has been treated and essentially resolved not including any post infection airway irritation with coughing.  As long as he had no fever or chills or other signs of  infection, generally the recommendation is to restart it.  He did have recent MRI of the pelvis and there was no evidence for sacroiliitis.  There was some mild chondral thickening in bilateral hips and mild disc bulging L5-S1.  He does not meet criteria for the diagnosis of ankylosing spondylitis, however, he does meet criteria for diagnosis of nonradiographic axial spondylarthritis, with positive HLA-B27, and enthesopathy.  Monitor disease activity and medication toxicity with lab work as ordered.  He is due for repeat QuantiFERON gold which we repeat yearly.  I have given him prescription for lab work.  I did request that he get a copy of his old records from his prior rheumatologist for my review.  Plan follow-up in 6 months or sooner if needed.                Reviewed records, labs, and imaging with the patient in detail.  Counseled patient.  Discussion regarding my findings and recommendations.  Office visit with documentation 35 min.    Subjective:      Patient ID: Kuldeep Foss Sr. is a 58 y.o. male.    58-year-old male, patient of Dr. Pozo, with a history of ankylosing spondylitis diagnosed in [id number].  He was evaluated and treated by rheumatologist Dr. Paris, initially with methotrexate to which he had incomplete response, and subsequently Humira with no significant benefit, and bothered by painful injections with burning.  He continued Humira for 6 to 8 months.  Last year he was started on Rinvoq by rheumatologist Dr. Telles.with overall benefit.  He did go off of Rinvoq around Memorial Day due to pneumonia right lower lobe, and has not gone back on the medication due to concerns about potential side effects.  He was also off of Rinvoq for bronchitis at the end of last year with COVID infection following bronchitis.  He does have history of postlaminectomy syndrome status post back surgery in the remote past and has persistence of chronic low back pain radiating into bilateral lower extremities  right greater than left to his feet with associated numbness and tingling and painful ambulation.  He does have chronic neck pain nonradicular in nature.  Patient does have intrathecal pump for Dilaudid instillation per pain management.  Patient has morning stiffness of 2 hours duration with chronic pain in multiple joints including ankles, knees, hips, shoulders, right elbow, in addition to neck and back.  He has history of right rotator cuff arthropathy and states he has a lump on the top of his shoulder.  He is status post right rotator cuff repair and remote history of left shoulder surgery.  He has been treated with steroid injection in the past.  He has been on disability since 2008.  He has no history of Psoriasis.  He is HLA-B27 positive and has history of plantar fasciitis.  Review of MRI of the pelvis dated 3/7/2024 significant for no evidence of sacroiliitis.  Mild disc bulge noted L5-S1 with mild chondral thickening bilateral hips.  He is status post bilateral carpal tunnel release but does note persistence of osteoarthritic pain in his right hand.  His left hand improved post carpal tunnel release.  Review of systems is positive for questionable Raynaud's phenomenon with no digital ulcerations.  He has sleep disturbance with early awakening more so than insomnia.  He notes chronic fatigue.  He denies chest pain, sicca symptoms, or history of headache.  He had recent influenza approximately 12/29/2023.  He does have hypogonadism and is treated with testosterone.  He has history of dental implants and gum infection.  Past medical history significant for hypertension, hyperlipidemia, hypergonadism, type 2 diabetes with questionable neuropathy, cervical and lumbar spondylosis, postlaminectomy syndrome, anemia, sleep apnea not on CPAP, and carpal tunnel syndrome.  Patient quit tobacco in 2016.    Lab work dated 5/31/2024 significant for sodium 133.  Glucose 153.  Calcium 8.8.  Total bilirubin 1.17.   "Estimated GFR 95.  Hemoglobin 12.  Hematocrit 33.9.  Platelet count 147.  Procalcitonin elevated at 2.99.  Hemoglobin A1c dated 3/8/2024 5.9.  Note lab work dated 11/30/2022 reviewed and significant for positive HLA-B27.  Hepatitis B surface antigen, core antibody, and hepatitis C all nonreactive.  QuantiFERON gold negative.  Sed rate 60.  Rheumatoid factor negative.  CK 83.        Allergies  Allergies   Allergen Reactions    Amoxicillin Anaphylaxis     BP dropped       Home Medications    Current Outpatient Medications:     atorvastatin (LIPITOR) 20 mg tablet, Take 1 tablet (20 mg total) by mouth daily at bedtime, Disp: 90 tablet, Rfl: 1    Blood Glucose Monitoring Suppl (Contour Next EZ) w/Device KIT, Use 1 each 4 (four) times a day, Disp: 1 kit, Rfl: 0    Cholecalciferol (VITAMIN D) 2000 units CAPS, Take 1 capsule by mouth daily, Disp: , Rfl:     glucose blood (Contour Next Test) test strip, Use 1 each 4 (four) times a day, Disp: 400 each, Rfl: 1    HYDROmorphone (Dilaudid) 4 mg/mL injection, Inject 1 Device as directed daily  , Disp: , Rfl:     lisinopril (ZESTRIL) 20 mg tablet, Take 1 tablet (20 mg total) by mouth daily, Disp: 90 tablet, Rfl: 1    meloxicam (MOBIC) 15 mg tablet, daily as needed, Disp: , Rfl:     metFORMIN (GLUCOPHAGE-XR) 500 mg 24 hr tablet, Take 1 tablet (500 mg total) by mouth 2 (two) times a day with meals, Disp: 180 tablet, Rfl: 1    Microlet Lancets MISC, Use 4 (four) times a day, Disp: 400 each, Rfl: 1    multivitamin (THERAGRAN) TABS, Take 1 tablet by mouth daily, Disp: , Rfl:     NEEDLE, DISP, 23 G 23G X 1-1/4\" MISC, Use once a week, Disp: 100 each, Rfl: 2    omeprazole (PriLOSEC) 40 MG capsule, Take 1 capsule (40 mg total) by mouth daily, Disp: 90 capsule, Rfl: 3    testosterone cypionate (DEPO-TESTOSTERONE) 200 mg/mL SOLN, INJECT 0.375 MLS WEEKLY*USE 1 VIAL FOR 1 WEEKLY DOSE,DISCARD REMAINDER (weekly dose of 75 mg/week), Disp: 12 mL, Rfl: 1    Upadacitinib ER (Rinvoq) 15 MG TB24, Take " 15 mg by mouth daily, Disp: , Rfl:     EPINEPHrine (EPIPEN) 0.3 mg/0.3 mL SOAJ, Inject 0.3 mL (0.3 mg total) into a muscle once for 1 dose, Disp: 0.6 mL, Rfl: 0    gabapentin (NEURONTIN) 300 mg capsule, Take 4 capsules (1,200 mg total) by mouth daily, Disp: 360 capsule, Rfl: 3    Past Medical History  Past Medical History:   Diagnosis Date    Anaphylactic reaction     Chronic pain     Depression     Diabetes (HCC)     Diabetes mellitus (HCC)     Fibromyalgia, primary     GERD (gastroesophageal reflux disease)     Hyperlipidemia     Hypertension     Low testosterone     Neuropathy     Pilonidal cyst     last assessed 3/17/2014    Pneumonia     Sleep apnea     no cpap       Past Surgical History   Past Surgical History:   Procedure Laterality Date    BACK SURGERY      discectomy    ELBOW SURGERY Right     MOUTH SURGERY      perm top teeth removed and dental implants    OTHER SURGICAL HISTORY  2015    Electr analysis of progr impl pump w/reprogram refill, requiring physician.  Replacement of right abdomen intrathecal  pain pump filled with Dilaudid with electronic analysis and programming.  managed by Bobby Coy    PILONIDAL CYST EXCISION      IA NDSC WRST SURG W/RLS TRANSVRS CARPL LIGM Right 2023    Procedure: RELEASE CARPAL TUNNEL ENDOSCOPIC;  Surgeon: Bret Salgado MD;  Location: [facility];  Service: Orthopedics    IA NDSC WRST SURG W/RLS TRANSVRS CARPL LIGM Left 2023    Procedure: RELEASE CARPAL TUNNEL ENDOSCOPIC;  Surgeon: Bret Salgado MD;  Location: [facility];  Service: Orthopedics    IA SURGICAL ARTHROSCOPY SHOULDER W/ROTATOR CUFF RPR Right 2020    Procedure: SHOULDER ARTHROSCOPIC ROTATOR CUFF REPAIR AND DEBRIDEMENT;  Surgeon: Wero De La Cruz MD;  Location: [facility];  Service: Orthopedics    ROTATOR CUFF REPAIR Left     WISDOM TOOTH EXTRACTION         Family History   Family History   Problem Relation Age of Onset    Alcohol abuse Mother              "Liver disease Mother     Arthritis Mother             Uterine cancer Mother     Lupus Mother     Coronary artery disease Father             Heart disease Father     Prostate cancer Father             Diabetes Sister     Diabetes unspecified Sister     Hypertension Sister     Hyperlipidemia Sister         pure hypercholesterolemia    Diabetes unspecified Brother         DM    Hypertension Brother     Hyperlipidemia Brother         pure hypercholesterolemia    Diabetes Brother     Diabetes Brother         DM    Hypertension Family        The following portions of the patient's history were reviewed and updated as appropriate: allergies, current medications, past family history, past medical history, past social history, past surgical history, and problem list.    Review of Systems   Constitutional:  Positive for fatigue. Negative for chills and fever.   HENT:  Positive for dental problem. Negative for ear pain and sore throat.    Eyes:  Negative for pain and visual disturbance.   Respiratory:  Negative for cough and shortness of breath.    Cardiovascular:  Negative for chest pain and palpitations.   Gastrointestinal:  Negative for abdominal pain and vomiting.   Genitourinary:  Negative for dysuria and hematuria.   Musculoskeletal:  Positive for arthralgias, back pain, gait problem and neck pain.   Skin:         Questionable Raynaud's phenomenon with no digital ulcerations.   Neurological:  Positive for numbness. Negative for seizures and syncope.   Psychiatric/Behavioral:  Positive for sleep disturbance.    All other systems reviewed and are negative.        Objective:      /70 (BP Location: Left arm, Patient Position: Sitting, Cuff Size: Adult)   Pulse 67   Temp (!) 97.4 °F (36.3 °C) (Temporal)   Ht 5' 9\" (1.753 m)   Wt 82 kg (180 lb 12.8 oz)   SpO2 97%   BMI 26.70 kg/m²          Physical Exam  Vitals reviewed.   Constitutional:       Appearance: Normal appearance.   HENT:      " Head: Normocephalic.      Nose:      Comments: Nose and throat unremarkable  Cardiovascular:      Rate and Rhythm: Regular rhythm.   Pulmonary:      Breath sounds: Normal breath sounds.   Abdominal:      Palpations: Abdomen is soft.   Musculoskeletal:      Comments: Neck decreased lateral flexion.  Spurling's negative.  Shoulders slightly decreased external rotation bilaterally right greater than left.  Bilateral shoulder crepitus.  Elbow right lacks full extension. Left full range of motion. Ankles full range of motion.  Tinel's negative bilaterally.  Hands squaring first carpometacarpal joints bilaterally with Heberden's and questionable early Helga's nodes scattered.  Thickening flexor tendon sheath bilateral middle fingers left greater than right.  No triggering appreciated.  Straight leg raising negative bilaterally.  Marked spasm dorsal and lumbosacral paraspinals with decreased forward flexion.  Schober's negative.  No SI joint tenderness appreciated.  Hips decreased internal rotation greater than external rotation right greater than left.  Knees full range of motion with patellofemoral crepitus.  Ankles full range of motion.  Feet rearfoot hyperpronation with midfoot cavus deformities, early hallux valgus and scattered hammertoe deformities.  Thickening plantar fascia right greater than left with tenderness right plantar fascia insertion.  No tenderness Achilles tendons.  No synovitis appreciated.   Skin:     Comments: Eczema versus psoriasiform dermatitis extensor surfaces knees.  No onycholysis.  No nail pitting.               This note was written in part using the assistance of the ICU [employer] Direct [id number] microphone system. Those portions using this system have been dictated and not read.

## 2024-07-12 NOTE — PATIENT INSTRUCTIONS
Get the lab work that I just wrote for.  You will be getting lab work every 3 months.  I did give you a prescription for a year.  The first lab draw they will recheck the TB blood factor that needs to be checked every year while on Rinvoq.  Call the number you have for Dr. Telles.  There should be number on the answering machine to tell you where to call to get your old records.

## 2024-07-18 ENCOUNTER — TELEPHONE (OUTPATIENT)
Age: 59
End: 2024-07-18

## 2024-07-19 ENCOUNTER — TELEPHONE (OUTPATIENT)
Age: 59
End: 2024-07-19

## 2024-07-19 NOTE — TELEPHONE ENCOUNTER
Rafael finn from [employer]:    Asking that we fax the demographics sheet of the RinSnapsheet enrollment form to:   Fax: [phone]

## 2024-07-21 NOTE — ASSESSMENT & PLAN NOTE
Lab Results   Component Value Date    HGBA1C 5.9 03/08/2024   Continue management of diabetes per primary care.  Currently he is well-controlled.  Continue to monitor.

## 2024-07-21 NOTE — ASSESSMENT & PLAN NOTE
Patient with postlaminectomy syndrome status post back surgery in the remote past, with persistence of chronic low back pain radiating into bilateral lower extremities right greater than left to his feet with associated numbness and tingling and painful ambulation.  He also has chronic neck pain which is nonradicular.  He has intrathecal pump for Dilaudid instillation per pain management.  Follow-up pain management.

## 2024-07-21 NOTE — ASSESSMENT & PLAN NOTE
I believe the more appropriate diagnosis for this patient would be nonradiographic ankylosing spondylitis in view of HLA-B27 positive enthesopathy, history of elevated inflammatory markers, with MRI of the pelvis with no evidence for sacroiliitis.  Patient is currently on Rinvoq.  Monitor disease activity and medication toxicity with lab work as ordered.  Monitor QuantiFERON gold yearly.

## 2024-07-21 NOTE — ASSESSMENT & PLAN NOTE
Chronic complex pain syndrome with known cervical spondylosis with history of radiculopathy, nonradiographic ankylosing spondylitis, and post lumbar laminectomy syndrome, who continues on gabapentin, and intrathecal Dilaudid.  Follow-up pain management.

## 2024-07-21 NOTE — ASSESSMENT & PLAN NOTE
History of chronic neck pain nonradicular in nature.  Most recent cervical spine films from November 2022 was significant for degenerative disc disease C5-7 with moderate to severe bilateral neuroforaminal narrowing at C6-7 and and moderate neuroforaminal narrowing on the left at C5-6.  He does follow with pain management.  He does get intrathecal Dilaudid instillation.  Continue to monitor.

## 2024-07-21 NOTE — ASSESSMENT & PLAN NOTE
History of recurrent plantar fasciitis with current symptoms right foot.  He does know how to do heel stretches, which I have encouraged him to do.  If symptoms persist, we will send him for formal physical therapy.  He does need to wear proper shoe gear and avoid barefoot walking.  Hopefully when he is back on Rinvoq, his enthesopathy will improve as well.  Continue to monitor.

## 2024-07-21 NOTE — ASSESSMENT & PLAN NOTE
Patient with initial diagnosis of ankylosing spondylitis around 2000 22,005 treated with methotrexate with incomplete response and subsequently Humira with no significant benefit.  Also noted painful injections with burning.  He was started on Rinvoq last year by his prior rheumatologist with benefit.  He has been off of Rinvoq since Memorial Day due to pneumonia right lower lobe and has not restarted it due to concerns about potential side effects.  He had also been off of Rinvoq at the end of last year for infection.  Long discussion with patient regarding risk-benefit profile of Rinvoq, as well as other biologic medications used for ankylosing spondylitis.  I told him that it was reasonable for him to restart Rinvoq as he had been treated appropriately for the pneumonia.  I told him that standard of care is to restart medication once the infection has been treated and essentially resolved not including any post infection airway irritation with coughing.  As long as he had no fever or chills or other signs of infection, generally the recommendation is to restart it.  He did have recent MRI of the pelvis and there was no evidence for sacroiliitis.  There was some mild chondral thickening in bilateral hips and mild disc bulging L5-S1.  He does not meet criteria for the diagnosis of ankylosing spondylitis, however, he does meet criteria for diagnosis of nonradiographic axial spondylarthritis, with positive HLA-B27, and enthesopathy.  Monitor disease activity and medication toxicity with lab work as ordered.  He is due for repeat QuantiFERON gold which we repeat yearly.  I have given him prescription for lab work.  I did request that he get a copy of his old records from his prior rheumatologist for my review.  Plan follow-up in 6 months or sooner if needed.

## 2024-08-28 ENCOUNTER — APPOINTMENT (OUTPATIENT)
Dept: LAB | Facility: CLINIC | Age: 59
End: 2024-08-28
Payer: COMMERCIAL

## 2024-08-28 DIAGNOSIS — M08.80 ENTHESITIS RELATED ARTHRITIS (HCC): ICD-10-CM

## 2024-08-28 DIAGNOSIS — E29.1 TESTICULAR HYPOGONADISM: ICD-10-CM

## 2024-08-28 DIAGNOSIS — M45.0 ANKYLOSING SPONDYLITIS OF MULTIPLE SITES IN SPINE (HCC): ICD-10-CM

## 2024-08-28 DIAGNOSIS — E11.9 DIABETES MELLITUS TYPE 2, NONINSULIN DEPENDENT (HCC): ICD-10-CM

## 2024-08-28 LAB
ALBUMIN SERPL BCG-MCNC: 4.7 G/DL (ref 3.5–5)
ALP SERPL-CCNC: 40 U/L (ref 34–104)
ALT SERPL W P-5'-P-CCNC: 23 U/L (ref 7–52)
ANION GAP SERPL CALCULATED.3IONS-SCNC: 10 MMOL/L (ref 4–13)
AST SERPL W P-5'-P-CCNC: 24 U/L (ref 13–39)
BACTERIA UR QL AUTO: ABNORMAL /HPF
BASOPHILS # BLD AUTO: 0.04 THOUSANDS/ÂΜL (ref 0–0.1)
BASOPHILS NFR BLD AUTO: 1 % (ref 0–1)
BILIRUB SERPL-MCNC: 0.83 MG/DL (ref 0.2–1)
BILIRUB UR QL STRIP: NEGATIVE
BUN SERPL-MCNC: 24 MG/DL (ref 5–25)
CALCIUM SERPL-MCNC: 9.4 MG/DL (ref 8.4–10.2)
CHLORIDE SERPL-SCNC: 99 MMOL/L (ref 96–108)
CHOLEST SERPL-MCNC: 142 MG/DL
CLARITY UR: CLEAR
CO2 SERPL-SCNC: 28 MMOL/L (ref 21–32)
COLOR UR: ABNORMAL
CREAT SERPL-MCNC: 0.9 MG/DL (ref 0.6–1.3)
CREAT UR-MCNC: 122.9 MG/DL
CRP SERPL QL: <1 MG/L
EOSINOPHIL # BLD AUTO: 0.09 THOUSAND/ÂΜL (ref 0–0.61)
EOSINOPHIL NFR BLD AUTO: 1 % (ref 0–6)
ERYTHROCYTE [DISTWIDTH] IN BLOOD BY AUTOMATED COUNT: 12.6 % (ref 11.6–15.1)
ERYTHROCYTE [SEDIMENTATION RATE] IN BLOOD: 3 MM/HOUR (ref 0–19)
EST. AVERAGE GLUCOSE BLD GHB EST-MCNC: 134 MG/DL
GFR SERPL CREATININE-BSD FRML MDRD: 93 ML/MIN/1.73SQ M
GLUCOSE P FAST SERPL-MCNC: 122 MG/DL (ref 65–99)
GLUCOSE UR STRIP-MCNC: NEGATIVE MG/DL
HBA1C MFR BLD: 6.3 %
HCT VFR BLD AUTO: 37.6 % (ref 36.5–49.3)
HDLC SERPL-MCNC: 47 MG/DL
HGB BLD-MCNC: 13 G/DL (ref 12–17)
HGB UR QL STRIP.AUTO: NEGATIVE
IMM GRANULOCYTES # BLD AUTO: 0.02 THOUSAND/UL (ref 0–0.2)
IMM GRANULOCYTES NFR BLD AUTO: 0 % (ref 0–2)
KETONES UR STRIP-MCNC: NEGATIVE MG/DL
LDLC SERPL CALC-MCNC: 77 MG/DL (ref 0–100)
LEUKOCYTE ESTERASE UR QL STRIP: NEGATIVE
LYMPHOCYTES # BLD AUTO: 2.67 THOUSANDS/ÂΜL (ref 0.6–4.47)
LYMPHOCYTES NFR BLD AUTO: 40 % (ref 14–44)
MCH RBC QN AUTO: 32.3 PG (ref 26.8–34.3)
MCHC RBC AUTO-ENTMCNC: 34.6 G/DL (ref 31.4–37.4)
MCV RBC AUTO: 93 FL (ref 82–98)
MICROALBUMIN UR-MCNC: <7 MG/L
MONOCYTES # BLD AUTO: 0.52 THOUSAND/ÂΜL (ref 0.17–1.22)
MONOCYTES NFR BLD AUTO: 8 % (ref 4–12)
NEUTROPHILS # BLD AUTO: 3.28 THOUSANDS/ÂΜL (ref 1.85–7.62)
NEUTS SEG NFR BLD AUTO: 50 % (ref 43–75)
NITRITE UR QL STRIP: NEGATIVE
NON-SQ EPI CELLS URNS QL MICRO: ABNORMAL /HPF
NONHDLC SERPL-MCNC: 95 MG/DL
NRBC BLD AUTO-RTO: 0 /100 WBCS
PH UR STRIP.AUTO: 6 [PH]
PLATELET # BLD AUTO: 220 THOUSANDS/UL (ref 149–390)
PMV BLD AUTO: 10.7 FL (ref 8.9–12.7)
POTASSIUM SERPL-SCNC: 4.4 MMOL/L (ref 3.5–5.3)
PROT SERPL-MCNC: 7.1 G/DL (ref 6.4–8.4)
PROT UR STRIP-MCNC: ABNORMAL MG/DL
RBC # BLD AUTO: 4.03 MILLION/UL (ref 3.88–5.62)
RBC #/AREA URNS AUTO: ABNORMAL /HPF
SODIUM SERPL-SCNC: 137 MMOL/L (ref 135–147)
SP GR UR STRIP.AUTO: 1.02 (ref 1–1.03)
TRIGL SERPL-MCNC: 92 MG/DL
UROBILINOGEN UR STRIP-ACNC: <2 MG/DL
WBC # BLD AUTO: 6.62 THOUSAND/UL (ref 4.31–10.16)
WBC #/AREA URNS AUTO: ABNORMAL /HPF

## 2024-08-28 PROCEDURE — 3044F HG A1C LEVEL LT 7.0%: CPT | Performed by: INTERNAL MEDICINE

## 2024-08-28 PROCEDURE — 85025 COMPLETE CBC W/AUTO DIFF WBC: CPT

## 2024-08-28 PROCEDURE — 80053 COMPREHEN METABOLIC PANEL: CPT

## 2024-08-28 PROCEDURE — 36415 COLL VENOUS BLD VENIPUNCTURE: CPT

## 2024-08-28 PROCEDURE — 83036 HEMOGLOBIN GLYCOSYLATED A1C: CPT

## 2024-08-28 PROCEDURE — 80061 LIPID PANEL: CPT

## 2024-08-28 PROCEDURE — 86140 C-REACTIVE PROTEIN: CPT

## 2024-08-28 PROCEDURE — 85652 RBC SED RATE AUTOMATED: CPT

## 2024-08-28 PROCEDURE — 82570 ASSAY OF URINE CREATININE: CPT

## 2024-08-28 PROCEDURE — 84402 ASSAY OF FREE TESTOSTERONE: CPT

## 2024-08-28 PROCEDURE — 86480 TB TEST CELL IMMUN MEASURE: CPT

## 2024-08-28 PROCEDURE — 84403 ASSAY OF TOTAL TESTOSTERONE: CPT

## 2024-08-28 PROCEDURE — 82043 UR ALBUMIN QUANTITATIVE: CPT

## 2024-08-29 LAB
GAMMA INTERFERON BACKGROUND BLD IA-ACNC: 0.02 IU/ML
M TB IFN-G BLD-IMP: NEGATIVE
M TB IFN-G CD4+ BCKGRND COR BLD-ACNC: 0.02 IU/ML
M TB IFN-G CD4+ BCKGRND COR BLD-ACNC: 0.04 IU/ML
MITOGEN IGNF BCKGRD COR BLD-ACNC: 9.98 IU/ML
TESTOST FREE SERPL-MCNC: 7.1 PG/ML (ref 7.2–24)
TESTOST SERPL-MCNC: 700 NG/DL (ref 264–916)

## 2024-09-10 ENCOUNTER — TELEPHONE (OUTPATIENT)
Dept: FAMILY MEDICINE CLINIC | Facility: CLINIC | Age: 59
End: 2024-09-10

## 2024-09-10 NOTE — TELEPHONE ENCOUNTER
Was calling patient to schedule appointment for AWV was able to schedule for 12/19/24 with pcp  Informed patient we will mailed out questionnaire patient understood.

## 2024-09-17 ENCOUNTER — OFFICE VISIT (OUTPATIENT)
Dept: ENDOCRINOLOGY | Facility: CLINIC | Age: 59
End: 2024-09-17
Payer: COMMERCIAL

## 2024-09-17 VITALS
HEIGHT: 69 IN | BODY MASS INDEX: 26.81 KG/M2 | SYSTOLIC BLOOD PRESSURE: 112 MMHG | WEIGHT: 181 LBS | HEART RATE: 74 BPM | DIASTOLIC BLOOD PRESSURE: 68 MMHG

## 2024-09-17 DIAGNOSIS — I10 ESSENTIAL HYPERTENSION: ICD-10-CM

## 2024-09-17 DIAGNOSIS — M81.0 AGE-RELATED OSTEOPOROSIS WITHOUT CURRENT PATHOLOGICAL FRACTURE: ICD-10-CM

## 2024-09-17 DIAGNOSIS — Z12.5 ENCOUNTER FOR SCREENING FOR MALIGNANT NEOPLASM OF PROSTATE: ICD-10-CM

## 2024-09-17 DIAGNOSIS — E29.1 TESTICULAR HYPOGONADISM: Primary | ICD-10-CM

## 2024-09-17 DIAGNOSIS — G47.33 OBSTRUCTIVE SLEEP APNEA SYNDROME: ICD-10-CM

## 2024-09-17 DIAGNOSIS — E11.9 DIABETES MELLITUS TYPE 2, NONINSULIN DEPENDENT (HCC): ICD-10-CM

## 2024-09-17 PROCEDURE — 99214 OFFICE O/P EST MOD 30 MIN: CPT | Performed by: INTERNAL MEDICINE

## 2024-09-17 RX ORDER — TESTOSTERONE CYPIONATE 200 MG/ML
INJECTION, SOLUTION INTRAMUSCULAR
Qty: 12 ML | Refills: 1 | Status: SHIPPED | OUTPATIENT
Start: 2024-09-17

## 2024-09-17 NOTE — ASSESSMENT & PLAN NOTE
Since he is unable to tolerate CPAP, I referred him to Dr. Borden for possible surgical interventions.    Orders:    Ambulatory Referral to Otolaryngology; Future

## 2024-09-17 NOTE — PROGRESS NOTES
Ambulatory Visit  Name: Kuldeep Foss Sr.      : 1965      MRN: [id number]  Encounter Provider: Xenia Barrett MD  Encounter Date: 2024   Encounter department: [facility]    Assessment & Plan  Essential hypertension  Blood pressure is well-controlled.  Continue current regimen.         Testicular hypogonadism  His testosterone level was in the target range with current dose of testosterone cypionate.  Check CBC, PSA, total and free testosterone prior to the next visit.  I did send a refill for his testosterone.    Orders:    testosterone cypionate (DEPO-TESTOSTERONE) 200 mg/mL SOLN; INJECT 0.375 MLS WEEKLY*USE 1 VIAL FOR 1 WEEKLY DOSE,DISCARD REMAINDER (weekly dose of 75 mg/week)    Testosterone, free, total; Future    CBC and differential; Future    PSA, Total Screen; Future    Diabetes mellitus type 2, noninsulin dependent (HCC)  His A1c has increased slightly.  He recognizes that he needs to focus more on his diet.  Lab Results   Component Value Date    HGBA1C 6.3 (H) 2024            Age-related osteoporosis without current pathological fracture  Repeat DEXA scan in 2 to 3 years.         Obstructive sleep apnea syndrome  Since he is unable to tolerate CPAP, I referred him to Dr. Borden for possible surgical interventions.    Orders:    Ambulatory Referral to Otolaryngology; Future    Encounter for screening for malignant neoplasm of prostate    Orders:    PSA, Total Screen; Future      History of Present Illness     Kuldeep Foss Sr. is a 59 y.o. male who presents for follow-up of hypogonadism related to chronic opioid use.  He is tolerating the testosterone well and has no complaints.    He has noticed an increase in his hemoglobin A1c.  He attributes this to eating out more frequently.    History obtained from : patient  Review of Systems   Constitutional:  Negative for chills and fever.   Respiratory:  Negative for shortness of breath.   "  Cardiovascular:  Negative for chest pain.   Gastrointestinal:  Negative for constipation, diarrhea, nausea and vomiting.   All other systems reviewed and are negative.    Current Outpatient Medications on File Prior to Visit   Medication Sig Dispense Refill    atorvastatin (LIPITOR) 20 mg tablet Take 1 tablet (20 mg total) by mouth daily at bedtime 90 tablet 1    Blood Glucose Monitoring Suppl (Contour Next EZ) w/Device KIT Use 1 each 4 (four) times a day 1 kit 0    Cholecalciferol (VITAMIN D) 2000 units CAPS Take 1 capsule by mouth daily      EPINEPHrine (EPIPEN) 0.3 mg/0.3 mL SOAJ Inject 0.3 mL (0.3 mg total) into a muscle once for 1 dose 0.6 mL 0    gabapentin (NEURONTIN) 300 mg capsule Take 4 capsules (1,200 mg total) by mouth daily (Patient taking differently: Take 900 mg by mouth daily at bedtime) 360 capsule 3    glucose blood (Contour Next Test) test strip Use 1 each 4 (four) times a day 400 each 1    HYDROmorphone (Dilaudid) 4 mg/mL injection Inject 1 Device as directed daily        lisinopril (ZESTRIL) 20 mg tablet Take 1 tablet (20 mg total) by mouth daily 90 tablet 1    meloxicam (MOBIC) 15 mg tablet daily as needed      metFORMIN (GLUCOPHAGE-XR) 500 mg 24 hr tablet Take 1 tablet (500 mg total) by mouth 2 (two) times a day with meals 180 tablet 1    Microlet Lancets MISC Use 4 (four) times a day 400 each 1    multivitamin (THERAGRAN) TABS Take 1 tablet by mouth daily      NEEDLE, DISP, 23 G 23G X 1-1/4\" MISC Use once a week 100 each 2    omeprazole (PriLOSEC) 40 MG capsule Take 1 capsule (40 mg total) by mouth daily 90 capsule 3    Upadacitinib ER (Rinvoq) 15 MG TB24 Take 15 mg by mouth daily      [DISCONTINUED] testosterone cypionate (DEPO-TESTOSTERONE) 200 mg/mL SOLN INJECT 0.375 MLS WEEKLY*USE 1 VIAL FOR 1 WEEKLY DOSE,DISCARD REMAINDER (weekly dose of 75 mg/week) 12 mL 1     No current facility-administered medications on file prior to visit.          Objective     /68   Pulse 74   Ht 5' 9\" " (1.753 m)   Wt 82.1 kg (181 lb)   BMI 26.73 kg/m²     Physical Exam  Constitutional:       General: He is not in acute distress.     Appearance: He is well-developed. He is not diaphoretic.   HENT:      Head: Normocephalic and atraumatic.   Eyes:      General: No scleral icterus.        Right eye: No discharge.         Left eye: No discharge.   Pulmonary:      Effort: Pulmonary effort is normal.   Musculoskeletal:         General: Normal range of motion.      Cervical back: Normal range of motion.   Skin:     Coloration: Skin is not jaundiced.   Neurological:      General: No focal deficit present.      Mental Status: He is alert and oriented to person, place, and time.      Cranial Nerves: No cranial nerve deficit.   Psychiatric:         Mood and Affect: Mood normal.         Behavior: Behavior normal.

## 2024-09-17 NOTE — ASSESSMENT & PLAN NOTE
His A1c has increased slightly.  He recognizes that he needs to focus more on his diet.  Lab Results   Component Value Date    HGBA1C 6.3 (H) 08/28/2024

## 2024-09-17 NOTE — ASSESSMENT & PLAN NOTE
His testosterone level was in the target range with current dose of testosterone cypionate.  Check CBC, PSA, total and free testosterone prior to the next visit.  I did send a refill for his testosterone.    Orders:    testosterone cypionate (DEPO-TESTOSTERONE) 200 mg/mL SOLN; INJECT 0.375 MLS WEEKLY*USE 1 VIAL FOR 1 WEEKLY DOSE,DISCARD REMAINDER (weekly dose of 75 mg/week)    Testosterone, free, total; Future    CBC and differential; Future    PSA, Total Screen; Future

## 2024-10-08 ENCOUNTER — VBI (OUTPATIENT)
Dept: ADMINISTRATIVE | Facility: OTHER | Age: 59
End: 2024-10-08

## 2024-10-08 NOTE — TELEPHONE ENCOUNTER
10/08/24 11:08 AM     Chart reviewed for Diabetic Eye Exam ; nothing is submitted to the patient's insurance at this time.     Vinicio Veloz MA   [facility]

## 2024-10-17 DIAGNOSIS — I10 ESSENTIAL HYPERTENSION: ICD-10-CM

## 2024-10-17 DIAGNOSIS — E11.9 TYPE 2 DIABETES MELLITUS WITHOUT COMPLICATION, WITHOUT LONG-TERM CURRENT USE OF INSULIN (HCC): ICD-10-CM

## 2024-10-17 DIAGNOSIS — E78.5 HYPERLIPIDEMIA, UNSPECIFIED HYPERLIPIDEMIA TYPE: ICD-10-CM

## 2024-10-17 RX ORDER — METFORMIN HYDROCHLORIDE 500 MG/1
500 TABLET, EXTENDED RELEASE ORAL 2 TIMES DAILY WITH MEALS
Qty: 180 TABLET | Refills: 1 | Status: SHIPPED | OUTPATIENT
Start: 2024-10-17

## 2024-10-17 RX ORDER — ATORVASTATIN CALCIUM 20 MG/1
20 TABLET, FILM COATED ORAL
Qty: 90 TABLET | Refills: 1 | Status: SHIPPED | OUTPATIENT
Start: 2024-10-17

## 2024-10-17 RX ORDER — LISINOPRIL 20 MG/1
20 TABLET ORAL DAILY
Qty: 90 TABLET | Refills: 1 | Status: SHIPPED | OUTPATIENT
Start: 2024-10-17

## 2024-10-28 ENCOUNTER — TELEPHONE (OUTPATIENT)
Age: 59
End: 2024-10-28

## 2024-10-28 NOTE — TELEPHONE ENCOUNTER
Dr. Reyes    Rinvoq and Flu Shot Question    Spouse read about concerns when taking Rinvoq and getting Flu Shot. Please advise further.

## 2024-12-19 ENCOUNTER — OFFICE VISIT (OUTPATIENT)
Dept: FAMILY MEDICINE CLINIC | Facility: CLINIC | Age: 59
End: 2024-12-19
Payer: COMMERCIAL

## 2024-12-19 VITALS
HEART RATE: 81 BPM | RESPIRATION RATE: 16 BRPM | HEIGHT: 69 IN | TEMPERATURE: 98.1 F | SYSTOLIC BLOOD PRESSURE: 112 MMHG | BODY MASS INDEX: 28.14 KG/M2 | DIASTOLIC BLOOD PRESSURE: 74 MMHG | OXYGEN SATURATION: 98 % | WEIGHT: 190 LBS

## 2024-12-19 DIAGNOSIS — E11.9 DIABETES MELLITUS TYPE 2, NONINSULIN DEPENDENT (HCC): Primary | ICD-10-CM

## 2024-12-19 DIAGNOSIS — F11.20 CONTINUOUS OPIOID DEPENDENCE (HCC): ICD-10-CM

## 2024-12-19 DIAGNOSIS — Z97.8 PRESENCE OF INTRATHECAL PUMP: ICD-10-CM

## 2024-12-19 DIAGNOSIS — G47.33 OBSTRUCTIVE SLEEP APNEA SYNDROME: ICD-10-CM

## 2024-12-19 DIAGNOSIS — M45.0 ANKYLOSING SPONDYLITIS OF MULTIPLE SITES IN SPINE (HCC): ICD-10-CM

## 2024-12-19 DIAGNOSIS — M96.1 LUMBAR POSTLAMINECTOMY SYNDROME: ICD-10-CM

## 2024-12-19 DIAGNOSIS — K44.9 HIATAL HERNIA WITH GERD: ICD-10-CM

## 2024-12-19 DIAGNOSIS — Z00.00 MEDICARE ANNUAL WELLNESS VISIT, SUBSEQUENT: ICD-10-CM

## 2024-12-19 DIAGNOSIS — I10 ESSENTIAL HYPERTENSION: ICD-10-CM

## 2024-12-19 DIAGNOSIS — G89.4 CHRONIC PAIN SYNDROME: ICD-10-CM

## 2024-12-19 DIAGNOSIS — K21.9 HIATAL HERNIA WITH GERD: ICD-10-CM

## 2024-12-19 DIAGNOSIS — E78.2 MIXED HYPERLIPIDEMIA: ICD-10-CM

## 2024-12-19 DIAGNOSIS — Z12.11 SCREENING FOR COLON CANCER: ICD-10-CM

## 2024-12-19 PROBLEM — Z46.2 END OF BATTERY LIFE OF INTRATHECAL INFUSION PUMP: Status: RESOLVED | Noted: 2021-10-04 | Resolved: 2024-12-19

## 2024-12-19 PROBLEM — Z79.891 CHRONICALLY ON OPIATE THERAPY: Status: RESOLVED | Noted: 2022-12-06 | Resolved: 2024-12-19

## 2024-12-19 PROCEDURE — 99214 OFFICE O/P EST MOD 30 MIN: CPT | Performed by: FAMILY MEDICINE

## 2024-12-19 PROCEDURE — G0439 PPPS, SUBSEQ VISIT: HCPCS | Performed by: FAMILY MEDICINE

## 2024-12-19 RX ORDER — CHLORHEXIDINE GLUCONATE ORAL RINSE 1.2 MG/ML
SOLUTION DENTAL
COMMUNITY
Start: 2024-10-15

## 2024-12-19 NOTE — PATIENT INSTRUCTIONS
Medicare Preventive Visit Patient Instructions  Thank you for completing your Welcome to Medicare Visit or Medicare Annual Wellness Visit today. Your next wellness visit will be due in one year (12/20/2025).  The screening/preventive services that you may require over the next 5-10 years are detailed below. Some tests may not apply to you based off risk factors and/or age. Screening tests ordered at today's visit but not completed yet may show as past due. Also, please note that scanned in results may not display below.  Preventive Screenings:  Service Recommendations Previous Testing/Comments   Colorectal Cancer Screening  Colonoscopy    Fecal Occult Blood Test (FOBT)/Fecal Immunochemical Test (FIT)  Fecal DNA/Cologuard Test  Flexible Sigmoidoscopy Age: 45-75 years old   Colonoscopy: every 10 years (May be performed more frequently if at higher risk)  OR  FOBT/FIT: every 1 year  OR  Cologuard: every 3 years  OR  Sigmoidoscopy: every 5 years  Screening may be recommended earlier than age 45 if at higher risk for colorectal cancer. Also, an individualized decision between you and your healthcare provider will decide whether screening between the ages of 76-85 would be appropriate. Colonoscopy: Not on file  FOBT/FIT: Not on file  Cologuard: Not on file  Sigmoidoscopy: Not on file          Prostate Cancer Screening Individualized decision between patient and health care provider in men between ages of 55-69   Medicare will cover every 12 months beginning on the day after your 50th birthday PSA: 0.8 ng/mL           Hepatitis C Screening Once for adults born between 1945 and 1965  More frequently in patients at high risk for Hepatitis C Hep C Antibody: 11/30/2022    Screening Current   Diabetes Screening 1-2 times per year if you're at risk for diabetes or have pre-diabetes Fasting glucose: 122 mg/dL (8/28/2024)  A1C: 6.3 % (8/28/2024)  Screening Not Indicated  History Diabetes   Cholesterol Screening Once every 5 years  if you don't have a lipid disorder. May order more often based on risk factors. Lipid panel: 08/28/2024  Screening Not Indicated  History Lipid Disorder      Other Preventive Screenings Covered by Medicare:  Abdominal Aortic Aneurysm (AAA) Screening: covered once if your at risk. You're considered to be at risk if you have a family history of AAA or a male between the age of 65-75 who smoking at least 100 cigarettes in your lifetime.  Lung Cancer Screening: covers low dose CT scan once per year if you meet all of the following conditions: (1) Age 55-77; (2) No signs or symptoms of lung cancer; (3) Current smoker or have quit smoking within the last 15 years; (4) You have a tobacco smoking history of at least 20 pack years (packs per day x number of years you smoked); (5) You get a written order from a healthcare provider.  Glaucoma Screening: covered annually if you're considered high risk: (1) You have diabetes OR (2) Family history of glaucoma OR (3)  aged 50 and older OR (4)  American aged 65 and older  Osteoporosis Screening: covered every 2 years if you meet one of the following conditions: (1) Have a vertebral abnormality; (2) On glucocorticoid therapy for more than 3 months; (3) Have primary hyperparathyroidism; (4) On osteoporosis medications and need to assess response to drug therapy.  HIV Screening: covered annually if you're between the age of 15-65. Also covered annually if you are younger than 15 and older than 65 with risk factors for HIV infection. For pregnant patients, it is covered up to 3 times per pregnancy.    Immunizations:  Immunization Recommendations   Influenza Vaccine Annual influenza vaccination during flu season is recommended for all persons aged >= 6 months who do not have contraindications   Pneumococcal Vaccine   * Pneumococcal conjugate vaccine = PCV13 (Prevnar 13), PCV15 (Vaxneuvance), PCV20 (Prevnar 20)  * Pneumococcal polysaccharide vaccine = PPSV23  (Pneumovax) Adults 19-65 yo with certain risk factors or if 65+ yo  If never received any pneumonia vaccine: recommend Prevnar 20 (PCV20)  Give PCV20 if previously received 1 dose of PCV13 or PPSV23   Hepatitis B Vaccine 3 dose series if at intermediate or high risk (ex: diabetes, end stage renal disease, liver disease)   Respiratory syncytial virus (RSV) Vaccine - COVERED BY MEDICARE PART D  * RSVPreF3 (Arexvy) CDC recommends that adults 60 years of age and older may receive a single dose of RSV vaccine using shared clinical decision-making (SCDM)   Tetanus (Td) Vaccine - COST NOT COVERED BY MEDICARE PART B Following completion of primary series, a booster dose should be given every 10 years to maintain immunity against tetanus. Td may also be given as tetanus wound prophylaxis.   Tdap Vaccine - COST NOT COVERED BY MEDICARE PART B Recommended at least once for all adults. For pregnant patients, recommended with each pregnancy.   Shingles Vaccine (Shingrix) - COST NOT COVERED BY MEDICARE PART B  2 shot series recommended in those 19 years and older who have or will have weakened immune systems or those 50 years and older     Health Maintenance Due:      Topic Date Due   • Colorectal Cancer Screening  06/29/2024   • HIV Screening  Completed   • Hepatitis C Screening  Completed     Immunizations Due:      Topic Date Due   • Hepatitis A Vaccine (1 of 2 - Risk 2-dose series) Never done   • COVID-19 Vaccine (4 - 2024-25 season) 09/01/2024     Advance Directives   What are advance directives?  Advance directives are legal documents that state your wishes and plans for medical care. These plans are made ahead of time in case you lose your ability to make decisions for yourself. Advance directives can apply to any medical decision, such as the treatments you want, and if you want to donate organs.   What are the types of advance directives?  There are many types of advance directives, and each state has rules about how to  use them. You may choose a combination of any of the following:  Living will:  This is a written record of the treatment you want. You can also choose which treatments you do not want, which to limit, and which to stop at a certain time. This includes surgery, medicine, IV fluid, and tube feedings.   Durable power of  for healthcare (DPAHC):  This is a written record that states who you want to make healthcare choices for you when you are unable to make them for yourself. This person, called a proxy, is usually a family member or a friend. You may choose more than 1 proxy.  Do not resuscitate (DNR) order:  A DNR order is used in case your heart stops beating or you stop breathing. It is a request not to have certain forms of treatment, such as CPR. A DNR order may be included in other types of advance directives.  Medical directive:  This covers the care that you want if you are in a coma, near death, or unable to make decisions for yourself. You can list the treatments you want for each condition. Treatment may include pain medicine, surgery, blood transfusions, dialysis, IV or tube feedings, and a ventilator (breathing machine).  Values history:  This document has questions about your views, beliefs, and how you feel and think about life. This information can help others choose the care that you would choose.  Why are advance directives important?  An advance directive helps you control your care. Although spoken wishes may be used, it is better to have your wishes written down. Spoken wishes can be misunderstood, or not followed. Treatments may be given even if you do not want them. An advance directive may make it easier for your family to make difficult choices about your care.   Weight Management   Why it is important to manage your weight:  Being overweight increases your risk of health conditions such as heart disease, high blood pressure, type 2 diabetes, and certain types of cancer. It can also  increase your risk for osteoarthritis, sleep apnea, and other respiratory problems. Aim for a slow, steady weight loss. Even a small amount of weight loss can lower your risk of health problems.  How to lose weight safely:  A safe and healthy way to lose weight is to eat fewer calories and get regular exercise. You can lose up about 1 pound a week by decreasing the number of calories you eat by 500 calories each day.   Healthy meal plan for weight management:  A healthy meal plan includes a variety of foods, contains fewer calories, and helps you stay healthy. A healthy meal plan includes the following:  Eat whole-grain foods more often.  A healthy meal plan should contain fiber. Fiber is the part of grains, fruits, and vegetables that is not broken down by your body. Whole-grain foods are healthy and provide extra fiber in your diet. Some examples of whole-grain foods are whole-wheat breads and pastas, oatmeal, brown rice, and bulgur.  Eat a variety of vegetables every day.  Include dark, leafy greens such as spinach, kale, lubna greens, and mustard greens. Eat yellow and orange vegetables such as carrots, sweet potatoes, and winter squash.   Eat a variety of fruits every day.  Choose fresh or canned fruit (canned in its own juice or light syrup) instead of juice. Fruit juice has very little or no fiber.  Eat low-fat dairy foods.  Drink fat-free (skim) milk or 1% milk. Eat fat-free yogurt and low-fat cottage cheese. Try low-fat cheeses such as mozzarella and other reduced-fat cheeses.  Choose meat and other protein foods that are low in fat.  Choose beans or other legumes such as split peas or lentils. Choose fish, skinless poultry (chicken or turkey), or lean cuts of red meat (beef or pork). Before you cook meat or poultry, cut off any visible fat.   Use less fat and oil.  Try baking foods instead of frying them. Add less fat, such as margarine, sour cream, regular salad dressing and mayonnaise to foods. Eat  fewer high-fat foods. Some examples of high-fat foods include french fries, doughnuts, ice cream, and cakes.  Eat fewer sweets.  Limit foods and drinks that are high in sugar. This includes candy, cookies, regular soda, and sweetened drinks.  Exercise:  Exercise at least 30 minutes per day on most days of the week. Some examples of exercise include walking, biking, dancing, and swimming. You can also fit in more physical activity by taking the stairs instead of the elevator or parking farther away from stores. Ask your healthcare provider about the best exercise plan for you.   Narcotic (Opioid) Safety    Use narcotics safely:  Take prescribed narcotics exactly as directed  Do not give narcotics to others or take narcotics that belong to someone else  Do not mix narcotics without medicines or alcohol  Do not drive or operate heavy machinery after you take the narcotic  Monitor for side effects and notify your healthcare provider if you experienced side effects such as nausea, sleepiness, itching, or trouble thinking clearly.    Manage constipation:    Constipation is the most common side effect of narcotic medicine. Constipation is when you have hard, dry bowel movements, or you go longer than usual between bowel movements. Tell your healthcare provider about all changes in your bowel movements while you are taking narcotics. He or she may recommend laxative medicine to help you have a bowel movement. He or she may also change the kind of narcotic you are taking, or change when you take it. The following are more ways you can prevent or relieve constipation:    Drink liquids as directed.  You may need to drink extra liquids to help soften and move your bowels. Ask how much liquid to drink each day and which liquids are best for you.  Eat high-fiber foods.  This may help decrease constipation by adding bulk to your bowel movements. High-fiber foods include fruits, vegetables, whole-grain breads and cereals, and  beans. Your healthcare provider or dietitian can help you create a high-fiber meal plan. Your provider may also recommend a fiber supplement if you cannot get enough fiber from food.  Exercise regularly.  Regular physical activity can help stimulate your intestines. Walking is a good exercise to prevent or relieve constipation. Ask which exercises are best for you.  Schedule a time each day to have a bowel movement.  This may help train your body to have regular bowel movements. Bend forward while you are on the toilet to help move the bowel movement out. Sit on the toilet for at least 10 minutes, even if you do not have a bowel movement.    Store narcotics safely:   Store narcotics where others cannot easily get them.  Keep them in a locked cabinet or secure area. Do not  keep them in a purse or other bag you carry with you. A person may be looking for something else and find the narcotics.  Make sure narcotics are stored out of the reach of children.  A child can easily overdose on narcotics. Narcotics may look like candy to a small child.    The best way to dispose of narcotics:      The laws vary by country and area. In the [place], the best way is to return the narcotics through a take-back program. This program is offered by the [place] Drug Enforcement Agency ([employer]ANN). The following are options for using the program:  Take the narcotics to a [employer] collection site.  The site is often a law enforcement center. Call your local law enforcement center for scheduled take-back days in your area. You will be given information on where to go if the collection site is in a different location.  Take the narcotics to an approved pharmacy or hospital.  A pharmacy or hospital may be set up as a collection site. You will need to ask if it is a [employer] collection site if you were not directed there. A pharmacy or doctor's office may not be able to take back narcotics unless it is a [employer] site.  Use a mail-back system.  This means you  are given containers to put the narcotics into. You will then mail them in the containers.  Use a take-back drop box.  This is a place to leave the narcotics at any time. People and animals will not be able to get into the box. Your local law enforcement agency can tell you where to find a drop box in your area.    Other ways to manage pain:   Ask your healthcare provider about non-narcotic medicines to control pain.  Nonprescription medicines include NSAIDs (such as ibuprofen) and acetaminophen. Prescription medicines include muscle relaxers, antidepressants, and steroids.  Pain may be managed without any medicines.  Some ways to relieve pain include massage, aromatherapy, or meditation. Physical or occupational therapy may also help.    For more information:   Drug Enforcement Administration  [address] [place] [address]  Phone: 3- 741 - 478-0584  Web Address: https://www.deadiversion.[facility].gov/drug_disposal/    [place] Food and Drug Administration  [address] [place] [address]  Phone: 4- 639 - 780-0056  Web Address: http://www.fda.gov     © Copyright [employer] 2018 Information is for End User's use only and may not be sold, redistributed or otherwise used for commercial purposes. All illustrations and images included in CareNotes® are the copyrighted property of [employer].A[employer] Inc[employer] or [employer]

## 2024-12-19 NOTE — ASSESSMENT & PLAN NOTE
On Rinvoq followed by Rheumatology     03/2024 MRI pelvis SI joints normal     11/2022 x rays lumbar spine Mild to moderate multilevel degenerative disc disease as above with multilevel facet arthropathy

## 2024-12-19 NOTE — PROGRESS NOTES
Name: Kuldeep MACHUCA Pipo Rose.      : 1965      MRN: [id number]  Encounter Provider: Kuldeep Pozo MD  Encounter Date: 2024   Encounter department: [facility]    Assessment & Plan  Diabetes mellitus type 2, noninsulin dependent (HCC)    Type 2 DM On Metformin  mg BID-followed by Endocrinology. 2024 A1c 6.3. Urine albumin < 7.0 decreased from a high of 157. on ACE.  No hypoglycemic events. Current with eye exam.    Lab Results   Component Value Date    HGBA1C 6.3 (H) 2024     Follow-up with Endocrinology.       Essential hypertension  Blood pressures have been stable on Lisinopril 20 mg daily.    BP Readings from Last 3 Encounters:   24 112/74   24 112/68   24 122/70      Lab Results   Component Value Date     2015    SODIUM 137 2024    K 4.4 2024    CL 99 2024    CO2 28 2024    ANIONGAP 6 2015    AGAP 10 2024    BUN 24 2024    CREATININE 0.90 2024    GLUC 153 (H) 2024    GLUF 122 (H) 2024    CALCIUM 9.4 2024    AST 24 2024    ALT 23 2024    ALKPHOS 40 2024    PROT 7.5 2015    TP 7.1 2024    BILITOT 0.27 2015    TBILI 0.83 2024    EGFR 93 2024     Lab Results   Component Value Date    WBC 6.62 2024    HGB 13.0 2024    HCT 37.6 2024    MCV 93 2024     2024            Mixed hyperlipidemia    On Atorvastatin 20 mg/day    Lab Results   Component Value Date    CHOLESTEROL 142 2024    CHOLESTEROL 115 2023    CHOLESTEROL 121 2022     Lab Results   Component Value Date    HDL 47 2024    HDL 52 2023    HDL 39 (L) 2022     Lab Results   Component Value Date    TRIG 92 2024    TRIG 41 2023    TRIG 125 2022     Lab Results   Component Value Date    LDLCALC 77 2024               Hiatal hernia with GERD    Stable on Omeprazole 40 mg daily           Obstructive sleep apnea syndrome    No longer using CPAP          Ankylosing spondylitis of multiple sites in spine (HCC)    On Rinvoq followed by Rheumatology     03/2024 MRI pelvis SI joints normal     11/2022 x rays lumbar spine Mild to moderate multilevel degenerative disc disease as above with multilevel facet arthropathy        Chronic pain syndrome         Lumbar postlaminectomy syndrome    On Gabapentin 900 mg at HS        Continuous opioid dependence (HCC)         Presence of intrathecal pump    He is followed by Pain Management        Medicare annual wellness visit, subsequent         Screening for colon cancer    Orders:    Cologuard       Preventive health issues were discussed with patient, and age appropriate screening tests were ordered as noted in patient's After Visit Summary. Personalized health advice and appropriate referrals for health education or preventive services given if needed, as noted in patient's After Visit Summary.    History of Present Illness       CC follow-up visit for chronic medical problems/AWV.       Patient Care Team:  Kuldeep Pozo MD as PCP - General  Xenia Barrett MD as PCP - Endocrinology (Endocrinology)  Kuldeep Pozo MD as PCP - PCP-[facility] ([facility])  MD Franky Cantu DPM (Podiatry)  ABELARDO Chopra as Nurse Practitioner (Endocrinology)    Review of Systems   Constitutional:  Positive for unexpected weight change (10 lb weight gain from 07/2024). Negative for appetite change, chills and fever.   HENT:  Negative for congestion, ear pain, rhinorrhea, sore throat and trouble swallowing.    Eyes:  Negative for visual disturbance.   Respiratory:  Negative for cough, shortness of breath and wheezing.    Cardiovascular:  Negative for chest pain, palpitations and leg swelling.   Gastrointestinal:  Negative for abdominal pain, blood in stool, constipation, diarrhea, nausea and vomiting.   Endocrine: Negative for polydipsia and polyuria.    Genitourinary:  Negative for difficulty urinating.   Musculoskeletal:  Positive for arthralgias and back pain. Negative for myalgias.   Skin:  Negative for rash.   Allergic/Immunologic: Negative for environmental allergies.   Neurological:  Negative for dizziness and headaches.   Hematological:  Negative for adenopathy. Does not bruise/bleed easily.   Psychiatric/Behavioral:  Positive for sleep disturbance (using medical marijuana). Negative for dysphoric mood. The patient is not nervous/anxious.        Current Outpatient Medications:     atorvastatin (LIPITOR) 20 mg tablet, TAKE ONE TABLET BY MOUTH DAILY AT BEDTIME, Disp: 90 tablet, Rfl: 1    chlorhexidine (PERIDEX) 0.12 % solution, TAKE 15 MLS SWISH AND SPIT FOR 1 MINUTE AFTER NORMAL BRUSHING AND HYGIENE ROUTINE, REPEAT UP TO THREE TIMES A DAY, Disp: , Rfl:     Cholecalciferol (VITAMIN D) 2000 units CAPS, Take 1 capsule by mouth daily, Disp: , Rfl:     EPINEPHrine (EPIPEN) 0.3 mg/0.3 mL SOAJ, Inject 0.3 mL (0.3 mg total) into a muscle once for 1 dose, Disp: 0.6 mL, Rfl: 0    gabapentin (NEURONTIN) 300 mg capsule, Take 4 capsules (1,200 mg total) by mouth daily (Patient taking differently: Take 900 mg by mouth daily at bedtime Only 3), Disp: 360 capsule, Rfl: 3    HYDROmorphone (Dilaudid) 4 mg/mL injection, Inject 1 Device as directed daily  , Disp: , Rfl:     lisinopril (ZESTRIL) 20 mg tablet, TAKE ONE TABLET BY MOUTH EVERY DAY, Disp: 90 tablet, Rfl: 1    meloxicam (MOBIC) 15 mg tablet, daily as needed, Disp: , Rfl:     metFORMIN (GLUCOPHAGE-XR) 500 mg 24 hr tablet, TAKE ONE TABLET BY MOUTH TWICE A DAY WITH MEALS, Disp: 180 tablet, Rfl: 1    multivitamin (THERAGRAN) TABS, Take 1 tablet by mouth daily, Disp: , Rfl:     omeprazole (PriLOSEC) 40 MG capsule, Take 1 capsule (40 mg total) by mouth daily, Disp: 90 capsule, Rfl: 3    testosterone cypionate (DEPO-TESTOSTERONE) 200 mg/mL SOLN, INJECT 0.375 MLS WEEKLY*USE 1 VIAL FOR 1 WEEKLY DOSE,DISCARD REMAINDER (weekly  "dose of 75 mg/week), Disp: 12 mL, Rfl: 1    Upadacitinib ER (Rinvoq) 15 MG TB24, Take 15 mg by mouth daily, Disp: , Rfl:     Blood Glucose Monitoring Suppl (Contour Next EZ) w/Device KIT, Use 1 each 4 (four) times a day (Patient not taking: Reported on 2024), Disp: 1 kit, Rfl: 0    glucose blood (Contour Next Test) test strip, Use 1 each 4 (four) times a day (Patient not taking: Reported on 2024), Disp: 400 each, Rfl: 1    Microlet Lancets MISC, Use 4 (four) times a day (Patient not taking: Reported on 2024), Disp: 400 each, Rfl: 1    NEEDLE, DISP, 23 G 23G X 1-4\" MISC, Use once a week (Patient not taking: Reported on 2024), Disp: 100 each, Rfl: 2     Family History   Problem Relation Age of Onset    Alcohol abuse Mother             Liver disease Mother     Arthritis Mother             Uterine cancer Mother     Lupus Mother     Coronary artery disease Father             Heart disease Father     Prostate cancer Father             Diabetes Sister     Diabetes unspecified Sister     Hypertension Sister     Hyperlipidemia Sister         pure hypercholesterolemia    Diabetes unspecified Brother         DM    Hypertension Brother     Hyperlipidemia Brother         pure hypercholesterolemia    Diabetes Brother     Diabetes Brother         DM    Hypertension Family       Past Surgical History:   Procedure Laterality Date    BACK SURGERY      discectomy    ELBOW SURGERY Right     MOUTH SURGERY      perm top teeth removed and dental implants    OTHER SURGICAL HISTORY  2015    Electr analysis of progr impl pump w/reprogram refill, requiring physician.  Replacement of right abdomen intrathecal  pain pump filled with Dilaudid with electronic analysis and programming.  managed by Bobby Coy    PILONIDAL CYST EXCISION      [facility] NDSC WRST SURG W/RLS TRANSVRS CARPL LIGM Right 2023    Procedure: RELEASE CARPAL TUNNEL ENDOSCOPIC;  Surgeon: Bret Salgado MD;  " Location: [facility];  Service: Orthopedics    MT NDSC WRST SURG W/RLS TRANSVRS CARPL LIGM Left 9/21/2023    Procedure: RELEASE CARPAL TUNNEL ENDOSCOPIC;  Surgeon: Bret Salgado MD;  Location: [facility];  Service: Orthopedics    MT SURGICAL ARTHROSCOPY SHOULDER W/ROTATOR CUFF RPR Right 02/28/2020    Procedure: SHOULDER ARTHROSCOPIC ROTATOR CUFF REPAIR AND DEBRIDEMENT;  Surgeon: Wero De La Cruz MD;  Location: [facility];  Service: Orthopedics    ROTATOR CUFF REPAIR Left     WISDOM TOOTH EXTRACTION          Medical History Reviewed by provider this encounter:       Annual Wellness Visit Questionnaire   Kuldeep is here for his Subsequent Wellness visit.     Health Risk Assessment:   Patient rates overall health as good. Patient feels that their physical health rating is same. Patient is very satisfied with their life. Eyesight was rated as slightly worse. Hearing was rated as slightly worse. Patient feels that their emotional and mental health rating is same. Patients states they are never, rarely angry. Patient states they are sometimes unusually tired/fatigued. Pain experienced in the last 7 days has been a lot. Patient's pain rating has been 7/10. Patient states that he has experienced no weight loss or gain in last 6 months.     Depression Screening:   PHQ-2 Score: 0      Fall Risk Screening:   In the past year, patient has experienced: no history of falling in past year      Home Safety:  Patient does not have trouble with stairs inside or outside of their home. Patient has working smoke alarms and has working carbon monoxide detector. Home safety hazards include: none.     Nutrition:   Current diet is Regular.     Medications:   Patient is currently taking over-the-counter supplements. OTC medications include: see medication list. Patient is able to manage medications.     Activities of Daily Living (ADLs)/Instrumental Activities of Daily Living (IADLs):   Walk and transfer into and out of bed and  "chair?: Yes  Dress and groom yourself?: Yes    Bathe or shower yourself?: Yes    Feed yourself? Yes  Do your laundry/housekeeping?: Yes  Manage your money, pay your bills and track your expenses?: Yes  Make your own meals?: Yes    Do your own shopping?: Yes    Previous Hospitalizations:   Any hospitalizations or ED visits within the last 12 months?: No      Advance Care Planning:   Living will: No    Durable POA for healthcare: No    Advanced directive: Yes      PREVENTIVE SCREENINGS      Cardiovascular Screening:    General: Screening Not Indicated and History Lipid Disorder      Diabetes Screening:     General: Screening Not Indicated and History Diabetes      Osteoporosis Screening:    General: Screening Not Indicated and History Osteoporosis      Abdominal Aortic Aneurysm (AAA) Screening:    Risk factors include: tobacco use        Lung Cancer Screening:     General: Screening Not Indicated      Hepatitis C Screening:    General: Screening Current    Screening, Brief Intervention, and Referral to Treatment (SBIRT)    Screening  Typical number of drinks in a day: 0  Typical number of drinks in a week: 2  Interpretation: Low risk drinking behavior.    Single Item Drug Screening:  How often have you used an illegal drug (including marijuana) or a prescription medication for non-medical reasons in the past year? weekly    Single Item Drug Screen Score: 3  Interpretation: POSITIVE screen for possible drug use disorder    Drug Abuse Screening Test (DAST-10):  1) Have you used drugs other than those required for medical reasons? Yes  2) Do you abuse more than one drug at a time? No  3) Are you always able to stop using drugs when you want to? Yes  4) Have you had \"blackouts\" or \"flashbacks\" as a result of drug use? No  5) Do you ever feel bad or guilty about your drug use? No  6) Does your spouse (or parents) ever complain about your involvement with drugs? No  7) Have you neglected your family because of your use of " "drugs? No  8) Have you engaged in illegal activities in order to obtain drugs? No  9) Have you ever experienced withdrawal symptoms (felt sick) when you stopped taking drugs? No  10) Have you had medical problems as a result of your drug use (e.g., memory loss, hepatitis, convulsions, bleeding, etc.)? No    DAST-10 Score: 1  Interpretation: Low level problems related to drug abuse    Review of Current Opioid Use    Opioid Risk Tool (ORT) Interpretation: Complete Opioid Risk Tool (ORT)    Social Drivers of Health     Food Insecurity: No Food Insecurity (12/19/2024)    Hunger Vital Sign     Worried About Running Out of Food in the Last Year: Never true     Ran Out of Food in the Last Year: Never true   Transportation Needs: No Transportation Needs (12/19/2024)    PRAPARE - Transportation     Lack of Transportation (Medical): No     Lack of Transportation (Non-Medical): No   Housing Stability: Low Risk  (12/19/2024)    Housing Stability Vital Sign     Unable to Pay for Housing in the Last Year: No     Number of Times Moved in the Last Year: 0     Homeless in the Last Year: No   Utilities: Not At Risk (12/19/2024)    [facility] Utilities     Threatened with loss of utilities: No     No results found.    Objective   /74 (BP Location: Left arm, Patient Position: Sitting, Cuff Size: Large)   Pulse 81   Temp 98.1 °F (36.7 °C) (Temporal)   Resp 16   Ht 5' 9\" (1.753 m)   Wt 86.2 kg (190 lb)   SpO2 98%   BMI 28.06 kg/m²     Wt Readings from Last 3 Encounters:   12/19/24 86.2 kg (190 lb)   09/17/24 82.1 kg (181 lb)   07/12/24 82 kg (180 lb 12.8 oz)        Physical Exam  Constitutional:       General: He is not in acute distress.  HENT:      Right Ear: Tympanic membrane and ear canal normal.      Left Ear: Tympanic membrane and ear canal normal.   Eyes:      General: No scleral icterus.     Extraocular Movements: Extraocular movements intact.      Conjunctiva/sclera: Conjunctivae normal.      Pupils: Pupils are equal, " round, and reactive to light.   Neck:      Thyroid: No thyroid mass or thyromegaly.      Vascular: Normal carotid pulses. No carotid bruit.   Cardiovascular:      Rate and Rhythm: Normal rate and regular rhythm.      Pulses: no weak pulses.           Dorsalis pedis pulses are 2+ on the right side and 2+ on the left side.        Posterior tibial pulses are 2+ on the right side and 2+ on the left side.      Heart sounds: No murmur heard.     No gallop.   Pulmonary:      Effort: Pulmonary effort is normal.      Breath sounds: Normal breath sounds. No wheezing or rales.   Musculoskeletal:      Lumbar back: Decreased range of motion.      Right lower leg: No edema.      Left lower leg: No edema.   Feet:      Right foot:      Skin integrity: No ulcer, skin breakdown, erythema, warmth, callus or dry skin.      Left foot:      Skin integrity: No ulcer, skin breakdown, erythema, warmth, callus or dry skin.   Lymphadenopathy:      Cervical: No cervical adenopathy.   Skin:     Findings: No rash.   Neurological:      General: No focal deficit present.      Mental Status: He is alert and oriented to person, place, and time.   Psychiatric:         Mood and Affect: Mood normal.         Cognition and Memory: Cognition normal.       Patient's shoes and socks removed.    Right Foot/Ankle   Right Foot Inspection  Skin Exam: skin normal and skin intact. No dry skin, no warmth, no callus, no erythema, no maceration, no abnormal color, no pre-ulcer, no ulcer and no callus.     Toe Exam: ROM and strength within normal limits. No swelling, no tenderness, erythema and  no right toe deformity    Sensory   Monofilament testing: intact    Vascular  Capillary refills: < 3 seconds  The right DP pulse is 2+. The right PT pulse is 2+.     Left Foot/Ankle  Left Foot Inspection  Skin Exam: skin normal and skin intact. No dry skin, no warmth, no erythema, no maceration, normal color, no pre-ulcer, no ulcer and no callus.     Toe Exam: ROM and  strength within normal limits. No swelling, no tenderness, no erythema and no left toe deformity.     Sensory   Monofilament testing: intact    Vascular  Capillary refills: < 3 seconds  The left DP pulse is 2+. The left PT pulse is 2+.     Assign Risk Category  No deformity present  No loss of protective sensation  No weak pulses  Risk: 0

## 2024-12-19 NOTE — ASSESSMENT & PLAN NOTE
Blood pressures have been stable on Lisinopril 20 mg daily.    BP Readings from Last 3 Encounters:   12/19/24 112/74   09/17/24 112/68   07/12/24 122/70      Lab Results   Component Value Date     11/30/2015    SODIUM 137 08/28/2024    K 4.4 08/28/2024    CL 99 08/28/2024    CO2 28 08/28/2024    ANIONGAP 6 11/30/2015    AGAP 10 08/28/2024    BUN 24 08/28/2024    CREATININE 0.90 08/28/2024    GLUC 153 (H) 05/31/2024    GLUF 122 (H) 08/28/2024    CALCIUM 9.4 08/28/2024    AST 24 08/28/2024    ALT 23 08/28/2024    ALKPHOS 40 08/28/2024    PROT 7.5 11/30/2015    TP 7.1 08/28/2024    BILITOT 0.27 11/30/2015    TBILI 0.83 08/28/2024    EGFR 93 08/28/2024     Lab Results   Component Value Date    WBC 6.62 08/28/2024    HGB 13.0 08/28/2024    HCT 37.6 08/28/2024    MCV 93 08/28/2024     08/28/2024

## 2024-12-19 NOTE — ASSESSMENT & PLAN NOTE
Type 2 DM On Metformin  mg BID-followed by Endocrinology. 08/2024 A1c 6.3. Urine albumin < 7.0 decreased from a high of 157. on ACE.  No hypoglycemic events. Current with eye exam.    Lab Results   Component Value Date    HGBA1C 6.3 (H) 08/28/2024     Follow-up with Endocrinology.

## 2024-12-19 NOTE — ASSESSMENT & PLAN NOTE
On Atorvastatin 20 mg/day    Lab Results   Component Value Date    CHOLESTEROL 142 08/28/2024    CHOLESTEROL 115 05/05/2023    CHOLESTEROL 121 11/30/2022     Lab Results   Component Value Date    HDL 47 08/28/2024    HDL 52 05/05/2023    HDL 39 (L) 11/30/2022     Lab Results   Component Value Date    TRIG 92 08/28/2024    TRIG 41 05/05/2023    TRIG 125 11/30/2022     Lab Results   Component Value Date    LDLCALC 77 08/28/2024

## 2025-01-01 DIAGNOSIS — M19.011 OSTEOARTHRITIS OF RIGHT ACROMIOCLAVICULAR JOINT: Primary | ICD-10-CM

## 2025-01-02 RX ORDER — MELOXICAM 15 MG/1
15 TABLET ORAL DAILY PRN
Qty: 30 TABLET | Refills: 0 | Status: SHIPPED | OUTPATIENT
Start: 2025-01-02

## 2025-01-04 ENCOUNTER — OFFICE VISIT (OUTPATIENT)
Dept: URGENT CARE | Facility: MEDICAL CENTER | Age: 60
End: 2025-01-04
Payer: COMMERCIAL

## 2025-01-04 VITALS
BODY MASS INDEX: 28.14 KG/M2 | HEIGHT: 69 IN | HEART RATE: 80 BPM | TEMPERATURE: 98.7 F | OXYGEN SATURATION: 98 % | WEIGHT: 190 LBS | DIASTOLIC BLOOD PRESSURE: 63 MMHG | SYSTOLIC BLOOD PRESSURE: 102 MMHG | RESPIRATION RATE: 18 BRPM

## 2025-01-04 DIAGNOSIS — J01.90 ACUTE NON-RECURRENT SINUSITIS, UNSPECIFIED LOCATION: Primary | ICD-10-CM

## 2025-01-04 PROCEDURE — 99213 OFFICE O/P EST LOW 20 MIN: CPT | Performed by: PHYSICIAN ASSISTANT

## 2025-01-04 NOTE — PROGRESS NOTES
[facility]        NAME: Kuldeep Foss Sr. is a 59 y.o. male  : 1965    MRN: [id number]  DATE: 2025  TIME: 12:08 PM      Assessment and Plan     Acute non-recurrent sinusitis, unspecified location [J01.90]  1. Acute non-recurrent sinusitis, unspecified location            POC Testing Results        Note:       Patient Instructions     Patient Instructions   Please take a decongestant such as Flonase or Afrin OTC  Taking a daily antihistamine (Zyrtec,Allegra work best) may help with symptoms  Please gargle with salt water as needed for sore throat. Please increase fluid intake.           Follow up with primary care provider.   Go to ER if symptoms worsen.    Chief Complaint     Chief Complaint   Patient presents with    Cold Like Symptoms     Bilateral ear fullness, yellow mucous (worse in the morning), post nasal drip, sinus pressure, headache; ongoing for 10 days     Headache     Sinus headache          History of Present Illness     Pt reports sinus congestion x 10 days with sore throat. He reports mild cough. He denies SOB, chest pain, n/v/d or fever. He reports he was able to push through symptoms and that they are mild.     Headache  Sore Throat   This is a new problem. The current episode started 1 to 4 weeks ago. The problem has been gradually worsening. Neither side of throat is experiencing more pain than the other. There has been no fever. The pain is at a severity of 5/10. The pain is moderate. Associated symptoms include congestion, coughing, headaches, a hoarse voice, a plugged ear sensation, neck pain and trouble swallowing. Pertinent negatives include no abdominal pain, diarrhea, drooling, ear discharge, ear pain, shortness of breath, stridor, swollen glands or vomiting.       Review of Systems     Review of Systems   Constitutional:  Negative for fever.   HENT:  Positive for congestion, hoarse voice, sore throat and trouble swallowing. Negative for drooling, ear discharge  and ear pain.    Respiratory:  Positive for cough. Negative for shortness of breath and stridor.    Gastrointestinal:  Negative for abdominal pain, diarrhea and vomiting.   Musculoskeletal:  Positive for neck pain.   Neurological:  Positive for headaches.         Current Medications       Current Outpatient Medications:     atorvastatin (LIPITOR) 20 mg tablet, TAKE ONE TABLET BY MOUTH DAILY AT BEDTIME, Disp: 90 tablet, Rfl: 1    Blood Glucose Monitoring Suppl (Contour Next EZ) w/Device KIT, Use 1 each 4 (four) times a day (Patient not taking: Reported on 12/19/2024), Disp: 1 kit, Rfl: 0    chlorhexidine (PERIDEX) 0.12 % solution, TAKE 15 MLS SWISH AND SPIT FOR 1 MINUTE AFTER NORMAL BRUSHING AND HYGIENE ROUTINE, REPEAT UP TO THREE TIMES A DAY, Disp: , Rfl:     Cholecalciferol (VITAMIN D) 2000 units CAPS, Take 1 capsule by mouth daily, Disp: , Rfl:     EPINEPHrine (EPIPEN) 0.3 mg/0.3 mL SOAJ, Inject 0.3 mL (0.3 mg total) into a muscle once for 1 dose, Disp: 0.6 mL, Rfl: 0    gabapentin (NEURONTIN) 300 mg capsule, Take 4 capsules (1,200 mg total) by mouth daily (Patient taking differently: Take 900 mg by mouth daily at bedtime Only 3), Disp: 360 capsule, Rfl: 3    glucose blood (Contour Next Test) test strip, Use 1 each 4 (four) times a day (Patient not taking: Reported on 12/19/2024), Disp: 400 each, Rfl: 1    HYDROmorphone (Dilaudid) 4 mg/mL injection, Inject 1 Device as directed daily  , Disp: , Rfl:     lisinopril (ZESTRIL) 20 mg tablet, TAKE ONE TABLET BY MOUTH EVERY DAY, Disp: 90 tablet, Rfl: 1    meloxicam (MOBIC) 15 mg tablet, Take 1 tablet (15 mg total) by mouth daily as needed for moderate pain, Disp: 30 tablet, Rfl: 0    metFORMIN (GLUCOPHAGE-XR) 500 mg 24 hr tablet, TAKE ONE TABLET BY MOUTH TWICE A DAY WITH MEALS, Disp: 180 tablet, Rfl: 1    Microlet Lancets MISC, Use 4 (four) times a day (Patient not taking: Reported on 12/19/2024), Disp: 400 each, Rfl: 1    multivitamin (THERAGRAN) TABS, Take 1 tablet by  "mouth daily, Disp: , Rfl:     NEEDLE, DISP, 23 G 23G X 1-1/4\" MISC, Use once a week (Patient not taking: Reported on 12/19/2024), Disp: 100 each, Rfl: 2    omeprazole (PriLOSEC) 40 MG capsule, Take 1 capsule (40 mg total) by mouth daily, Disp: 90 capsule, Rfl: 3    testosterone cypionate (DEPO-TESTOSTERONE) 200 mg/mL SOLN, INJECT 0.375 MLS WEEKLY*USE 1 VIAL FOR 1 WEEKLY DOSE,DISCARD REMAINDER (weekly dose of 75 mg/week), Disp: 12 mL, Rfl: 1    Upadacitinib ER (Rinvoq) 15 MG TB24, Take 15 mg by mouth daily, Disp: , Rfl:     Current Allergies     Allergies as of 01/04/2025 - Reviewed 01/04/2025   Allergen Reaction Noted    Amoxicillin Anaphylaxis 04/10/2023              The following portions of the patient's history were reviewed and updated as appropriate: allergies, current medications, past family history, past medical history, past social history, past surgical history, and problem list.     Past Medical History:   Diagnosis Date    Anaphylactic reaction     Chronic pain     Depression     Diabetes (HCC)     Diabetes mellitus (HCC)     Fibromyalgia, primary     GERD (gastroesophageal reflux disease)     Hyperlipidemia     Hypertension     Low testosterone     Neuropathy     Pilonidal cyst     last assessed 3/17/2014    Pneumonia     Sleep apnea     no cpap       Past Surgical History:   Procedure Laterality Date    BACK SURGERY      discectomy    ELBOW SURGERY Right     MOUTH SURGERY      perm top teeth removed and dental implants    OTHER SURGICAL HISTORY  03/13/2015    Electr analysis of progr impl pump w/reprogram refill, requiring physician.  Replacement of right abdomen intrathecal  pain pump filled with Dilaudid with electronic analysis and programming.  managed by Bobby Coy    PILONIDAL CYST EXCISION      MO NDSC WRST SURG W/RLS TRANSVRS CARPL LIGM Right 9/6/2023    Procedure: RELEASE CARPAL TUNNEL ENDOSCOPIC;  Surgeon: Bret Salgado MD;  Location: [facility] MAIN OR;  Service: Orthopedics    MO " "NDSC WRST SURG W/RLS TRANSVRS CARPL LIGM Left 2023    Procedure: RELEASE CARPAL TUNNEL ENDOSCOPIC;  Surgeon: Bret Salgado MD;  Location: [facility];  Service: Orthopedics    FL SURGICAL ARTHROSCOPY SHOULDER W/ROTATOR CUFF RPR Right 2020    Procedure: SHOULDER ARTHROSCOPIC ROTATOR CUFF REPAIR AND DEBRIDEMENT;  Surgeon: Wero De La Cruz MD;  Location: [facility];  Service: Orthopedics    ROTATOR CUFF REPAIR Left     WISDOM TOOTH EXTRACTION         Family History   Problem Relation Age of Onset    Alcohol abuse Mother             Liver disease Mother     Arthritis Mother             Uterine cancer Mother     Lupus Mother     Coronary artery disease Father             Heart disease Father     Prostate cancer Father             Diabetes Sister     Diabetes unspecified Sister     Hypertension Sister     Hyperlipidemia Sister         pure hypercholesterolemia    Diabetes unspecified Brother         DM    Hypertension Brother     Hyperlipidemia Brother         pure hypercholesterolemia    Diabetes Brother     Diabetes Brother         DM    Hypertension Family          Medications have been verified.        Objective     /63   Pulse 80   Temp 98.7 °F (37.1 °C) (Temporal)   Resp 18   Ht 5' 9\" (1.753 m)   Wt 86.2 kg (190 lb)   SpO2 98%   BMI 28.06 kg/m²   No LMP for male patient.         Physical Exam     Physical Exam  Vitals and nursing note reviewed.   Constitutional:       General: He is not in acute distress.     Appearance: Normal appearance. He is not ill-appearing, toxic-appearing or diaphoretic.   HENT:      Head: Normocephalic and atraumatic.      Right Ear: Tympanic membrane, ear canal and external ear normal. There is no impacted cerumen.      Left Ear: Tympanic membrane, ear canal and external ear normal. There is no impacted cerumen.      Nose: Nose normal.      Mouth/Throat:      Lips: Pink. No lesions.      Mouth: Mucous membranes are moist. "      Tongue: No lesions. Tongue does not deviate from midline.      Palate: No mass and lesions.      Pharynx: Oropharynx is clear. Uvula midline. No pharyngeal swelling, oropharyngeal exudate, posterior oropharyngeal erythema or uvula swelling.      Tonsils: No tonsillar exudate or tonsillar abscesses.   Eyes:      General: No scleral icterus.        Right eye: No discharge.         Left eye: No discharge.      Extraocular Movements: Extraocular movements intact.      Conjunctiva/sclera: Conjunctivae normal.      Pupils: Pupils are equal, round, and reactive to light.   Cardiovascular:      Rate and Rhythm: Normal rate and regular rhythm.      Heart sounds: Normal heart sounds. No murmur heard.     No friction rub. No gallop.   Pulmonary:      Effort: Pulmonary effort is normal. No respiratory distress.      Breath sounds: Normal breath sounds. No stridor. No wheezing, rhonchi or rales.   Musculoskeletal:      Cervical back: Normal range of motion and neck supple. No rigidity.   Lymphadenopathy:      Cervical: No cervical adenopathy.   Skin:     General: Skin is warm and dry.      Coloration: Skin is not jaundiced or pale.      Findings: No bruising, erythema, lesion or rash.   Neurological:      General: No focal deficit present.      Mental Status: He is alert and oriented to person, place, and time. Mental status is at baseline.   Psychiatric:         Mood and Affect: Mood normal.         Behavior: Behavior normal.         Thought Content: Thought content normal.         Judgment: Judgment normal.

## 2025-01-06 ENCOUNTER — APPOINTMENT (EMERGENCY)
Dept: RADIOLOGY | Facility: HOSPITAL | Age: 60
End: 2025-01-06
Payer: COMMERCIAL

## 2025-01-06 ENCOUNTER — APPOINTMENT (EMERGENCY)
Dept: CT IMAGING | Facility: HOSPITAL | Age: 60
End: 2025-01-06
Payer: COMMERCIAL

## 2025-01-06 ENCOUNTER — HOSPITAL ENCOUNTER (EMERGENCY)
Facility: HOSPITAL | Age: 60
Discharge: HOME | End: 2025-01-06
Attending: EMERGENCY MEDICINE
Payer: COMMERCIAL

## 2025-01-06 VITALS
HEART RATE: 77 BPM | SYSTOLIC BLOOD PRESSURE: 114 MMHG | OXYGEN SATURATION: 98 % | TEMPERATURE: 97.6 F | RESPIRATION RATE: 18 BRPM | DIASTOLIC BLOOD PRESSURE: 61 MMHG

## 2025-01-06 DIAGNOSIS — S09.90XA INJURY OF HEAD, INITIAL ENCOUNTER: ICD-10-CM

## 2025-01-06 DIAGNOSIS — R55 SYNCOPE: ICD-10-CM
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
## 2025-01-06 LAB
ALBUMIN SERPL BCG-MCNC: 4 G/DL (ref 3.5–5)
ALP SERPL-CCNC: 37 U/L (ref 34–104)
ALT SERPL W P-5'-P-CCNC: 16 U/L (ref 7–52)
ANION GAP SERPL CALCULATED.3IONS-SCNC: 5 MMOL/L (ref 4–13)
AST SERPL W P-5'-P-CCNC: 21 U/L (ref 13–39)
BASOPHILS # BLD AUTO: 0.02 THOUSANDS/ΜL (ref 0–0.1)
BASOPHILS NFR BLD AUTO: 0 % (ref 0–1)
BILIRUB SERPL-MCNC: 0.59 MG/DL (ref 0.2–1)
BUN SERPL-MCNC: 15 MG/DL (ref 5–25)
CALCIUM SERPL-MCNC: 8.4 MG/DL (ref 8.4–10.2)
CARDIAC TROPONIN I PNL SERPL HS: <2 NG/L (ref ?–50)
CHLORIDE SERPL-SCNC: 102 MMOL/L (ref 96–108)
CO2 SERPL-SCNC: 28 MMOL/L (ref 21–32)
CREAT SERPL-MCNC: 0.91 MG/DL (ref 0.6–1.3)
EOSINOPHIL # BLD AUTO: 0.05 THOUSAND/ΜL (ref 0–0.61)
EOSINOPHIL NFR BLD AUTO: 1 % (ref 0–6)
ERYTHROCYTE [DISTWIDTH] IN BLOOD BY AUTOMATED COUNT: 12 % (ref 11.6–15.1)
FLUAV AG UPPER RESP QL IA.RAPID: NEGATIVE
FLUBV AG UPPER RESP QL IA.RAPID: NEGATIVE
GFR SERPL CREATININE-BSD FRML MDRD: 92 ML/MIN/1.73SQ M
GLUCOSE SERPL-MCNC: 162 MG/DL (ref 65–140)
HCT VFR BLD AUTO: 33.7 % (ref 36.5–49.3)
HGB BLD-MCNC: 11.9 G/DL (ref 12–17)
IMM GRANULOCYTES # BLD AUTO: 0.05 THOUSAND/UL (ref 0–0.2)
IMM GRANULOCYTES NFR BLD AUTO: 1 % (ref 0–2)
LYMPHOCYTES # BLD AUTO: 0.85 THOUSANDS/ΜL (ref 0.6–4.47)
LYMPHOCYTES NFR BLD AUTO: 9 % (ref 14–44)
MCH RBC QN AUTO: 33.5 PG (ref 26.8–34.3)
MCHC RBC AUTO-ENTMCNC: 35.3 G/DL (ref 31.4–37.4)
MCV RBC AUTO: 95 FL (ref 82–98)
MONOCYTES # BLD AUTO: 1.19 THOUSAND/ΜL (ref 0.17–1.22)
MONOCYTES NFR BLD AUTO: 13 % (ref 4–12)
NEUTROPHILS # BLD AUTO: 6.91 THOUSANDS/ΜL (ref 1.85–7.62)
NEUTS SEG NFR BLD AUTO: 76 % (ref 43–75)
NRBC BLD AUTO-RTO: 0 /100 WBCS
PLATELET # BLD AUTO: 178 THOUSANDS/UL (ref 149–390)
PMV BLD AUTO: 9.8 FL (ref 8.9–12.7)
POTASSIUM SERPL-SCNC: 4.4 MMOL/L (ref 3.5–5.3)
PROT SERPL-MCNC: 6.5 G/DL (ref 6.4–8.4)
RBC # BLD AUTO: 3.55 MILLION/UL (ref 3.88–5.62)
SARS-COV+SARS-COV-2 AG RESP QL IA.RAPID: NEGATIVE
SODIUM SERPL-SCNC: 135 MMOL/L (ref 135–147)
WBC # BLD AUTO: 9.07 THOUSAND/UL (ref 4.31–10.16)

## 2025-01-06 PROCEDURE — 93005 ELECTROCARDIOGRAM TRACING: CPT

## 2025-01-06 PROCEDURE — 12011 RPR F/E/E/N/L/M 2.5 CM/<: CPT | Performed by: EMERGENCY MEDICINE

## 2025-01-06 PROCEDURE — 85025 COMPLETE CBC W/AUTO DIFF WBC: CPT

## 2025-01-06 PROCEDURE — 87804 INFLUENZA ASSAY W/OPTIC: CPT

## 2025-01-06 PROCEDURE — 99285 EMERGENCY DEPT VISIT HI MDM: CPT | Performed by: EMERGENCY MEDICINE

## 2025-01-06 PROCEDURE — 84484 ASSAY OF TROPONIN QUANT: CPT

## 2025-01-06 PROCEDURE — 99285 EMERGENCY DEPT VISIT HI MDM: CPT

## 2025-01-06 PROCEDURE — 70450 CT HEAD/BRAIN W/O DYE: CPT

## 2025-01-06 PROCEDURE — 80053 COMPREHEN METABOLIC PANEL: CPT

## 2025-01-06 PROCEDURE — 71045 X-RAY EXAM CHEST 1 VIEW: CPT

## 2025-01-06 PROCEDURE — 87811 SARS-COV-2 COVID19 W/OPTIC: CPT

## 2025-01-06 PROCEDURE — 36415 COLL VENOUS BLD VENIPUNCTURE: CPT

## 2025-01-06 NOTE — ED PROVIDER NOTES
Time reflects when diagnosis was documented in both MDM as applicable and the Disposition within this note       Time User Action Codes Description Comment    1/6/2025  5:17 AM Pati Sparks Add [W19.XXXA] Fall, initial encounter     1/6/2025  5:17 AM Pati Sparks Add [R55] Syncope     1/6/2025  5:19 AM Pati Sparks Add [S09.90XA] Injury of head, initial encounter           ED Disposition       ED Disposition   Discharge    Condition   Stable    Date/Time   Mon Jan 6, 2025  5:17 AM    Comment   Kuldeep Foss Sr. discharge to home/self care.                   Assessment & Plan       Medical Decision Making  Bairon is a 58yo M w/ PMH of diabetes, HTN, ankylosing spondylitis, who presents to the emergency department due to syncope.    DDx includes but is not limited to: Dehydration, orthostatic hypotension, arrhythmia, electrolyte abnormality    See ED course for MDM    Results shared with patient. Return precautions given and patient expresses understanding and is comfortable with discharge.       Amount and/or Complexity of Data Reviewed  Labs: ordered.  Radiology: ordered and independent interpretation performed.        ED Course as of 01/06/25 0800   Mon Jan 06, 2025   0439 CMP and CBC grossly unremarkable, patient is mildly anemic but this is around the patient's baseline.  Viral panel negative.  Viral URI still likely, simply not one that is tested for this panel.   0515 Orthostatics performed by nursing, no significant drop or associated symptoms during testing. Volume sufficiently repleted   0517 Based on workup and monitoring in the emergency department, leading suspicion is orthostatic hypotension resulting in patient's syncope and fall.  Dermabond placed over 0.  2 5 laceration over left eyebrow.  Results shared with patient, recommended increasing his hydration and fall precautions at home.  Follow-up with PCP.       Medications - No data to display    ED Risk Strat Scores                                               History of Present Illness       Chief Complaint   Patient presents with    Fall     Pt dealing with recent illness, had syncopal episode at home with headstrike. -asa/thinners. Laceration to left temple       Past Medical History:   Diagnosis Date    Anaphylactic reaction     Chronic pain     Depression     Diabetes (HCC)     Diabetes mellitus (HCC)     Fibromyalgia, primary     GERD (gastroesophageal reflux disease)     Hyperlipidemia     Hypertension     Low testosterone     Neuropathy     Pilonidal cyst     last assessed 3/17/2014    Pneumonia     Sleep apnea     no cpap      Past Surgical History:   Procedure Laterality Date    BACK SURGERY      discectomy    ELBOW SURGERY Right     MOUTH SURGERY      perm top teeth removed and dental implants    OTHER SURGICAL HISTORY  2015    Electr analysis of progr impl pump w/reprogram refill, requiring physician.  Replacement of right abdomen intrathecal  pain pump filled with Dilaudid with electronic analysis and programming.  managed by Bobby Coy    PILONIDAL CYST EXCISION      UT NDSC WRST SURG W/RLS TRANSVRS CARPL LIGM Right 2023    Procedure: RELEASE CARPAL TUNNEL ENDOSCOPIC;  Surgeon: Bret Salgado MD;  Location: [facility];  Service: Orthopedics    UT NDSC WRST SURG W/RLS TRANSVRS CARPL LIGM Left 2023    Procedure: RELEASE CARPAL TUNNEL ENDOSCOPIC;  Surgeon: Bret Salgado MD;  Location: [facility];  Service: Orthopedics    UT SURGICAL ARTHROSCOPY SHOULDER W/ROTATOR CUFF RPR Right 2020    Procedure: SHOULDER ARTHROSCOPIC ROTATOR CUFF REPAIR AND DEBRIDEMENT;  Surgeon: Wero De La Cruz MD;  Location: [facility];  Service: Orthopedics    ROTATOR CUFF REPAIR Left     WISDOM TOOTH EXTRACTION        Family History   Problem Relation Age of Onset    Alcohol abuse Mother             Liver disease Mother     Arthritis Mother             Uterine cancer Mother     Lupus  Mother     Coronary artery disease Father             Heart disease Father     Prostate cancer Father             Diabetes Sister     Diabetes unspecified Sister     Hypertension Sister     Hyperlipidemia Sister         pure hypercholesterolemia    Diabetes unspecified Brother         DM    Hypertension Brother     Hyperlipidemia Brother         pure hypercholesterolemia    Diabetes Brother     Diabetes Brother         DM    Hypertension Family       Social History     Tobacco Use    Smoking status: Former     Current packs/day: 0.00     Types: Cigarettes     Quit date: 2018     Years since quittin.8    Smokeless tobacco: Never   Vaping Use    Vaping status: Never Used   Substance Use Topics    Alcohol use: No    Drug use: Never      E-Cigarette/Vaping    E-Cigarette Use Never User       E-Cigarette/Vaping Substances    Nicotine No     THC No     CBD No     Flavoring No     Other No     Unknown No       I have reviewed and agree with the history as documented.     Bairon is a 58yo M w/ PMH of diabetes, HTN, ankylosing spondylitis, who presents to the emergency department due to syncope.  Patient reports that he has had URI symptoms over the past couple of days including cough, congestion, fevers, nausea.  She reports that he was returning from the bathroom, and felt nauseous so went to the living room to get a little trash can that he keeps there.  Reports he remembers bending over to  the trash can, and then woke up on the living room floor with his wife standing over him.  She told him that he was out for roughly 30 seconds.  Reports that he had slight bleeding from a small cut over his eyebrow and a mild headache.  Denies any prodromal symptoms.  Patient denies any palpitations, lightheadedness, dizziness, chest pain, melena, hematochezia.        Review of Systems   Constitutional:  Positive for activity change, chills and fever.   Eyes:  Negative for pain and visual disturbance.    Respiratory:  Negative for chest tightness and shortness of breath.    Cardiovascular:  Negative for chest pain.   Gastrointestinal:  Negative for abdominal pain, constipation, diarrhea, nausea and vomiting.   Genitourinary:  Negative for dysuria and hematuria.   Skin:  Negative for rash and wound.   Neurological:  Negative for dizziness, syncope, light-headedness and headaches.   Psychiatric/Behavioral: Negative.             Objective       ED Triage Vitals   Temperature Pulse Blood Pressure Respirations SpO2 Patient Position - Orthostatic VS   01/06/25 0329 01/06/25 0327 01/06/25 0327 01/06/25 0327 01/06/25 0327 01/06/25 0327   97.6 °F (36.4 °C) 73 137/65 18 98 % Sitting      Temp Source Heart Rate Source BP Location FiO2 (%) Pain Score    01/06/25 0329 01/06/25 0327 01/06/25 0327 -- --    Oral Monitor Right arm        Vitals      Date and Time Temp Pulse SpO2 Resp BP Pain Score FACES Pain Rating User   01/06/25 0459 -- 77 -- 18 114/61 -- --    01/06/25 0456 -- 75 -- 18 118/57 -- --    01/06/25 0455 -- 77 -- 18 120/62 -- --    01/06/25 0454 -- 73 -- 18 126/68 -- --    01/06/25 0329 97.6 °F (36.4 °C) -- -- -- -- -- --    01/06/25 0327 -- 73 98 % 18 137/65 -- --             Physical Exam  Vitals and nursing note reviewed.   Constitutional:       Appearance: Normal appearance.   HENT:      Head: Normocephalic and atraumatic.      Right Ear: External ear normal.      Left Ear: External ear normal.      Nose: Nose normal.      Mouth/Throat:      Mouth: Mucous membranes are moist.      Pharynx: Oropharynx is clear.   Eyes:      Extraocular Movements: Extraocular movements intact.      Conjunctiva/sclera: Conjunctivae normal.   Cardiovascular:      Rate and Rhythm: Normal rate and regular rhythm.      Heart sounds: Normal heart sounds.   Pulmonary:      Effort: Pulmonary effort is normal.      Breath sounds: Normal breath sounds.   Abdominal:      General: There is no distension.      Palpations: Abdomen is  soft.      Tenderness: There is no abdominal tenderness. There is no guarding.   Musculoskeletal:         General: Normal range of motion.      Cervical back: Normal range of motion and neck supple.   Skin:     General: Skin is warm and dry.   Neurological:      General: No focal deficit present.      Mental Status: He is alert and oriented to person, place, and time.   Psychiatric:         Mood and Affect: Mood normal.         Behavior: Behavior normal.         Results Reviewed       Procedure Component Value Units Date/Time    HS Troponin 0hr (reflex protocol) [[id number]]  (Normal) Collected: 01/06/25 0358    Lab Status: Final result Specimen: Blood from Arm, Left Updated: 01/06/25 0454     hs TnI 0hr <2 ng/L     Comprehensive metabolic panel [[id number]]  (Abnormal) Collected: 01/06/25 0358    Lab Status: Final result Specimen: Blood from Arm, Left Updated: 01/06/25 0425     Sodium 135 mmol/L      Potassium 4.4 mmol/L      Chloride 102 mmol/L      CO2 28 mmol/L      ANION GAP 5 mmol/L      BUN 15 mg/dL      Creatinine 0.91 mg/dL      Glucose 162 mg/dL      Calcium 8.4 mg/dL      AST 21 U/L      ALT 16 U/L      Alkaline Phosphatase 37 U/L      Total Protein 6.5 g/dL      Albumin 4.0 g/dL      Total Bilirubin 0.59 mg/dL      eGFR 92 ml/min/1.73sq m     Narrative:      National Kidney Disease Foundation guidelines for Chronic Kidney Disease (CKD):     Stage 1 with normal or high GFR (GFR > 90 mL/min/1.73 square meters)    Stage 2 Mild CKD (GFR = 60-89 mL/min/1.73 square meters)    Stage 3A Moderate CKD (GFR = 45-59 mL/min/1.73 square meters)    Stage 3B Moderate CKD (GFR = 30-44 mL/min/1.73 square meters)    Stage 4 Severe CKD (GFR = 15-29 mL/min/1.73 square meters)    Stage 5 End Stage CKD (GFR <15 mL/min/1.73 square meters)  Note: GFR calculation is accurate only with a steady state creatinine    FLU/COVID Rapid Antigen (30 min. TAT) - Preferred screening test in ED [[id number]]  (Normal) Collected: 01/06/25 0358     Lab Status: Final result Specimen: Nares from Nose Updated: 01/06/25 0422     SARS COV Rapid Antigen Negative     Influenza A Rapid Antigen Negative     Influenza B Rapid Antigen Negative    Narrative:      This test has been performed using the [employer] Analy 2 FLU+SARS Antigen test under the Emergency Use Authorization (EUA). This test has been validated by the  and verified by the performing laboratory. The Analy uses lateral flow immunofluorescent sandwich assay to detect SARS-COV, Influenza A and Influenza B Antigen.     The [employer] Analy 2 SARS Antigen test does not differentiate between SARS-CoV and SARS-CoV-2.     Negative results are presumptive and may be confirmed with a molecular assay, if necessary, for patient management. Negative results do not rule out SARS-CoV-2 or influenza infection and should not be used as the sole basis for treatment or patient management decisions. A negative test result may occur if the level of antigen in a sample is below the limit of detection of this test.     Positive results are indicative of the presence of viral antigens, but do not rule out bacterial infection or co-infection with other viruses.     All test results should be used as an adjunct to clinical observations and other information available to the provider.    FOR PEDIATRIC PATIENTS - copy/paste COVID Guidelines URL to browser: [email or web address]    CBC and differential [[id number]]  (Abnormal) Collected: 01/06/25 0358    Lab Status: Final result Specimen: Blood from Arm, Left Updated: 01/06/25 0412     WBC 9.07 Thousand/uL      RBC 3.55 Million/uL      Hemoglobin 11.9 g/dL      Hematocrit 33.7 %      MCV 95 fL      MCH 33.5 pg      MCHC 35.3 g/dL      RDW 12.0 %      MPV 9.8 fL      Platelets 178 Thousands/uL      nRBC 0 /100 WBCs      Segmented % 76 %      Immature Grans % 1 %      Lymphocytes % 9 %      Monocytes % 13 %      Eosinophils  Relative 1 %      Basophils Relative 0 %      Absolute Neutrophils 6.91 Thousands/µL      Absolute Immature Grans 0.05 Thousand/uL      Absolute Lymphocytes 0.85 Thousands/µL      Absolute Monocytes 1.19 Thousand/µL      Eosinophils Absolute 0.05 Thousand/µL      Basophils Absolute 0.02 Thousands/µL             CT head without contrast   Final Interpretation by Huy Diaz MD (01/06 0426)      No acute intracranial abnormality.                  Workstation performed: [id number]         XR chest 1 view portable   ED Interpretation by Pati Sparks MD (01/06 0442)   No acute cardiac, pulmonary, or osseous processes noted.        Final Interpretation by Huy Diaz MD (01/06 0510)      No acute cardiopulmonary disease.            Workstation performed: [id number]             ECG 12 Lead Documentation Only    Date/Time: 1/6/2025 5:15 AM    Performed by: Pati Sparks MD  Authorized by: Pati Sparks MD    Indications / Diagnosis:  Syncope  Patient location:  ED  Previous ECG:     Previous ECG:  Compared to current  Rate:     ECG rate:  74    ECG rate assessment: normal    Rhythm:     Rhythm: sinus rhythm    Ectopy:     Ectopy: none    QRS:     QRS axis:  Normal    QRS intervals:  Normal  Conduction:     Conduction: normal    ST segments:     ST segments:  Normal  T waves:     T waves: normal    Universal Protocol:  procedure performed by consultantConsent: Verbal consent obtained.  Consent given by: patient  Patient identity confirmed: verbally with patient  Laceration repair    Date/Time: 1/6/2025 7:59 AM    Performed by: Pati Sparks MD  Authorized by: Pati Sparks MD  Body area: head/neck  Location details: left eyebrow  Laceration length: 0.3 cm  Tendon involvement: none  Nerve involvement: none      Procedure Details:  Comments: Dermabond placed on top of wound as it reapproximated well on its own.          ED Medication and Procedure Management   Prior to Admission Medications  "  Prescriptions Last Dose Informant Patient Reported? Taking?   Blood Glucose Monitoring Suppl (Contour Next EZ) w/Device KIT  Self No No   Sig: Use 1 each 4 (four) times a day   Patient not taking: Reported on 12/19/2024   Cholecalciferol (VITAMIN D) 2000 units CAPS  Self Yes No   Sig: Take 1 capsule by mouth daily   EPINEPHrine (EPIPEN) 0.3 mg/0.3 mL SOAJ  Self No No   Sig: Inject 0.3 mL (0.3 mg total) into a muscle once for 1 dose   HYDROmorphone (Dilaudid) 4 mg/mL injection  Self Yes No   Sig: Inject 1 Device as directed daily     Microlet Lancets MISC  Self No No   Sig: Use 4 (four) times a day   Patient not taking: Reported on 12/19/2024   NEEDLE, DISP, 23 G 23G X 1-1/4\" MISC  Self No No   Sig: Use once a week   Patient not taking: Reported on 12/19/2024   Upadacitinib ER (Rinvoq) 15 MG TB24  Self Yes No   Sig: Take 15 mg by mouth daily   atorvastatin (LIPITOR) 20 mg tablet  Self No No   Sig: TAKE ONE TABLET BY MOUTH DAILY AT BEDTIME   chlorhexidine (PERIDEX) 0.12 % solution   Yes No   Sig: TAKE 15 MLS SWISH AND SPIT FOR 1 MINUTE AFTER NORMAL BRUSHING AND HYGIENE ROUTINE, REPEAT UP TO THREE TIMES A DAY   gabapentin (NEURONTIN) 300 mg capsule  Self No No   Sig: Take 4 capsules (1,200 mg total) by mouth daily   Patient taking differently: Take 900 mg by mouth daily at bedtime Only 3   glucose blood (Contour Next Test) test strip  Self No No   Sig: Use 1 each 4 (four) times a day   Patient not taking: Reported on 12/19/2024   lisinopril (ZESTRIL) 20 mg tablet  Self No No   Sig: TAKE ONE TABLET BY MOUTH EVERY DAY   meloxicam (MOBIC) 15 mg tablet   No No   Sig: Take 1 tablet (15 mg total) by mouth daily as needed for moderate pain   metFORMIN (GLUCOPHAGE-XR) 500 mg 24 hr tablet  Self No No   Sig: TAKE ONE TABLET BY MOUTH TWICE A DAY WITH MEALS   multivitamin (THERAGRAN) TABS  Self Yes No   Sig: Take 1 tablet by mouth daily   omeprazole (PriLOSEC) 40 MG capsule  Self No No   Sig: Take 1 capsule (40 mg total) by " "mouth daily   testosterone cypionate (DEPO-TESTOSTERONE) 200 mg/mL SOLN  Self No No   Sig: INJECT 0.375 MLS WEEKLY*USE 1 VIAL FOR 1 WEEKLY DOSE,DISCARD REMAINDER (weekly dose of 75 mg/week)      Facility-Administered Medications: None     Discharge Medication List as of 1/6/2025  5:20 AM        CONTINUE these medications which have NOT CHANGED    Details   atorvastatin (LIPITOR) 20 mg tablet TAKE ONE TABLET BY MOUTH DAILY AT BEDTIME, Starting Thu 10/17/2024, Normal      Blood Glucose Monitoring Suppl (Contour Next EZ) w/Device KIT Use 1 each 4 (four) times a day, Starting Mon 6/28/2021, Normal      chlorhexidine (PERIDEX) 0.12 % solution TAKE 15 MLS SWISH AND SPIT FOR 1 MINUTE AFTER NORMAL BRUSHING AND HYGIENE ROUTINE, REPEAT UP TO THREE TIMES A DAY, Historical Med      Cholecalciferol (VITAMIN D) 2000 units CAPS Take 1 capsule by mouth daily, Historical Med      EPINEPHrine (EPIPEN) 0.3 mg/0.3 mL SOAJ Inject 0.3 mL (0.3 mg total) into a muscle once for 1 dose, Starting Sun 4/9/2023, Normal      gabapentin (NEURONTIN) 300 mg capsule Take 4 capsules (1,200 mg total) by mouth daily, Starting Mon 3/18/2024, Until Tue 9/17/2024, Normal      glucose blood (Contour Next Test) test strip Use 1 each 4 (four) times a day, Starting Mon 6/28/2021, Normal      HYDROmorphone (Dilaudid) 4 mg/mL injection Inject 1 Device as directed daily  , Historical Med      lisinopril (ZESTRIL) 20 mg tablet TAKE ONE TABLET BY MOUTH EVERY DAY, Starting Thu 10/17/2024, Normal      meloxicam (MOBIC) 15 mg tablet Take 1 tablet (15 mg total) by mouth daily as needed for moderate pain, Starting Thu 1/2/2025, Normal      metFORMIN (GLUCOPHAGE-XR) 500 mg 24 hr tablet TAKE ONE TABLET BY MOUTH TWICE A DAY WITH MEALS, Starting Thu 10/17/2024, Normal      Microlet Lancets MISC Use 4 (four) times a day, Starting Mon 6/28/2021, Normal      multivitamin (THERAGRAN) TABS Take 1 tablet by mouth daily, Historical Med      NEEDLE, DISP, 23 G 23G X 1-1/4\" MISC " Use once a week, Starting Thu 7/20/2023, Normal      omeprazole (PriLOSEC) 40 MG capsule Take 1 capsule (40 mg total) by mouth daily, Starting Mon 3/18/2024, Normal      testosterone cypionate (DEPO-TESTOSTERONE) 200 mg/mL SOLN INJECT 0.375 MLS WEEKLY*USE 1 VIAL FOR 1 WEEKLY DOSE,DISCARD REMAINDER (weekly dose of 75 mg/week), Normal      Upadacitinib ER (Rinvoq) 15 MG TB24 Take 15 mg by mouth daily, Historical Med           No discharge procedures on file.  ED SEPSIS DOCUMENTATION   Time reflects when diagnosis was documented in both MDM as applicable and the Disposition within this note       Time User Action Codes Description Comment    1/6/2025  5:17 AM Pati Sparks [W19.XXXA] Fall, initial encounter     1/6/2025  5:17 AM Pati Sparks [R55] Syncope     1/6/2025  5:19 AM Pati Sparks [S09.90XA] Injury of head, initial encounter                  Pati Sparks MD  01/06/25 0351       Pati Sparks MD  01/06/25 0758       Pati Sparks MD  01/06/25 0800

## 2025-01-06 NOTE — ED ATTENDING ATTESTATION
1/6/2025  I, Siddharth Navarro MD, saw and evaluated the patient. I have discussed the patient with the resident/non-physician practitioner and agree with the resident's/non-physician practitioner's findings, Plan of Care, and MDM as documented in the resident's/non-physician practitioner's note, except where noted. All available labs and Radiology studies were reviewed.  I was present for key portions of any procedure(s) performed by the resident/non-physician practitioner and I was immediately available to provide assistance.       At this point I agree with the current assessment done in the Emergency Department.  I have conducted an independent evaluation of this patient a history and physical is as follows: Patient is a 59 year old male with syncope tonight while getting up to go to the bathroom. (+) nausea prior to episode. Had headache earlier yesterday. (+) cough and congestion since this past Saturday. No fever. No vomiting or diarrhea. No urinary sx. No GI bleeding. (+) left facial laceration tonight. Last  Tdap was in 2019 as per Epic. No anticoagulant use. No travel. No chest pain or sob. Was last seen at [facility] in [place] on 1/4/25 for sinusitis. [facility] website checked on this patient and last Rx filled was on 12/17/24 for dilaudid for 30 day supply. Normocephalic. (+) left eyebrow laceration. PERRL. No scleral icterus. Mucous membranes moist. Neck nontender. Lungs clear. Heart regular without murmur. Abdomen soft and nontender. Good bowel sounds. No edema. No rash noted. No focal deficits. Differential diagnosis including but not limited to: vasovagal syncope, cardiac arrhythmia; doubt MI, ACS, PE, metabolic abnormality, intracranial process, seizure, anemia, GI bleed, dehydration, intracranial injury, concussion, facial laceration; doubt cervical injury or thoracic or intraabdominal injury. Will check labs, EKG, CXR, CT.     ED Course         Critical Care Time  Procedures

## 2025-01-07 ENCOUNTER — VBI (OUTPATIENT)
Dept: FAMILY MEDICINE CLINIC | Facility: CLINIC | Age: 60
End: 2025-01-07

## 2025-01-07 NOTE — TELEPHONE ENCOUNTER
01/07/25 11:03 AM    Patient contacted post ED visit, [facility] department spoke with patient/caregiver and outreach was successful.    Thank you.  Carlee Peng MA  [facility]

## 2025-01-09 ENCOUNTER — VBI (OUTPATIENT)
Dept: ADMINISTRATIVE | Facility: OTHER | Age: 60
End: 2025-01-09

## 2025-01-09 NOTE — TELEPHONE ENCOUNTER
01/09/25 3:50 PM     Chart reviewed for Diabetic Eye Exam ; nothing is submitted to the patient's insurance at this time.     Vinicio Veloz MA   [facility]

## 2025-01-10 LAB
ATRIAL RATE: 74 BPM
P AXIS: 69 DEGREES
PR INTERVAL: 188 MS
QRS AXIS: 68 DEGREES
QRSD INTERVAL: 84 MS
QT INTERVAL: 392 MS
QTC INTERVAL: 435 MS
T WAVE AXIS: 60 DEGREES
VENTRICULAR RATE: 74 BPM

## 2025-01-10 PROCEDURE — 93010 ELECTROCARDIOGRAM REPORT: CPT | Performed by: INTERNAL MEDICINE

## 2025-01-13 ENCOUNTER — TELEPHONE (OUTPATIENT)
Age: 60
End: 2025-01-13

## 2025-01-13 NOTE — TELEPHONE ENCOUNTER
Patient dropped off Rinvoq patient assistance application at the office (P).  Will give to Dr. Reyes for completion.  Once complete, will fax to [employer] on his behalf.

## 2025-01-25 LAB — COLOGUARD RESULT REPORTABLE: POSITIVE

## 2025-01-26 ENCOUNTER — RESULTS FOLLOW-UP (OUTPATIENT)
Dept: FAMILY MEDICINE CLINIC | Facility: CLINIC | Age: 60
End: 2025-01-26

## 2025-02-12 DIAGNOSIS — R19.5 POSITIVE COLORECTAL CANCER SCREENING USING COLOGUARD TEST: Primary | ICD-10-CM

## 2025-02-16 DIAGNOSIS — M19.011 OSTEOARTHRITIS OF RIGHT ACROMIOCLAVICULAR JOINT: ICD-10-CM

## 2025-02-17 RX ORDER — MELOXICAM 15 MG/1
15 TABLET ORAL DAILY PRN
Qty: 30 TABLET | Refills: 2 | Status: SHIPPED | OUTPATIENT
Start: 2025-02-17

## 2025-02-19 ENCOUNTER — PREP FOR PROCEDURE (OUTPATIENT)
Age: 60
End: 2025-02-19

## 2025-02-19 ENCOUNTER — TELEPHONE (OUTPATIENT)
Age: 60
End: 2025-02-19

## 2025-02-19 DIAGNOSIS — Z12.11 SCREENING FOR COLON CANCER: Primary | ICD-10-CM

## 2025-02-19 NOTE — TELEPHONE ENCOUNTER
Scheduled date of colonoscopy (as of today): 3/20/2025  Physician performing colonoscopy:   Location of colonoscopy: [facility]  Bowel prep reviewed with patient: Aaliyah Awan  Instructions reviewed with patient by: Aaliyah Awan  Clearances: N/A      Jake Dul prep sent

## 2025-02-19 NOTE — LETTER
Kuldeep Foss Sr.  [phone] [place] [id number]                        ------------------------------------------------------------------------------------------------------------

## 2025-02-19 NOTE — TELEPHONE ENCOUNTER
02/19/25  Screened by: Aaliyah Awan    Referring Provider     Pre- Screening:     There is no height or weight on file to calculate BMI.  Has patient been referred for a routine screening Colonoscopy? yes  Is the patient between 45-75 years old? yes      Previous Colonoscopy no   If yes:    Date: N/A    Facility: N/A    Reason: N/A          Does the patient want to see a Gastroenterologist prior to their procedure OR are they having any GI symptoms? no    Has the patient been hospitalized or had abdominal surgery in the past 6 months? no    Does the patient use supplemental oxygen? no    Does the patient take Coumadin, Lovenox, Plavix, Elliquis, Xarelto, or other blood thinning medication? no    Has the patient had a stroke, cardiac event, or stent placed in the past year? no        If patient is between 45yrs - 49yrs, please advise patient that we will have to confirm benefits & coverage with their insurance company for a routine screening colonoscopy.

## 2025-02-20 ENCOUNTER — OFFICE VISIT (OUTPATIENT)
Age: 60
End: 2025-02-20
Payer: COMMERCIAL

## 2025-02-20 VITALS
SYSTOLIC BLOOD PRESSURE: 114 MMHG | HEART RATE: 83 BPM | DIASTOLIC BLOOD PRESSURE: 80 MMHG | WEIGHT: 194.6 LBS | OXYGEN SATURATION: 98 % | BODY MASS INDEX: 28.82 KG/M2 | HEIGHT: 69 IN

## 2025-02-20 DIAGNOSIS — G89.4 CHRONIC PAIN SYNDROME: ICD-10-CM

## 2025-02-20 DIAGNOSIS — M65.311 TRIGGER THUMB OF BOTH THUMBS: Primary | ICD-10-CM

## 2025-02-20 DIAGNOSIS — E11.9 DIABETES MELLITUS TYPE 2, NONINSULIN DEPENDENT (HCC): ICD-10-CM

## 2025-02-20 DIAGNOSIS — M96.1 LUMBAR POSTLAMINECTOMY SYNDROME: ICD-10-CM

## 2025-02-20 DIAGNOSIS — M47.812 CERVICAL SPONDYLOSIS WITHOUT MYELOPATHY: ICD-10-CM

## 2025-02-20 DIAGNOSIS — M65.312 TRIGGER THUMB OF BOTH THUMBS: Primary | ICD-10-CM

## 2025-02-20 DIAGNOSIS — M45.AB: ICD-10-CM

## 2025-02-20 DIAGNOSIS — M72.2 PLANTAR FASCIITIS, BILATERAL: ICD-10-CM

## 2025-02-20 PROCEDURE — 99214 OFFICE O/P EST MOD 30 MIN: CPT | Performed by: INTERNAL MEDICINE

## 2025-02-20 NOTE — PATIENT INSTRUCTIONS
Continue the Rinvoq daily.  I gave you a new slip to get lab work every 3 months while on Rinvoq.  You should follow-up with the hand surgeon in [place] regarding your trigger thumbs.  He likely will give you an injection.  Let us know what your colonoscopy shows.

## 2025-02-20 NOTE — PROGRESS NOTES
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  Also noted painful injections with burning.  He was started on Rinvoq 2023 by his prior rheumatologist with benefit.  He had been off of Rinvoq since Memorial Day last year due to pneumonia right lower lobe and restarted it after the last office visit.  He did have recent MRI of the pelvis and there was no evidence for sacroiliitis.  There was some mild chondral thickening in bilateral hips and mild disc bulging L5-S1.  He does not meet criteria for the diagnosis of ankylosing spondylitis, however, he does meet criteria for diagnosis of nonradiographic axial spondylarthritis, with positive HLA-B27, and enthesopathy.  Monitor disease activity and medication toxicity with lab work as ordered.  He will be due for repeat QuantiFERON gold in August.  I have given him prescription for lab work.  I did request that he get a copy of his old records from his prior rheumatologist for my review.  Plan follow-up in 6 months or sooner if needed.    Plantar fasciitis, bilateral  History of recurrent plantar fasciitis with chronic thickening plantar fascia with some tenderness right plantar fascia.  He does know how to do heel stretches, which I have encouraged him to do.  If symptoms persist, we will send him for formal physical therapy.  I encouraged the patient to wear proper shoe gear and avoid barefoot walking.  Continue to monitor enthesopathy.    Chronic pain syndrome  Chronic complex pain syndrome with known cervical spondylosis with history of radiculopathy, nonradiographic ankylosing spondylitis, and post lumbar laminectomy syndrome, who continues on gabapentin, and intrathecal Dilaudid.  Follow-up pain management.    Lumbar postlaminectomy  syndrome  Patient with postlaminectomy syndrome status post back surgery in the remote past, with persistence of chronic low back pain radiating into bilateral lower extremities right greater than left to his feet with associated numbness and tingling and painful ambulation.  He also has chronic neck pain which is nonradicular.  He has intrathecal pump for Dilaudid instillation per pain management.  Follow-up pain management.    Cervical spondylosis without myelopathy  History of chronic neck pain nonradicular in nature.  Review of the most recent cervical spine films from November 2022 was significant for degenerative disc disease C5-7 with moderate to severe bilateral neuroforaminal narrowing at C6-7 and moderate neuroforaminal narrowing on the left at C5-6.  Continue follow-up with pain management for intrathecal Dilaudid instillation and management of chronic pain.  Continue to monitor.    Trigger thumb of both thumbs  Patient does have thickening bilateral thumb flexor tendon sheath with triggering and tenderness.  He does follow with a hand surgeon in [place] and I have suggested that he follow-up with a hand surgeon regarding his trigger thumbs.  Suspect this is related to his diabetes.  Continue to monitor.    Diabetes mellitus type 2, noninsulin dependent (HCC)    Lab Results   Component Value Date    HGBA1C 6.3 (H) 08/28/2024   Follow-up with endocrinology and primary care for management of type 2 diabetes.  Continue to monitor.         Problem List Items Addressed This Visit     Chronic pain syndrome    Chronic complex pain syndrome with known cervical spondylosis with history of radiculopathy, nonradiographic ankylosing spondylitis, and post lumbar laminectomy syndrome, who continues on gabapentin, and intrathecal Dilaudid.  Follow-up pain management.         Lumbar postlaminectomy syndrome    Patient with postlaminectomy syndrome status post back surgery in the remote past, with persistence of chronic  low back pain radiating into bilateral lower extremities right greater than left to his feet with associated numbness and tingling and painful ambulation.  He also has chronic neck pain which is nonradicular.  He has intrathecal pump for Dilaudid instillation per pain management.  Follow-up pain management.         Diabetes mellitus type 2, noninsulin dependent ([facility])      Lab Results   Component Value Date    HGBA1C 6.3 (H) 08/28/2024   Follow-up with endocrinology and primary care for management of type 2 diabetes.  Continue to monitor.         Cervical spondylosis without myelopathy    History of chronic neck pain nonradicular in nature.  Review of the most recent cervical spine films from November 2022 was significant for degenerative disc disease C5-7 with moderate to severe bilateral neuroforaminal narrowing at C6-7 and moderate neuroforaminal narrowing on the left at C5-6.  Continue follow-up with pain management for intrathecal Dilaudid instillation and management of chronic pain.  Continue to monitor.         Plantar fasciitis, bilateral    History of recurrent plantar fasciitis with chronic thickening plantar fascia with some tenderness right plantar fascia.  He does know how to do heel stretches, which I have encouraged him to do.  If symptoms persist, we will send him for formal physical therapy.  I encouraged the patient to wear proper shoe gear and avoid barefoot walking.  Continue to monitor enthesopathy.         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  Also noted painful injections with burning.  He was started on Rinvoq 2023 by his prior rheumatologist with benefit.  He had been off of Rinvoq since Memorial Day last year due to pneumonia right lower lobe and restarted it after the last  office visit.  He did have recent MRI of the pelvis and there was no evidence for sacroiliitis.  There was some mild chondral thickening in bilateral hips and mild disc bulging L5-S1.  He does not meet criteria for the diagnosis of ankylosing spondylitis, however, he does meet criteria for diagnosis of nonradiographic axial spondylarthritis, with positive HLA-B27, and enthesopathy.  Monitor disease activity and medication toxicity with lab work as ordered.  He will be due for repeat QuantiFERON gold in August.  I have given him prescription for lab work.  I did request that he get a copy of his old records from his prior rheumatologist for my review.  Plan follow-up in 6 months or sooner if needed.         Relevant Orders    CBC and differential    Comprehensive metabolic panel    Sedimentation rate, automated    C-reactive protein    Urinalysis with microscopic    Quantiferon TB Gold Plus Assay (Completed)    Trigger thumb of both thumbs - Primary    Patient does have thickening bilateral thumb flexor tendon sheath with triggering and tenderness.  He does follow with a hand surgeon in [place] and I have suggested that he follow-up with a hand surgeon regarding his trigger thumbs.  Suspect this is related to his diabetes.  Continue to monitor.                  Reviewed records, labs, and imaging with the patient in detail.  Counseled patient.  Discussion regarding my findings and recommendations.  Office visit with documentation 35 min.    Subjective:      Patient ID: Kuldeep Foss Sr. is a 59 y.o. male.    59-year-old male, patient of Dr. Pozo, with a history of ankylosing spondylitis diagnosed in 8315-1440.  He was evaluated and treated by rheumatologist Dr. Paris, initially with methotrexate to which he had incomplete response, and subsequently Humira with no significant benefit, and bothered by painful injections with burning.  He continued Humira for 6 to 8 months.  He was started on Rinvoq by his prior  rheumatologist Dr. Telles.with overall benefit.  He did go off of Rinvoq around Memorial Day 2024 due to pneumonia right lower lobe, and had not gone back on it due to concerns about potential side effects, however he did restart Rinvoq after the last office visit July 2024.  He does have history of postlaminectomy syndrome status post back surgery in the remote past and has persistence of chronic low back pain radiating into bilateral lower extremities right greater than left to his feet with associated numbness and tingling and painful ambulation.  He does have chronic neck pain nonradicular in nature.  Patient does have intrathecal pump for Dilaudid instillation per pain management.  He has used medical marijuana which he finds is helpful for his sleep.  Patient has morning stiffness of 2 hours duration with chronic pain in multiple joints including ankles, knees, hips, shoulders, right elbow, in addition to neck and back.  He has history of right rotator cuff arthropathy and states he has a lump on the top of his shoulder.  He is status post right rotator cuff repair and remote history of left shoulder surgery.  He has been treated with steroid injection in the past.  He has been on disability since 2008.  He has no history of Psoriasis.  He is HLA-B27 positive and has history of plantar fasciitis.  Review of MRI of the pelvis dated 3/7/2024 significant for no evidence of sacroiliitis.  Mild disc bulge noted L5-S1 with mild chondral thickening bilateral hips.  He is status post bilateral carpal tunnel release but does note persistence of osteoarthritic pain in his right hand.  His left hand improved post carpal tunnel release.  He does note thumb arthralgias.  Review of systems is positive for questionable Raynaud's phenomenon with no digital ulcerations.  He has sleep disturbance with early awakening more so than insomnia.  He notes chronic fatigue.  He denies chest pain, sicca symptoms, or history of  headache.  He had influenza approximately 12/29/2023.  He does have hypogonadism and is treated with testosterone.  He has history of dental implants and gum infection.  Past medical history significant for hypertension, hyperlipidemia, hypergonadism, type 2 diabetes with questionable neuropathy, cervical and lumbar spondylosis, postlaminectomy syndrome, anemia, sleep apnea not on CPAP, and carpal tunnel syndrome.  Patient quit tobacco in 2016.    Lab work dated 1/21/2025 is significant for positive Cologuard.  He is scheduled for colonoscopy in March for further evaluation.  Lab work dated 1/6/2025 significant for hemoglobin 11.9 with hematocrit 33.7.  Glucose 162.  Calcium 8.4.  Creatinine 0.91 with estimated GFR 92.  Patient was seen that day in the emergency room for syncopal episode with fall during which time he hit his head and sustained an eyebrow laceration.  He did complain of nausea at that time.  Lab work dated 8/28/2024 significant for hemoglobin A1c 6.3.  QuantiFERON gold negative.  Review of lab work dated 5/31/2024 significant for sodium 133.  Glucose 153.  Calcium 8.8.  Total bilirubin 1.17.  Estimated GFR 95.  Hemoglobin 12.  Hematocrit 33.9.  Platelet count 147.  Procalcitonin elevated at 2.99.  Hemoglobin A1c dated 3/8/2024 5.9.  Note lab work dated 11/30/2022 reviewed and significant for positive HLA-B27.  Hepatitis B surface antigen, core antibody, and hepatitis C all nonreactive.  QuantiFERON gold negative.  Sed rate 60.  Rheumatoid factor negative.  CK 83.    DEXA dated 6/24/2020 normal with 3% decrease in spine BMD as compared to the prior study.        Allergies  Allergies   Allergen Reactions   • Amoxicillin Anaphylaxis     BP dropped       Home Medications    Current Outpatient Medications:   •  atorvastatin (LIPITOR) 20 mg tablet, TAKE ONE TABLET BY MOUTH DAILY AT BEDTIME, Disp: 90 tablet, Rfl: 1  •  chlorhexidine (PERIDEX) 0.12 % solution, TAKE 15 MLS SWISH AND SPIT FOR 1 MINUTE AFTER  "NORMAL BRUSHING AND HYGIENE ROUTINE, REPEAT UP TO THREE TIMES A DAY, Disp: , Rfl:   •  Cholecalciferol (VITAMIN D) 2000 units CAPS, Take 1 capsule by mouth daily, Disp: , Rfl:   •  EPINEPHrine (EPIPEN) 0.3 mg/0.3 mL SOAJ, Inject 0.3 mL (0.3 mg total) into a muscle once for 1 dose, Disp: 0.6 mL, Rfl: 0  •  gabapentin (NEURONTIN) 300 mg capsule, Take 4 capsules (1,200 mg total) by mouth daily (Patient taking differently: Take 900 mg by mouth daily at bedtime Only 3), Disp: 360 capsule, Rfl: 3  •  HYDROmorphone (Dilaudid) 4 mg/mL injection, Inject 1 Device as directed daily  , Disp: , Rfl:   •  lisinopril (ZESTRIL) 20 mg tablet, TAKE ONE TABLET BY MOUTH EVERY DAY, Disp: 90 tablet, Rfl: 1  •  meloxicam (MOBIC) 15 mg tablet, TAKE 1 TABLET BY MOUTH DAILY AS NEEDED FOR MODERATE PAIN, Disp: 30 tablet, Rfl: 2  •  metFORMIN (GLUCOPHAGE-XR) 500 mg 24 hr tablet, TAKE ONE TABLET BY MOUTH TWICE A DAY WITH MEALS, Disp: 180 tablet, Rfl: 1  •  multivitamin (THERAGRAN) TABS, Take 1 tablet by mouth daily, Disp: , Rfl:   •  omeprazole (PriLOSEC) 40 MG capsule, Take 1 capsule (40 mg total) by mouth daily, Disp: 90 capsule, Rfl: 3  •  testosterone cypionate (DEPO-TESTOSTERONE) 200 mg/mL SOLN, INJECT 0.375 MLS WEEKLY*USE 1 VIAL FOR 1 WEEKLY DOSE,DISCARD REMAINDER (weekly dose of 75 mg/week), Disp: 12 mL, Rfl: 1  •  Upadacitinib ER (Rinvoq) 15 MG TB24, Take 15 mg by mouth daily, Disp: , Rfl:   •  Blood Glucose Monitoring Suppl (Contour Next EZ) w/Device KIT, Use 1 each 4 (four) times a day (Patient not taking: Reported on 12/19/2024), Disp: 1 kit, Rfl: 0  •  glucose blood (Contour Next Test) test strip, Use 1 each 4 (four) times a day (Patient not taking: Reported on 12/19/2024), Disp: 400 each, Rfl: 1  •  Microlet Lancets MISC, Use 4 (four) times a day (Patient not taking: Reported on 12/19/2024), Disp: 400 each, Rfl: 1  •  NEEDLE, DISP, 23 G 23G X 1-1/4\" MISC, Use once a week (Patient not taking: Reported on 12/19/2024), Disp: 100 each, " Rfl: 2    Past Medical History  Past Medical History:   Diagnosis Date   • Anaphylactic reaction    • Chronic pain    • Depression    • Diabetes (HCC)    • Diabetes mellitus (HCC)    • Fibromyalgia, primary    • GERD (gastroesophageal reflux disease)    • Hyperlipidemia    • Hypertension    • Low testosterone    • Neuropathy    • Pilonidal cyst     last assessed 3/17/2014   • Pneumonia    • Sleep apnea     no cpap       Past Surgical History   Past Surgical History:   Procedure Laterality Date   • BACK SURGERY      discectomy   • ELBOW SURGERY Right     sometime between 8205-0305   • MOUTH SURGERY      perm top teeth removed and dental implants   • OTHER SURGICAL HISTORY  2015    Electr analysis of progr impl pump w/reprogram refill, requiring physician.  Replacement of right abdomen intrathecal  pain pump filled with Dilaudid with electronic analysis and programming.  managed by Bobby Coy   • PILONIDAL CYST EXCISION     • IL NDSC WRST SURG W/RLS TRANSVRS CARPL LIGM Right 2023    Procedure: RELEASE CARPAL TUNNEL ENDOSCOPIC;  Surgeon: Bret Salgado MD;  Location: [facility];  Service: Orthopedics   • IL NDSC WRST SURG W/RLS TRANSVRS CARPL LIGM Left 2023    Procedure: RELEASE CARPAL TUNNEL ENDOSCOPIC;  Surgeon: Bret Salgado MD;  Location: [facility];  Service: Orthopedics   • IL SURGICAL ARTHROSCOPY SHOULDER W/ROTATOR CUFF RPR Right 2020    Procedure: SHOULDER ARTHROSCOPIC ROTATOR CUFF REPAIR AND DEBRIDEMENT;  Surgeon: Wero De La Cruz MD;  Location: [facility];  Service: Orthopedics   • ROTATOR CUFF REPAIR Left    • WISDOM TOOTH EXTRACTION         Family History   Family History   Problem Relation Age of Onset   • Alcohol abuse Mother            • Liver disease Mother    • Arthritis Mother            • Uterine cancer Mother    • Lupus Mother    • Coronary artery disease Father            • Heart disease Father    • Prostate cancer Father      "       • Diabetes Sister    • Diabetes unspecified Sister    • Hypertension Sister    • Hyperlipidemia Sister         pure hypercholesterolemia   • Diabetes unspecified Brother         DM   • Hypertension Brother    • Hyperlipidemia Brother         pure hypercholesterolemia   • Diabetes Brother    • Diabetes Brother         DM   • Hypertension Family        The following portions of the patient's history were reviewed and updated as appropriate: allergies, current medications, past family history, past medical history, past social history, past surgical history, and problem list.    Review of Systems   Constitutional:  Positive for fatigue. Negative for chills and fever.   HENT:  Positive for dental problem (Hx implants upper jaw). Negative for ear pain and sore throat.    Eyes:  Negative for pain and visual disturbance.   Respiratory:  Negative for cough and shortness of breath.    Cardiovascular:  Negative for chest pain and palpitations.   Gastrointestinal:  Negative for abdominal pain and vomiting.   Genitourinary:  Negative for dysuria and hematuria.   Musculoskeletal:  Positive for arthralgias, back pain, gait problem and neck pain.   Skin:         Questionable Raynaud's phenomenon with no digital ulcerations.   Neurological:  Positive for numbness (occas numbness fingers and legs). Negative for seizures and syncope.   Psychiatric/Behavioral:  Positive for sleep disturbance.    All other systems reviewed and are negative.        Objective:      /80   Pulse 83   Ht 5' 8.5\" (1.74 m)   Wt 88.3 kg (194 lb 9.6 oz)   SpO2 98%   BMI 29.16 kg/m²          Physical Exam  Vitals reviewed.   Constitutional:       Appearance: Normal appearance.   HENT:      Head: Normocephalic.      Nose:      Comments: Nose and throat unremarkable  Cardiovascular:      Rate and Rhythm: Regular rhythm.   Pulmonary:      Breath sounds: Normal breath sounds.   Abdominal:      Palpations: Abdomen is soft.   Musculoskeletal: "      Comments: Neck decreased lateral flexion.  Spurling's negative.  Shoulders slightly decreased external rotation bilaterally right greater than left.  Bilateral shoulder crepitus.  Elbow right lacks full extension. Left full range of motion. Ankles full range of motion.  Tinel's negative bilaterally.  Hands squaring first carpometacarpal joints bilaterally with Heberden's and questionable early Helga's nodes scattered.  Thickening flexor tendon sheath bilateral middle fingers left greater than right with no triggering appreciated.  Thickening flexor tendon sheath bilateral thumbs with triggering and tenderness to palpation.  Straight leg raising negative bilaterally.  Marked spasm dorsal and lumbosacral paraspinals with decreased forward flexion.  Schober's negative.  No SI joint tenderness appreciated.  Hips decreased internal rotation greater than external rotation right greater than left.  Knees full range of motion with patellofemoral crepitus.  Ankles full range of motion.  Feet rearfoot hyperpronation with midfoot cavus deformities, early hallux valgus and scattered hammertoe deformities.  Thickening plantar fascia right greater than left with tenderness right plantar fascia insertion.  No tenderness Achilles tendons.  No synovitis appreciated.   Skin:     Comments: Eczema versus psoriasiform dermatitis extensor surfaces knees.  No onycholysis.  No nail pitting.               This note was written in part using the assistance of the [employer] [id number] microphone system. Those portions using this system have been dictated and not read.

## 2025-02-20 NOTE — ASSESSMENT & PLAN NOTE
Patient with initial diagnosis of ankylosing spondylitis around 4334-1458 treated with methotrexate with incomplete response and subsequently Humira with no significant benefit.  Also noted painful injections with burning.  He was started on Rinvoq 2023 by his prior rheumatologist with benefit.  He had been off of Rinvoq since Memorial Day last year due to pneumonia right lower lobe and restarted it after the last office visit.  He did have recent MRI of the pelvis and there was no evidence for sacroiliitis.  There was some mild chondral thickening in bilateral hips and mild disc bulging L5-S1.  He does not meet criteria for the diagnosis of ankylosing spondylitis, however, he does meet criteria for diagnosis of nonradiographic axial spondylarthritis, with positive HLA-B27, and enthesopathy.  Monitor disease activity and medication toxicity with lab work as ordered.  He will be due for repeat QuantiFERON gold in August.  I have given him prescription for lab work.  I did request that he get a copy of his old records from his prior rheumatologist for my review.  Plan follow-up in 6 months or sooner if needed.

## 2025-03-06 ENCOUNTER — ANESTHESIA EVENT (OUTPATIENT)
Dept: ANESTHESIOLOGY | Facility: HOSPITAL | Age: 60
End: 2025-03-06

## 2025-03-06 ENCOUNTER — ANESTHESIA (OUTPATIENT)
Dept: ANESTHESIOLOGY | Facility: HOSPITAL | Age: 60
End: 2025-03-06

## 2025-03-11 ENCOUNTER — PATIENT MESSAGE (OUTPATIENT)
Dept: GASTROENTEROLOGY | Facility: CLINIC | Age: 60
End: 2025-03-11

## 2025-03-12 ENCOUNTER — APPOINTMENT (OUTPATIENT)
Dept: LAB | Facility: CLINIC | Age: 60
End: 2025-03-12
Payer: COMMERCIAL

## 2025-03-12 DIAGNOSIS — M45.0 ANKYLOSING SPONDYLITIS OF MULTIPLE SITES IN SPINE (HCC): ICD-10-CM

## 2025-03-12 DIAGNOSIS — Z12.5 ENCOUNTER FOR SCREENING FOR MALIGNANT NEOPLASM OF PROSTATE: ICD-10-CM

## 2025-03-12 DIAGNOSIS — M45.AB: ICD-10-CM

## 2025-03-12 DIAGNOSIS — E29.1 TESTICULAR HYPOGONADISM: ICD-10-CM

## 2025-03-12 LAB
ALBUMIN SERPL BCG-MCNC: 4.6 G/DL (ref 3.5–5)
ALP SERPL-CCNC: 32 U/L (ref 34–104)
ALT SERPL W P-5'-P-CCNC: 29 U/L (ref 7–52)
ANION GAP SERPL CALCULATED.3IONS-SCNC: 6 MMOL/L (ref 4–13)
AST SERPL W P-5'-P-CCNC: 27 U/L (ref 13–39)
BACTERIA UR QL AUTO: ABNORMAL /HPF
BASOPHILS # BLD AUTO: 0.03 THOUSANDS/ÂΜL (ref 0–0.1)
BASOPHILS NFR BLD AUTO: 1 % (ref 0–1)
BILIRUB SERPL-MCNC: 0.83 MG/DL (ref 0.2–1)
BILIRUB UR QL STRIP: NEGATIVE
BUN SERPL-MCNC: 16 MG/DL (ref 5–25)
CALCIUM SERPL-MCNC: 9.3 MG/DL (ref 8.4–10.2)
CHLORIDE SERPL-SCNC: 102 MMOL/L (ref 96–108)
CLARITY UR: CLEAR
CO2 SERPL-SCNC: 30 MMOL/L (ref 21–32)
COLOR UR: ABNORMAL
CREAT SERPL-MCNC: 0.79 MG/DL (ref 0.6–1.3)
CRP SERPL QL: <1 MG/L
EOSINOPHIL # BLD AUTO: 0.04 THOUSAND/ÂΜL (ref 0–0.61)
EOSINOPHIL NFR BLD AUTO: 1 % (ref 0–6)
ERYTHROCYTE [DISTWIDTH] IN BLOOD BY AUTOMATED COUNT: 12.7 % (ref 11.6–15.1)
ERYTHROCYTE [SEDIMENTATION RATE] IN BLOOD: 2 MM/HOUR (ref 0–19)
GFR SERPL CREATININE-BSD FRML MDRD: 98 ML/MIN/1.73SQ M
GLUCOSE P FAST SERPL-MCNC: 129 MG/DL (ref 65–99)
GLUCOSE UR STRIP-MCNC: NEGATIVE MG/DL
HCT VFR BLD AUTO: 35 % (ref 36.5–49.3)
HGB BLD-MCNC: 12.5 G/DL (ref 12–17)
HGB UR QL STRIP.AUTO: NEGATIVE
IMM GRANULOCYTES # BLD AUTO: 0.02 THOUSAND/UL (ref 0–0.2)
IMM GRANULOCYTES NFR BLD AUTO: 0 % (ref 0–2)
KETONES UR STRIP-MCNC: NEGATIVE MG/DL
LEUKOCYTE ESTERASE UR QL STRIP: NEGATIVE
LYMPHOCYTES # BLD AUTO: 2.03 THOUSANDS/ÂΜL (ref 0.6–4.47)
LYMPHOCYTES NFR BLD AUTO: 34 % (ref 14–44)
MCH RBC QN AUTO: 32.8 PG (ref 26.8–34.3)
MCHC RBC AUTO-ENTMCNC: 35.7 G/DL (ref 31.4–37.4)
MCV RBC AUTO: 92 FL (ref 82–98)
MONOCYTES # BLD AUTO: 0.48 THOUSAND/ÂΜL (ref 0.17–1.22)
MONOCYTES NFR BLD AUTO: 8 % (ref 4–12)
MUCOUS THREADS UR QL AUTO: ABNORMAL
NEUTROPHILS # BLD AUTO: 3.42 THOUSANDS/ÂΜL (ref 1.85–7.62)
NEUTS SEG NFR BLD AUTO: 56 % (ref 43–75)
NITRITE UR QL STRIP: NEGATIVE
NON-SQ EPI CELLS URNS QL MICRO: ABNORMAL /HPF
NRBC BLD AUTO-RTO: 0 /100 WBCS
PH UR STRIP.AUTO: 6.5 [PH]
PLATELET # BLD AUTO: 199 THOUSANDS/UL (ref 149–390)
PMV BLD AUTO: 10.4 FL (ref 8.9–12.7)
POTASSIUM SERPL-SCNC: 4.5 MMOL/L (ref 3.5–5.3)
PROT SERPL-MCNC: 7 G/DL (ref 6.4–8.4)
PROT UR STRIP-MCNC: ABNORMAL MG/DL
PSA SERPL-MCNC: 1.03 NG/ML (ref 0–4)
RBC # BLD AUTO: 3.81 MILLION/UL (ref 3.88–5.62)
RBC #/AREA URNS AUTO: ABNORMAL /HPF
SODIUM SERPL-SCNC: 138 MMOL/L (ref 135–147)
SP GR UR STRIP.AUTO: 1.02 (ref 1–1.03)
UROBILINOGEN UR STRIP-ACNC: <2 MG/DL
WBC # BLD AUTO: 6.02 THOUSAND/UL (ref 4.31–10.16)
WBC #/AREA URNS AUTO: ABNORMAL /HPF

## 2025-03-12 PROCEDURE — G0103 PSA SCREENING: HCPCS

## 2025-03-12 PROCEDURE — 36415 COLL VENOUS BLD VENIPUNCTURE: CPT

## 2025-03-12 PROCEDURE — 85025 COMPLETE CBC W/AUTO DIFF WBC: CPT

## 2025-03-12 PROCEDURE — 80053 COMPREHEN METABOLIC PANEL: CPT

## 2025-03-12 PROCEDURE — 84402 ASSAY OF FREE TESTOSTERONE: CPT

## 2025-03-12 PROCEDURE — 81001 URINALYSIS AUTO W/SCOPE: CPT

## 2025-03-12 PROCEDURE — 86480 TB TEST CELL IMMUN MEASURE: CPT

## 2025-03-12 PROCEDURE — 86140 C-REACTIVE PROTEIN: CPT

## 2025-03-12 PROCEDURE — 85652 RBC SED RATE AUTOMATED: CPT

## 2025-03-12 PROCEDURE — 84403 ASSAY OF TOTAL TESTOSTERONE: CPT

## 2025-03-13 ENCOUNTER — RESULTS FOLLOW-UP (OUTPATIENT)
Age: 60
End: 2025-03-13

## 2025-03-13 ENCOUNTER — RESULTS FOLLOW-UP (OUTPATIENT)
Dept: ENDOCRINOLOGY | Facility: CLINIC | Age: 60
End: 2025-03-13

## 2025-03-13 LAB
GAMMA INTERFERON BACKGROUND BLD IA-ACNC: 0.02 IU/ML
M TB IFN-G BLD-IMP: NEGATIVE
M TB IFN-G CD4+ BCKGRND COR BLD-ACNC: 0.05 IU/ML
M TB IFN-G CD4+ BCKGRND COR BLD-ACNC: 0.07 IU/ML
MITOGEN IGNF BCKGRD COR BLD-ACNC: 7.51 IU/ML
TESTOST FREE SERPL-MCNC: 16.7 PG/ML (ref 7.2–24)
TESTOST SERPL-MCNC: 1073 NG/DL (ref 264–916)

## 2025-03-14 NOTE — RESULT ENCOUNTER NOTE
Testosterone level is elevated.  Please let us know when you had the blood work done compared to when you took your shot. no

## 2025-03-16 NOTE — ASSESSMENT & PLAN NOTE
History of chronic neck pain nonradicular in nature.  Review of the most recent cervical spine films from November 2022 was significant for degenerative disc disease C5-7 with moderate to severe bilateral neuroforaminal narrowing at C6-7 and moderate neuroforaminal narrowing on the left at C5-6.  Continue follow-up with pain management for intrathecal Dilaudid instillation and management of chronic pain.  Continue to monitor.

## 2025-03-16 NOTE — ASSESSMENT & PLAN NOTE
Patient does have thickening bilateral thumb flexor tendon sheath with triggering and tenderness.  He does follow with a hand surgeon in [place] and I have suggested that he follow-up with a hand surgeon regarding his trigger thumbs.  Suspect this is related to his diabetes.  Continue to monitor.

## 2025-03-16 NOTE — ASSESSMENT & PLAN NOTE
Lab Results   Component Value Date    HGBA1C 6.3 (H) 08/28/2024   Follow-up with endocrinology and primary care for management of type 2 diabetes.  Continue to monitor.

## 2025-03-16 NOTE — ASSESSMENT & PLAN NOTE
History of recurrent plantar fasciitis with chronic thickening plantar fascia with some tenderness right plantar fascia.  He does know how to do heel stretches, which I have encouraged him to do.  If symptoms persist, we will send him for formal physical therapy.  I encouraged the patient to wear proper shoe gear and avoid barefoot walking.  Continue to monitor enthesopathy.

## 2025-03-18 ENCOUNTER — OFFICE VISIT (OUTPATIENT)
Dept: ENDOCRINOLOGY | Facility: CLINIC | Age: 60
End: 2025-03-18
Payer: COMMERCIAL

## 2025-03-18 ENCOUNTER — RESULTS FOLLOW-UP (OUTPATIENT)
Dept: ENDOCRINOLOGY | Facility: CLINIC | Age: 60
End: 2025-03-18

## 2025-03-18 VITALS
BODY MASS INDEX: 28.61 KG/M2 | HEART RATE: 81 BPM | DIASTOLIC BLOOD PRESSURE: 72 MMHG | SYSTOLIC BLOOD PRESSURE: 122 MMHG | WEIGHT: 193.2 LBS | OXYGEN SATURATION: 98 % | HEIGHT: 69 IN

## 2025-03-18 DIAGNOSIS — I10 ESSENTIAL HYPERTENSION: ICD-10-CM

## 2025-03-18 DIAGNOSIS — M81.0 AGE-RELATED OSTEOPOROSIS WITHOUT CURRENT PATHOLOGICAL FRACTURE: ICD-10-CM

## 2025-03-18 DIAGNOSIS — E11.9 TYPE 2 DIABETES MELLITUS WITHOUT COMPLICATION, WITHOUT LONG-TERM CURRENT USE OF INSULIN (HCC): Primary | ICD-10-CM

## 2025-03-18 DIAGNOSIS — E29.1 TESTICULAR HYPOGONADISM: ICD-10-CM

## 2025-03-18 DIAGNOSIS — E78.2 MIXED HYPERLIPIDEMIA: ICD-10-CM

## 2025-03-18 LAB — SL AMB POCT HEMOGLOBIN AIC: 6.3 (ref ?–6.5)

## 2025-03-18 PROCEDURE — 83036 HEMOGLOBIN GLYCOSYLATED A1C: CPT

## 2025-03-18 PROCEDURE — 99214 OFFICE O/P EST MOD 30 MIN: CPT

## 2025-03-18 PROCEDURE — G2211 COMPLEX E/M VISIT ADD ON: HCPCS

## 2025-03-18 RX ORDER — TESTOSTERONE CYPIONATE 200 MG/ML
INJECTION, SOLUTION INTRAMUSCULAR
Qty: 12 ML | Refills: 1 | Status: SHIPPED | OUTPATIENT
Start: 2025-03-18

## 2025-03-18 NOTE — ASSESSMENT & PLAN NOTE
Hx of osteoporosis. Last DEXA was 5 years ago. Recommend he repeat.   Orders:    DXA bone density spine hip and pelvis; Future

## 2025-03-18 NOTE — ASSESSMENT & PLAN NOTE
Testosterone level elevated. He completed labs 3 days after testosterone injection, however week prior he took 2 doses close together within same week. Recommend he take testosterone on same day each week and will plan to repeat labs in 1 month. If testosterone remains elevated will plan to decrease dose to 50 mg weekly.   Orders:    Testosterone, free, total; Future    Testosterone, free, total; Future

## 2025-03-18 NOTE — ASSESSMENT & PLAN NOTE
Continue on statin therapy   Check lipid panel prior to next appointment    Orders:    Lipid panel; Future

## 2025-03-18 NOTE — PROGRESS NOTES
Established Patient Progress Note      Chief Complaint   Patient presents with    Diabetes Type 2    Hypogonadism        Impression & Plan:    Assessment & Plan  Type 2 diabetes mellitus without complication, without long-term current use of insulin (HCC)  Diabetes control:  HGA1C well controlled   Treatment regimen:   Continue Metformin at current dose   Discussed risks/complications associated with uncontrolled diabetes.    Advised to adhere to diabetic diet, and recommended staying active/exercising routinely.  Keep carbohydrates consistent to limit blood glucose fluctuations.  Recommended routine follow-up with podiatry and ophthalmology.   Ordered blood work to complete prior to next visit.   Lab Results   Component Value Date    HGBA1C 6.3 03/18/2025       Orders:    POCT hemoglobin A1c    Albumin / creatinine urine ratio; Future    Hemoglobin A1C; Future    Comprehensive metabolic panel; Future    Testicular hypogonadism  Testosterone level elevated. He completed labs 3 days after testosterone injection, however week prior he took 2 doses close together within same week. Recommend he take testosterone on same day each week and will plan to repeat labs in 1 month. If testosterone remains elevated will plan to decrease dose to 50 mg weekly.   Orders:    Testosterone, free, total; Future    Testosterone, free, total; Future    Essential hypertension  BP at goal  Continue current medication regimen        Mixed hyperlipidemia  Continue on statin therapy   Check lipid panel prior to next appointment    Orders:    Lipid panel; Future    Age-related osteoporosis without current pathological fracture  Hx of osteoporosis. Last DEXA was 5 years ago. Recommend he repeat.   Orders:    DXA bone density spine hip and pelvis; Future      History of Present Illness:   Kuldeep Foss Sr. is a 59 y.o. male with type 2 diabetes, hypertension, hyperlipidemia, and testicular hypogonadism seen in follow up .  Reports complications  of microalbuminuria and neuropathy. Denies recent severe hypoglycemic or severe hyperglycemic episodes. Denies any issues with his current regimen. POCT A1C was 6.3. Home glucose monitoring: are not performed.    Current regimen:   Metformin 500 mg BID     Last Eye Exam: needs to schedule   Last Foot Exam: UTD    Has hypertension: Taking lisinopril  Has hyperlipidemia: Taking atorvastatin     Hx of osteoporosis: DEXA from 2020 showed normal bone density. He was previously treated with IV reclast over 20 years ago. He received 2-3 treatments before stopping. He takes calcium and vitamin D supplements. Denies any recent fractures since.   Hypogonadism: Taking Testosterone 75 mg(0.375 ml) of IM testosterone once/week.       Patient Active Problem List   Diagnosis    Cervical radiculopathy    Chronic pain syndrome    Essential hypertension    Lumbar postlaminectomy syndrome    Mixed hyperlipidemia    Osteoporosis    Other chronic nonalcoholic liver disease    Parotid gland enlargement    Testicular hypogonadism    Diabetes mellitus type 2, noninsulin dependent (HCC)    Tear of right supraspinatus tendon    Osteoarthritis of right acromioclavicular joint    Ankylosing spondylitis (HCC)    Degeneration of intervertebral disc of lumbar region    Degenerative lumbar spinal stenosis    Diabetic polyneuropathy (HCC)    Presence of intrathecal pump    Osteoarthritis of both hands    Hiatal hernia with GERD    Eustachian tube dysfunction    Continuous opioid dependence (HCC)    Adhesive capsulitis of left shoulder    Family history of MI (myocardial infarction)    Arthritis of carpometacarpal (CMC) joint of left thumb    Primary generalized (osteo)arthritis    Cervical spondylosis without myelopathy    Primary osteoarthritis of both hips    Plantar fasciitis, bilateral    Enthesitis related arthritis (HCC)    Platelets decreased (HCC)    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)    Obstructive  sleep apnea syndrome    Trigger thumb of both thumbs    Type 2 diabetes mellitus without complication, without long-term current use of insulin (HCC)      Past Medical History:   Diagnosis Date    Anaphylactic reaction     Chronic pain     Depression     Diabetes (HCC)     Diabetes mellitus (HCC)     Fibromyalgia, primary     GERD (gastroesophageal reflux disease)     Hyperlipidemia     Hypertension     Low testosterone     Neuropathy     Pilonidal cyst     last assessed 3/17/2014    Pneumonia     Sleep apnea     no cpap      Past Surgical History:   Procedure Laterality Date    BACK SURGERY      discectomy    ELBOW SURGERY Right     sometime between [id number]    MOUTH SURGERY      perm top teeth removed and dental implants    OTHER SURGICAL HISTORY  2015    Electr analysis of progr impl pump w/reprogram refill, requiring physician.  Replacement of right abdomen intrathecal  pain pump filled with Dilaudid with electronic analysis and programming.  managed by Bobby Coy    PILONIDAL CYST EXCISION      [place] ND[facility] WRST SURG W/RLS TRANSVRS CARPL LIGM Right 2023    Procedure: RELEASE CARPAL TUNNEL ENDOSCOPIC;  Surgeon: Bret Salgado MD;  Location: [facility];  Service: Orthopedics    [place] NDSC WRST SURG W/RLS TRANSVRS CARPL LIGM Left 2023    Procedure: RELEASE CARPAL TUNNEL ENDOSCOPIC;  Surgeon: Bret Salgado MD;  Location: [facility];  Service: Orthopedics    [place] SURGICAL ARTHROSCOPY SHOULDER W/ROTATOR CUFF RPR Right 2020    Procedure: SHOULDER ARTHROSCOPIC ROTATOR CUFF REPAIR AND DEBRIDEMENT;  Surgeon: Wero De La Cruz MD;  Location: [facility];  Service: Orthopedics    ROTATOR CUFF REPAIR Left     WISDOM TOOTH EXTRACTION        Social History     Tobacco Use    Smoking status: Former     Current packs/day: 0.00     Types: Cigarettes     Quit date: 2018     Years since quittin.0    Smokeless tobacco: Never   Substance Use Topics    Alcohol use: Not Currently      "Comment: rare     Allergies   Allergen Reactions    Amoxicillin Anaphylaxis     BP dropped         Current Outpatient Medications:     atorvastatin (LIPITOR) 20 mg tablet, TAKE ONE TABLET BY MOUTH DAILY AT BEDTIME, Disp: 90 tablet, Rfl: 1    chlorhexidine (PERIDEX) 0.12 % solution, TAKE 15 MLS SWISH AND SPIT FOR 1 MINUTE AFTER NORMAL BRUSHING AND HYGIENE ROUTINE, REPEAT UP TO THREE TIMES A DAY, Disp: , Rfl:     Cholecalciferol (VITAMIN D) 2000 units CAPS, Take 1 capsule by mouth daily, Disp: , Rfl:     EPINEPHrine (EPIPEN) 0.3 mg/0.3 mL SOAJ, Inject 0.3 mL (0.3 mg total) into a muscle once for 1 dose (Patient taking differently: Inject 0.3 mg into a muscle once As needed), Disp: 0.6 mL, Rfl: 0    gabapentin (NEURONTIN) 300 mg capsule, Take 4 capsules (1,200 mg total) by mouth daily (Patient taking differently: Take 900 mg by mouth daily at bedtime Only 3), Disp: 360 capsule, Rfl: 3    HYDROmorphone (Dilaudid) 4 mg/mL injection, Inject 1 Device as directed daily  , Disp: , Rfl:     lisinopril (ZESTRIL) 20 mg tablet, TAKE ONE TABLET BY MOUTH EVERY DAY, Disp: 90 tablet, Rfl: 1    meloxicam (MOBIC) 15 mg tablet, TAKE 1 TABLET BY MOUTH DAILY AS NEEDED FOR MODERATE PAIN, Disp: 30 tablet, Rfl: 2    metFORMIN (GLUCOPHAGE-XR) 500 mg 24 hr tablet, TAKE ONE TABLET BY MOUTH TWICE A DAY WITH MEALS, Disp: 180 tablet, Rfl: 1    multivitamin (THERAGRAN) TABS, Take 1 tablet by mouth daily, Disp: , Rfl:     NEEDLE, DISP, 23 G 23G X 1-1/4\" MISC, Use once a week, Disp: 100 each, Rfl: 2    omeprazole (PriLOSEC) 40 MG capsule, Take 1 capsule (40 mg total) by mouth daily, Disp: 90 capsule, Rfl: 3    testosterone cypionate (DEPO-TESTOSTERONE) 200 mg/mL SOLN, INJECT 0.375 MLS WEEKLY*USE 1 VIAL FOR 1 WEEKLY DOSE,DISCARD REMAINDER (weekly dose of 75 mg/week), Disp: 12 mL, Rfl: 1    Upadacitinib ER (Rinvoq) 15 MG TB24, Take 15 mg by mouth daily, Disp: , Rfl:     Blood Glucose Monitoring Suppl (Contour Next EZ) w/Device KIT, Use 1 each 4 (four) " "times a day (Patient not taking: Reported on 12/19/2024), Disp: 1 kit, Rfl: 0    glucose blood (Contour Next Test) test strip, Use 1 each 4 (four) times a day (Patient not taking: Reported on 12/19/2024), Disp: 400 each, Rfl: 1    Microlet Lancets MISC, Use 4 (four) times a day (Patient not taking: Reported on 12/19/2024), Disp: 400 each, Rfl: 1    Review of Systems   Constitutional:  Negative for fatigue, fever and unexpected weight change.   Eyes:  Negative for visual disturbance.   Respiratory:  Negative for shortness of breath.    Cardiovascular:  Negative for chest pain.   Gastrointestinal:  Negative for abdominal pain, constipation, diarrhea, nausea and vomiting.   Endocrine: Negative for polydipsia and polyuria.   Musculoskeletal:  Positive for arthralgias.   Skin:  Negative for wound.   Neurological:  Negative for dizziness, syncope and numbness.   All other systems reviewed and are negative.      Physical Exam:  Body mass index is 28.95 kg/m².  /72   Pulse 81   Ht 5' 8.5\" (1.74 m)   Wt 87.6 kg (193 lb 3.2 oz)   SpO2 98%   BMI 28.95 kg/m²    Wt Readings from Last 3 Encounters:   03/18/25 87.6 kg (193 lb 3.2 oz)   02/20/25 88.3 kg (194 lb 9.6 oz)   01/04/25 86.2 kg (190 lb)       Physical Exam  Vitals reviewed.   Constitutional:       Appearance: Normal appearance.   Cardiovascular:      Rate and Rhythm: Normal rate.   Pulmonary:      Effort: Pulmonary effort is normal.   Neurological:      Mental Status: He is alert and oriented to person, place, and time.   Psychiatric:         Mood and Affect: Mood normal.       Labs:   Lab Results   Component Value Date    HGBA1C 6.3 03/18/2025    HGBA1C 6.3 (H) 08/28/2024    HGBA1C 5.9 03/08/2024     Lab Results   Component Value Date    CREATININE 0.79 03/12/2025    CREATININE 0.91 01/06/2025    CREATININE 0.90 08/28/2024    BUN 16 03/12/2025     11/30/2015    K 4.5 03/12/2025     03/12/2025    CO2 30 03/12/2025     eGFR   Date Value Ref Range " Status   03/12/2025 98 ml/min/1.73sq m Final     Lab Results   Component Value Date    CHOL 227 11/10/2015    HDL 47 08/28/2024    TRIG 92 08/28/2024     Lab Results   Component Value Date    ALT 29 03/12/2025    AST 27 03/12/2025    ALKPHOS 32 (L) 03/12/2025    BILITOT 0.27 11/30/2015     Lab Results   Component Value Date    XPE4EMHEXTPE 0.759 11/09/2023    PYV2TKQDCYJG 0.608 07/12/2022    PMR6FCTKOEDI 0.435 (L) 05/01/2022       There are no Patient Instructions on file for this visit.    Discussed with the patient and all questioned fully answered. He will call me if any problems arise.    ABELARDO Strauss

## 2025-03-18 NOTE — ASSESSMENT & PLAN NOTE
Diabetes control:  HGA1C well controlled   Treatment regimen:   Continue Metformin at current dose   Discussed risks/complications associated with uncontrolled diabetes.    Advised to adhere to diabetic diet, and recommended staying active/exercising routinely.  Keep carbohydrates consistent to limit blood glucose fluctuations.  Recommended routine follow-up with podiatry and ophthalmology.   Ordered blood work to complete prior to next visit.   Lab Results   Component Value Date    HGBA1C 6.3 03/18/2025       Orders:    POCT hemoglobin A1c    Albumin / creatinine urine ratio; Future    Hemoglobin A1C; Future    Comprehensive metabolic panel; Future

## 2025-03-18 NOTE — ASSESSMENT & PLAN NOTE
Diabetes control:  HGA1C  Treatment regimen:   Discussed risks/complications associated with uncontrolled diabetes.    Advised to adhere to diabetic diet, and recommended staying active/exercising routinely.  Keep carbohydrates consistent to limit blood glucose fluctuations.  Advised to call if blood sugars less than 70 mg/dl or over 300 mg/dl.   Discussed symptoms and treatment of hypoglycemia.    Recommended routine follow-up with podiatry and ophthalmology.   Ordered blood work to complete prior to next visit.   Lab Results   Component Value Date    HGBA1C 6.3 03/18/2025

## 2025-03-20 ENCOUNTER — ANESTHESIA EVENT (OUTPATIENT)
Dept: GASTROENTEROLOGY | Facility: AMBULARY SURGERY CENTER | Age: 60
End: 2025-03-20
Payer: COMMERCIAL

## 2025-03-20 ENCOUNTER — HOSPITAL ENCOUNTER (OUTPATIENT)
Dept: GASTROENTEROLOGY | Facility: AMBULARY SURGERY CENTER | Age: 60
Setting detail: OUTPATIENT SURGERY
Discharge: HOME | End: 2025-03-20
Attending: INTERNAL MEDICINE
Payer: COMMERCIAL

## 2025-03-20 VITALS
TEMPERATURE: 97.6 F | HEIGHT: 69 IN | HEART RATE: 74 BPM | BODY MASS INDEX: 27.4 KG/M2 | DIASTOLIC BLOOD PRESSURE: 63 MMHG | SYSTOLIC BLOOD PRESSURE: 92 MMHG | WEIGHT: 185 LBS | OXYGEN SATURATION: 98 % | RESPIRATION RATE: 18 BRPM

## 2025-03-20 DIAGNOSIS — Z12.11 SCREENING FOR COLON CANCER: ICD-10-CM

## 2025-03-20 PROCEDURE — 45385 COLONOSCOPY W/LESION REMOVAL: CPT | Performed by: INTERNAL MEDICINE

## 2025-03-20 PROCEDURE — 88305 TISSUE EXAM BY PATHOLOGIST: CPT | Performed by: PATHOLOGY

## 2025-03-20 RX ORDER — SODIUM CHLORIDE, SODIUM LACTATE, POTASSIUM CHLORIDE, CALCIUM CHLORIDE 600; 310; 30; 20 MG/100ML; MG/100ML; MG/100ML; MG/100ML
INJECTION, SOLUTION INTRAVENOUS CONTINUOUS PRN
Status: DISCONTINUED | OUTPATIENT
Start: 2025-03-20 | End: 2025-03-20

## 2025-03-20 RX ORDER — LIDOCAINE HYDROCHLORIDE 20 MG/ML
INJECTION, SOLUTION EPIDURAL; INFILTRATION; INTRACAUDAL; PERINEURAL AS NEEDED
Status: DISCONTINUED | OUTPATIENT
Start: 2025-03-20 | End: 2025-03-20

## 2025-03-20 RX ORDER — PROPOFOL 10 MG/ML
INJECTION, EMULSION INTRAVENOUS CONTINUOUS PRN
Status: DISCONTINUED | OUTPATIENT
Start: 2025-03-20 | End: 2025-03-20

## 2025-03-20 RX ADMIN — LIDOCAINE HYDROCHLORIDE 100 MG: 20 INJECTION, SOLUTION EPIDURAL; INFILTRATION; INTRACAUDAL at 10:15

## 2025-03-20 RX ADMIN — PROPOFOL 125 MCG/KG/MIN: 10 INJECTION, EMULSION INTRAVENOUS at 10:16

## 2025-03-20 RX ADMIN — PROPOFOL 100 MG: 10 INJECTION, EMULSION INTRAVENOUS at 10:15

## 2025-03-20 RX ADMIN — SODIUM CHLORIDE, SODIUM LACTATE, POTASSIUM CHLORIDE, AND CALCIUM CHLORIDE: .6; .31; .03; .02 INJECTION, SOLUTION INTRAVENOUS at 10:17

## 2025-03-20 NOTE — H&P
History and Physical - [facility]  Kuldeep Foss Sr. 59 y.o. male MRN: [id number]                  HPI: Kuldeep Foss Sr. is a 59 y.o. year old male who presents for CRC screening.      REVIEW OF SYSTEMS: Per the HPI, and otherwise unremarkable.    Historical Information   Past Medical History:   Diagnosis Date    Anaphylactic reaction     Chronic pain     Depression     Diabetes (HCC)     Diabetes mellitus (HCC)     Fibromyalgia, primary     GERD (gastroesophageal reflux disease)     Hyperlipidemia     Hypertension     Low testosterone     Neuropathy     Pilonidal cyst     last assessed 3/17/2014    Pneumonia     Sleep apnea     no cpap     Past Surgical History:   Procedure Laterality Date    BACK SURGERY      discectomy    ELBOW SURGERY Right     sometime between [id number]    MOUTH SURGERY      perm top teeth removed and dental implants    OTHER SURGICAL HISTORY  03/13/2015    Electr analysis of progr impl pump w/reprogram refill, requiring physician.  Replacement of right abdomen intrathecal  pain pump filled with Dilaudid with electronic analysis and programming.  managed by Bobby Coy    PILONIDAL CYST EXCISION      [place] [facility] WRST SURG W/RLS TRANSVRS CARPL LIGM Right 09/06/2023    Procedure: RELEASE CARPAL TUNNEL ENDOSCOPIC;  Surgeon: Bret Salgado MD;  Location: [facility];  Service: Orthopedics    [place] WRST SURG W/RLS TRANSVRS CARPL LIGM Left 09/21/2023    Procedure: RELEASE CARPAL TUNNEL ENDOSCOPIC;  Surgeon: Bret Salgado MD;  Location: [facility];  Service: Orthopedics    [place] SURGICAL ARTHROSCOPY SHOULDER W/ROTATOR CUFF RPR Right 02/28/2020    Procedure: SHOULDER ARTHROSCOPIC ROTATOR CUFF REPAIR AND DEBRIDEMENT;  Surgeon: Wero De La Cruz MD;  Location: [facility];  Service: Orthopedics    ROTATOR CUFF REPAIR Left     WISDOM TOOTH EXTRACTION       Social History   Social History     Substance and Sexual Activity   Alcohol Use Not Currently    Comment: rare  "    Social History     Substance and Sexual Activity   Drug Use Never     Social History     Tobacco Use   Smoking Status Former    Current packs/day: 0.00    Types: Cigarettes    Quit date: 2018    Years since quittin.0   Smokeless Tobacco Never     Family History   Problem Relation Age of Onset    Alcohol abuse Mother             Liver disease Mother     Arthritis Mother             Uterine cancer Mother     Lupus Mother     Coronary artery disease Father             Heart disease Father     Prostate cancer Father             Diabetes Sister     Diabetes unspecified Sister     Hypertension Sister     Hyperlipidemia Sister         pure hypercholesterolemia    Diabetes unspecified Brother         DM    Hypertension Brother     Hyperlipidemia Brother         pure hypercholesterolemia    Diabetes Brother     Diabetes Brother         DM    Hypertension Family        Meds/Allergies       Current Outpatient Medications:     atorvastatin (LIPITOR) 20 mg tablet    Blood Glucose Monitoring Suppl (Contour Next EZ) w/Device KIT    chlorhexidine (PERIDEX) 0.12 % solution    Cholecalciferol (VITAMIN D) 2000 units CAPS    EPINEPHrine (EPIPEN) 0.3 mg/0.3 mL SOAJ    gabapentin (NEURONTIN) 300 mg capsule    glucose blood (Contour Next Test) test strip    HYDROmorphone (Dilaudid) 4 mg/mL injection    lisinopril (ZESTRIL) 20 mg tablet    meloxicam (MOBIC) 15 mg tablet    metFORMIN (GLUCOPHAGE-XR) 500 mg 24 hr tablet    Microlet Lancets MISC    multivitamin (THERAGRAN) TABS    NEEDLE, DISP, 23 G 23G X 1-1/4\" MISC    omeprazole (PriLOSEC) 40 MG capsule    testosterone cypionate (DEPO-TESTOSTERONE) 200 mg/mL SOLN    Upadacitinib ER (Rinvoq) 15 MG TB24    Allergies   Allergen Reactions    Amoxicillin Anaphylaxis     BP dropped       Objective     There were no vitals taken for this visit.      PHYSICAL EXAM    Gen: NAD  Head: NCAT  CV: RRR  CHEST: Clear  ABD: soft, NT/ND  EXT: no " edema      ASSESSMENT/PLAN:  This is a 59 y.o. year old male here for colonoscopy, and he is stable and optimized for his procedure.

## 2025-03-20 NOTE — ANESTHESIA PREPROCEDURE EVALUATION
Procedure:  COLONOSCOPY    Relevant Problems   CARDIO   (+) Essential hypertension   (+) Mixed hyperlipidemia      ENDO   (+) Diabetes mellitus type 2, noninsulin dependent (HCC)   (+) Type 2 diabetes mellitus without complication, without long-term current use of insulin (HCC)      GI/HEPATIC   (+) Hiatal hernia with GERD   (+) Other chronic nonalcoholic liver disease      MUSCULOSKELETAL   (+) Adhesive capsulitis of left shoulder   (+) Ankylosing spondylitis (HCC)   (+) Arthritis of carpometacarpal (CMC) joint of left thumb   (+) Cervical spondylosis without myelopathy   (+) Degeneration of intervertebral disc of lumbar region   (+) Enthesitis related arthritis (HCC)   (+) Hiatal hernia with GERD   (+) 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)   (+) Osteoarthritis of both hands   (+) Osteoarthritis of right acromioclavicular joint   (+) Primary generalized (osteo)arthritis   (+) Primary osteoarthritis of both hips      NEURO/PSYCH   (+) Chronic pain syndrome   (+) Continuous opioid dependence (HCC)   (+) Diabetic polyneuropathy (HCC)      PULMONARY   (+) Obstructive sleep apnea syndrome        Physical Exam    Airway    Mallampati score: III  TM Distance: >3 FB  Neck ROM: full     Dental    implants    Cardiovascular      Pulmonary      Other Findings        Anesthesia Plan  ASA Score- 3     Anesthesia Type- IV sedation with anesthesia with ASA Monitors.         Additional Monitors:     Airway Plan:            Plan Factors-Exercise tolerance (METS): >4 METS.    Chart reviewed.    Patient summary reviewed.    Patient is not a current smoker.  Patient did not smoke on day of surgery.            Induction- intravenous.    Postoperative Plan-     Perioperative Resuscitation Plan - Level 1 - Full Code.       Informed Consent- Anesthetic plan and risks discussed with patient.  I personally reviewed this patient with the CRNA. Discussed and agreed on the Anesthesia Plan with the  CRNA..      NPO Status:  Vitals Value Taken Time   Date of last liquid 03/20/25 03/20/25 0949   Time of last liquid 0600 03/20/25 0949   Date of last solid 03/18/25 03/20/25 0949   Time of last solid 1800 03/20/25 0949

## 2025-03-26 PROCEDURE — 88305 TISSUE EXAM BY PATHOLOGIST: CPT | Performed by: PATHOLOGY

## 2025-03-27 ENCOUNTER — RESULTS FOLLOW-UP (OUTPATIENT)
Dept: GASTROENTEROLOGY | Facility: CLINIC | Age: 60
End: 2025-03-27

## 2025-04-07 ENCOUNTER — VBI (OUTPATIENT)
Dept: ADMINISTRATIVE | Facility: OTHER | Age: 60
End: 2025-04-07

## 2025-04-10 DIAGNOSIS — M19.011 OSTEOARTHRITIS OF RIGHT ACROMIOCLAVICULAR JOINT: ICD-10-CM

## 2025-04-11 RX ORDER — MELOXICAM 15 MG/1
15 TABLET ORAL DAILY PRN
Qty: 90 TABLET | Refills: 1 | Status: SHIPPED | OUTPATIENT
Start: 2025-04-11

## 2025-04-13 DIAGNOSIS — M96.1 LUMBAR POSTLAMINECTOMY SYNDROME: ICD-10-CM

## 2025-04-13 DIAGNOSIS — K44.9 HIATAL HERNIA WITH GERD: ICD-10-CM

## 2025-04-13 DIAGNOSIS — E11.9 TYPE 2 DIABETES MELLITUS WITHOUT COMPLICATION, WITHOUT LONG-TERM CURRENT USE OF INSULIN (HCC): ICD-10-CM

## 2025-04-13 DIAGNOSIS — I10 ESSENTIAL HYPERTENSION: ICD-10-CM

## 2025-04-13 DIAGNOSIS — K21.9 HIATAL HERNIA WITH GERD: ICD-10-CM

## 2025-04-13 DIAGNOSIS — E78.5 HYPERLIPIDEMIA, UNSPECIFIED HYPERLIPIDEMIA TYPE: ICD-10-CM

## 2025-04-14 RX ORDER — METFORMIN HYDROCHLORIDE 500 MG/1
500 TABLET, EXTENDED RELEASE ORAL 2 TIMES DAILY WITH MEALS
Qty: 180 TABLET | Refills: 1 | Status: SHIPPED | OUTPATIENT
Start: 2025-04-14

## 2025-04-14 RX ORDER — OMEPRAZOLE 40 MG/1
40 CAPSULE, DELAYED RELEASE ORAL DAILY
Qty: 90 CAPSULE | Refills: 1 | Status: SHIPPED | OUTPATIENT
Start: 2025-04-14

## 2025-04-15 RX ORDER — GABAPENTIN 300 MG/1
900 CAPSULE ORAL
Qty: 270 CAPSULE | Refills: 1 | Status: SHIPPED | OUTPATIENT
Start: 2025-04-15

## 2025-04-15 RX ORDER — LISINOPRIL 20 MG/1
20 TABLET ORAL DAILY
Qty: 90 TABLET | Refills: 1 | Status: SHIPPED | OUTPATIENT
Start: 2025-04-15

## 2025-04-15 RX ORDER — ATORVASTATIN CALCIUM 20 MG/1
20 TABLET, FILM COATED ORAL
Qty: 90 TABLET | Refills: 1 | Status: SHIPPED | OUTPATIENT
Start: 2025-04-15

## 2025-04-30 ENCOUNTER — APPOINTMENT (OUTPATIENT)
Dept: LAB | Facility: CLINIC | Age: 60
End: 2025-04-30
Payer: COMMERCIAL

## 2025-04-30 DIAGNOSIS — M45.0 ANKYLOSING SPONDYLITIS OF MULTIPLE SITES IN SPINE (HCC): ICD-10-CM

## 2025-04-30 LAB
ALBUMIN SERPL BCG-MCNC: 4.5 G/DL (ref 3.5–5)
ALP SERPL-CCNC: 43 U/L (ref 34–104)
ALT SERPL W P-5'-P-CCNC: 28 U/L (ref 7–52)
ANION GAP SERPL CALCULATED.3IONS-SCNC: 8 MMOL/L (ref 4–13)
AST SERPL W P-5'-P-CCNC: 21 U/L (ref 13–39)
BACTERIA UR QL AUTO: NORMAL /HPF
BASOPHILS # BLD AUTO: 0.03 THOUSANDS/ÂΜL (ref 0–0.1)
BASOPHILS NFR BLD AUTO: 1 % (ref 0–1)
BILIRUB SERPL-MCNC: 0.72 MG/DL (ref 0.2–1)
BILIRUB UR QL STRIP: NEGATIVE
BUN SERPL-MCNC: 15 MG/DL (ref 5–25)
CALCIUM SERPL-MCNC: 9.5 MG/DL (ref 8.4–10.2)
CHLORIDE SERPL-SCNC: 101 MMOL/L (ref 96–108)
CLARITY UR: CLEAR
CO2 SERPL-SCNC: 30 MMOL/L (ref 21–32)
COLOR UR: NORMAL
CREAT SERPL-MCNC: 0.82 MG/DL (ref 0.6–1.3)
CRP SERPL QL: 2.4 MG/L
EOSINOPHIL # BLD AUTO: 0.04 THOUSAND/ÂΜL (ref 0–0.61)
EOSINOPHIL NFR BLD AUTO: 1 % (ref 0–6)
ERYTHROCYTE [DISTWIDTH] IN BLOOD BY AUTOMATED COUNT: 13 % (ref 11.6–15.1)
ERYTHROCYTE [SEDIMENTATION RATE] IN BLOOD: 7 MM/HOUR (ref 0–19)
GFR SERPL CREATININE-BSD FRML MDRD: 97 ML/MIN/1.73SQ M
GLUCOSE P FAST SERPL-MCNC: 127 MG/DL (ref 65–99)
GLUCOSE UR STRIP-MCNC: NEGATIVE MG/DL
HCT VFR BLD AUTO: 34 % (ref 36.5–49.3)
HGB BLD-MCNC: 12.1 G/DL (ref 12–17)
HGB UR QL STRIP.AUTO: NEGATIVE
IMM GRANULOCYTES # BLD AUTO: 0.02 THOUSAND/UL (ref 0–0.2)
IMM GRANULOCYTES NFR BLD AUTO: 0 % (ref 0–2)
KETONES UR STRIP-MCNC: NEGATIVE MG/DL
LEUKOCYTE ESTERASE UR QL STRIP: NEGATIVE
LYMPHOCYTES # BLD AUTO: 1.92 THOUSANDS/ÂΜL (ref 0.6–4.47)
LYMPHOCYTES NFR BLD AUTO: 36 % (ref 14–44)
MCH RBC QN AUTO: 33.6 PG (ref 26.8–34.3)
MCHC RBC AUTO-ENTMCNC: 35.6 G/DL (ref 31.4–37.4)
MCV RBC AUTO: 94 FL (ref 82–98)
MONOCYTES # BLD AUTO: 0.45 THOUSAND/ÂΜL (ref 0.17–1.22)
MONOCYTES NFR BLD AUTO: 8 % (ref 4–12)
NEUTROPHILS # BLD AUTO: 2.93 THOUSANDS/ÂΜL (ref 1.85–7.62)
NEUTS SEG NFR BLD AUTO: 54 % (ref 43–75)
NITRITE UR QL STRIP: NEGATIVE
NON-SQ EPI CELLS URNS QL MICRO: NORMAL /HPF
NRBC BLD AUTO-RTO: 0 /100 WBCS
PH UR STRIP.AUTO: 6.5 [PH]
PLATELET # BLD AUTO: 198 THOUSANDS/UL (ref 149–390)
PMV BLD AUTO: 10.4 FL (ref 8.9–12.7)
POTASSIUM SERPL-SCNC: 4.5 MMOL/L (ref 3.5–5.3)
PROT SERPL-MCNC: 6.8 G/DL (ref 6.4–8.4)
PROT UR STRIP-MCNC: NEGATIVE MG/DL
RBC # BLD AUTO: 3.6 MILLION/UL (ref 3.88–5.62)
RBC #/AREA URNS AUTO: NORMAL /HPF
SODIUM SERPL-SCNC: 139 MMOL/L (ref 135–147)
SP GR UR STRIP.AUTO: 1.01 (ref 1–1.03)
UROBILINOGEN UR STRIP-ACNC: <2 MG/DL
WBC # BLD AUTO: 5.39 THOUSAND/UL (ref 4.31–10.16)
WBC #/AREA URNS AUTO: NORMAL /HPF

## 2025-04-30 PROCEDURE — 85652 RBC SED RATE AUTOMATED: CPT

## 2025-04-30 PROCEDURE — 85025 COMPLETE CBC W/AUTO DIFF WBC: CPT

## 2025-04-30 PROCEDURE — 80053 COMPREHEN METABOLIC PANEL: CPT

## 2025-04-30 PROCEDURE — 86140 C-REACTIVE PROTEIN: CPT

## 2025-04-30 PROCEDURE — 81001 URINALYSIS AUTO W/SCOPE: CPT

## 2025-05-02 ENCOUNTER — OFFICE VISIT (OUTPATIENT)
Dept: OBGYN CLINIC | Facility: CLINIC | Age: 60
End: 2025-05-02
Payer: COMMERCIAL

## 2025-05-02 VITALS — WEIGHT: 191 LBS | BODY MASS INDEX: 28.29 KG/M2 | HEIGHT: 69 IN

## 2025-05-02 DIAGNOSIS — M65.312 BILATERAL TRIGGER THUMB: Primary | ICD-10-CM

## 2025-05-02 DIAGNOSIS — E29.1 TESTICULAR HYPOGONADISM: ICD-10-CM

## 2025-05-02 DIAGNOSIS — M18.0 ARTHRITIS OF CARPOMETACARPAL (CMC) JOINT OF BOTH THUMBS: ICD-10-CM

## 2025-05-02 DIAGNOSIS — M65.311 BILATERAL TRIGGER THUMB: Primary | ICD-10-CM

## 2025-05-02 PROCEDURE — 20550 NJX 1 TENDON SHEATH/LIGAMENT: CPT | Performed by: STUDENT IN AN ORGANIZED HEALTH CARE EDUCATION/TRAINING PROGRAM

## 2025-05-02 PROCEDURE — 99214 OFFICE O/P EST MOD 30 MIN: CPT | Performed by: STUDENT IN AN ORGANIZED HEALTH CARE EDUCATION/TRAINING PROGRAM

## 2025-05-02 PROCEDURE — 20600 DRAIN/INJ JOINT/BURSA W/O US: CPT | Performed by: STUDENT IN AN ORGANIZED HEALTH CARE EDUCATION/TRAINING PROGRAM

## 2025-05-02 RX ORDER — ROPIVACAINE HYDROCHLORIDE 5 MG/ML
1 INJECTION, SOLUTION EPIDURAL; INFILTRATION; PERINEURAL
Status: COMPLETED | OUTPATIENT
Start: 2025-05-02 | End: 2025-05-02

## 2025-05-02 RX ORDER — BETAMETHASONE SODIUM PHOSPHATE AND BETAMETHASONE ACETATE 3; 3 MG/ML; MG/ML
6 INJECTION, SUSPENSION INTRA-ARTICULAR; INTRALESIONAL; INTRAMUSCULAR; SOFT TISSUE
Status: COMPLETED | OUTPATIENT
Start: 2025-05-02 | End: 2025-05-02

## 2025-05-02 RX ORDER — ROPIVACAINE HYDROCHLORIDE 2 MG/ML
1 INJECTION, SOLUTION EPIDURAL; INFILTRATION; PERINEURAL
Status: COMPLETED | OUTPATIENT
Start: 2025-05-02 | End: 2025-05-02

## 2025-05-02 RX ORDER — TESTOSTERONE CYPIONATE 200 MG/ML
INJECTION, SOLUTION INTRAMUSCULAR
Qty: 12 ML | Refills: 1 | Status: SHIPPED | OUTPATIENT
Start: 2025-05-02

## 2025-05-02 RX ADMIN — ROPIVACAINE HYDROCHLORIDE 1 ML: 5 INJECTION, SOLUTION EPIDURAL; INFILTRATION; PERINEURAL at 08:30

## 2025-05-02 RX ADMIN — ROPIVACAINE HYDROCHLORIDE 1 ML: 2 INJECTION, SOLUTION EPIDURAL; INFILTRATION; PERINEURAL at 08:30

## 2025-05-02 RX ADMIN — BETAMETHASONE SODIUM PHOSPHATE AND BETAMETHASONE ACETATE 6 MG: 3; 3 INJECTION, SUSPENSION INTRA-ARTICULAR; INTRALESIONAL; INTRAMUSCULAR; SOFT TISSUE at 08:30

## 2025-05-02 NOTE — PATIENT INSTRUCTIONS
"Hand Arthritis    Some things that can be beneficial in relieving your pain are:  Voltaren Gel - An antiinflammatory gel you can apply topically on the area of pain rather than taking pills by mouth  Lidocaine Cream - A numbing agent you can apply topically - just make sure to use something to help apply this as it will make any skin it touches go numb!  Comfort Cool Brace - you can buy this off [facility] and wear as needed.  Push Metagrip Brace - you can buy this off [facility] and wear as needed.  Heat/Ice - Use whatever works. If you notice your joints don't do well with the cold, make sure to wear gloves and keep fingers warm.  Paraffin Wax - These machines can typically be found at physical therapy offices or sometimes even nail salons. They use the idea of \"moist heat\" to help with pain. If this is something that works well for you, you can buy your own machine to use at home offline.   Therapy - Some patients find therapy to be helpful to strengthen the muscles around the joint. If you are interested, we can always place an order for you.  Steroid Injections - These injections do not get rid of the arthritis, but help with the inflammation caused by the arthritis. You can receive these injections every 3 months or longer as needed. If one steroid doesn't seem to give you the best results, we do have other steroids we can try to see if you get a better response. It's like the typical Coke vs Pepsi debate... for some reason, some people just like one better than the other!     "

## 2025-05-12 ENCOUNTER — HOSPITAL ENCOUNTER (OUTPATIENT)
Dept: RADIOLOGY | Facility: MEDICAL CENTER | Age: 60
Discharge: HOME | End: 2025-05-12
Payer: COMMERCIAL

## 2025-05-12 DIAGNOSIS — M81.0 AGE-RELATED OSTEOPOROSIS WITHOUT CURRENT PATHOLOGICAL FRACTURE: ICD-10-CM

## 2025-05-12 PROCEDURE — 77080 DXA BONE DENSITY AXIAL: CPT

## 2025-06-13 DIAGNOSIS — M18.0 ARTHRITIS OF CARPOMETACARPAL (CMC) JOINT OF BOTH THUMBS: Primary | ICD-10-CM

## 2025-06-13 PROCEDURE — 20600 DRAIN/INJ JOINT/BURSA W/O US: CPT | Performed by: STUDENT IN AN ORGANIZED HEALTH CARE EDUCATION/TRAINING PROGRAM

## 2025-06-13 PROCEDURE — 99214 OFFICE O/P EST MOD 30 MIN: CPT | Performed by: STUDENT IN AN ORGANIZED HEALTH CARE EDUCATION/TRAINING PROGRAM

## 2025-06-13 RX ORDER — BETAMETHASONE SODIUM PHOSPHATE AND BETAMETHASONE ACETATE 3; 3 MG/ML; MG/ML
6 INJECTION, SUSPENSION INTRA-ARTICULAR; INTRALESIONAL; INTRAMUSCULAR; SOFT TISSUE
Status: COMPLETED | OUTPATIENT
Start: 2025-06-13 | End: 2025-06-13

## 2025-06-13 RX ORDER — CELECOXIB 100 MG/1
1 CAPSULE ORAL 2 TIMES DAILY
COMMUNITY
Start: 2025-05-07

## 2025-06-13 RX ORDER — ROPIVACAINE HYDROCHLORIDE 2 MG/ML
1 INJECTION, SOLUTION EPIDURAL; INFILTRATION; PERINEURAL
Status: COMPLETED | OUTPATIENT
Start: 2025-06-13 | End: 2025-06-13

## 2025-06-13 RX ADMIN — BETAMETHASONE SODIUM PHOSPHATE AND BETAMETHASONE ACETATE 6 MG: 3; 3 INJECTION, SUSPENSION INTRA-ARTICULAR; INTRALESIONAL; INTRAMUSCULAR; SOFT TISSUE at 08:15

## 2025-06-13 RX ADMIN — ROPIVACAINE HYDROCHLORIDE 1 ML: 2 INJECTION, SOLUTION EPIDURAL; INFILTRATION; PERINEURAL at 08:15

## 2025-06-13 NOTE — PROGRESS NOTES
ORTHOPAEDIC HAND, WRIST, AND ELBOW OFFICE  VISIT      ASSESSMENT/PLAN:      Assessment & Plan  Arthritis of carpometacarpal (CMC) joint of both thumbs  Treatment options were discussed in the form of heat, bracing, and a possible repeat steroid injection. Discussed typically, we like to perform the injections every 3 months however, we can consider one early injection. The patient was agreeable to this. He consented and underwent a right thumb CMC injection in the office today without any complications. Discussed if he does not see relief from this we can consider trying a different steroid. Hand OA AVS provided.  Orders:    Small joint arthrocentesis: R thumb CMC            Follow Up:  3 months       To Do Next Visit:  Re-evaluation of current issue          Bret Salgado MD  Attending, Orthopaedic Surgery  Hand, Wrist, and Elbow Surgery  [facility]    ______________________________________________________________________________________________    CHIEF COMPLAINT:  Chief Complaint   Patient presents with    Right Thumb - Pain, Follow-up     Worse than the left       Left Thumb - Pain, Follow-up       SUBJECTIVE:  Patient is a 59 y.o. RHD male who presents today for follow up of bilateral trigger thumb and bilateral thumb CMC OA. The patient consented and underwent bilateral trigger thumb and bilateral thumb CMC injections at his last visit which he states provided him with approx 2 weeks worth of relief. He denies any locking or triggering. He notes pain to the base of his right thumb. He notes increased pain with pressure. He will take OTC medications as needed.      Occupation: disabled      I have personally reviewed all the relevant PMH, PSH, SH, FH, Medications and allergies      PAST MEDICAL HISTORY:  Past Medical History[1]    PAST SURGICAL HISTORY:  Past Surgical History[2]    FAMILY HISTORY:  Family History[3]    SOCIAL HISTORY:  Social History[4]    MEDICATIONS:  Current  Medications[5]    ALLERGIES:  Allergies[6]        REVIEW OF SYSTEMS:  Musculoskeletal:        As noted in HPI.   All other systems reviewed and are negative.    VITALS:  There were no vitals filed for this visit.    LABS:  HgA1c:   Lab Results   Component Value Date    HGBA1C 6.3 03/18/2025     BMP:   Lab Results   Component Value Date    GLUCOSE 94 11/30/2015    CALCIUM 9.5 04/30/2025     11/30/2015    K 4.5 04/30/2025    CO2 30 04/30/2025     04/30/2025    BUN 15 04/30/2025    CREATININE 0.82 04/30/2025       _____________________________________________________  PHYSICAL EXAMINATION:  General: Well developed and well nourished, alert & oriented x 3, appears comfortable  Psychiatric: Normal  HEENT: Normocephalic, Atraumatic Trachea Midline, No torticollis  Pulmonary: No audible wheezing or respiratory distress   Abdomen/GI: Non tender, non distended   Cardiovascular: No pitting edema, 2+ radial pulse   Skin: No masses, erythema, lacerations, fluctation, ulcerations  Neurovascular: Sensation Intact to the Median, Ulnar, Radial Nerve, Motor Intact to the Median, Ulnar, Radial Nerve, and Pulses Intact  Musculoskeletal: Normal, except as noted in detailed exam and in HPI.      MUSCULOSKELETAL EXAMINATION:  Right thumb  NTTP 1st dorsal compartment  NTTP thumb CMC  - finkelstein  Full fist  Full digit extension     ___________________________________________________  STUDIES REVIEWED:  Pain medicine note 5/7/25 reviewed  Prior right hand x-rays reviewed  HbA1c 8/2024 6.3      PROCEDURES PERFORMED:  Small joint arthrocentesis: R thumb CMC    Performed by: Bret Salgado MD  Authorized by: Bret Salgado MD    [place] Protocol:  procedure performed by consultantConsent: Verbal consent obtained  Consent given by: patient  Patient identity confirmed: verbally with patient  Supporting Documentation  Indications: pain   Procedure Details  Location: thumb - R thumb CMC  Needle size: 25 G  Ultrasound  guidance: no  Medications administered: 1 mL ropivacaine 0.2 %; 6 mg betamethasone acetate-betamethasone sodium phosphate 6 (3-3) mg/mL    Patient tolerance: patient tolerated the procedure well with no immediate complications  Dressing:  Sterile dressing applied        -    _____________________________________________________      Scribe Attestation      I,:  Sol Hough MA am acting as a scribe while in the presence of the attending physician.:       I,:  Bret Salgado MD personally performed the services described in this documentation    as scribed in my presence.:                [1]   Past Medical History:  Diagnosis Date    Anaphylactic reaction     Chronic pain     Depression     Diabetes (HCC)     Diabetes mellitus (HCC)     Fibromyalgia, primary     GERD (gastroesophageal reflux disease)     Hyperlipidemia     Hypertension     Low testosterone     Neuropathy     Pilonidal cyst     last assessed 3/17/2014    Pneumonia     Sleep apnea     no cpap   [2]   Past Surgical History:  Procedure Laterality Date    BACK SURGERY      discectomy    ELBOW SURGERY Right     sometime between 2223-1363    MOUTH SURGERY      perm top teeth removed and dental implants    OTHER SURGICAL HISTORY  03/13/2015    Electr analysis of progr impl pump w/reprogram refill, requiring physician.  Replacement of right abdomen intrathecal  pain pump filled with Dilaudid with electronic analysis and programming.  managed by Bobby Coy    PILONIDAL CYST EXCISION      MO NDSC WRST SURG W/RLS TRANSVRS CARPL LIGM Right 09/06/2023    Procedure: RELEASE CARPAL TUNNEL ENDOSCOPIC;  Surgeon: Bret Salgado MD;  [facility];  Service: Orthopedics    MO NDSC WRST SURG W/RLS TRANSVRS CARPL LIGM Left 09/21/2023    Procedure: RELEASE CARPAL TUNNEL ENDOSCOPIC;  Surgeon: Bret Salgado MD;  [facility];  Service: Orthopedics    MO SURGICAL ARTHROSCOPY SHOULDER W/ROTATOR CUFF RPR Right 02/28/2020     Procedure: SHOULDER ARTHROSCOPIC ROTATOR CUFF REPAIR AND DEBRIDEMENT;  Surgeon: Wero De La Cruz MD;  [facility];  Service: Orthopedics    ROTATOR CUFF REPAIR Left     WISDOM TOOTH EXTRACTION     [3]   Family History  Problem Relation Name Age of Onset    Alcohol abuse Mother Dariana Gary             Liver disease Mother Dariana Gary     Arthritis Mother Dariana Gary             Uterine cancer Mother Dariana Gary     Lupus Mother Dariana Gary     Coronary artery disease Father Dewayne Foss             Heart disease Father Dewayne Foss     Prostate cancer Father Dewayne Foss             Diabetes Sister Dariana Douglass     Diabetes unspecified Sister Dariana Douglass     Hypertension Sister Dariana Douglass     Hyperlipidemia Sister Dariana Douglass         pure hypercholesterolemia    Diabetes unspecified Brother Dewayne Foss         DM    Hypertension Brother Dewayne Foss     Hyperlipidemia Brother Dewayne Foss         pure hypercholesterolemia    Diabetes Brother Dewayne Foss     Diabetes Brother          DM    Hypertension Family     [4]   Social History  Tobacco Use    Smoking status: Former     Current packs/day: 0.00     Types: Cigarettes     Quit date: 2018     Years since quittin.3    Smokeless tobacco: Never   Vaping Use    Vaping status: Never Used   Substance Use Topics    Alcohol use: Not Currently     Comment: rare    Drug use: Never   [5]   Current Outpatient Medications:     atorvastatin (LIPITOR) 20 mg tablet, TAKE ONE TABLET BY MOUTH DAILY AT BEDTIME, Disp: 90 tablet, Rfl: 1    celecoxib (CeleBREX) 100 mg capsule, Take 1 capsule by mouth in the morning and 1 capsule before bedtime., Disp: , Rfl:     chlorhexidine (PERIDEX) 0.12 % solution, , Disp: , Rfl:     Cholecalciferol (VITAMIN D) 2000 units CAPS, Take 1 capsule by mouth in the morning., Disp: , Rfl:     gabapentin (NEURONTIN) 300 mg capsule, Take 3 capsules  "(900 mg total) by mouth daily at bedtime, Disp: 270 capsule, Rfl: 1    HYDROmorphone (Dilaudid) 4 mg/mL injection, Inject 1 Device as directed in the morning., Disp: , Rfl:     lisinopril (ZESTRIL) 20 mg tablet, TAKE ONE TABLET BY MOUTH EVERY DAY, Disp: 90 tablet, Rfl: 1    meloxicam (MOBIC) 15 mg tablet, Take 1 tablet (15 mg total) by mouth daily as needed for moderate pain, Disp: 90 tablet, Rfl: 1    metFORMIN (GLUCOPHAGE-XR) 500 mg 24 hr tablet, TAKE ONE TABLET BY MOUTH TWICE A DAY WITH MEALS, Disp: 180 tablet, Rfl: 1    multivitamin (THERAGRAN) TABS, Take 1 tablet by mouth in the morning., Disp: , Rfl:     NEEDLE, DISP, 23 G 23G X 1-1/4\" MISC, Use once a week, Disp: 100 each, Rfl: 2    omeprazole (PriLOSEC) 40 MG capsule, TAKE ONE CAPSULE BY MOUTH EVERY DAY, Disp: 90 capsule, Rfl: 1    testosterone cypionate (DEPO-TESTOSTERONE) 200 mg/mL SOLN, INJECT 0.25 MLS WEEKLY*USE 1 VIAL FOR 1 WEEKLY DOSE,DISCARD REMAINDER (weekly dose of 50 mg/week), Disp: 12 mL, Rfl: 1    Upadacitinib ER (Rinvoq) 15 MG TB24, Take 15 mg by mouth in the morning., Disp: , Rfl:     Blood Glucose Monitoring Suppl (Contour Next EZ) w/Device KIT, Use 1 each 4 (four) times a day (Patient not taking: Reported on 5/2/2025), Disp: 1 kit, Rfl: 0    EPINEPHrine (EPIPEN) 0.3 mg/0.3 mL SOAJ, Inject 0.3 mL (0.3 mg total) into a muscle once for 1 dose (Patient taking differently: Inject 0.3 mg into a muscle once As needed), Disp: 0.6 mL, Rfl: 0    glucose blood (Contour Next Test) test strip, Use 1 each 4 (four) times a day (Patient not taking: Reported on 12/19/2024), Disp: 400 each, Rfl: 1    Microlet Lancets MISC, Use 4 (four) times a day (Patient not taking: Reported on 12/19/2024), Disp: 400 each, Rfl: 1  [6]   Allergies  Allergen Reactions    Amoxicillin Anaphylaxis     BP dropped     "

## 2025-06-13 NOTE — PATIENT INSTRUCTIONS
"Hand Arthritis    Some things that can be beneficial in relieving your pain are:  Voltaren Gel - An antiinflammatory gel you can apply topically on the area of pain rather than taking pills by mouth  Lidocaine Cream - A numbing agent you can apply topically - just make sure to use something to help apply this as it will make any skin it touches go numb!  Comfort Cool Brace - you can buy this off [employer] and wear as needed.  Push Metagrip Brace - you can buy this off [employer] and wear as needed.  Heat/Ice - Use whatever works. If you notice your joints don't do well with the cold, make sure to wear gloves and keep fingers warm.  Paraffin Wax - These machines can typically be found at physical therapy offices or sometimes even nail salons. They use the idea of \"moist heat\" to help with pain. If this is something that works well for you, you can buy your own machine to use at home offline.   Therapy - Some patients find therapy to be helpful to strengthen the muscles around the joint. If you are interested, we can always place an order for you.  Steroid Injections - These injections do not get rid of the arthritis, but help with the inflammation caused by the arthritis. You can receive these injections every 3 months or longer as needed. If one steroid doesn't seem to give you the best results, we do have other steroids we can try to see if you get a better response. It's like the typical Coke vs Pepsi debate... for some reason, some people just like one better than the other!     "

## 2025-06-13 NOTE — ASSESSMENT & PLAN NOTE
Treatment options were discussed in the form of heat, bracing, and a possible repeat steroid injection. Discussed typically, we like to perform the injections every 3 months however, we can consider one early injection. The patient was agreeable to this. He consented and underwent a right thumb CMC injection in the office today without any complications. Discussed if he does not see relief from this we can consider trying a different steroid. Hand OA AVS provided.  Orders:    Small joint arthrocentesis: R thumb CMC

## 2025-07-15 DIAGNOSIS — M96.1 LUMBAR POSTLAMINECTOMY SYNDROME: ICD-10-CM

## 2025-07-15 RX ORDER — GABAPENTIN 300 MG/1
CAPSULE ORAL
Qty: 270 CAPSULE | Refills: 1 | Status: SHIPPED | OUTPATIENT
Start: 2025-07-15

## 2025-07-23 ENCOUNTER — TELEPHONE (OUTPATIENT)
Age: 60
End: 2025-07-23

## 2025-08-06 ENCOUNTER — OFFICE VISIT (OUTPATIENT)
Dept: FAMILY MEDICINE CLINIC | Facility: CLINIC | Age: 60
End: 2025-08-06
Payer: COMMERCIAL

## 2025-08-06 ENCOUNTER — APPOINTMENT (OUTPATIENT)
Dept: RADIOLOGY | Facility: MEDICAL CENTER | Age: 60
End: 2025-08-06
Payer: COMMERCIAL

## 2025-08-06 VITALS
RESPIRATION RATE: 18 BRPM | HEIGHT: 69 IN | HEART RATE: 74 BPM | OXYGEN SATURATION: 97 % | DIASTOLIC BLOOD PRESSURE: 72 MMHG | BODY MASS INDEX: 27.7 KG/M2 | SYSTOLIC BLOOD PRESSURE: 112 MMHG | WEIGHT: 187 LBS | TEMPERATURE: 97.7 F

## 2025-08-06 DIAGNOSIS — F11.20 CONTINUOUS OPIOID DEPENDENCE (HCC): ICD-10-CM

## 2025-08-06 DIAGNOSIS — M54.12 CERVICAL RADICULOPATHY: Primary | ICD-10-CM

## 2025-08-06 DIAGNOSIS — M79.602 LEFT ARM PAIN: ICD-10-CM

## 2025-08-06 DIAGNOSIS — E11.42 DIABETIC POLYNEUROPATHY ASSOCIATED WITH TYPE 2 DIABETES MELLITUS (HCC): ICD-10-CM

## 2025-08-06 PROCEDURE — 93000 ELECTROCARDIOGRAM COMPLETE: CPT | Performed by: FAMILY MEDICINE

## 2025-08-06 PROCEDURE — 99214 OFFICE O/P EST MOD 30 MIN: CPT | Performed by: FAMILY MEDICINE

## 2025-08-06 RX ORDER — METHYLPREDNISOLONE 4 MG/1
TABLET ORAL
Qty: 21 EACH | Refills: 0 | Status: SHIPPED | OUTPATIENT
Start: 2025-08-06

## 2025-08-07 DIAGNOSIS — M54.12 CERVICAL RADICULOPATHY: Primary | ICD-10-CM

## (undated) DEVICE — GLOVE SRG BIOGEL ECLIPSE 7

## (undated) DEVICE — DRESSING MEPILEX AG BORDER 4 X 4 IN

## (undated) DEVICE — CUFF TOURNIQUET 18 X 4 IN QUICK CONNECT DISP 1 BLADDER

## (undated) DEVICE — PADDING CAST 4 IN  COTTON STRL

## (undated) DEVICE — GLOVE SRG BIOGEL 6.5

## (undated) DEVICE — SUT CHROMIC 4-0 P-3 18 MM 1654G

## (undated) DEVICE — SHOULDER SUSPENSION KIT 6 PER BOX

## (undated) DEVICE — NEEDLE 25G X 1 1/2

## (undated) DEVICE — TUBING SUCTION 5MM X 12 FT

## (undated) DEVICE — BLADE SHAVER DISSECTOR 4MM 13CM COOLCUT

## (undated) DEVICE — GAUZE SPONGES,16 PLY: Brand: CURITY

## (undated) DEVICE — ANTI-FOG SOLUTION WITH FOAM PAD: Brand: DEVON

## (undated) DEVICE — INTENDED FOR TISSUE SEPARATION, AND OTHER PROCEDURES THAT REQUIRE A SHARP SURGICAL BLADE TO PUNCTURE OR CUT.: Brand: BARD-PARKER ® CARBON RIB-BACK BLADES

## (undated) DEVICE — GLOVE INDICATOR PI UNDERGLOVE SZ 7 BLUE

## (undated) DEVICE — SYSTEM ENDOSCOPIC CARPAL TUNNEL RELEASE DISP

## (undated) DEVICE — THREADED CLEAR CANNULA WITH OBTURATOR 7MM X 75MM

## (undated) DEVICE — PAD CAST 4 IN COTTON NON STERILE

## (undated) DEVICE — INTENDED FOR TISSUE SEPARATION, AND OTHER PROCEDURES THAT REQUIRE A SHARP SURGICAL BLADE TO PUNCTURE OR CUT.: Brand: BARD-PARKER SAFETY BLADES SIZE 11, STERILE

## (undated) DEVICE — ACE WRAP 3 IN UNSTERILE

## (undated) DEVICE — ACE WRAP 4 IN STERILE

## (undated) DEVICE — GLOVE INDICATOR PI UNDERGLOVE SZ 7.5 BLUE

## (undated) DEVICE — 3M™ IOBAN™ 2 ANTIMICROBIAL INCISE DRAPE 6650EZ: Brand: IOBAN™ 2

## (undated) DEVICE — OCCLUSIVE GAUZE STRIP,3% BISMUTH TRIBROMOPHENATE IN PETROLATUM BLEND: Brand: XEROFORM

## (undated) DEVICE — ADHESIVE SKIN HIGH VISCOSITY EXOFIN 1ML

## (undated) DEVICE — STERILE BETHLEHEM PLASTIC HAND: Brand: CARDINAL HEALTH

## (undated) DEVICE — DISPOSABLE EQUIPMENT COVER: Brand: SMALL TOWEL DRAPE

## (undated) DEVICE — NEEDLE 25GA X 1 IN SAFETY GLIDE

## (undated) DEVICE — KNIFE CARPAL TUNNEL DISP

## (undated) DEVICE — SUT MONOCRYL 4-0 PS-2 18 IN Y496G

## (undated) DEVICE — SPONGE SCRUB 4 PCT CHLORHEXIDINE

## (undated) DEVICE — PACK PBDS SHOULDER ARTHROSCOPY RF

## (undated) DEVICE — VAPR COOLPULSE 90 ELECTRODE 90 DEGREES SUCTION WITH INTEGRATED HANDPIECE: Brand: VAPR COOLPULSE

## (undated) DEVICE — GLOVE SRG BIOGEL 7.5

## (undated) DEVICE — ACE WRAP 3 IN STERILE

## (undated) DEVICE — LIGHT HANDLE COVER SLEEVE DISP BLUE STELLAR

## (undated) DEVICE — SPONGE PVP SCRUB WING STERILE

## (undated) DEVICE — THREADED CLEAR CANNULA WITH OBTURATOR 8.5MM X 75MM

## (undated) DEVICE — CHLORAPREP HI-LITE 26ML ORANGE